# Patient Record
Sex: FEMALE | Race: WHITE | Employment: OTHER | URBAN - METROPOLITAN AREA
[De-identification: names, ages, dates, MRNs, and addresses within clinical notes are randomized per-mention and may not be internally consistent; named-entity substitution may affect disease eponyms.]

---

## 2017-01-06 ENCOUNTER — TELEPHONE (OUTPATIENT)
Dept: INTERNAL MEDICINE CLINIC | Age: 77
End: 2017-01-06

## 2017-01-06 NOTE — TELEPHONE ENCOUNTER
Patient called wanting to know if records have been sent to [facility]. She has not heard from them to schedule her appointment. please call patient back at [phone]

## 2017-01-09 NOTE — TELEPHONE ENCOUNTER
Spoke to pt - she states \"they would NOT give me an appointment, they said that they needed records to see if I was qualified to get an appointment with them. I called them the day after I was in your office and have not heard back from them. \" Pt was advised that I would be in contact with that office in attempts to get her scheduled. Pt states that she has an open schedule and is available whenever to schedule with Dr. Jackelyn Knapp.

## 2017-01-09 NOTE — TELEPHONE ENCOUNTER
LM on the general VM at [facility] requesting a staff member to contact me back at the office. Office number was provided.

## 2017-01-09 NOTE — TELEPHONE ENCOUNTER
Oral Hose Dr. Alta Easley was accepting new patients as of a few months ago. Please see if we can get her in to see him if possible. (he is [employer]). Other options for mental health (I'm not certain who has room for new patients at this time):    [facility]  Dr. Simon Ibarra, [address] [facility], [address]  64[address] [facility]   [phone]  [address] [facility], [address] [place] [address]  [phone]  201 [facility], 1 [facility]  [phone] 900 [facility] 101  [facility] 22.    [phone]   [address]   [address]  [address] [facility], [address]  Phone: [phone]      [facility]  Dr. Wilbur Juárez   [phone]  [address] [facility] [address]   [place] [id number]

## 2017-01-10 NOTE — TELEPHONE ENCOUNTER
Spoke to pt - she was advised of Dr. Esther Griffiths MD's recommendations and provided with all contact information. Ms. Hiral Winetr acknowledged understanding and all questions were answered to patients satisfaction. No further questions or concerns at this time. Requested pt to contact the office back with further information if she can get an appointment with a provider. Also, LVM on Dr. Wyatt Comes office VM requesting they contact the office back in attempts to get pt scheduled.

## 2017-01-10 NOTE — TELEPHONE ENCOUNTER
Left VM requesting pt to contact the office back to advise of further information and contacts provided by Dr. Alla Bui. Also, would like to advise pt that documentation has been faxed over to [facility]. Fax confirmation was received.

## 2017-03-23 RX ORDER — LOVASTATIN 40 MG/1
TABLET ORAL
Qty: 90 TAB | Refills: 0 | Status: SHIPPED | OUTPATIENT
Start: 2017-03-23 | End: 2017-06-20 | Stop reason: SDUPTHER

## 2017-04-25 RX ORDER — OMEPRAZOLE 20 MG/1
CAPSULE, DELAYED RELEASE ORAL
Qty: 180 CAP | Refills: 1 | Status: SHIPPED | OUTPATIENT
Start: 2017-04-25 | End: 2018-03-13 | Stop reason: SDUPTHER

## 2017-04-26 PROBLEM — Z79.891 CHRONICALLY ON OPIATE THERAPY: Status: ACTIVE | Noted: 2017-04-26

## 2017-04-26 RX ORDER — VALSARTAN 320 MG/1
TABLET ORAL
Qty: 90 TAB | Refills: 1 | Status: SHIPPED | OUTPATIENT
Start: 2017-04-26 | End: 2018-01-15 | Stop reason: SDUPTHER

## 2017-06-13 ENCOUNTER — HOSPITAL ENCOUNTER (OUTPATIENT)
Dept: LAB | Age: 77
Discharge: HOME | End: 2017-06-13
Payer: MEDICARE

## 2017-06-13 ENCOUNTER — OFFICE VISIT (OUTPATIENT)
Dept: INTERNAL MEDICINE CLINIC | Age: 77
End: 2017-06-13

## 2017-06-13 VITALS
HEIGHT: 64 IN | SYSTOLIC BLOOD PRESSURE: 110 MMHG | HEART RATE: 58 BPM | TEMPERATURE: 99.1 F | WEIGHT: 179 LBS | OXYGEN SATURATION: 96 % | RESPIRATION RATE: 16 BRPM | DIASTOLIC BLOOD PRESSURE: 60 MMHG | BODY MASS INDEX: 30.56 KG/M2

## 2017-06-13 DIAGNOSIS — E55.9 VITAMIN D DEFICIENCY: ICD-10-CM

## 2017-06-13 DIAGNOSIS — I10 BENIGN ESSENTIAL HYPERTENSION: Primary | ICD-10-CM

## 2017-06-13 DIAGNOSIS — E78.00 HYPERCHOLESTEREMIA: ICD-10-CM

## 2017-06-13 DIAGNOSIS — F32.A ANXIETY AND DEPRESSION: ICD-10-CM

## 2017-06-13 DIAGNOSIS — F41.9 ANXIETY AND DEPRESSION: ICD-10-CM

## 2017-06-13 DIAGNOSIS — I25.10 CORONARY ARTERY DISEASE INVOLVING NATIVE CORONARY ARTERY OF NATIVE HEART WITHOUT ANGINA PECTORIS: ICD-10-CM

## 2017-06-13 PROCEDURE — 81001 URINALYSIS AUTO W/SCOPE: CPT

## 2017-06-13 PROCEDURE — 82043 UR ALBUMIN QUANTITATIVE: CPT

## 2017-06-13 PROCEDURE — 82306 VITAMIN D 25 HYDROXY: CPT

## 2017-06-13 PROCEDURE — 80061 LIPID PANEL: CPT

## 2017-06-13 PROCEDURE — 85025 COMPLETE CBC W/AUTO DIFF WBC: CPT

## 2017-06-13 PROCEDURE — 36415 COLL VENOUS BLD VENIPUNCTURE: CPT

## 2017-06-13 PROCEDURE — 80053 COMPREHEN METABOLIC PANEL: CPT

## 2017-06-13 PROCEDURE — 84443 ASSAY THYROID STIM HORMONE: CPT

## 2017-06-13 RX ORDER — DOCUSATE SODIUM 100 MG/1
100 CAPSULE, LIQUID FILLED ORAL
Status: CANCELLED | OUTPATIENT
Start: 2017-06-13

## 2017-06-13 RX ORDER — POLYETHYLENE GLYCOL 3350 17 G/17G
POWDER, FOR SOLUTION ORAL
Qty: 24 PACKET | Refills: 0 | Status: SHIPPED | OUTPATIENT
Start: 2017-06-13 | End: 2018-03-19 | Stop reason: SDUPTHER

## 2017-06-13 NOTE — MR AVS SNAPSHOT
Visit Information Date & Time Provider Department Dept. Phone Encounter #  
 6/13/2017  1:15 PM Effie Mejia MD [place] [address] [facility] [phone] [id number] Follow-up Instructions Return in about 6 months (around 12/13/2017) for MWE, 30 min slot. Upcoming Health Maintenance Date Due DTaP/Tdap/Td series (1 - Tdap) 5/15/1961 ZOSTER VACCINE AGE 60> 5/15/2002 GLAUCOMA SCREENING Q2Y 5/15/2007 MEDICARE YEARLY EXAM 5/15/2007 Pneumococcal 65+ Low/Medium Risk (2 of 2 - PPSV23) 3/3/2016 INFLUENZA AGE 9 TO ADULT 8/1/2017 COLONOSCOPY 1/12/2026 Allergies as of 6/13/2017  Review Complete On: 6/13/2017 By: Effie Mejia MD  
 No Known Allergies Current Immunizations  Reviewed on 12/13/2016 Name Date Influenza High Dose Vaccine PF 12/1/2016 Influenza Vaccine PF 10/17/2014, 11/12/2013 12:18 PM  
 Influenza Vaccine Split 12/5/2012 Influenza Vaccine Whole 1/1/2010 Pneumococcal Conjugate (PCV-13) 3/3/2015 Not reviewed this visit You Were Diagnosed With   
  
 Codes Comments Vitamin D deficiency    -  Primary ICD-10-CM: E55.9 ICD-9-CM: 268.9 Benign essential hypertension     ICD-10-CM: I10 
ICD-9-CM: 401.1 Hypercholesteremia     ICD-10-CM: E78.00 ICD-9-CM: 272.0 Anxiety and depression     ICD-10-CM: F41.9, F32.9 ICD-9-CM: 300.00, 311 Coronary artery disease involving native coronary artery of native heart without angina pectoris     ICD-10-CM: I25.10 ICD-9-CM: 414.01 Vitals BP Pulse Temp Resp Height(growth percentile) Weight(growth percentile) 110/60 (BP 1 Location: Left arm, BP Patient Position: Sitting) (!) 58 99.1 °F (37.3 °C) (Oral) 16 5' 4\" (1.626 m) 179 lb (81.2 kg) SpO2 BMI OB Status Smoking Status 96% 30.73 kg/m2 Postmenopausal Former Smoker Vitals History BMI and BSA Data Body Mass Index Body Surface Area 30.73 kg/m 2 1.91 m 2 Preferred Pharmacy Pharmacy Name Phone [facility] [address] [facility] [address] [facility] [phone] Your Updated Medication List  
  
   
This list is accurate as of: 6/13/17  2:11 PM.  Always use your most recent med list. amLODIPine 10 mg tablet Commonly known as:  Frye Asher TAKE ONE TABLET BY MOUTH DAILY  
  
 aspirin 81 mg tablet Take 81 mg by mouth. B.infantis-B.ani-B.long-B.bifi 10-15 mg Tbec Take  by mouth. Calcium Carbonate-Vit D3-Min 600 mg calcium- 400 unit Tab Take 1 Tab by mouth daily. celecoxib 200 mg capsule Commonly known as:  CELEBREX Take 200 mg by mouth daily. cloNIDine HCl 0.2 mg tablet Commonly known as:  CATAPRES Take 0.2 mg by mouth two (2) times a day. COLACE 100 mg capsule Generic drug:  docusate sodium Take 100 mg by mouth daily as needed. DOMPERIDONE (BULK) Take 10 mg by mouth four (4) times daily. DULoxetine 60 mg capsule Commonly known as:  CYMBALTA Take 60 mg by mouth daily. furosemide 80 mg tablet Commonly known as:  LASIX TAKE ONE TABLET BY MOUTH DAILY HYDROcodone-acetaminophen  mg tablet Commonly known as:  NORCO  
four (4) times daily. lovastatin 40 mg tablet Commonly known as:  MEVACOR  
TAKE ONE TABLET BY MOUTH EVERY NIGHT  
  
 LYRICA PO Take 150 mg by mouth two (2) times a day. Pt unsure of dosage  
  
 melatonin 5 mg Cap capsule Take 5 mg by mouth nightly. metoprolol tartrate 100 mg IR tablet Commonly known as:  LOPRESSOR Take 1 Tab by mouth two (2) times a day. multivitamin with iron tablet Take 1 Tab by mouth daily. omeprazole 20 mg capsule Commonly known as:  PRILOSEC  
TAKE ONE CAPSULE BY MOUTH TWICE A DAY  
  
 polyethylene glycol 17 gram packet Commonly known as:  Nick Hoops 1 packet as needed daily. No more than three times a week  
  
 valsartan 320 mg tablet Commonly known as:  DIOVAN  
TAKE ONE TABLET BY MOUTH DAILY ZyrTEC 10 mg tablet Generic drug:  cetirizine Take 10 mg by mouth daily. Prescriptions Sent to Pharmacy Refills  
 polyethylene glycol (MIRALAX) 17 gram packet 0 Si packet as needed daily. No more than three times a week Class: Normal  
 Pharmacy: [facility] [address] [facility] [address] Lower[address] [facility] Ph #: [phone] We Performed the Following CBC WITH AUTOMATED DIFF [55482 CPT(R)] LIPID PANEL [24394 CPT(R)] METABOLIC PANEL, COMPREHENSIVE [54119 CPT(R)] MICROALBUMIN, UR, RAND W/ MICROALBUMIN/CREA RATIO S067005 CPT(R)] TSH REFLEX TO T4 [[id number] Custom] UA/M W/RFLX CULTURE, ROUTINE [[id number] Custom] VITAMIN D, 25 HYDROXY [id number] CPT(R)] Follow-up Instructions Return in about 6 months (around 2017) for MWE, 30 min slot. Please provide this summary of care documentation to your next provider. Your primary care clinician is listed as Yuan Karma. If you have any questions after today's visit, please call [phone].

## 2017-06-13 NOTE — PROGRESS NOTES
Establish Care       HPI:  Judy Bailey is a 78y.o. year old female who is here to establish care. She  had her medical care:    [facility]    She reports the following history and medical concerns:      HTN- s/p MI in . Dr. Judyann Favre  Valsartan  Clonidine  Amlodipine    Chol- mevacor 40 mg. Also on lasix 80 mg for fluid retention- Dr. Judyann Favre  (episode in 2016 when she was on a lower dose of lasix)  Developed pressure sore on ankle and had to go to wound care. Had a nuclear stress test.    Metabolic encephalopathy- opiod use as per discharge summary 2016    Dr. Tori Murphy- pain management. On oxycodone, lyrica, and cymbalta. Took her off paxil. Significant other  recently. Life has been hell since then regarding the trust.    Aware of risk of kidney issues and ulcers with celebrex        Assessment and Plan        1. Vitamin D deficiency  Patient compliant with Vit D. Emphasized importance of taking with food and not too much because it can be toxic to our body. Therefore, it should be checked regularly. Levels ordered. - VITAMIN D, 25 HYDROXY    2. Benign essential hypertension  Tolerating medication. Denies dizziness that is positional, SOB, or chest pain. Understands the importance of compliance to reduce risk of future heart failure. Agreed to call if any of above symptoms develop and  stay on current regimen of  clonodine and valsartan and amlodipine. Concerned about her use of lasix 80 mg but she states she needs it. Will check creatinine.  - CBC WITH AUTOMATED DIFF  - METABOLIC PANEL, COMPREHENSIVE  - MICROALBUMIN, UR, RAND W/ MICROALBUMIN/CREA RATIO  - UA/M W/RFLX CULTURE, ROUTINE    3. Hypercholesteremia  The nature of cardiac risk has been fully discussed with this patient. I have made her aware of her LDL target goal given her cardiovascular risk analysis. I have discussed the appropriate diet. The need for lifelong compliance in order to reduce risk is stressed.  A regular exercise program is recommended to help achieve and maintain normal body weight, fitness and improve lipid balance. The risks and benefits of medications were discussed. Advised to have Omega 3 Fish Oil 1000 mg as a supplement. Last cholesterol labs reviewed with patient. Patient made aware to get liver checked every 6 months. Continue with  mevacor for now although old drug.  - LIPID PANEL  - TSH REFLEX TO T4    4. Anxiety and depression  Doing well on cymbalta. 5. Coronary artery disease involving native coronary artery of native heart without angina pectoris  Stable. Stopped smoking cigars. 6. Pain management- concerned about her use of chronic celebrex. Risk for heart disease and kidney issues. Will check Cr.          Visit Vitals    /60 (BP 1 Location: Left arm, BP Patient Position: Sitting)    Pulse (!) 58    Temp 99.1 °F (37.3 °C) (Oral)    Resp 16    Ht 5' 4\" (1.626 m)    Wt 179 lb (81.2 kg)    SpO2 96%    BMI 30.73 kg/m2       Historical Data    Past Medical History:   Diagnosis Date    Arthritis     Beta-blocker therapy     CAD (coronary artery disease) 1999    MI >> stent x3, cath 2010: OK    Chronic pain     back pain    Delayed gastric emptying     Depression     Depression with anxiety     Dyspepsia and other specified disorders of function of stomach     GERD (gastroesophageal reflux disease)     Hypercholesterolemia     Hypertension        Past Surgical History:   Procedure Laterality Date    HX CATARACT REMOVAL      HX CORONARY STENT PLACEMENT      HX DILATION AND CURETTAGE      HX HEENT      ORAL SX    HX LUMBAR LAMINECTOMY  12/2012    Dr. Pool Bass    R Maury Sanchez 8 HX OTHER SURGICAL      RECTAL SX TWICE; ABCESS; FISTULA    HX SMALL BOWEL RESECTION  10/31/14    Obstruction, Dr. Bhupendra Caceres, 25 in removed    HX TONSILLECTOMY         Outpatient Encounter Prescriptions as of 6/13/2017   Medication Sig Dispense Refill    polyethylene glycol (MIRALAX) 17 gram packet 1 packet as needed daily. No more than three times a week 24 Packet 0    valsartan (DIOVAN) 320 mg tablet TAKE ONE TABLET BY MOUTH DAILY 90 Tab 1    omeprazole (PRILOSEC) 20 mg capsule TAKE ONE CAPSULE BY MOUTH TWICE A  Cap 1    lovastatin (MEVACOR) 40 mg tablet TAKE ONE TABLET BY MOUTH EVERY NIGHT 90 Tab 0    furosemide (LASIX) 80 mg tablet TAKE ONE TABLET BY MOUTH DAILY 90 Tab 1    melatonin (MELATONIN) 5 mg cap capsule Take 5 mg by mouth nightly.  cloNIDine HCl (CATAPRES) 0.2 mg tablet Take 0.2 mg by mouth two (2) times a day.  DULoxetine (CYMBALTA) 60 mg capsule Take 60 mg by mouth daily.  HYDROcodone-acetaminophen (NORCO)  mg tablet four (4) times daily.  multivitamin with iron tablet Take 1 Tab by mouth daily.  amLODIPine (NORVASC) 10 mg tablet TAKE ONE TABLET BY MOUTH DAILY 90 Tab 3    PREGABALIN (LYRICA PO) Take 150 mg by mouth two (2) times a day. Pt unsure of dosage      celecoxib (CELEBREX) 200 mg capsule Take 200 mg by mouth daily.  B.infantis-B.ani-B.long-B.bifi 10-15 mg TbEC Take  by mouth.  cetirizine (ZYRTEC) 10 mg tablet Take 10 mg by mouth daily.  metoprolol (LOPRESSOR) 100 mg tablet Take 1 Tab by mouth two (2) times a day. 180 Tab 1    Calcium Carbonate-Vit D3-Min 600 mg calcium- 400 unit Tab Take 1 Tab by mouth daily.  docusate sodium (COLACE) 100 mg capsule Take 100 mg by mouth daily as needed.  DOMPERIDONE, BULK, Take 10 mg by mouth four (4) times daily.  aspirin 81 mg tablet Take 81 mg by mouth. No facility-administered encounter medications on file as of 6/13/2017. No Known Allergies     Social History     Social History    Marital status:      Spouse name: N/A    Number of children: N/A    Years of education: N/A     Occupational History    Not on file.      Social History Main Topics    Smoking status: Former Smoker     Packs/day: 1.00     Types: Cigarettes     Quit date: 1/1/2006    Smokeless tobacco: Never Used    Alcohol use 1.8 oz/week     3 Standard drinks or equivalent per week    Drug use: No    Sexual activity: Not Currently     Other Topics Concern    Not on file     Social History Narrative    Julia lives independently. One daughter is nurse at [employer], one daughter  at [employer].         family history includes Depression in her maternal aunt, maternal grandfather, maternal grandmother, and maternal uncle; Heart Disease in her brother, father, and mother; Hypertension in her brother and mother; Stroke in her father. Review of Systems   Constitutional: Negative for weight loss. Eyes: Negative for blurred vision. Respiratory: Negative for shortness of breath. Cardiovascular: Negative for chest pain. Gastrointestinal: Negative for abdominal pain. Genitourinary: Negative for dysuria and frequency. Skin: Negative for rash. Neurological: Negative for dizziness, focal weakness, weakness and headaches. Endo/Heme/Allergies: Negative for environmental allergies. Does not bruise/bleed easily. Physical Exam   Constitutional: She appears well-developed and well-nourished. She is active. Non-toxic appearance. She does not have a sickly appearance. She does not appear ill. No distress. Eyes: Conjunctivae are normal. Right eye exhibits no discharge. Cardiovascular: Normal rate, regular rhythm, S1 normal, S2 normal, normal heart sounds and normal pulses. Exam reveals no gallop and no friction rub. Pulmonary/Chest: Effort normal and breath sounds normal. No respiratory distress. Abdominal: Soft. Bowel sounds are normal.   Musculoskeletal: She exhibits no edema or deformity. Neurological: She is alert. Skin: Skin is warm and dry. No rash noted. No pallor. Psychiatric: She has a normal mood and affect. Her behavior is normal.   Vitals reviewed.      Ortho Exam       Orders Placed This Encounter  CBC WITH AUTOMATED DIFF    LIPID PANEL    TSH REFLEX TO T4    METABOLIC PANEL, COMPREHENSIVE    VITAMIN D, 25 HYDROXY    MICROALBUMIN, UR, RAND W/ MICROALBUMIN/CREA RATIO    UA/M W/RFLX CULTURE, ROUTINE    polyethylene glycol (MIRALAX) 17 gram packet     Si packet as needed daily. No more than three times a week     Dispense:  24 Packet     Refill:  0        I have reviewed the patient's medical history in detail and updated the computerized patient record. We had a prolonged discussion about these complex clinical issues and went over the various important aspects to consider. All questions were answered. Advised her to call back or return to office if symptoms do not improve, change in nature, or persist.    She was given an after visit summary or informed of [employer] [facility] which includes patient instructions, diagnoses, current medications, & vitals. She expressed understanding with the diagnosis and plan.

## 2017-06-13 NOTE — PROGRESS NOTES
1. Have you been to the ER, urgent care clinic since your last visit? Hospitalized since your last visit? No    2. Have you seen or consulted any other health care providers outside of the [address] [place] [address] since your last visit? Include any pap smears or colon screening.  No  Chief Complaint   Patient presents with   [address]

## 2017-06-14 LAB
25(OH)D3+25(OH)D2 SERPL-MCNC: 31.8 NG/ML (ref 30–100)
ALBUMIN SERPL-MCNC: 4.2 G/DL (ref 3.5–4.8)
ALBUMIN/CREAT UR: <19.1 MG/G CREAT (ref 0–30)
ALBUMIN/GLOB SERPL: 1.8 {RATIO} (ref 1.2–2.2)
ALP SERPL-CCNC: 77 IU/L (ref 39–117)
ALT SERPL-CCNC: 16 IU/L (ref 0–32)
APPEARANCE UR: CLEAR
AST SERPL-CCNC: 23 IU/L (ref 0–40)
BACTERIA #/AREA URNS HPF: NORMAL /[HPF]
BASOPHILS # BLD AUTO: 0 X10E3/UL (ref 0–0.2)
BASOPHILS NFR BLD AUTO: 0 %
BILIRUB SERPL-MCNC: 0.5 MG/DL (ref 0–1.2)
BILIRUB UR QL STRIP: NEGATIVE
BUN SERPL-MCNC: 15 MG/DL (ref 8–27)
BUN/CREAT SERPL: 21 (ref 12–28)
CALCIUM SERPL-MCNC: 9.2 MG/DL (ref 8.7–10.3)
CASTS URNS QL MICRO: NORMAL /LPF
CHLORIDE SERPL-SCNC: 99 MMOL/L (ref 96–106)
CHOLEST SERPL-MCNC: 152 MG/DL (ref 100–199)
CO2 SERPL-SCNC: 25 MMOL/L (ref 18–29)
COLOR UR: YELLOW
CREAT SERPL-MCNC: 0.71 MG/DL (ref 0.57–1)
CREAT UR-MCNC: 15.7 MG/DL
EOSINOPHIL # BLD AUTO: 0.1 X10E3/UL (ref 0–0.4)
EOSINOPHIL NFR BLD AUTO: 1 %
EPI CELLS #/AREA URNS HPF: NORMAL /HPF
ERYTHROCYTE [DISTWIDTH] IN BLOOD BY AUTOMATED COUNT: 13.5 % (ref 12.3–15.4)
GLOBULIN SER CALC-MCNC: 2.4 G/DL (ref 1.5–4.5)
GLUCOSE SERPL-MCNC: 85 MG/DL (ref 65–99)
GLUCOSE UR QL: NEGATIVE
HCT VFR BLD AUTO: 41 % (ref 34–46.6)
HDLC SERPL-MCNC: 39 MG/DL
HGB BLD-MCNC: 13.8 G/DL (ref 11.1–15.9)
HGB UR QL STRIP: NEGATIVE
IMM GRANULOCYTES # BLD: 0 X10E3/UL (ref 0–0.1)
IMM GRANULOCYTES NFR BLD: 0 %
INTERPRETATION, 910389: NORMAL
KETONES UR QL STRIP: NEGATIVE
LDLC SERPL CALC-MCNC: 60 MG/DL (ref 0–99)
LEUKOCYTE ESTERASE UR QL STRIP: NEGATIVE
LYMPHOCYTES # BLD AUTO: 2.4 X10E3/UL (ref 0.7–3.1)
LYMPHOCYTES NFR BLD AUTO: 24 %
MCH RBC QN AUTO: 32.2 PG (ref 26.6–33)
MCHC RBC AUTO-ENTMCNC: 33.7 G/DL (ref 31.5–35.7)
MCV RBC AUTO: 96 FL (ref 79–97)
MICRO URNS: NORMAL
MICRO URNS: NORMAL
MICROALBUMIN UR-MCNC: <3 UG/ML
MONOCYTES # BLD AUTO: 0.6 X10E3/UL (ref 0.1–0.9)
MONOCYTES NFR BLD AUTO: 6 %
NEUTROPHILS # BLD AUTO: 6.9 X10E3/UL (ref 1.4–7)
NEUTROPHILS NFR BLD AUTO: 69 %
NITRITE UR QL STRIP: NEGATIVE
PH UR STRIP: 6 [PH] (ref 5–7.5)
PLATELET # BLD AUTO: 208 X10E3/UL (ref 150–379)
POTASSIUM SERPL-SCNC: 4.3 MMOL/L (ref 3.5–5.2)
PROT SERPL-MCNC: 6.6 G/DL (ref 6–8.5)
PROT UR QL STRIP: NEGATIVE
RBC # BLD AUTO: 4.29 X10E6/UL (ref 3.77–5.28)
RBC #/AREA URNS HPF: NORMAL /HPF
SODIUM SERPL-SCNC: 141 MMOL/L (ref 134–144)
SP GR UR: 1.01 (ref 1–1.03)
TRIGL SERPL-MCNC: 264 MG/DL (ref 0–149)
TSH SERPL DL<=0.005 MIU/L-ACNC: 3.88 UIU/ML (ref 0.45–4.5)
URINALYSIS REFLEX, 377202: NORMAL
UROBILINOGEN UR STRIP-MCNC: 0.2 MG/DL (ref 0.2–1)
VLDLC SERPL CALC-MCNC: 53 MG/DL (ref 5–40)
WBC # BLD AUTO: 10.1 X10E3/UL (ref 3.4–10.8)
WBC #/AREA URNS HPF: NORMAL /HPF

## 2017-06-17 NOTE — PROGRESS NOTES
Let her know her kidneys and liver are ok but her TG are very high indicating too high carb intake like sweets, bread, potatoes, pasta. When too high it can cause pancreatitis. Ok to mail copy if she wants of her labs. Her urine culture was negative.   Ask if she is having urine symptoms  Signed electronically by: Simone Ray MD at 8:11 AM on June 17, 2017

## 2017-06-20 NOTE — PROGRESS NOTES
Patient notified if lab results. Patient states she does not eat a lot of starches. She has gastroparesis. She states she is taking lovastatin 40 mg for cholesterol. She is wondering if she need to increase her dosage.

## 2017-09-05 ENCOUNTER — HOSPITAL ENCOUNTER (OUTPATIENT)
Dept: PHYSICAL THERAPY | Age: 77
Discharge: HOME | End: 2017-09-05
Payer: MEDICARE

## 2017-09-05 PROCEDURE — 97110 THERAPEUTIC EXERCISES: CPT

## 2017-09-05 PROCEDURE — G8979 MOBILITY GOAL STATUS: HCPCS

## 2017-09-05 PROCEDURE — G8978 MOBILITY CURRENT STATUS: HCPCS

## 2017-09-05 PROCEDURE — 97162 PT EVAL MOD COMPLEX 30 MIN: CPT

## 2017-09-05 NOTE — PROGRESS NOTES
[facility]  [address]  Phone: [phone]  Fax: [phone]      Plan of Care/Statement of Necessity for Physical Therapy Services  2-15    Patient name: Alicja Rodas  : 1940  Provider#: [id number]  Referral source: Brenda Osorio MD      Medical/Treatment Diagnosis: Low back pain [M54.5]     Prior Hospitalization: see medical history     Comorbidities: GERD, CAD, chronic back pain, anxiety and depression, spinal stenosis, lumbar region with neurogenic claudication, gastroparesis, s/p colon resection, dextroscoliosis, bilateral leg edema, HTN, mild obstructive sleep apnea on CPAP, osteopenia, persistent disorder of initiating or maintaining sleep, hypercholesteremia, chronic neck and back pain, iron deficiency anemia, chronically on opiate therapy  Prior Level of Function: chronic back pain; retired  Medications: Verified on Patient Summary List  Start of Care: 2017     Onset Date: chronic   The Plan of Care and following information is based on the information from the initial evaluation. Assessment/ key information: Pt is a 78year old female who is referred to Physical Therapy by Savanna Marti, with a diagnosis of post-laminectomy syndrome and lumbar radiculopathy. Pt presents poor postural awareness and is a poor pelvic girdle stabilizer. Pt demonstrates weakness in bilateral LEs. Pt wears a lumbar brace for support and has a pain stimulator implanted for management of chronic back pain.   Patient will benefit from skilled PT services to modify and progress therapeutic interventions, address functional mobility deficits, address ROM deficits, address strength deficits, analyze and address soft tissue restrictions, analyze and cue movement patterns, analyze and modify body mechanics/ergonomics, assess and modify postural abnormalities, address imbalance/dizziness and instruct in home and community integration to attain pt/PT goals. Evaluation Complexity History HIGH Complexity :3+ comorbidities / personal factors will impact the outcome/ POC ; Examination HIGH Complexity : 4+ Standardized tests and measures addressing body structure, function, activity limitation and / or participation in recreation  ;Presentation MEDIUM Complexity : Evolving with changing characteristics  ; Clinical Decision Making MEDIUM Complexity : FOTO score of 26-74  Overall Complexity Rating: MEDIUM    Problem List: pain affecting function, decrease ROM, decrease strength, edema affecting function, impaired gait/ balance, decrease ADL/ functional abilitiies, decrease activity tolerance, decrease flexibility/ joint mobility and decrease transfer abilities   Treatment Plan may include any combination of the following: Therapeutic exercise, Therapeutic activities, Neuromuscular re-education, Physical agent/modality, Gait/balance training, Manual therapy, Aquatic therapy, Patient education, Functional mobility training and Stair training  Patient / Family readiness to learn indicated by: asking questions, trying to perform skills and interest  Persons(s) to be included in education: patient (P)  Barriers to Learning/Limitations: None  Patient Goal (s): Improve walking  Patient Self Reported Health Status: good  Rehabilitation Potential: fair    Short Term Goals: To be accomplished in 2 weeks:  1) Pt will be Independent with HEP. 2) Pt will be able to sit greater than 30 minutes without pain. 3) Pt will be able to stand greater than 10 minutes without increase of pain to perform meal preparation. 4) Pt will be able to ambulate greater than 0.5 block without increase of pain with LRAD. Long Term Goals: To be accomplished in 4 weeks:  1) Pt will be able to stand greater than 15 minutes without increase of pain to perform household chores. 2) Pt will be able to ambulate greater than1 block without increase of pain with LRAD.   3) Pt will be able to retrieve item from ground without pain. 4) Pt will report improvement in overall functional mobility, as measured by FOTO, with an increased score of at least 8 points, from 38 to 46. Frequency / Duration: Patient to be seen 2 times per week for 4 weeks. Patient/ Caregiver education and instruction: self care, activity modification and exercises    [x]  Plan of care has been reviewed with PTA    G-Codes (GP)  Mobility   Current  CL= 60-79%   Goal  CK= 40-59%  The severity rating is based on clinical judgment and the FOTO Score. Certification Period: 09/05/2017-12/05/2107  Carmelo Souza PT, DPT   9/5/2017 1:43 PM    ________________________________________________________________________    I certify that the above Therapy Services are being furnished while the patient is under my care. I agree with the treatment plan and certify that this therapy is necessary.     [de-identified] Signature:____________________  Date:____________Time: _________
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
## 2017-09-11 ENCOUNTER — HOSPITAL ENCOUNTER (OUTPATIENT)
Dept: PHYSICAL THERAPY | Age: 77
Discharge: HOME | End: 2017-09-11
Payer: MEDICARE

## 2017-09-11 PROCEDURE — 97110 THERAPEUTIC EXERCISES: CPT

## 2017-09-11 NOTE — PROGRESS NOTES
PT DAILY TREATMENT NOTE - [facility] 2-15    Patient Name: Grant Tillman  Date:2017  : 1940  [x]  Patient  Verified  Payor: Edgar Jimenez / Plan: [place] MEDICARE PART A & B / Product Type: Medicare /    In time:1:45pm  Out time: 2:46/105pm  Total Treatment Time (min): 60  Total Timed Codes (min): 60  1:1 Treatment Time ( [address] only): 30   Visit #: 2     Treatment Area: Low back pain [M54.5]    SUBJECTIVE  Pain Level (0-10 scale): 6/10  Any medication changes, allergies to medications, adverse drug reactions, diagnosis change, or new procedure performed?: [x] No    [] Yes (see summary sheet for update)  Subjective functional status/changes:   [] No changes reported  Pt reports compliance with HEP. Pt brings in rollator and is interested if we can help move back wheels from the inside to the outside because when she walks (with the wheels on the inside), she trips on them. OBJECTIVE  50 min Therapeutic Exercise:  [x] See flow sheet :   Rationale: increase ROM, increase strength and improve coordination to improve the patients ability to activate core stabilizing muscles to support LB and improve pt's ability to perform functional activities          10  With   [] TE   [] TA   [] neuro   [] other: Patient Education: [x] Review HEP    [] Progressed/Changed HEP based on:   [] positioning   [] body mechanics   [] transfers   [] heat/ice application    [x] other: able to move back 2 wheels on rollator from the inside to the outside. Other Objective/Functional Measures: --     Pain Level (0-10 scale) post treatment: 5/10    ASSESSMENT/Changes in Function:   Pt tolerated progression of therapeutic exercises without increase in pain. Used foam roll to facilitate activation of lower abdominals during posterior pelvic tilt with abdominal brace exercise.    Patient will continue to benefit from skilled PT services to modify and progress therapeutic interventions, address functional mobility deficits, address ROM deficits, address strength deficits, analyze and address soft tissue restrictions, analyze and cue movement patterns, analyze and modify body mechanics/ergonomics, assess and modify postural abnormalities and address imbalance to attain remaining goals. []  See Plan of Care  []  See progress note/recertification  []  See Discharge Summary         Progress towards goals / Updated goals:  Short Term Goals: To be accomplished in 2 weeks:  1) Pt will be Independent with HEP. 2) Pt will be able to sit greater than 30 minutes without pain. 3) Pt will be able to stand greater than 10 minutes without increase of pain to perform meal preparation. 4) Pt will be able to ambulate greater than 0.5 block without increase of pain with LRAD.      Long Term Goals: To be accomplished in 4 weeks:  1) Pt will be able to stand greater than 15 minutes without increase of pain to perform household chores. 2) Pt will be able to ambulate greater than1 block without increase of pain with LRAD. 3) Pt will be able to retrieve item from ground without pain. 4) Pt will report improvement in overall functional mobility, as measured by FOTO, with an increased score of at least 8 points, from 38 to 46.         PLAN  [x]  Upgrade activities as tolerated     [x]  Continue plan of care  []  Update interventions per flow sheet       []  Discharge due to:_  []  Other:_      Oswald Orellana PT, DPT   9/11/2017  3:18 PM

## 2017-09-14 ENCOUNTER — HOSPITAL ENCOUNTER (OUTPATIENT)
Dept: PHYSICAL THERAPY | Age: 77
Discharge: HOME | End: 2017-09-14
Payer: MEDICARE

## 2017-09-14 PROCEDURE — 97110 THERAPEUTIC EXERCISES: CPT

## 2017-09-14 NOTE — PROGRESS NOTES
PT DAILY TREATMENT NOTE - [facility] 2-15    Patient Name: Benito Zimmerman  Date:2017  : 1940  [x]  Patient  Verified  Payor: Skye Falk / Plan: [place] MEDICARE PART A & B / Product Type: Medicare /    In time:1:15pm  Out time: 2:15pm  Total Treatment Time (min): 60  Total Timed Codes (min): 50  1:1 Treatment Time ( [address] only): 50  Visit #: 3    Treatment Area: Low back pain [M54.5]    SUBJECTIVE  Pain Level (0-10 scale): 8/10  Any medication changes, allergies to medications, adverse drug reactions, diagnosis change, or new procedure performed?: [x] No    [] Yes (see summary sheet for update)  Subjective functional status/changes:   [] No changes reported  Pt complains of increased pain in hips and legs from doing a lot of driving since last PT session and from additional exercises last visit.     OBJECTIVE  Modality rationale: decrease pain and increase tissue extensibility to improve the patients ability to ambulate and perform functional activities safely   Min Type Additional Details    [] Estim: []Att   []Unatt        []TENS instruct                  []IFC  []Premod   []NMES                     []Other:  []w/US   []w/ice   []w/heat  Position:  Location:    []  Traction: [] Cervical       []Lumbar                       [] Prone          []Supine                       []Intermittent   []Continuous Lbs:  [] before manual  [] after manual  []w/heat    []  Ultrasound: []Continuous   [] Pulsed at:                           []1MHz   []3MHz Location:  W/cm2:    [] Paraffin         Location:   []w/heat   10 []  Ice     [x]  Heat  []  Ice massage Position: right side-lying with pillow between knees  Location: low back at end of session    []  Laser  []  Other: Position:  Location:      []  Vasopneumatic Device Pressure:       [] lo [] med [] hi   Temperature:    [x] Skin assessment post-treatment:  [x]intact []redness- no adverse reaction    []redness  adverse reaction:     50 min Therapeutic Exercise:  [x] See flow sheet :   Rationale: increase ROM, increase strength and improve coordination to improve the patients ability to activate core stabilizing muscles to support LB and improve pt's ability to perform functional activities            With   [] TE   [] TA   [] neuro   [] other: Patient Education: [x] Review HEP    [] Progressed/Changed HEP based on:   [] positioning   [] body mechanics   [] transfers   [] heat/ice application    [] other:     Other Objective/Functional Measures: --     Pain Level (0-10 scale) post treatment: 4/10    ASSESSMENT/Changes in Function:   Pt reported significant benefit from moist hot pack at end of session. Progressed strengthening and core stabilization exercises without increase in pain. Pt educated about postural awareness, especially when ambulating with rollator. Patient will continue to benefit from skilled PT services to modify and progress therapeutic interventions, address functional mobility deficits, address ROM deficits, address strength deficits, analyze and address soft tissue restrictions, analyze and cue movement patterns, analyze and modify body mechanics/ergonomics, assess and modify postural abnormalities and address imbalance to attain remaining goals. []  See Plan of Care  []  See progress note/recertification  []  See Discharge Summary         Progress towards goals / Updated goals:  Short Term Goals: To be accomplished in 2 weeks:  1) Pt will be Independent with HEP. 2) Pt will be able to sit greater than 30 minutes without pain. 3) Pt will be able to stand greater than 10 minutes without increase of pain to perform meal preparation. 4) Pt will be able to ambulate greater than 0.5 block without increase of pain with LRAD.      Long Term Goals: To be accomplished in 4 weeks:  1) Pt will be able to stand greater than 15 minutes without increase of pain to perform household chores.   2) Pt will be able to ambulate greater than1 block without increase of pain with LRAD. 3) Pt will be able to retrieve item from ground without pain. 4) Pt will report improvement in overall functional mobility, as measured by FOTO, with an increased score of at least 8 points, from 38 to 46.         PLAN  [x]  Upgrade activities as tolerated     [x]  Continue plan of care  []  Update interventions per flow sheet       []  Discharge due to:_  []  Other:_      Alfred Causey, PT, DPT   9/14/2017  3:18 PM

## 2017-09-18 ENCOUNTER — APPOINTMENT (OUTPATIENT)
Dept: PHYSICAL THERAPY | Age: 77
End: 2017-09-18
Payer: MEDICARE

## 2017-09-19 ENCOUNTER — HOSPITAL ENCOUNTER (OUTPATIENT)
Dept: PHYSICAL THERAPY | Age: 77
Discharge: HOME | End: 2017-09-19
Payer: MEDICARE

## 2017-09-19 PROCEDURE — 97110 THERAPEUTIC EXERCISES: CPT

## 2017-09-19 NOTE — PROGRESS NOTES
PT DAILY TREATMENT NOTE - [facility] 2-15    Patient Name: Angel Dinero  Date:2017  : 1940  [x]  Patient  Verified  Payor: [place] MEDICARE / Plan: [place] MEDICARE PART A & B / Product Type: Medicare /    In time: 1:30pm  Out time: 2:30pm  Total Treatment Time (min): 60  Total Timed Codes (min): 50  1:1 Treatment Time ( [address] only): 50  Visit #: 4    Treatment Area: Low back pain [M54.5]    SUBJECTIVE  Pain Level (0-10 scale): 8/10  Any medication changes, allergies to medications, adverse drug reactions, diagnosis change, or new procedure performed?: [x] No    [] Yes (see summary sheet for update)  Subjective functional status/changes:   [] No changes reported  Pt reports compliance with HEP and has been practicing sit to/from stand with controlled descent. Pt feels that exercises are helping manage pain.     OBJECTIVE  Modality rationale: decrease pain and increase tissue extensibility to improve the patients ability to ambulate and perform functional activities safely   Min Type Additional Details    [] Estim: []Att   []Unatt        []TENS instruct                  []IFC  []Premod   []NMES                     []Other:  []w/US   []w/ice   []w/heat  Position:  Location:    []  Traction: [] Cervical       []Lumbar                       [] Prone          []Supine                       []Intermittent   []Continuous Lbs:  [] before manual  [] after manual  []w/heat    []  Ultrasound: []Continuous   [] Pulsed at:                           []1MHz   []3MHz Location:  W/cm2:    [] Paraffin         Location:   []w/heat   10 []  Ice     [x]  Heat  []  Ice massage Position: right side-lying with pillow between knees  Location: low back at end of session    []  Laser  []  Other: Position:  Location:      []  Vasopneumatic Device Pressure:       [] lo [] med [] hi   Temperature:    [x] Skin assessment post-treatment:  [x]intact []redness- no adverse reaction    []redness  adverse reaction:     50 min Therapeutic Exercise: [x] See flow sheet :   Rationale: increase ROM, increase strength and improve coordination to improve the patients ability to activate core stabilizing muscles to support LB and improve pt's ability to perform functional activities            With   [] TE   [] TA   [] neuro   [] other: Patient Education: [x] Review HEP    [] Progressed/Changed HEP based on:   [] positioning   [] body mechanics   [] transfers   [] heat/ice application    [] other:     Other Objective/Functional Measures: --     Pain Level (0-10 scale) post treatment: 4/10    ASSESSMENT/Changes in Function:   Pt tolerated increase resistance with LE strengthening (mat) exercises with increase in pain. Patient will continue to benefit from skilled PT services to modify and progress therapeutic interventions, address functional mobility deficits, address ROM deficits, address strength deficits, analyze and address soft tissue restrictions, analyze and cue movement patterns, analyze and modify body mechanics/ergonomics, assess and modify postural abnormalities and address imbalance to attain remaining goals. []  See Plan of Care  []  See progress note/recertification  []  See Discharge Summary         Progress towards goals / Updated goals:  Short Term Goals: To be accomplished in 2 weeks:  1) Pt will be Independent with HEP. MET  2) Pt will be able to sit greater than 30 minutes without pain. 3) Pt will be able to stand greater than 10 minutes without increase of pain to perform meal preparation. 4) Pt will be able to ambulate greater than 0.5 block without increase of pain with LRAD.      Long Term Goals: To be accomplished in 4 weeks:  1) Pt will be able to stand greater than 15 minutes without increase of pain to perform household chores. 2) Pt will be able to ambulate greater than1 block without increase of pain with LRAD. 3) Pt will be able to retrieve item from ground without pain.   4) Pt will report improvement in overall functional mobility, as measured by FOTO, with an increased score of at least 8 points, from 38 to 46.         PLAN  [x]  Upgrade activities as tolerated     [x]  Continue plan of care  []  Update interventions per flow sheet       []  Discharge due to:_  []  Other:_      Shankar Delacruz, PT, DPT   9/19/2017  3:18 PM

## 2017-09-21 ENCOUNTER — HOSPITAL ENCOUNTER (OUTPATIENT)
Dept: PHYSICAL THERAPY | Age: 77
Discharge: HOME | End: 2017-09-21
Payer: MEDICARE

## 2017-09-21 PROCEDURE — 97110 THERAPEUTIC EXERCISES: CPT

## 2017-09-21 PROCEDURE — 97140 MANUAL THERAPY 1/> REGIONS: CPT

## 2017-09-21 NOTE — PROGRESS NOTES
PT DAILY TREATMENT NOTE - [facility] 2-15    Patient Name: Lian Maldonado  Date:2017  : 1940  [x]  Patient  Verified  Payor: [place] MEDICARE / Plan: VA MEDICARE PART A & B / Product Type: Medicare /    In time: 2:10pm  Out time: 3:05pm  Total Treatment Time (min): 55  Total Timed Codes (min): 45  1:1 Treatment Time ([address] only): 30  Visit #: 5    Treatment Area: Low back pain [M54.5]    SUBJECTIVE  Pain Level (0-10 scale): 8/10  Any medication changes, allergies to medications, adverse drug reactions, diagnosis change, or new procedure performed?: [x] No    [] Yes (see summary sheet for update)  Subjective functional status/changes:   [] No changes reported  Pt states that pain is about the same but she is able to do a little more activity during the day. Pt complains of feeling \"compressed\".      OBJECTIVE  Modality rationale: decrease pain and increase tissue extensibility to improve the patients ability to ambulate and perform functional activities safely   Min Type Additional Details    [] Estim: []Att   []Unatt        []TENS instruct                  []IFC  []Premod   []NMES                     []Other:  []w/US   []w/ice   []w/heat  Position:  Location:    []  Traction: [] Cervical       []Lumbar                       [] Prone          []Supine                       []Intermittent   []Continuous Lbs:  [] before manual  [] after manual  []w/heat    []  Ultrasound: []Continuous   [] Pulsed at:                           []1MHz   []3MHz Location:  W/cm2:    [] Paraffin         Location:   []w/heat   10 []  Ice     [x]  Heat  []  Ice massage Position: right side-lying with pillow between knees  Location: low back at end of session    []  Laser  []  Other: Position:  Location:      []  Vasopneumatic Device Pressure:       [] lo [] med [] hi   Temperature:    [x] Skin assessment post-treatment:  [x]intact []redness- no adverse reaction    []redness  adverse reaction:     35 min Therapeutic Exercise:  [x] See flow sheet :   Rationale: increase ROM, increase strength and improve coordination to improve the patients ability to activate core stabilizing muscles to support LB and improve pt's ability to perform functional activities    10 min Manual Therapy: lumbar traction with belt- pt in hook-lying position     Rationale: decrease pain, increase ROM, increase tissue extensibility and increase postural awareness to improve the patients ability to ambulate and perform functional activities with increased ease            With   [] TE   [] TA   [] neuro   [] other: Patient Education: [x] Review HEP    [] Progressed/Changed HEP based on:   [] positioning   [] body mechanics   [] transfers   [] heat/ice application    [] other:     Other Objective/Functional Measures: --     Pain Level (0-10 scale) post treatment: 7/10    ASSESSMENT/Changes in Function:   Pt reported significant relief with manual lumbar traction. Patient will continue to benefit from skilled PT services to modify and progress therapeutic interventions, address functional mobility deficits, address ROM deficits, address strength deficits, analyze and address soft tissue restrictions, analyze and cue movement patterns, analyze and modify body mechanics/ergonomics, assess and modify postural abnormalities and address imbalance to attain remaining goals. []  See Plan of Care  []  See progress note/recertification  []  See Discharge Summary         Progress towards goals / Updated goals:  Short Term Goals: To be accomplished in 2 weeks:  1) Pt will be Independent with HEP. MET  2) Pt will be able to sit greater than 30 minutes without pain. 3) Pt will be able to stand greater than 10 minutes without increase of pain to perform meal preparation. 4) Pt will be able to ambulate greater than 0.5 block without increase of pain with LRAD.      Long Term Goals:  To be accomplished in 4 weeks:  1) Pt will be able to stand greater than 15 minutes without increase of pain to perform household chores. 2) Pt will be able to ambulate greater than1 block without increase of pain with LRAD. 3) Pt will be able to retrieve item from ground without pain. 4) Pt will report improvement in overall functional mobility, as measured by FOTO, with an increased score of at least 8 points, from 38 to 46.         PLAN  [x]  Upgrade activities as tolerated     [x]  Continue plan of care  []  Update interventions per flow sheet       []  Discharge due to:_  []  Other:_      Lizz Mcmillan, PT, DPT   9/21/2017  3:18 PM

## 2017-09-25 ENCOUNTER — APPOINTMENT (OUTPATIENT)
Dept: PHYSICAL THERAPY | Age: 77
End: 2017-09-25
Payer: MEDICARE

## 2017-09-26 ENCOUNTER — HOSPITAL ENCOUNTER (OUTPATIENT)
Dept: PHYSICAL THERAPY | Age: 77
Discharge: HOME | End: 2017-09-26
Payer: MEDICARE

## 2017-09-26 PROCEDURE — 97110 THERAPEUTIC EXERCISES: CPT

## 2017-09-26 NOTE — PROGRESS NOTES
PT DAILY TREATMENT NOTE - [facility] 2-15    Patient Name: Katy Gale  Date:2017  : 1940  [x]  Patient  Verified  Payor: Jonelle Samson / Plan: [place] MED[place] / Product Type: Medicare /    In time: 1:35pm  Out time: 3:05pm  Total Treatment Time (min): 90  Total Timed Codes (min):60  1:1 Treatment Time ( [address] only): 30  Visit #: 6    Treatment Area: Low back pain [M54.5]    SUBJECTIVE  Pain Level (0-10 scale): 8/10  Any medication changes, allergies to medications, adverse drug reactions, diagnosis change, or new procedure performed?: [x] No    [] Yes (see summary sheet for update)  Subjective functional status/changes:   [] No changes reported  Pt reports feeling about the same.     OBJECTIVE  Modality rationale: decrease pain and increase tissue extensibility to improve the patients ability to ambulate and perform functional activities safely   Min Type Additional Details    [] Estim: []Att   []Unatt        []TENS instruct                  []IFC  []Premod   []NMES                     []Other:  []w/US   []w/ice   []w/heat  Position:  Location:    []  Traction: [] Cervical       []Lumbar                       [] Prone          []Supine                       []Intermittent   []Continuous Lbs:  [] before manual  [] after manual  []w/heat    []  Ultrasound: []Continuous   [] Pulsed at:                           []1MHz   []3MHz Location:  W/cm2:    [] Paraffin         Location:   []w/heat   10 []  Ice     [x]  Heat  []  Ice massage Position: right side-lying with pillow between knees  Location: low back at end of session    []  Laser  []  Other: Position:  Location:      []  Vasopneumatic Device Pressure:       [] lo [] med [] hi   Temperature:    [x] Skin assessment post-treatment:  [x]intact []redness- no adverse reaction    []redness  adverse reaction:     60 min Therapeutic Exercise:  [x] See flow sheet :   Rationale: increase ROM, increase strength and improve coordination to improve the patients ability to activate core stabilizing muscles to support LB and improve pt's ability to perform functional activities            With   [] TE   [] TA   [] neuro   [] other: Patient Education: [x] Review HEP    [] Progressed/Changed HEP based on:   [] positioning   [] body mechanics   [] transfers   [] heat/ice application    [] other:     Other Objective/Functional Measures: --     Pain Level (0-10 scale) post treatment: 7/10    ASSESSMENT/Changes in Function:   Pt tolerated progression of core stabilization (supine bird-dog) exercise without difficulty or complaint of increased pain. Patient will continue to benefit from skilled PT services to modify and progress therapeutic interventions, address functional mobility deficits, address ROM deficits, address strength deficits, analyze and address soft tissue restrictions, analyze and cue movement patterns, analyze and modify body mechanics/ergonomics, assess and modify postural abnormalities and address imbalance to attain remaining goals. []  See Plan of Care  []  See progress note/recertification  []  See Discharge Summary         Progress towards goals / Updated goals:  Short Term Goals: To be accomplished in 2 weeks:  1) Pt will be Independent with HEP. MET  2) Pt will be able to sit greater than 30 minutes without pain. 3) Pt will be able to stand greater than 10 minutes without increase of pain to perform meal preparation. 4) Pt will be able to ambulate greater than 0.5 block without increase of pain with LRAD.      Long Term Goals: To be accomplished in 4 weeks:  1) Pt will be able to stand greater than 15 minutes without increase of pain to perform household chores. 2) Pt will be able to ambulate greater than1 block without increase of pain with LRAD. 3) Pt will be able to retrieve item from ground without pain.   4) Pt will report improvement in overall functional mobility, as measured by FOTO, with an increased score of at least 8 points, from 38 to 46.        PLAN  [x]  Upgrade activities as tolerated     [x]  Continue plan of care  []  Update interventions per flow sheet       []  Discharge due to:_  []  Other:_      Mayra Oliveros, PT, DPT   9/26/2017  3:18 PM

## 2017-09-28 ENCOUNTER — HOSPITAL ENCOUNTER (OUTPATIENT)
Dept: PHYSICAL THERAPY | Age: 77
Discharge: HOME | End: 2017-09-28
Payer: MEDICARE

## 2017-09-28 PROCEDURE — 97110 THERAPEUTIC EXERCISES: CPT

## 2017-09-28 PROCEDURE — 97140 MANUAL THERAPY 1/> REGIONS: CPT

## 2017-09-28 NOTE — PROGRESS NOTES
PT DAILY TREATMENT NOTE - [facility] 2-15    Patient Name: Oscar Johnson  Date:2017  : 1940  [x]  Patient  Verified  Payor: [place] MEDICARE / Plan: [place] MEDICARE PART A & B / Product Type: Medicare /    In time: 1:00pm  Out time: 2:00pm  Total Treatment Time (min): 60  Total Timed Codes (min): 50  1:1 Treatment Time ( only): 30  Visit #: 7    Treatment Area: Low back pain [M54.5]    SUBJECTIVE  Pain Level (0-10 scale): 8/10  Any medication changes, allergies to medications, adverse drug reactions, diagnosis change, or new procedure performed?: [x] No    [] Yes (see summary sheet for update)  Subjective functional status/changes:   [] No changes reported  Pt states that she has been sleeping better but complains that she feels increased pain and stiffness first thing in the morning.     OBJECTIVE  Modality rationale: decrease pain and increase tissue extensibility to improve the patients ability to ambulate and perform functional activities safely   Min Type Additional Details    [] Estim: []Att   []Unatt        []TENS instruct                  []IFC  []Premod   []NMES                     []Other:  []w/US   []w/ice   []w/heat  Position:  Location:    []  Traction: [] Cervical       []Lumbar                       [] Prone          []Supine                       []Intermittent   []Continuous Lbs:  [] before manual  [] after manual  []w/heat    []  Ultrasound: []Continuous   [] Pulsed at:                           []1MHz   []3MHz Location:  W/cm2:    [] Paraffin         Location:   []w/heat   10 []  Ice     [x]  Heat  []  Ice massage Position: right side-lying with pillow between knees  Location: low back at end of session    []  Laser  []  Other: Position:  Location:      []  Vasopneumatic Device Pressure:       [] lo [] med [] hi   Temperature:    [x] Skin assessment post-treatment:  [x]intact []redness- no adverse reaction    []redness  adverse reaction:     40 min Therapeutic Exercise:  [x] See flow sheet : Rationale: increase ROM, increase strength and improve coordination to improve the patients ability to activate core stabilizing muscles to support LB and improve pt's ability to perform functional activities    10 min Manual Therapy: lumbar traction with belt- pt in hook-lying position     Rationale: decrease pain, increase ROM, increase tissue extensibility and increase postural awareness to improve the patients ability to ambulate and perform functional activities with increased ease            With   [] TE   [] TA   [] neuro   [] other: Patient Education: [x] Review HEP    [] Progressed/Changed HEP based on:   [] positioning   [] body mechanics   [] transfers   [] heat/ice application    [] other:     Other Objective/Functional Measures: --     Pain Level (0-10 scale) post treatment: 5/10    ASSESSMENT/Changes in Function:   Pt tolerated progression of strengthening exercises (total gym squat) without increase in pain; pt fatigued quickly. Patient will continue to benefit from skilled PT services to modify and progress therapeutic interventions, address functional mobility deficits, address ROM deficits, address strength deficits, analyze and address soft tissue restrictions, analyze and cue movement patterns, analyze and modify body mechanics/ergonomics, assess and modify postural abnormalities and address imbalance to attain remaining goals. []  See Plan of Care  []  See progress note/recertification  []  See Discharge Summary         Progress towards goals / Updated goals:  Short Term Goals: To be accomplished in 2 weeks:  1) Pt will be Independent with HEP. MET  2) Pt will be able to sit greater than 30 minutes without pain. 3) Pt will be able to stand greater than 10 minutes without increase of pain to perform meal preparation. 4) Pt will be able to ambulate greater than 0.5 block without increase of pain with LRAD.      Long Term Goals:  To be accomplished in 4 weeks:  1) Pt will be able to stand greater than 15 minutes without increase of pain to perform household chores. 2) Pt will be able to ambulate greater than1 block without increase of pain with LRAD. 3) Pt will be able to retrieve item from ground without pain. 4) Pt will report improvement in overall functional mobility, as measured by FOTO, with an increased score of at least 8 points, from 38 to 46.         PLAN  [x]  Upgrade activities as tolerated     [x]  Continue plan of care  []  Update interventions per flow sheet       []  Discharge due to:_  []  Other:_      Jake Moses PT, DPT   9/28/2017  3:18 PM

## 2017-10-03 ENCOUNTER — HOSPITAL ENCOUNTER (OUTPATIENT)
Dept: PHYSICAL THERAPY | Age: 77
Discharge: HOME | End: 2017-10-03
Payer: MEDICARE

## 2017-10-03 PROCEDURE — 97110 THERAPEUTIC EXERCISES: CPT

## 2017-10-03 NOTE — PROGRESS NOTES
PT DAILY TREATMENT NOTE - [facility] 2-15    Patient Name: Carmen Mary  Date:10/3/2017  : 1940  [x]  Patient  Verified  Payor: [place] MEDICARE / Plan: [place] MEDICARE PART A & B / Product Type: Medicare /    In time: 1:30pm  Out time: 2:30pm  Total Treatment Time (min): 60  Total Timed Codes (min): 50  1:1 Treatment Time ( [address] only): 30  Visit #: 8    Treatment Area: Low back pain [M54.5]    SUBJECTIVE  Pain Level (0-10 scale): 10/10  Any medication changes, allergies to medications, adverse drug reactions, diagnosis change, or new procedure performed?: [x] No    [] Yes (see summary sheet for update)  Subjective functional status/changes:   [] No changes reported  Pt complains of increased LBP today. Pt does not know why today is a \"bad day\".     OBJECTIVE  Modality rationale: decrease pain and increase tissue extensibility to improve the patients ability to ambulate and perform functional activities safely   Min Type Additional Details    [] Estim: []Att   []Unatt        []TENS instruct                  []IFC  []Premod   []NMES                     []Other:  []w/US   []w/ice   []w/heat  Position:  Location:    []  Traction: [] Cervical       []Lumbar                       [] Prone          []Supine                       []Intermittent   []Continuous Lbs:  [] before manual  [] after manual  []w/heat    []  Ultrasound: []Continuous   [] Pulsed at:                           []1MHz   []3MHz Location:  W/cm2:    [] Paraffin         Location:   []w/heat   10 []  Ice     [x]  Heat  []  Ice massage Position: right side-lying with pillow between knees  Location: low back at end of session    []  Laser  []  Other: Position:  Location:      []  Vasopneumatic Device Pressure:       [] lo [] med [] hi   Temperature:    [x] Skin assessment post-treatment:  [x]intact []redness- no adverse reaction    []redness  adverse reaction:     50 min Therapeutic Exercise:  [x] See flow sheet :   Rationale: increase ROM, increase strength and improve coordination to improve the patients ability to activate core stabilizing muscles to support LB and improve pt's ability to perform functional activities            With   [] TE   [] TA   [] neuro   [] other: Patient Education: [x] Review HEP    [] Progressed/Changed HEP based on:   [] positioning   [] body mechanics   [] transfers   [] heat/ice application    [] other:     Other Objective/Functional Measures: --     Pain Level (0-10 scale) post treatment: 8/10    ASSESSMENT/Changes in Function:    []  See Plan of Care  [x]  See progress note/recertification  []  See Discharge Summary         Progress towards goals / Updated goals:  Short Term Goals: To be accomplished in 2 weeks:  1) Pt will be Independent with HEP. MET  2) Pt will be able to sit greater than 30 minutes without pain. MET  3) Pt will be able to stand greater than 10 minutes without increase of pain to perform meal preparation. MET  4) Pt will be able to ambulate greater than 0.5 block without increase of pain with LRAD. MET     Long Term Goals: To be accomplished in 4 weeks:  1) Pt will be able to stand greater than 15 minutes without increase of pain to perform household chores. progressing  2) Pt will be able to ambulate greater than1 block without increase of pain with LRAD. progressing  3) Pt will be able to retrieve item from ground without pain. MET  4) Pt will report improvement in overall functional mobility, as measured by FOTO, with an increased score of at least 8 points, from 38 to 46.     progressing    PLAN  [x]  Upgrade activities as tolerated     [x]  Continue plan of care  []  Update interventions per flow sheet       []  Discharge due to:_  []  Other:_      Maikol Almendarez, PT, DPT   10/3/2017  3:18 PM

## 2017-10-05 ENCOUNTER — HOSPITAL ENCOUNTER (OUTPATIENT)
Dept: PHYSICAL THERAPY | Age: 77
Discharge: HOME | End: 2017-10-05
Payer: MEDICARE

## 2017-10-05 PROCEDURE — 97110 THERAPEUTIC EXERCISES: CPT | Performed by: PHYSICAL THERAPIST

## 2017-10-05 PROCEDURE — 97140 MANUAL THERAPY 1/> REGIONS: CPT | Performed by: PHYSICAL THERAPIST

## 2017-10-05 NOTE — PROGRESS NOTES
PT DAILY TREATMENT NOTE - [facility] 2-15    Patient Name: Miya Lu  Date:10/5/2017  : 1940  [x]  Patient  Verified  Payor: [place] MEDICARE / Plan: [place] MEDICARE PART A & B / Product Type: Medicare /    In time: 2:00p Out time:3:55p  Total Treatment Time (min): 55  Total Timed Codes (min): 40  1:1 Treatment Time ( only): 40   Visit #: 8    Treatment Area: Low back pain [M54.5]    SUBJECTIVE  Pain Level (0-10 scale): 8/10  Any medication changes, allergies to medications, adverse drug reactions, diagnosis change, or new procedure performed?: [x] No    [] Yes (see summary sheet for update)  Subjective functional status/changes:   [] No changes reported  \"I was so tired yesterday following lunch with my girlfriend, but doing more errands preparing for my cruise\"    OBJECTIVE        Modality rationale: decrease pain and increase tissue extensibility to improve the patients ability to ambulate and perform functional activities safely   Min Type Additional Details     [] Estim: []Att   []Unatt        []TENS instruct                  []IFC  []Premod   []NMES                     []Other:  []w/US   []w/ice   []w/heat  Position:  Location:     []  Traction: [] Cervical       []Lumbar                       [] Prone          []Supine                       []Intermittent   []Continuous Lbs:  [] before manual  [] after manual  []w/heat     []  Ultrasound: []Continuous   [] Pulsed at:                           []1MHz   []3MHz Location:  W/cm2:     [] Paraffin      Location:   []w/heat   15 []  Ice     [x]  Heat  []  Ice massage Position: right side-lying with pillow between knees  Location: low back at end of session     []  Laser  []  Other: Position:  Location:     []  Vasopneumatic Device Pressure:       [] lo [] med [] hi   Temperature:    [x] Skin assessment post-treatment:  [x]intact []redness- no adverse reaction    []redness  adverse reaction:      30 min Therapeutic Exercise:  [x] See flow sheet :   Rationale: increase ROM, increase strength and improve coordination to improve the patients ability to activate core stabilizing muscles to support LB and improve pt's ability to perform functional activities     10 min Manual Therapy: lumbar traction with belt- pt in hook-lying position     Rationale: decrease pain, increase ROM, increase tissue extensibility and increase postural awareness to improve the patients ability to ambulate and perform functional activities with increased ease         With   [] TE   [] TA   [] neuro   [] other: Patient Education: [x] Review HEP    [] Progressed/Changed HEP based on:   [] positioning   [] body mechanics   [] transfers   [] heat/ice application    [] other:      Other Objective/Functional Measures: --                  Pain Level (0-10 scale) post treatment: 5/10     ASSESSMENT/Changes in Function:   Pt did well today and felt good relief with manual traction with belt. She has been able to do more up and about with preparing for her trip so feeling more fatigue, but overall feeling stronger. Patient will continue to benefit from skilled PT services to modify and progress therapeutic interventions, address functional mobility deficits, address ROM deficits, address strength deficits, analyze and address soft tissue restrictions, analyze and cue movement patterns, analyze and modify body mechanics/ergonomics, assess and modify postural abnormalities and address imbalance to attain remaining goals.      []  See Plan of Care  []  See progress note/recertification  []  See Discharge Summary      Progress towards goals / Updated goals:  Short Term Goals: To be accomplished in 2 weeks:  1) Pt will be Independent with HEP. MET  2) Pt will be able to sit greater than 30 minutes without pain. 3) Pt will be able to stand greater than 10 minutes without increase of pain to perform meal preparation. 4) Pt will be able to ambulate greater than 0.5 block without increase of pain with LRAD.     Long Term Goals: To be accomplished in 4 weeks:  1) Pt will be able to stand greater than 15 minutes without increase of pain to perform household chores. 2) Pt will be able to ambulate greater than1 block without increase of pain with LRAD. 3) Pt will be able to retrieve item from ground without pain.   4) Pt will report improvement in overall functional mobility, as measured by FOTO, with an increased score of at least 8 points, from 38 to 46.         PLAN  [x]  Upgrade activities as tolerated     [x]  Continue plan of care  []  Update interventions per flow sheet       []  Discharge due to:_  []  Other:_      Esther Blair 10/5/2017  4:04 PM

## 2017-10-09 NOTE — PROGRESS NOTES
[facility]   [address] [address], [address]  Phone: [phone] Fax: [phone]    Progress Note    Name: Oscar Johnson   : 1940   MD: Tangela Escobar MD       Treatment Diagnosis: Low back pain [M54.5]  Start of Care: 2017    Visits from Start of Care: 8  Missed Visits: 0    Summary of Care: Pt has participated in 8 outpatient PT sessions, 2017-10/03/2017, for chronic LBP. Treatment has included therapeutic exercise, manual therapy, pt education, moist hot pack and home exercise program.      Assessment / Recommendations: Pt has been agreeable and compliant with PT. Pt continues to use rollator for ambulation; not able to achieve erect posture (forward flexed trunk). Pt reports pain relief with moist hot pack and manual traction. Plan to continue outpatient PT 2x/week x 4 weeks to maximize rehab potential and safety. Progress towards goals / Updated goals:  Short Term Goals: To be accomplished in 2 weeks:  1) Pt will be Independent with HEP. MET  2) Pt will be able to sit greater than 30 minutes without pain. MET  3) Pt will be able to stand greater than 10 minutes without increase of pain to perform meal preparation. MET  4) Pt will be able to ambulate greater than 0.5 block without increase of pain with LRAD. MET     Long Term Goals: To be accomplished in 4 weeks:  1) Pt will be able to stand greater than 15 minutes without increase of pain to perform household chores. progressing  2) Pt will be able to ambulate greater than1 block without increase of pain with LRAD. progressing  3) Pt will be able to retrieve item from ground without pain. MET  4) Pt will report improvement in overall functional mobility, as measured by FOTO, with an increased score of at least 8 points, from 38 to 46.     progressing    G-Codes (GP)  Mobility  [id number] Current  CL= 60-79%  [id number] Goal  CK= 40-59%    The severity rating is based on the Other clinical judgement. [email or web address]. Willie Mcfarland, PT, DPT   10/9/2017 12:43 PM    ________________________________________________________________________  NOTE TO PHYSICIAN:  Please complete the following and fax to: [facility]: Fax: [phone]. Retain this original for your records. If you are unable to process this request in 24 hours, please contact our office.        ____ I have read the above report and request that my patient continue therapy with the following changes/special instructions:  ____ I have read the above report and request that my patient be discharged from therapy    Physician's Signature:_________________ Date:___________Time:__________

## 2017-10-10 ENCOUNTER — HOSPITAL ENCOUNTER (OUTPATIENT)
Dept: PHYSICAL THERAPY | Age: 77
Discharge: HOME | End: 2017-10-10
Payer: MEDICARE

## 2017-10-10 PROCEDURE — 97110 THERAPEUTIC EXERCISES: CPT

## 2017-10-10 PROCEDURE — 97140 MANUAL THERAPY 1/> REGIONS: CPT

## 2017-10-10 NOTE — PROGRESS NOTES
PT DAILY TREATMENT NOTE - [facility] 2-15    Patient Name: Amanda Childers  Date:10/10/2017  : 1940  [x]  Patient  Verified  Payor: [place] MEDICARE / Plan: [place] MEDICARE PART A & B / Product Type: Medicare /    In time: 1:30pm Out time: 3:00pm  Total Treatment Time (min): 90  Total Timed Codes (min): 75  1:1 Treatment Time ( [address] only): 30  Visit #: 10    Treatment Area: Low back pain [M54.5]    SUBJECTIVE  Pain Level (0-10 scale): 9/10  Any medication changes, allergies to medications, adverse drug reactions, diagnosis change, or new procedure performed?: [x] No    [] Yes (see summary sheet for update)  Subjective functional status/changes:   [] No changes reported  Pt reports having increased pain today; pt believes it is due to the rainy weather today. Pt complains of increased left LE pain and tingling. Pt ambulating with SPC today because she could not get her rollator out of the car.     OBJECTIVE        Modality rationale: decrease pain and increase tissue extensibility to improve the patients ability to ambulate and perform functional activities safely   Min Type Additional Details     [] Estim: []Att   []Unatt        []TENS instruct                  []IFC  []Premod   []NMES                     []Other:  []w/US   []w/ice   []w/heat  Position:  Location:     []  Traction: [] Cervical       []Lumbar                       [] Prone          []Supine                       []Intermittent   []Continuous Lbs:  [] before manual  [] after manual  []w/heat     []  Ultrasound: []Continuous   [] Pulsed at:                           []1MHz   []3MHz Location:  W/cm2:     [] Paraffin      Location:   []w/heat   15 []  Ice     [x]  Heat  []  Ice massage Position: right side-lying with pillow between knees  Location: low back at end of session     []  Laser  []  Other: Position:  Location:     []  Vasopneumatic Device Pressure:       [] lo [] med [] hi   Temperature:    [x] Skin assessment post-treatment:  [x]intact []redness- no adverse reaction    []redness  adverse reaction:      50 min Therapeutic Exercise:  [x] See flow sheet :   Rationale: increase ROM, increase strength and improve coordination to improve the patients ability to activate core stabilizing muscles to support LB and improve pt's ability to perform functional activities     15 min Manual Therapy: right piriformis release prone (too painful to lie on left side); lumbar traction with belt- pt in hook-lying position     Rationale: decrease pain, increase ROM, increase tissue extensibility and increase postural awareness to improve the patients ability to ambulate and perform functional activities with increased ease         With   [] TE   [] TA   [] neuro   [] other: Patient Education: [x] Review HEP    [] Progressed/Changed HEP based on:   [] positioning   [] body mechanics   [] transfers   [] heat/ice application    [] other:      Other Objective/Functional Measures: --                  Pain Level (0-10 scale) post treatment: 7/10     ASSESSMENT/Changes in Function:   Pt responded favorably to piriformis release technique. Patient will continue to benefit from skilled PT services to modify and progress therapeutic interventions, address functional mobility deficits, address ROM deficits, address strength deficits, analyze and address soft tissue restrictions, analyze and cue movement patterns, analyze and modify body mechanics/ergonomics, assess and modify postural abnormalities and address imbalance to attain remaining goals.      []  See Plan of Care  []  See progress note/recertification  []  See Discharge Summary      Progress towards goals / Updated goals:  Short Term Goals: To be accomplished in 2 weeks:  1) Pt will be Independent with HEP. MET  2) Pt will be able to sit greater than 30 minutes without pain. 3) Pt will be able to stand greater than 10 minutes without increase of pain to perform meal preparation.   4) Pt will be able to ambulate greater than 0.5 block without increase of pain with LRAD.       Long Term Goals: To be accomplished in 4 weeks:  1) Pt will be able to stand greater than 15 minutes without increase of pain to perform household chores. 2) Pt will be able to ambulate greater than1 block without increase of pain with LRAD. 3) Pt will be able to retrieve item from ground without pain.   4) Pt will report improvement in overall functional mobility, as measured by FOTO, with an increased score of at least 8 points, from 38 to 46.         PLAN  [x]  Upgrade activities as tolerated     [x]  Continue plan of care  []  Update interventions per flow sheet       []  Discharge due to:_  []  Other:_      Quique Hauser PT, DPT   10/10/2017  4:04 PM

## 2017-10-12 ENCOUNTER — HOSPITAL ENCOUNTER (OUTPATIENT)
Dept: PHYSICAL THERAPY | Age: 77
Discharge: HOME | End: 2017-10-12
Payer: MEDICARE

## 2017-10-12 PROCEDURE — 97110 THERAPEUTIC EXERCISES: CPT

## 2017-10-12 PROCEDURE — 97140 MANUAL THERAPY 1/> REGIONS: CPT

## 2017-10-12 NOTE — PROGRESS NOTES
PT DAILY TREATMENT NOTE - [facility] 2-15    Patient Name: Oscar Johnson  Date:10/12/2017  : 1940  [x]  Patient  Verified  Payor: [place] MEDICARE / Plan: [place] MEDICARE PART A & B / Product Type: Medicare /    In time: 1:35P Out time: 2:55P  Total Treatment Time (min): 80  Total Timed Codes (min): 65  1:1 Treatment Time ([address] only): 45  Visit #: 11    Treatment Area: Low back pain [M54.5]    SUBJECTIVE  Pain Level (0-10 scale): 8/10  Any medication changes, allergies to medications, adverse drug reactions, diagnosis change, or new procedure performed?: [x] No    [] Yes (see summary sheet for update)  Subjective functional status/changes:   [] No changes reported  Pt reported that the weather makes her pain worse. She stated that she took a pain pill about an hour ago.     OBJECTIVE        Modality rationale: decrease pain and increase tissue extensibility to improve the patients ability to ambulate and perform functional activities safely   Min Type Additional Details     [] Estim: []Att   []Unatt        []TENS instruct                  []IFC  []Premod   []NMES                     []Other:  []w/US   []w/ice   []w/heat  Position:  Location:     []  Traction: [] Cervical       []Lumbar                       [] Prone          []Supine                       []Intermittent   []Continuous Lbs:  [] before manual  [] after manual  []w/heat     []  Ultrasound: []Continuous   [] Pulsed at:                           []1MHz   []3MHz Location:  W/cm2:     [] Paraffin      Location:   []w/heat   15 []  Ice     [x]  Heat  []  Ice massage Position: right side-lying with pillow between knees  Location: low back at end of session     []  Laser  []  Other: Position:  Location:     []  Vasopneumatic Device Pressure:       [] lo [] med [] hi   Temperature:    [x] Skin assessment post-treatment:  [x]intact []redness- no adverse reaction    []redness  adverse reaction:      45 min Therapeutic Exercise:  [x] See flow sheet :   Rationale: increase ROM, increase strength and improve coordination to improve the patients ability to activate core stabilizing muscles to support LB and improve pt's ability to perform functional activities     20 min Manual Therapy: right piriformis release prone (too painful to lie on left side); lumbar traction with belt- pt in hook-lying position     Rationale: decrease pain, increase ROM, increase tissue extensibility and increase postural awareness to improve the patients ability to ambulate and perform functional activities with increased ease         With   [] TE   [] TA   [] neuro   [] other: Patient Education: [x] Review HEP    [] Progressed/Changed HEP based on:   [] positioning   [] body mechanics   [] transfers   [] heat/ice application    [] other:      Other Objective/Functional Measures: --                  Pain Level (0-10 scale) post treatment: better/10     ASSESSMENT/Changes in Function:   Pt tolerated more aggressive STM on R glute today. Pt reported she felt better following manual.Will capture FOTO next visit. Patient will continue to benefit from skilled PT services to modify and progress therapeutic interventions, address functional mobility deficits, address ROM deficits, address strength deficits, analyze and address soft tissue restrictions, analyze and cue movement patterns, analyze and modify body mechanics/ergonomics, assess and modify postural abnormalities and address imbalance to attain remaining goals.      [x]  See Plan of Care  []  See progress note/recertification  []  See Discharge Summary      Progress towards goals / Updated goals:  Short Term Goals: To be accomplished in 2 weeks:  1) Pt will be Independent with HEP. MET  2) Pt will be able to sit greater than 30 minutes without pain. 3) Pt will be able to stand greater than 10 minutes without increase of pain to perform meal preparation. 4) Pt will be able to ambulate greater than 0.5 block without increase of pain with LRAD.     Long Term Goals: To be accomplished in 4 weeks:  1) Pt will be able to stand greater than 15 minutes without increase of pain to perform household chores. 2) Pt will be able to ambulate greater than1 block without increase of pain with LRAD. 3) Pt will be able to retrieve item from ground without pain.   4) Pt will report improvement in overall functional mobility, as measured by FOTO, with an increased score of at least 8 points, from 38 to 46.         PLAN  [x]  Upgrade activities as tolerated     [x]  Continue plan of care  []  Update interventions per flow sheet       []  Discharge due to:_  []  Other:_      Donis Noland PTA   10/12/2017  4:04 PM

## 2017-10-24 ENCOUNTER — HOSPITAL ENCOUNTER (OUTPATIENT)
Dept: PHYSICAL THERAPY | Age: 77
Discharge: HOME | End: 2017-10-24
Payer: MEDICARE

## 2017-10-24 PROCEDURE — 97140 MANUAL THERAPY 1/> REGIONS: CPT

## 2017-10-24 PROCEDURE — G8978 MOBILITY CURRENT STATUS: HCPCS

## 2017-10-24 PROCEDURE — G8979 MOBILITY GOAL STATUS: HCPCS

## 2017-10-24 PROCEDURE — 97110 THERAPEUTIC EXERCISES: CPT

## 2017-10-24 NOTE — PROGRESS NOTES
PT DAILY TREATMENT NOTE - [facility] 2-15    Patient Name: Rikki Jones  Date:10/24/2017  : 1940  [x]  Patient  Verified  Payor: [place] MEDICARE / Plan: [place] MEDICARE PART A & B / Product Type: Medicare /    In time: 1:40pm Out time: 2:35pm  Total Treatment Time (min): 60  Total Timed Codes (min): 60  1:1 Treatment Time ([facility] only): 60  Visit #: 12    Treatment Area: Low back pain [M54.5]    SUBJECTIVE  Pain Level (0-10 scale): 9/10  Any medication changes, allergies to medications, adverse drug reactions, diagnosis change, or new procedure performed?: [x] No    [] Yes (see summary sheet for update)  Subjective functional status/changes:   [] No changes reported  Pt reports traveling on cruise to [place] since last PT session and managed fairly well- used rollator and hand a handicapped room. OBJECTIVE   45 min Therapeutic Exercise:  [x] See flow sheet :   Rationale: increase ROM, increase strength and improve coordination to improve the patients ability to activate core stabilizing muscles to support LB and improve pt's ability to perform functional activities     10 min Manual Therapy: lumbar traction with belt- pt in hook-lying position     Rationale: decrease pain, increase ROM, increase tissue extensibility and increase postural awareness to improve the patients ability to ambulate and perform functional activities with increased ease         With   [] TE   [] TA   [] neuro   [] other: Patient Education: [x] Review HEP    [] Progressed/Changed HEP based on:   [] positioning   [] body mechanics   [] transfers   [] heat/ice application    [] other:      Other Objective/Functional Measures: --                  Pain Level (0-10 scale) post treatment: 8/10     ASSESSMENT/Changes in Function:   Pt denied modalities at end of session. Pt performed all exercises without increase in pain; reports greatest benefit from manual traction. Pt has met all established short-term PT goals.   Patient will continue to benefit from skilled PT services to modify and progress therapeutic interventions, address functional mobility deficits, address ROM deficits, address strength deficits, analyze and address soft tissue restrictions, analyze and cue movement patterns, analyze and modify body mechanics/ergonomics, assess and modify postural abnormalities and address imbalance to attain remaining goals.      [x]  See Plan of Care  []  See progress note/recertification  []  See Discharge Summary      Progress towards goals / Updated goals:  Short Term Goals: To be accomplished in 2 weeks:  1) Pt will be Independent with HEP. MET  2) Pt will be able to sit greater than 30 minutes without pain. MET  3) Pt will be able to stand greater than 10 minutes without increase of pain to perform meal preparation. MET  4) Pt will be able to ambulate greater than 0.5 block without increase of pain with LRAD. MET      Long Term Goals: To be accomplished in 4 weeks:  1) Pt will be able to stand greater than 15 minutes without increase of pain to perform household chores. 2) Pt will be able to ambulate greater than1 block without increase of pain with LRAD. 3) Pt will be able to retrieve item from ground without pain.   4) Pt will report improvement in overall functional mobility, as measured by FOTO, with an increased score of at least 8 points, from 38 to 46.         PLAN  [x]  Upgrade activities as tolerated     [x]  Continue plan of care  []  Update interventions per flow sheet       []  Discharge due to:_  []  Other:_      Quincy Joaquin, PT, PTA   10/24/2017  4:04 PM

## 2017-10-26 ENCOUNTER — HOSPITAL ENCOUNTER (OUTPATIENT)
Dept: PHYSICAL THERAPY | Age: 77
Discharge: HOME | End: 2017-10-26
Payer: MEDICARE

## 2017-10-26 PROCEDURE — 97140 MANUAL THERAPY 1/> REGIONS: CPT

## 2017-10-26 PROCEDURE — 97110 THERAPEUTIC EXERCISES: CPT

## 2017-10-26 NOTE — PROGRESS NOTES
PT DAILY TREATMENT NOTE - [facility] 2-15    Patient Name: Amanda Childers  Date:10/26/2017  : 1940  [x]  Patient  Verified  Payor: [place] MEDICARE / Plan: [place] MEDICARE PART A & B / Product Type: Medicare /    In time: 1:30pm Out time: 2:40pm  Total Treatment Time (min): 70  Total Timed Codes (min): 55  1:1 Treatment Time ( [address] only): 55  Visit #: 13    Treatment Area: Low back pain [M54.5]    SUBJECTIVE  Pain Level (0-10 scale): 8/10  Any medication changes, allergies to medications, adverse drug reactions, diagnosis change, or new procedure performed?: [x] No    [] Yes (see summary sheet for update)  Subjective functional status/changes:   [] No changes reported  Pt reports feeling tired and stiff recovering from her cruise.     OBJECTIVE   Modality rationale: decrease pain and increase tissue extensibility to improve the patients ability to ambulate and perform functional activities with increased ease   Min Type Additional Details    [] Estim: []Att   []Unatt        []TENS instruct                  []IFC  []Premod   []NMES                     []Other:  []w/US   []w/ice   []w/heat  Position:  Location:    []  Traction: [] Cervical       []Lumbar                       [] Prone          []Supine                       []Intermittent   []Continuous Lbs:  [] before manual  [] after manual  []w/heat    []  Ultrasound: []Continuous   [] Pulsed at:                           []1MHz   []3MHz Location:  W/cm2:    [] Paraffin         Location:   []w/heat   15 []  Ice     [x]  Heat  []  Ice massage Position: right side-lying  Location: LB at end of session    []  Laser  []  Other: Position:  Location:      []  Vasopneumatic Device Pressure:       [] lo [] med [] hi   Temperature:    [x] Skin assessment post-treatment:  [x]intact []redness- no adverse reaction    []redness  adverse reaction:     45 min Therapeutic Exercise:  [x] See flow sheet :   Rationale: increase ROM, increase strength and improve coordination to improve the patients ability to activate core stabilizing muscles to support LB and improve pt's ability to perform functional activities     10 min Manual Therapy: lumbar traction with belt- pt in hook-lying position     Rationale: decrease pain, increase ROM, increase tissue extensibility and increase postural awareness to improve the patients ability to ambulate and perform functional activities with increased ease         With   [] TE   [] TA   [] neuro   [] other: Patient Education: [x] Review HEP    [] Progressed/Changed HEP based on:   [] positioning   [] body mechanics   [] transfers   [] heat/ice application    [] other:      Other Objective/Functional Measures: --                  Pain Level (0-10 scale) post treatment: 7/10     ASSESSMENT/Changes in Function:   Pt complained of \"cramping\" in left foot when performing LE therapeutic exercises. Patient will continue to benefit from skilled PT services to modify and progress therapeutic interventions, address functional mobility deficits, address ROM deficits, address strength deficits, analyze and address soft tissue restrictions, analyze and cue movement patterns, analyze and modify body mechanics/ergonomics, assess and modify postural abnormalities and address imbalance to attain remaining goals.      [x]  See Plan of Care  []  See progress note/recertification  []  See Discharge Summary      Progress towards goals / Updated goals:  Short Term Goals: To be accomplished in 2 weeks:  1) Pt will be Independent with HEP. MET  2) Pt will be able to sit greater than 30 minutes without pain. MET  3) Pt will be able to stand greater than 10 minutes without increase of pain to perform meal preparation. MET  4) Pt will be able to ambulate greater than 0.5 block without increase of pain with LRAD. MET      Long Term Goals: To be accomplished in 4 weeks:  1) Pt will be able to stand greater than 15 minutes without increase of pain to perform household chores.   2) Pt will be able to ambulate greater than1 block without increase of pain with LRAD. 3) Pt will be able to retrieve item from ground without pain.   4) Pt will report improvement in overall functional mobility, as measured by FOTO, with an increased score of at least 8 points, from 38 to 46.         PLAN  [x]  Upgrade activities as tolerated     [x]  Continue plan of care  []  Update interventions per flow sheet       []  Discharge due to:_  []  Other:_      Jake Moses PT, DPT  10/26/2017  4:04 PM

## 2017-10-31 ENCOUNTER — HOSPITAL ENCOUNTER (OUTPATIENT)
Dept: PHYSICAL THERAPY | Age: 77
Discharge: HOME | End: 2017-10-31
Payer: MEDICARE

## 2017-10-31 PROCEDURE — 97110 THERAPEUTIC EXERCISES: CPT

## 2017-10-31 NOTE — PROGRESS NOTES
PT DAILY TREATMENT NOTE - [facility] 2-15    Patient Name: Phillip Hernandes  Date:10/31/2017  : 1940  [x]  Patient  Verified  Payor: [place] MEDICARE / Plan: VA MEDICARE PART A & B / Product Type: Medicare /    In time: 1:30pm Out time: 2:30pm  Total Treatment Time (min): 60  Total Timed Codes (min): 45  1:1 Treatment Time ( [address] only): 30  Visit #: 14    Treatment Area: Low back pain [M54.5]    SUBJECTIVE  Pain Level (0-10 scale): 8/10  Any medication changes, allergies to medications, adverse drug reactions, diagnosis change, or new procedure performed?: [x] No    [] Yes (see summary sheet for update)  Subjective functional status/changes:   [] No changes reported  Pt states that she ran out of her Lyrica medication and went 4 days without taking it- which she believes related her increased pain.     OBJECTIVE   Modality rationale: decrease pain and increase tissue extensibility to improve the patients ability to ambulate and perform functional activities with increased ease   Min Type Additional Details    [] Estim: []Att   []Unatt        []TENS instruct                  []IFC  []Premod   []NMES                     []Other:  []w/US   []w/ice   []w/heat  Position:  Location:    []  Traction: [] Cervical       []Lumbar                       [] Prone          []Supine                       []Intermittent   []Continuous Lbs:  [] before manual  [] after manual  []w/heat    []  Ultrasound: []Continuous   [] Pulsed at:                           []1MHz   []3MHz Location:  W/cm2:    [] Paraffin         Location:   []w/heat   15 []  Ice     [x]  Heat  []  Ice massage Position: right side-lying  Location: LB at end of session    []  Laser  []  Other: Position:  Location:      []  Vasopneumatic Device Pressure:       [] lo [] med [] hi   Temperature:    [x] Skin assessment post-treatment:  [x]intact []redness- no adverse reaction    []redness  adverse reaction:     45 min Therapeutic Exercise:  [x] See flow sheet :   Rationale: increase ROM, increase strength and improve coordination to improve the patients ability to activate core stabilizing muscles to support LB and improve pt's ability to perform functional activities         With   [] TE   [] TA   [] neuro   [] other: Patient Education: [x] Review HEP    [] Progressed/Changed HEP based on:   [] positioning   [] body mechanics   [] transfers   [] heat/ice application    [] other:      Other Objective/Functional Measures: --                  Pain Level (0-10 scale) post treatment: 7/10     ASSESSMENT/Changes in Function:   Pt experience toe/foot cramping again today but not as bad as last visit. Patient will continue to benefit from skilled PT services to modify and progress therapeutic interventions, address functional mobility deficits, address ROM deficits, address strength deficits, analyze and address soft tissue restrictions, analyze and cue movement patterns, analyze and modify body mechanics/ergonomics, assess and modify postural abnormalities and address imbalance to attain remaining goals.      [x]  See Plan of Care  []  See progress note/recertification  []  See Discharge Summary      Progress towards goals / Updated goals:  Short Term Goals: To be accomplished in 2 weeks:  1) Pt will be Independent with HEP. MET  2) Pt will be able to sit greater than 30 minutes without pain. MET  3) Pt will be able to stand greater than 10 minutes without increase of pain to perform meal preparation. MET  4) Pt will be able to ambulate greater than 0.5 block without increase of pain with LRAD. MET      Long Term Goals: To be accomplished in 4 weeks:  1) Pt will be able to stand greater than 15 minutes without increase of pain to perform household chores. 2) Pt will be able to ambulate greater than1 block without increase of pain with LRAD. 3) Pt will be able to retrieve item from ground without pain.   4) Pt will report improvement in overall functional mobility, as measured by FOTO, with an increased score of at least 8 points, from 38 to 46.         PLAN  [x]  Upgrade activities as tolerated     [x]  Continue plan of care  []  Update interventions per flow sheet       []  Discharge due to:_  []  Other:_      Gil Rapp PT, DPT  10/31/2017  4:04 PM

## 2017-11-02 ENCOUNTER — HOSPITAL ENCOUNTER (OUTPATIENT)
Dept: PHYSICAL THERAPY | Age: 77
Discharge: HOME | End: 2017-11-02
Payer: MEDICARE

## 2017-11-02 PROCEDURE — 97110 THERAPEUTIC EXERCISES: CPT

## 2017-11-02 PROCEDURE — 97140 MANUAL THERAPY 1/> REGIONS: CPT

## 2017-11-02 NOTE — PROGRESS NOTES
PT DAILY TREATMENT NOTE - [facility] 2-15    Patient Name: Renee Macias  Date:2017  : 1940  [x]  Patient  Verified  [place]or: [place] MED[place]ARE / Plan: [place] MEDICARE PART A & B / Product Type: Medicare /    In time: 1:25pm Out time: 2:30pm  Total Treatment Time (min): 65  Total Timed Codes (min): 50  1:1 Treatment Time ( only): 30  Visit #: 15    Treatment Area: Low back pain [M54.5]    SUBJECTIVE  Pain Level (0-10 scale): 6/10  Any medication changes, allergies to medications, adverse drug reactions, diagnosis change, or new procedure performed?: [x] No    [] Yes (see summary sheet for update)  Subjective functional status/changes:   [] No changes reported  Pt was able to go to the movies last night and sit through movie for the first time in a long time.     OBJECTIVE   Modality rationale: decrease pain and increase tissue extensibility to improve the patients ability to ambulate and perform functional activities with increased ease   Min Type Additional Details    [] Estim: []Att   []Unatt        []TENS instruct                  []IFC  []Premod   []NMES                     []Other:  []w/US   []w/ice   []w/heat  Position:  Location:    []  Traction: [] Cervical       []Lumbar                       [] Prone          []Supine                       []Intermittent   []Continuous Lbs:  [] before manual  [] after manual  []w/heat    []  Ultrasound: []Continuous   [] Pulsed at:                           []1MHz   []3MHz Location:  W/cm2:    [] Paraffin         Location:   []w/heat   15 []  Ice     [x]  Heat  []  Ice massage Position: right side-lying  Location: LB at end of session    []  Laser  []  Other: Position:  Location:      []  Vasopneumatic Device Pressure:       [] lo [] med [] hi   Temperature:    [x] Skin assessment post-treatment:  [x]intact []redness- no adverse reaction    []redness  adverse reaction:     40 min Therapeutic Exercise:  [x] See flow sheet :   Rationale: increase ROM, increase strength and improve coordination to improve the patients ability to activate core stabilizing muscles to support LB and improve pt's ability to perform functional activities    10 min Manual Therapy: lumbar traction with strap    Rationale: decrease pain, increase ROM, increase tissue extensibility and increase postural awareness to improve the patients ability to perform functional activities with increased ease         With   [] TE   [] TA   [] neuro   [] other: Patient Education: [x] Review HEP    [] Progressed/Changed HEP based on:   [] positioning   [] body mechanics   [] transfers   [] heat/ice application    [] other:      Other Objective/Functional Measures: --                  Pain Level (0-10 scale) post treatment: 5/10     ASSESSMENT/Changes in Function:   Pt able to perform all exercises without increase in pain. Pt complained that standing hamstring curl exercise was difficult; require frequent verbal cues for postural correction and to avoid compensation. Patient will continue to benefit from skilled PT services to modify and progress therapeutic interventions, address functional mobility deficits, address ROM deficits, address strength deficits, analyze and address soft tissue restrictions, analyze and cue movement patterns, analyze and modify body mechanics/ergonomics, assess and modify postural abnormalities and address imbalance to attain remaining goals.      [x]  See Plan of Care  []  See progress note/recertification  []  See Discharge Summary      Progress towards goals / Updated goals:  Short Term Goals: To be accomplished in 2 weeks:  1) Pt will be Independent with HEP. MET  2) Pt will be able to sit greater than 30 minutes without pain. MET  3) Pt will be able to stand greater than 10 minutes without increase of pain to perform meal preparation. MET  4) Pt will be able to ambulate greater than 0.5 block without increase of pain with LRAD.   MET      Long Term Goals: To be accomplished in 4 weeks:  1) Pt will be able to stand greater than 15 minutes without increase of pain to perform household chores. 2) Pt will be able to ambulate greater than1 block without increase of pain with LRAD. 3) Pt will be able to retrieve item from ground without pain.   4) Pt will report improvement in overall functional mobility, as measured by FOTO, with an increased score of at least 8 points, from 38 to 46.         PLAN  [x]  Upgrade activities as tolerated     [x]  Continue plan of care  []  Update interventions per flow sheet       []  Discharge due to:_  []  Other:_      Mikaela Jara, PT, DPT  11/2/2017  4:04 PM

## 2017-11-07 ENCOUNTER — HOSPITAL ENCOUNTER (OUTPATIENT)
Dept: PHYSICAL THERAPY | Age: 77
Discharge: HOME | End: 2017-11-07
Payer: MEDICARE

## 2017-11-07 PROCEDURE — G8979 MOBILITY GOAL STATUS: HCPCS

## 2017-11-07 PROCEDURE — 97110 THERAPEUTIC EXERCISES: CPT

## 2017-11-07 PROCEDURE — G8978 MOBILITY CURRENT STATUS: HCPCS

## 2017-11-07 PROCEDURE — 97140 MANUAL THERAPY 1/> REGIONS: CPT

## 2017-11-07 NOTE — PROGRESS NOTES
[facility]   [address] [address], [address] [place] [address]  Phone: [phone] Fax: [phone]    Progress Note    Name: Antonette Aden   : 1940   MD: Elke Morse MD     Treatment Diagnosis: Low back pain [M54.5]  Start of Care: 2017    Visits from Start of Care: 16  Missed Visits: 0    Summary of Care: Pt has participated in 16 outpatient PT sessions, 2017-2017, for chronic LBP. Treatment has included therapeutic exercise, manual therapy, pt education, moist hot pack and home exercise program.      Assessment / Recommendations:  Pt continues to complain of severe pain, 8-10; pt was able to travel on a cruise and reports improvement in overall functional mobility and therefore, quality of life. Pt primarily uses rollator for ambulation; occasionally uses SPC. Pt's trunk remains forward-flexed and knees stay in slight flexion during ambulation/standing. Right LE externally rotates during ambulation when she fatigues. Discussed management of chronic pain with exercises, with which pt responds favorably. Recommend pt consider therapeutic massage as a compliment to exercises for long-term management of chronic pain. Plan to continues outpatient PT 2x/week x 2 weeks. Progress towards goals / Updated goals:  Short Term Goals: To be accomplished in 2 weeks:  1) Pt will be Independent with HEP. MET  2) Pt will be able to sit greater than 30 minutes without pain. MET  3) Pt will be able to stand greater than 10 minutes without increase of pain to perform meal preparation. MET  4) Pt will be able to ambulate greater than 0.5 block without increase of pain with LRAD. MET      Long Term Goals: To be accomplished in 4 weeks:  1) Pt will be able to stand greater than 15 minutes without increase of pain to perform household chores. progressing  2) Pt will be able to ambulate greater than1 block without increase of pain with LRAD.  MET  3) Pt will be able to retrieve item from ground without pain. progressing  4) Pt will report improvement in overall functional mobility, as measured by FOTO, with an increased score of at least 8 points, from 38 to 46.     NOT ASSESSED    G-Codes (GP)  Mobility  [id number] Current  CK= 40-59%   Goal  CK= 40-59%  The severity rating is based on the Other clinical judgement score. Jose Markham, PT, DPT   11/7/2017 2:25 PM    ________________________________________________________________________  NOTE TO PHYSICIAN:  Please complete the following and fax to: [facility]: Fax: [phone]. Rey Kwan Retain this original for your records. If you are unable to process this request in 24 hours, please contact our office.        ____ I have read the above report and request that my patient continue therapy with the following changes/special instructions:  ____ I have read the above report and request that my patient be discharged from therapy    Physician's Signature:_________________ Date:___________Time:__________

## 2017-11-07 NOTE — PROGRESS NOTES
PT DAILY TREATMENT NOTE - [facility] 2-15    Patient Name: Lino Almaguer  Date:2017  : 1940  [x]  Patient  Verified  Payor: [place] MEDICARE / Plan: [place] MEDICARE PART A & B / Product Type: Medicare /    In time: 1:30pm Out time: 2:40pm  Total Treatment Time (min): 70  Total Timed Codes (min): 60  1:1 Treatment Time ( [address] only): 60  Visit #: 16    Treatment Area: Low back pain [M54.5]    SUBJECTIVE  Pain Level (0-10 scale): 610  Any medication changes, allergies to medications, adverse drug reactions, diagnosis change, or new procedure performed?: [x] No    [] Yes (see summary sheet for update)  Subjective functional status/changes:   [] No changes reported  Pt complains of increased pain in low back and in neck, above hardware.     OBJECTIVE   Modality rationale: decrease pain and increase tissue extensibility to improve the patients ability to ambulate and perform functional activities with increased ease   Min Type Additional Details    [] Estim: []Att   []Unatt        []TENS instruct                  []IFC  []Premod   []NMES                     []Other:  []w/US   []w/ice   []w/heat  Position:  Location:    []  Traction: [] Cervical       []Lumbar                       [] Prone          []Supine                       []Intermittent   []Continuous Lbs:  [] before manual  [] after manual  []w/heat    []  Ultrasound: []Continuous   [] Pulsed at:                           []1MHz   []3MHz Location:  W/cm2:    [] Paraffin         Location:   []w/heat   15 []  Ice     [x]  Heat  []  Ice massage Position: right side-lying  Location: LB at end of session    []  Laser  []  Other: Position:  Location:      []  Vasopneumatic Device Pressure:       [] lo [] med [] hi   Temperature:    [x] Skin assessment post-treatment:  [x]intact []redness- no adverse reaction    []redness  adverse reaction:     45 min Therapeutic Exercise:  [x] See flow sheet :   Rationale: increase ROM, increase strength and improve coordination to improve the patients ability to activate core stabilizing muscles to support LB and improve pt's ability to perform functional activities    15 min Manual Therapy: lumbar traction with strap    Rationale: decrease pain, increase ROM, increase tissue extensibility and increase postural awareness to improve the patients ability to perform functional activities with increased ease         With   [] TE   [] TA   [] neuro   [] other: Patient Education: [x] Review HEP    [] Progressed/Changed HEP based on:   [] positioning   [] body mechanics   [] transfers   [] heat/ice application    [] other:      Other Objective/Functional Measures: --                  Pain Level (0-10 scale) post treatment: 6/10     ASSESSMENT/Changes in Function:   [x]  See Plan of Care  [x]  See progress note/recertification  []  See Discharge Summary      Progress towards goals / Updated goals:  Short Term Goals: To be accomplished in 2 weeks:  1) Pt will be Independent with HEP. MET  2) Pt will be able to sit greater than 30 minutes without pain. MET  3) Pt will be able to stand greater than 10 minutes without increase of pain to perform meal preparation. MET  4) Pt will be able to ambulate greater than 0.5 block without increase of pain with LRAD. MET      Long Term Goals: To be accomplished in 4 weeks:  1) Pt will be able to stand greater than 15 minutes without increase of pain to perform household chores. progressing  2) Pt will be able to ambulate greater than1 block without increase of pain with LRAD. MET  3) Pt will be able to retrieve item from ground without pain.  progressing  4) Pt will report improvement in overall functional mobility, as measured by FOTO, with an increased score of at least 8 points, from 38 to 46.     NOT ASSESSED     PLAN  [x]  Upgrade activities as tolerated     [x]  Continue plan of care  []  Update interventions per flow sheet       []  Discharge due to:_  []  Other:_      Michoacano Azar, PT, FELISHA  11/7/2017  4:04 PM

## 2017-11-09 ENCOUNTER — HOSPITAL ENCOUNTER (OUTPATIENT)
Dept: PHYSICAL THERAPY | Age: 77
Discharge: HOME | End: 2017-11-09
Payer: MEDICARE

## 2017-11-09 PROCEDURE — 97110 THERAPEUTIC EXERCISES: CPT

## 2017-11-09 PROCEDURE — 97140 MANUAL THERAPY 1/> REGIONS: CPT

## 2017-11-09 NOTE — PROGRESS NOTES
PT DAILY TREATMENT NOTE - [facility] 2-15    Patient Name: Beth Garner  Date:2017  : 1940  [x]  Patient  Verified  Payor: [place] MEDICARE / Plan: VA MEDICARE PART A & B / Product Type: Medicare /    In time: 1:30pm Out time: 2:40pm  Total Treatment Time (min): 70  Total Timed Codes (min): 55  1:1 Treatment Time ( [address] only): 30  Visit #: 17    Treatment Area: Low back pain [M54.5]    SUBJECTIVE  Pain Level (0-10 scale): 610  Any medication changes, allergies to medications, adverse drug reactions, diagnosis change, or new procedure performed?: [x] No    [] Yes (see summary sheet for update)  Subjective functional status/changes:   [] No changes reported  Pt states that she was not able to sleep last night due to pain. Pt believes pain is worse and interfering with her ability to sleep due to the weather (cold and rainy).     OBJECTIVE   Modality rationale: decrease pain and increase tissue extensibility to improve the patients ability to ambulate and perform functional activities with increased ease   Min Type Additional Details    [] Estim: []Att   []Unatt        []TENS instruct                  []IFC  []Premod   []NMES                     []Other:  []w/US   []w/ice   []w/heat  Position:  Location:    []  Traction: [] Cervical       []Lumbar                       [] Prone          []Supine                       []Intermittent   []Continuous Lbs:  [] before manual  [] after manual  []w/heat    []  Ultrasound: []Continuous   [] Pulsed at:                           []1MHz   []3MHz Location:  W/cm2:    [] Paraffin         Location:   []w/heat   15 []  Ice     [x]  Heat  []  Ice massage Position: right side-lying  Location: LB at end of session    []  Laser  []  Other: Position:  Location:      []  Vasopneumatic Device Pressure:       [] lo [] med [] hi   Temperature:    [x] Skin assessment post-treatment:  [x]intact []redness- no adverse reaction    []redness  adverse reaction:     40 min Therapeutic Exercise: [x] See flow sheet :   Rationale: increase ROM, increase strength and improve coordination to improve the patients ability to activate core stabilizing muscles to support LB and improve pt's ability to perform functional activities    15 min Manual Therapy: right piriformis release; foam roll to right ITB; TPR left rhomboids; lumbar traction with strap    Rationale: decrease pain, increase ROM, increase tissue extensibility and increase postural awareness to improve the patients ability to perform functional activities with increased ease         With   [] TE   [] TA   [] neuro   [] other: Patient Education: [x] Review HEP    [] Progressed/Changed HEP based on:   [] positioning   [] body mechanics   [] transfers   [] heat/ice application    [] other:      Other Objective/Functional Measures: --                  Pain Level (0-10 scale) post treatment: 5/10     ASSESSMENT/Changes in Function:   Held some exercises due to increased pain and complaint of spasm in right foot with exercises. Patient will continue to benefit from skilled PT services to modify and progress therapeutic interventions, address functional mobility deficits, address ROM deficits, address strength deficits, analyze and address soft tissue restrictions, analyze and cue movement patterns, analyze and modify body mechanics/ergonomics, assess and modify postural abnormalities and address imbalance to attain remaining goals.      [x]  See Plan of Care  []  See progress note/recertification  []  See Discharge Summary      Progress towards goals / Updated goals:  Short Term Goals: To be accomplished in 2 weeks:  1) Pt will be Independent with HEP. MET  2) Pt will be able to sit greater than 30 minutes without pain. MET  3) Pt will be able to stand greater than 10 minutes without increase of pain to perform meal preparation. MET  4) Pt will be able to ambulate greater than 0.5 block without increase of pain with LRAD.   MET      Long Term Goals: To be accomplished in 4 weeks:  1) Pt will be able to stand greater than 15 minutes without increase of pain to perform household chores. progressing  2) Pt will be able to ambulate greater than1 block without increase of pain with LRAD. MET  3) Pt will be able to retrieve item from ground without pain.  progressing  4) Pt will report improvement in overall functional mobility, as measured by FOTO, with an increased score of at least 8 points, from 38 to 46.     NOT ASSESSED    PLAN  [x]  Upgrade activities as tolerated     [x]  Continue plan of care  []  Update interventions per flow sheet       []  Discharge due to:_  []  Other:_      Maikol Almendarez, PT, DPT  11/9/2017  4:04 PM

## 2017-11-14 ENCOUNTER — HOSPITAL ENCOUNTER (OUTPATIENT)
Dept: PHYSICAL THERAPY | Age: 77
Discharge: HOME | End: 2017-11-14
Payer: MEDICARE

## 2017-11-14 PROCEDURE — 97140 MANUAL THERAPY 1/> REGIONS: CPT

## 2017-11-14 PROCEDURE — 97110 THERAPEUTIC EXERCISES: CPT

## 2017-11-14 NOTE — PROGRESS NOTES
PT DAILY TREATMENT NOTE - [facility] 2-15    Patient Name: Sim Coffey  Date:2017  : 1940  [x]  Patient  Verified  Payor: [place] MEDICARE / Plan: [place] MEDICARE PART A & B / Product Type: Medicare /    In time: 1:30pm Out time: 2:40pm  Total Treatment Time (min): 70  Total Timed Codes (min): 55  1:1 Treatment Time ([address] only): 54  Visit #: 18    Treatment Area: Low back pain [M54.5]    SUBJECTIVE  Pain Level (0-10 scale): 8/10  Any medication changes, allergies to medications, adverse drug reactions, diagnosis change, or new procedure performed?: [x] No    [] Yes (see summary sheet for update)  Subjective functional status/changes:   [] No changes reported  Pt received full-body massage last week and felt \"wonderful\". Pt realized that she was a little dehydrated afterwards but felt better after drinking water.      OBJECTIVE   Modality rationale: decrease pain and increase tissue extensibility to improve the patients ability to ambulate and perform functional activities with increased ease   Min Type Additional Details    [] Estim: []Att   []Unatt        []TENS instruct                  []IFC  []Premod   []NMES                     []Other:  []w/US   []w/ice   []w/heat  Position:  Location:    []  Traction: [] Cervical       []Lumbar                       [] Prone          []Supine                       []Intermittent   []Continuous Lbs:  [] before manual  [] after manual  []w/heat    []  Ultrasound: []Continuous   [] Pulsed at:                           []1MHz   []3MHz Location:  W/cm2:    [] Paraffin         Location:   []w/heat   15 []  Ice     [x]  Heat  []  Ice massage Position: right side-lying  Location: LB at end of session    []  Laser  []  Other: Position:  Location:      []  Vasopneumatic Device Pressure:       [] lo [] med [] hi   Temperature:    [x] Skin assessment post-treatment:  [x]intact []redness- no adverse reaction    []redness  adverse reaction:     45 min Therapeutic Exercise:  [x] See flow sheet :   Rationale: increase ROM, increase strength and improve coordination to improve the patients ability to activate core stabilizing muscles to support LB and improve pt's ability to perform functional activities    10 min Manual Therapy: lumbar traction with strap    Rationale: decrease pain, increase ROM, increase tissue extensibility and increase postural awareness to improve the patients ability to perform functional activities with increased ease         With   [] TE   [] TA   [] neuro   [] other: Patient Education: [x] Review HEP    [] Progressed/Changed HEP based on:   [] positioning   [] body mechanics   [] transfers   [] heat/ice application    [] other:      Other Objective/Functional Measures: --                  Pain Level (0-10 scale) post treatment: 5/10     ASSESSMENT/Changes in Function:   Pt tolerated progression of strengthening and stabilization exercises in standing position without increase in pain; pt fatigued quickly. Patient will continue to benefit from skilled PT services to modify and progress therapeutic interventions, address functional mobility deficits, address ROM deficits, address strength deficits, analyze and address soft tissue restrictions, analyze and cue movement patterns, analyze and modify body mechanics/ergonomics, assess and modify postural abnormalities and address imbalance to attain remaining goals.      [x]  See Plan of Care  []  See progress note/recertification  []  See Discharge Summary      Progress towards goals / Updated goals:  Short Term Goals: To be accomplished in 2 weeks:  1) Pt will be Independent with HEP. MET  2) Pt will be able to sit greater than 30 minutes without pain. MET  3) Pt will be able to stand greater than 10 minutes without increase of pain to perform meal preparation. MET  4) Pt will be able to ambulate greater than 0.5 block without increase of pain with LRAD.   MET      Long Term Goals: To be accomplished in 4 weeks:  1) Pt will be able to stand greater than 15 minutes without increase of pain to perform household chores. progressing  2) Pt will be able to ambulate greater than1 block without increase of pain with LRAD. MET  3) Pt will be able to retrieve item from ground without pain.  progressing  4) Pt will report improvement in overall functional mobility, as measured by FOTO, with an increased score of at least 8 points, from 38 to 46.     NOT ASSESSED    PLAN  [x]  Upgrade activities as tolerated     [x]  Continue plan of care  []  Update interventions per flow sheet       []  Discharge due to:_  []  Other:_      Soraida Preciado, PT, DPT  11/14/2017  4:04 PM

## 2017-11-16 ENCOUNTER — HOSPITAL ENCOUNTER (OUTPATIENT)
Dept: PHYSICAL THERAPY | Age: 77
Discharge: HOME | End: 2017-11-16
Payer: MEDICARE

## 2017-11-16 PROCEDURE — 97110 THERAPEUTIC EXERCISES: CPT

## 2017-11-16 PROCEDURE — 97140 MANUAL THERAPY 1/> REGIONS: CPT

## 2017-11-16 NOTE — PROGRESS NOTES
PT DAILY TREATMENT NOTE - [facility] 2-15    Patient Name: Sandor Muller  Date:2017  : 1940  [x]  Patient  Verified  Payor: [place] MEDICARE / Plan: [place] MEDICARE PART A & B / Product Type: Medicare /    In time: 1:35pm Out time: 2:45pm  Total Treatment Time (min): 70  Total Timed Codes (min): 55  1:1 Treatment Time ( [address] only): 30  Visit #: 19    Treatment Area: Low back pain [M54.5]    SUBJECTIVE  Pain Level (0-10 scale): 810  Any medication changes, allergies to medications, adverse drug reactions, diagnosis change, or new procedure performed?: [x] No    [] Yes (see summary sheet for update)  Subjective functional status/changes:   [] No changes reported  Pt states that pain is the same. Pt continues to wear lumbar corset brace for support and pain relief when out of the house. Pt ambulates with rollator.     OBJECTIVE   Modality rationale: decrease pain and increase tissue extensibility to improve the patients ability to ambulate and perform functional activities with increased ease   Min Type Additional Details    [] Estim: []Att   []Unatt        []TENS instruct                  []IFC  []Premod   []NMES                     []Other:  []w/US   []w/ice   []w/heat  Position:  Location:    []  Traction: [] Cervical       []Lumbar                       [] Prone          []Supine                       []Intermittent   []Continuous Lbs:  [] before manual  [] after manual  []w/heat    []  Ultrasound: []Continuous   [] Pulsed at:                           []1MHz   []3MHz Location:  W/cm2:    [] Paraffin         Location:   []w/heat   15 []  Ice     [x]  Heat  []  Ice massage Position: right side-lying  Location: LB at end of session    []  Laser  []  Other: Position:  Location:      []  Vasopneumatic Device Pressure:       [] lo [] med [] hi   Temperature:    [x] Skin assessment post-treatment:  [x]intact []redness- no adverse reaction    []redness  adverse reaction:     45 min Therapeutic Exercise:  [x] See flow sheet :   Rationale: increase ROM, increase strength and improve coordination to improve the patients ability to activate core stabilizing muscles to support LB and improve pt's ability to perform functional activities    10 min Manual Therapy: lumbar traction with strap    Rationale: decrease pain, increase ROM, increase tissue extensibility and increase postural awareness to improve the patients ability to perform functional activities with increased ease         With   [] TE   [] TA   [] neuro   [] other: Patient Education: [x] Review HEP    [] Progressed/Changed HEP based on:   [] positioning   [] body mechanics   [] transfers   [] heat/ice application    [] other:      Other Objective/Functional Measures: --                  Pain Level (0-10 scale) post treatment: 5/10     ASSESSMENT/Changes in Function:   Plan to continue x 2 visits to complete POC, then discharge to [facility] for maintenance.       [x]  See Plan of Care  []  See progress note/recertification  []  See Discharge Summary      Progress towards goals / Updated goals:  Short Term Goals: To be accomplished in 2 weeks:  1) Pt will be Independent with HEP. MET  2) Pt will be able to sit greater than 30 minutes without pain. MET  3) Pt will be able to stand greater than 10 minutes without increase of pain to perform meal preparation. MET  4) Pt will be able to ambulate greater than 0.5 block without increase of pain with LRAD. MET      Long Term Goals: To be accomplished in 4 weeks:  1) Pt will be able to stand greater than 15 minutes without increase of pain to perform household chores. progressing  2) Pt will be able to ambulate greater than1 block without increase of pain with LRAD. MET  3) Pt will be able to retrieve item from ground without pain.  progressing  4) Pt will report improvement in overall functional mobility, as measured by FOTO, with an increased score of at least 8 points, from 38 to 46.     NOT ASSESSED    PLAN  [x]  Upgrade activities as tolerated     [x]  Continue plan of care  []  Update interventions per flow sheet       []  Discharge due to:_  []  Other:_      Soraida Preciado, PT, DPT  11/16/2017  4:04 PM

## 2017-11-21 ENCOUNTER — HOSPITAL ENCOUNTER (OUTPATIENT)
Dept: PHYSICAL THERAPY | Age: 77
Discharge: HOME | End: 2017-11-21
Payer: MEDICARE

## 2017-11-21 PROCEDURE — 97140 MANUAL THERAPY 1/> REGIONS: CPT

## 2017-11-21 PROCEDURE — 97110 THERAPEUTIC EXERCISES: CPT

## 2017-11-21 NOTE — PROGRESS NOTES
PT DAILY TREATMENT NOTE - [facility] 2-15    Patient Name: Govind Quezada  Date:2017  : 1940  [x]  Patient  Verified  Payor: [place] MEDICARE / Plan: [place] MEDICARE PART A & B / Product Type: Medicare /    In time: 1:35pm Out time: 2:40pm  Total Treatment Time (min): 65  Total Timed Codes (min): 50  1:1 Treatment Time ( only): 30  Visit #: 19    Treatment Area: Low back pain [M54.5]    SUBJECTIVE  Pain Level (0-10 scale): 8/10  Any medication changes, allergies to medications, adverse drug reactions, diagnosis change, or new procedure performed?: [x] No    [] Yes (see summary sheet for update)  Subjective functional status/changes:   [] No changes reported  Pt states that although she still has pain, pt has been able to do a lot more. Pt went shopping yesterday by herself.     OBJECTIVE   Modality rationale: decrease pain and increase tissue extensibility to improve the patients ability to ambulate and perform functional activities with increased ease   Min Type Additional Details    [] Estim: []Att   []Unatt        []TENS instruct                  []IFC  []Premod   []NMES                     []Other:  []w/US   []w/ice   []w/heat  Position:  Location:    []  Traction: [] Cervical       []Lumbar                       [] Prone          []Supine                       []Intermittent   []Continuous Lbs:  [] before manual  [] after manual  []w/heat    []  Ultrasound: []Continuous   [] Pulsed at:                           []1MHz   []3MHz Location:  W/cm2:    [] Paraffin         Location:   []w/heat   15 []  Ice     [x]  Heat  []  Ice massage Position: right side-lying  Location: LB at end of session    []  Laser  []  Other: Position:  Location:      []  Vasopneumatic Device Pressure:       [] lo [] med [] hi   Temperature:    [x] Skin assessment post-treatment:  [x]intact []redness- no adverse reaction    []redness  adverse reaction:     40 min Therapeutic Exercise:  [x] See flow sheet :   Rationale: increase ROM, increase strength and improve coordination to improve the patients ability to activate core stabilizing muscles to support LB and improve pt's ability to perform functional activities    10 min Manual Therapy: lumbar traction with strap    Rationale: decrease pain, increase ROM, increase tissue extensibility and increase postural awareness to improve the patients ability to perform functional activities with increased ease         With   [] TE   [] TA   [] neuro   [] other: Patient Education: [x] Review HEP    [] Progressed/Changed HEP based on:   [] positioning   [] body mechanics   [] transfers   [] heat/ice application    [] other:      Other Objective/Functional Measures: --                  Pain Level (0-10 scale) post treatment: 5/10     ASSESSMENT/Changes in Function:   Pt complained to cramping in toes (both feet) when lumbar flexion exercises (knee to chest with ball). Patient will continue to benefit from skilled PT services to modify and progress therapeutic interventions, address functional mobility deficits, address ROM deficits, address strength deficits, analyze and address soft tissue restrictions, analyze and cue movement patterns, analyze and modify body mechanics/ergonomics, assess and modify postural abnormalities and address imbalance to attain remaining goals.      [x]  See Plan of Care  []  See progress note/recertification  []  See Discharge Summary      Progress towards goals / Updated goals:  Short Term Goals: To be accomplished in 2 weeks:  1) Pt will be Independent with HEP. MET  2) Pt will be able to sit greater than 30 minutes without pain. MET  3) Pt will be able to stand greater than 10 minutes without increase of pain to perform meal preparation. MET  4) Pt will be able to ambulate greater than 0.5 block without increase of pain with LRAD.   MET      Long Term Goals: To be accomplished in 4 weeks:  1) Pt will be able to stand greater than 15 minutes without increase of pain to perform household chores. progressing  2) Pt will be able to ambulate greater than1 block without increase of pain with LRAD. MET  3) Pt will be able to retrieve item from ground without pain.  progressing  4) Pt will report improvement in overall functional mobility, as measured by FOTO, with an increased score of at least 8 points, from 38 to 46.     NOT ASSESSED    PLAN  [x]  Upgrade activities as tolerated     [x]  Continue plan of care  []  Update interventions per flow sheet       []  Discharge due to:_  []  Other:_      Nicole Willis, PT, DPT  11/21/2017  4:04 PM

## 2017-11-29 ENCOUNTER — HOSPITAL ENCOUNTER (OUTPATIENT)
Dept: PHYSICAL THERAPY | Age: 77
Discharge: HOME | End: 2017-11-29
Payer: MEDICARE

## 2017-11-29 PROCEDURE — 97140 MANUAL THERAPY 1/> REGIONS: CPT

## 2017-11-29 PROCEDURE — 97110 THERAPEUTIC EXERCISES: CPT

## 2017-11-29 NOTE — PROGRESS NOTES
PT DAILY TREATMENT NOTE - [facility] 2-15    Patient Name: Govind Quezada  Date:2017  : 1940  [x]  Patient  Verified  Payor: [place] MEDICARE / Plan: VA MEDICARE PART A & B / Product Type: Medicare /    In time: 2:00pm Out time: 3:10pm  Total Treatment Time (min): 70  Total Timed Codes (min): 55  1:1 Treatment Time ( [address] only): 30  Visit #: 21    Treatment Area: Low back pain [M54.5]    SUBJECTIVE  Pain Level (0-10 scale): 8/10  Any medication changes, allergies to medications, adverse drug reactions, diagnosis change, or new procedure performed?: [x] No    [] Yes (see summary sheet for update)  Subjective functional status/changes:   [] No changes reported  Pt states that she thinks her pain stimulator is not holding a charge as well as it used to and she may need to get it checked; therefore, pain has been worse.      OBJECTIVE   Modality rationale: decrease pain and increase tissue extensibility to improve the patients ability to ambulate and perform functional activities with increased ease   Min Type Additional Details    [] Estim: []Att   []Unatt        []TENS instruct                  []IFC  []Premod   []NMES                     []Other:  []w/US   []w/ice   []w/heat  Position:  Location:    []  Traction: [] Cervical       []Lumbar                       [] Prone          []Supine                       []Intermittent   []Continuous Lbs:  [] before manual  [] after manual  []w/heat    []  Ultrasound: []Continuous   [] Pulsed at:                           []1MHz   []3MHz Location:  W/cm2:    [] Paraffin         Location:   []w/heat   15 []  Ice     [x]  Heat  []  Ice massage Position: right side-lying  Location: LB at end of session    []  Laser  []  Other: Position:  Location:      []  Vasopneumatic Device Pressure:       [] lo [] med [] hi   Temperature:    [x] Skin assessment post-treatment:  [x]intact []redness- no adverse reaction    []redness  adverse reaction:     45 min Therapeutic Exercise:  [x] See flow sheet :   Rationale: increase ROM, increase strength and improve coordination to improve the patients ability to activate core stabilizing muscles to support LB and improve pt's ability to perform functional activities    10 min Manual Therapy: lumbar traction with strap    Rationale: decrease pain, increase ROM, increase tissue extensibility and increase postural awareness to improve the patients ability to perform functional activities with increased ease         With   [] TE   [] TA   [] neuro   [] other: Patient Education: [x] Review HEP    [] Progressed/Changed HEP based on:   [] positioning   [] body mechanics   [] transfers   [] heat/ice application    [] other:      Other Objective/Functional Measures: --                  Pain Level (0-10 scale) post treatment: 7.5/10     ASSESSMENT/Changes in Function:   Plan to continue x 1 more visit, to complete PT POC, then discharge to [facility].     [x]  See Plan of Care  []  See progress note/recertification  []  See Discharge Summary      Progress towards goals / Updated goals:  Short Term Goals: To be accomplished in 2 weeks:  1) Pt will be Independent with HEP. MET  2) Pt will be able to sit greater than 30 minutes without pain. MET  3) Pt will be able to stand greater than 10 minutes without increase of pain to perform meal preparation. MET  4) Pt will be able to ambulate greater than 0.5 block without increase of pain with LRAD. MET      Long Term Goals: To be accomplished in 4 weeks:  1) Pt will be able to stand greater than 15 minutes without increase of pain to perform household chores. progressing  2) Pt will be able to ambulate greater than1 block without increase of pain with LRAD. MET  3) Pt will be able to retrieve item from ground without pain.  progressing  4) Pt will report improvement in overall functional mobility, as measured by FOTO, with an increased score of at least 8 points, from 38 to 46.     NOT ASSESSED    PLAN  [x]  Upgrade activities as tolerated     [x]  Continue plan of care  []  Update interventions per flow sheet       []  Discharge due to:_  []  Other:_      Mikaela Jara PT, DPT  11/29/2017  4:04 PM

## 2017-12-04 ENCOUNTER — HOSPITAL ENCOUNTER (OUTPATIENT)
Dept: PHYSICAL THERAPY | Age: 77
Discharge: HOME | End: 2017-12-04
Payer: MEDICARE

## 2017-12-04 PROCEDURE — 97140 MANUAL THERAPY 1/> REGIONS: CPT

## 2017-12-04 PROCEDURE — 97110 THERAPEUTIC EXERCISES: CPT

## 2017-12-04 NOTE — PROGRESS NOTES
PT DAILY TREATMENT NOTE - [facility] 2-15    Patient Name: Patience Wilkes  Date:2017  : 1940  [x]  Patient  Verified  Payor: [place] MEDICARE / Plan: VA MEDICARE PART A & B / Product Type: Medicare /    In time: 2:20pm Out time: 3:30pm  Total Treatment Time (min): 70  Total Timed Codes (min): 55  1:1 Treatment Time ( [address] only): 54  Visit #: 22    Treatment Area: Low back pain [M54.5]    SUBJECTIVE  Pain Level (0-10 scale): 8/10  Any medication changes, allergies to medications, adverse drug reactions, diagnosis change, or new procedure performed?: [x] No    [] Yes (see summary sheet for update)  Subjective functional status/changes:   [] No changes reported  Pt complains of persistent pain in LB but overall, feels that PT has been helpful.  Pt plans to continue her Karly Puckett" class at least 2x/week    OBJECTIVE   Modality rationale: decrease pain and increase tissue extensibility to improve the patients ability to ambulate and perform functional activities with increased ease   Min Type Additional Details    [] Estim: []Att   []Unatt        []TENS instruct                  []IFC  []Premod   []NMES                     []Other:  []w/US   []w/ice   []w/heat  Position:  Location:    []  Traction: [] Cervical       []Lumbar                       [] Prone          []Supine                       []Intermittent   []Continuous Lbs:  [] before manual  [] after manual  []w/heat    []  Ultrasound: []Continuous   [] Pulsed at:                           []1MHz   []3MHz Location:  W/cm2:    [] Paraffin         Location:   []w/heat   15 []  Ice     [x]  Heat  []  Ice massage Position: right side-lying  Location: LB at end of session    []  Laser  []  Other: Position:  Location:      []  Vasopneumatic Device Pressure:       [] lo [] med [] hi   Temperature:    [x] Skin assessment post-treatment:  [x]intact []redness- no adverse reaction    []redness  adverse reaction:     45 min Therapeutic Exercise:  [x] See flow sheet : Rationale: increase ROM, increase strength and improve coordination to improve the patients ability to activate core stabilizing muscles to support LB and improve pt's ability to perform functional activities    10 min Manual Therapy: lumbar traction with strap    Rationale: decrease pain, increase ROM, increase tissue extensibility and increase postural awareness to improve the patients ability to perform functional activities with increased ease         With   [] TE   [] TA   [] neuro   [] other: Patient Education: [x] Review HEP    [] Progressed/Changed HEP based on:   [] positioning   [] body mechanics   [] transfers   [] heat/ice application    [] other:      Other Objective/Functional Measures: FOTO: 36                  Pain Level (0-10 scale) post treatment: 7/10     ASSESSMENT/Changes in Function:     []  See Plan of Care  []  See progress note/recertification  [x]  See Discharge Summary      Progress towards goals / Updated goals:  Short Term Goals: To be accomplished in 2 weeks:  1) Pt will be Independent with HEP. MET  2) Pt will be able to sit greater than 30 minutes without pain. MET  3) Pt will be able to stand greater than 10 minutes without increase of pain to perform meal preparation. MET  4) Pt will be able to ambulate greater than 0.5 block without increase of pain with LRAD. MET      Long Term Goals: To be accomplished in 4 weeks:  1) Pt will be able to stand greater than 15 minutes without increase of pain to perform household chores. MET  2) Pt will be able to ambulate greater than1 block without increase of pain with LRAD. MET  3) Pt will be able to retrieve item from ground without pain.  MET  4) Pt will report improvement in overall functional mobility, as measured by FOTO, with an increased score of at least 8 points, from 38 to 46.     NOT MET    PLAN  [x]  Upgrade activities as tolerated     []  Continue plan of care  []  Update interventions per flow sheet       [x]  Discharge due to:_completed POC  []  Other:_      Nicole Willis PT, DPT  12/4/2017  4:04 PM

## 2017-12-06 ENCOUNTER — APPOINTMENT (OUTPATIENT)
Dept: PHYSICAL THERAPY | Age: 77
End: 2017-12-06
Payer: MEDICARE

## 2017-12-06 NOTE — ANCILLARY DISCHARGE INSTRUCTIONS
[facility]   [address], [address] [place] [address]  Phone: [phone] Fax: [phone]      Discharge Summary 2-15      Patient name: Miya Lu  : 1940  Provider#: [id number]  Referral source: Malini Osorio MD      Medical/Treatment Diagnosis: Low back pain [M54.5]     Prior Hospitalization: see medical history     Comorbidities: GERD, CAD, chronic back pain, anxiety and depression, spinal stenosis, lumbar region with neurogenic claudication, gastroparesis, s/p colon resection, dextroscoliosis, bilateral leg edema, HTN, mild obstructive sleep apnea on CPAP, osteopenia, persistent disorder of initiating or maintaining sleep, hypercholesteremia, chronic neck and back pain, iron deficiency anemia, chronically on opiate therapy  Prior Level of Function:chronic back pain; retired  Medications: Verified on Patient Summary List    Start of Care: 2017     Onset Date:chronic   Visits from Start of Care: 22     Missed Visits: 0  Reporting Period : 2017 to 2017    Progress towards goals / Updated goals:  Short Term Goals: To be accomplished in 2 weeks:  1) Pt will be Independent with HEP. MET  2) Pt will be able to sit greater than 30 minutes without pain. MET  3) Pt will be able to stand greater than 10 minutes without increase of pain to perform meal preparation. MET  4) Pt will be able to ambulate greater than 0.5 block without increase of pain with LRAD.  MET      Long Term Goals: To be accomplished in 4 weeks:  1) Pt will be able to stand greater than 15 minutes without increase of pain to perform household chores. MET  2) Pt will be able to ambulate greater than1 block without increase of pain with LRAD. MET  3) Pt will be able to retrieve item from ground without pain.  MET  4) Pt will report improvement in overall functional mobility, as measured by FOTO, with an increased score of at least 8 points, from 38 to 46.     NOT MET     Assessment/Summary of care: Pt has participated in 22 outpatient PT sessions, 09/05/2017-12/04/2017, for chronic LBP. Treatment has included therapeutic exercise, manual therapy, pt education, moist hot pack and home exercise program.     Pt continues to complain of severe pain, 8-9/10 but reports improvement in overall functional mobility and therefore, quality of life. Pt primarily uses rollator for ambulation; occasionally uses SPC. Pt's trunk remains forward-flexed and knees stay in slight flexion during ambulation/standing. Right LE externally rotates during ambulation when she fatigues. Discussed management of chronic pain with exercises, with which pt responds favorably. Pt plans to continue her Spero Lower" class at least 2x/week. Recommend pt consider therapeutic massage as a compliment to exercises for long-term management of chronic pain. Pt has completed PT Plan of Care, therefore, pt discharged at this time. G-Codes (GP)  Mobility   [id number] Goal  CK= 40-59%  D/C  CL= 60-79%    The severity rating is based on clinical judgment and the FOTO Score score.     RECOMMENDATIONS:  [x]Discontinue therapy: [x]Patient has reached or is progressing toward set goals     []Patient is non-compliant or has abdicated     []Due to lack of appreciable progress towards set goals    Ronnie Emery, PT, DPT   12/6/2017 2:03 PM

## 2017-12-13 ENCOUNTER — HOSPITAL ENCOUNTER (OUTPATIENT)
Dept: LAB | Age: 77
Discharge: HOME | End: 2017-12-13
Payer: MEDICARE

## 2017-12-13 ENCOUNTER — OFFICE VISIT (OUTPATIENT)
Dept: INTERNAL MEDICINE CLINIC | Age: 77
End: 2017-12-13

## 2017-12-13 VITALS
HEART RATE: 67 BPM | BODY MASS INDEX: 29.79 KG/M2 | SYSTOLIC BLOOD PRESSURE: 120 MMHG | OXYGEN SATURATION: 97 % | HEIGHT: 64 IN | DIASTOLIC BLOOD PRESSURE: 64 MMHG | WEIGHT: 174.5 LBS | RESPIRATION RATE: 14 BRPM | TEMPERATURE: 97.9 F

## 2017-12-13 DIAGNOSIS — E55.9 VITAMIN D DEFICIENCY: ICD-10-CM

## 2017-12-13 DIAGNOSIS — I25.10 CORONARY ARTERY DISEASE INVOLVING NATIVE CORONARY ARTERY OF NATIVE HEART WITHOUT ANGINA PECTORIS: ICD-10-CM

## 2017-12-13 DIAGNOSIS — Z79.891 CHRONICALLY ON OPIATE THERAPY: ICD-10-CM

## 2017-12-13 DIAGNOSIS — I10 BENIGN ESSENTIAL HYPERTENSION: Primary | ICD-10-CM

## 2017-12-13 DIAGNOSIS — E78.00 HYPERCHOLESTEREMIA: ICD-10-CM

## 2017-12-13 DIAGNOSIS — Z13.31 SCREENING FOR DEPRESSION: ICD-10-CM

## 2017-12-13 DIAGNOSIS — M48.062 SPINAL STENOSIS, LUMBAR REGION, WITH NEUROGENIC CLAUDICATION: ICD-10-CM

## 2017-12-13 DIAGNOSIS — Z00.00 MEDICARE ANNUAL WELLNESS VISIT, SUBSEQUENT: ICD-10-CM

## 2017-12-13 PROCEDURE — 84436 ASSAY OF TOTAL THYROXINE: CPT

## 2017-12-13 PROCEDURE — 85025 COMPLETE CBC W/AUTO DIFF WBC: CPT

## 2017-12-13 PROCEDURE — 82306 VITAMIN D 25 HYDROXY: CPT

## 2017-12-13 PROCEDURE — 36415 COLL VENOUS BLD VENIPUNCTURE: CPT

## 2017-12-13 PROCEDURE — 80061 LIPID PANEL: CPT

## 2017-12-13 PROCEDURE — 84443 ASSAY THYROID STIM HORMONE: CPT

## 2017-12-13 PROCEDURE — 80053 COMPREHEN METABOLIC PANEL: CPT

## 2017-12-13 RX ORDER — PRAVASTATIN SODIUM 40 MG/1
40 TABLET ORAL
Qty: 90 TAB | Refills: 3 | Status: SHIPPED | OUTPATIENT
Start: 2017-12-13 | End: 2018-12-10 | Stop reason: SDUPTHER

## 2017-12-13 RX ORDER — PREGABALIN 225 MG/1
225 CAPSULE ORAL 2 TIMES DAILY
COMMUNITY
Start: 2017-11-16

## 2017-12-13 RX ORDER — CYCLOBENZAPRINE HCL 10 MG
10 TABLET ORAL 2 TIMES DAILY
COMMUNITY
Start: 2017-11-25

## 2017-12-13 RX ORDER — BUMETANIDE 2 MG/1
2 TABLET ORAL
COMMUNITY
Start: 2017-11-20

## 2017-12-13 NOTE — PROGRESS NOTES
Annual Wellness Visit and Hypertension (6 MONTH FOLLOW UP)       HPI:  Shavon Lomeli is a 78y.o. year old female who is here for a follow up visit. She was last seen by me on Visit date not found. She reports the following:    Joined exercise class and doing Gianluca Qi. \"I love it\"      Hypertension    Patient has a history of HTN. The patient is taking hypertensive medications compliantly without side effects. Denies chest pain, dyspnea, edema, or symptoms of TIA's. BP Readings from Last 3 Encounters:   12/13/17 120/64   06/13/17 110/60   12/13/16 134/80         Hyperlipidemia    Patient has history of hyperlipidemia that is being managed with medications. She has been taking her statin cholesterol medication regularly without side effects such as myalgias or upper abdominal pain, nausea or jaundice. Lab Results   Component Value Date/Time    Cholesterol, total 152 06/13/2017 02:14 PM    HDL Cholesterol 39 06/13/2017 02:14 PM    LDL, calculated 60 06/13/2017 02:14 PM    VLDL, calculated 53 06/13/2017 02:14 PM    Triglyceride 264 06/13/2017 02:14 PM    CHOL/HDL Ratio 3.0 02/12/2015 11:09 AM         Hx of \"metabolic encephalopathy\" for opiod use as per discharge summary April 2016. Back pain better with PT but \"it will never go away. \"  Pain management Dr. Galvez Dose. Neuropathy- on lyrica 225 mg          Assessment and Plan        1. Benign essential hypertension  Tolerating medication. Denies dizziness that is positional, SOB, or chest pain. Understands the importance of compliance to reduce risk of future heart failure. Agreed to call if any of above symptoms develop and  stay on current regimen of    Key CAD CHF Meds             bumetanide (BUMEX) 2 mg tablet  (Taking)     valsartan (DIOVAN) 320 mg tablet  (Taking) TAKE ONE TABLET BY MOUTH DAILY    cloNIDine HCl (CATAPRES) 0.2 mg tablet  (Taking) Take 0.2 mg by mouth two (2) times a day.     amLODIPine (NORVASC) 10 mg tablet  (Taking) TAKE ONE TABLET BY MOUTH DAILY    metoprolol (LOPRESSOR) 100 mg tablet  (Taking) Take 1 Tab by mouth two (2) times a day. aspirin 81 mg tablet  (Taking) Take 81 mg by mouth.         - CBC WITH AUTOMATED DIFF  - LIPID PANEL  - TSH REFLEX TO T4  - METABOLIC PANEL, COMPREHENSIVE  - VITAMIN D, 25 HYDROXY    2. Chronically on opiate therapy  As per pain management. She is aware of its long term effects on addiction and possible memory. 3. Hypercholesteremia  The nature of cardiac risk has been fully discussed with this patient. I have made her aware of her LDL target goal given her cardiovascular risk analysis. I have discussed the appropriate diet. The need for lifelong compliance in order to reduce risk is stressed. A regular exercise program is recommended to help achieve and maintain normal body weight, fitness and improve lipid balance. The risks and benefits of medications were discussed. Last cholesterol labs reviewed with patient. Patient made aware to get liver checked every 6 months. Because of so many meds she is on, change mevacor to pravachol (more renal excretion and metabolism). - LIPID PANEL  - TSH REFLEX TO T4    4. Coronary artery disease involving native coronary artery of native heart without angina pectoris  Stable no active issues. 5. Screening for depression  PHQ over the last two weeks 12/13/2017   Little interest or pleasure in doing things Not at all   Feeling down, depressed or hopeless Not at all   Total Score PHQ 2 0           6. Medicare annual wellness visit, subsequent  States both daughters are on her advanced directive. Has some memory impairment. Didn't want to get tested. Likely from opiod and lyrica not helping. 7. Spinal stenosis, lumbar region, with neurogenic claudication  Needs rolling walker. No falls. Started Gianluca Qi which is fantastic to prevent falls. 8. Vitamin D deficiency   Patient compliant with Vit D.   Emphasized importance of taking with food and not too much because it can be toxic to our body. Therefore, it should be checked regularly. Levels ordered. - VITAMIN D, 25 HYDROXY          Visit Vitals    /64 (BP 1 Location: Left arm, BP Patient Position: Sitting)    Pulse 67    Temp 97.9 °F (36.6 °C) (Oral)    Resp 14    Ht 5' 4\" (1.626 m)    Wt 174 lb 8 oz (79.2 kg)    SpO2 97%    BMI 29.95 kg/m2       Historical Data    Past Medical History:   Diagnosis Date    Arthritis     Beta-blocker therapy     CAD (coronary artery disease) 1999    MI >> stent x3, cath 2010: OK    Chronic pain     back pain    Delayed gastric emptying     Depression     Depression with anxiety     Dyspepsia and other specified disorders of function of stomach     GERD (gastroesophageal reflux disease)     Hypercholesterolemia     Hypertension        Past Surgical History:   Procedure Laterality Date    HX CATARACT REMOVAL      HX CORONARY STENT PLACEMENT      HX DILATION AND CURETTAGE      HX HEENT      ORAL SX    HX LUMBAR LAMINECTOMY  12/2012    Dr. Severino Rank    [facility]    HX OTHER SURGICAL      RECTAL SX TWICE; ABCESS; FISTULA    HX SMALL BOWEL RESECTION  10/31/14    Obstruction, Dr. Sharona Guzman, 25 in removed    HX TONSILLECTOMY         Outpatient Encounter Prescriptions as of 12/13/2017   Medication Sig Dispense Refill    bumetanide (BUMEX) 2 mg tablet       cyclobenzaprine (FLEXERIL) 10 mg tablet       LYRICA 225 mg capsule       Omega-3 Fatty Acids (FISH OIL) 300 mg cap Take  by mouth.  lovastatin (MEVACOR) 40 mg tablet TAKE ONE TABLET BY MOUTH EVERY NIGHT 90 Tab 3    polyethylene glycol (MIRALAX) 17 gram packet 1 packet as needed daily.   No more than three times a week 24 Packet 0    valsartan (DIOVAN) 320 mg tablet TAKE ONE TABLET BY MOUTH DAILY 90 Tab 1    omeprazole (PRILOSEC) 20 mg capsule TAKE ONE CAPSULE BY MOUTH TWICE A  Cap 1    melatonin (MELATONIN) 5 mg cap capsule Take 5 mg by mouth nightly.  cloNIDine HCl (CATAPRES) 0.2 mg tablet Take 0.2 mg by mouth two (2) times a day.  HYDROcodone-acetaminophen (NORCO)  mg tablet four (4) times daily.  multivitamin with iron tablet Take 1 Tab by mouth daily.  amLODIPine (NORVASC) 10 mg tablet TAKE ONE TABLET BY MOUTH DAILY 90 Tab 3    B.infantis-B.ani-B.long-B.bifi 10-15 mg TbEC Take  by mouth.  cetirizine (ZYRTEC) 10 mg tablet Take 10 mg by mouth daily.  metoprolol (LOPRESSOR) 100 mg tablet Take 1 Tab by mouth two (2) times a day. 180 Tab 1    Calcium Carbonate-Vit D3-Min 600 mg calcium- 400 unit Tab Take 1 Tab by mouth daily.  docusate sodium (COLACE) 100 mg capsule Take 100 mg by mouth daily as needed.  DOMPERIDONE, BULK, Take 10 mg by mouth four (4) times daily.  aspirin 81 mg tablet Take 81 mg by mouth.  furosemide (LASIX) 80 mg tablet TAKE ONE TABLET BY MOUTH DAILY 90 Tab 2    DULoxetine (CYMBALTA) 60 mg capsule Take 60 mg by mouth daily.  PREGABALIN (LYRICA PO) Take 150 mg by mouth two (2) times a day. Pt unsure of dosage      celecoxib (CELEBREX) 200 mg capsule Take 200 mg by mouth daily. No facility-administered encounter medications on file as of 12/13/2017. No Known Allergies     Social History     Social History    Marital status:      Spouse name: N/A    Number of children: N/A    Years of education: N/A     Occupational History    Not on file. Social History Main Topics    Smoking status: Former Smoker     Packs/day: 1.00     Types: Cigarettes     Quit date: 1/1/2006    Smokeless tobacco: Never Used    Alcohol use 1.8 oz/week     3 Standard drinks or equivalent per week    Drug use: No    Sexual activity: Not Currently     Other Topics Concern    Not on file     Social History Narrative    Julia lives independently.   One daughter is nurse at [employer], one daughter  at [employer].         family history includes Depression in her maternal aunt, maternal grandfather, maternal grandmother, and maternal uncle; Heart Disease in her brother, father, and mother; Hypertension in her brother and mother; Stroke in her father. Review of Systems   Constitutional: Negative for chills, fever and weight loss. Eyes: Negative for blurred vision. Respiratory: Negative for shortness of breath. Cardiovascular: Negative for chest pain. Gastrointestinal: Negative for abdominal pain. Genitourinary: Negative for dysuria and frequency. Skin: Negative for rash. Neurological: Negative for dizziness, focal weakness, weakness and headaches. Endo/Heme/Allergies: Negative for environmental allergies. Does not bruise/bleed easily. Psychiatric/Behavioral: Negative for depression. The patient is not nervous/anxious. Physical Exam   Constitutional: She appears well-developed and well-nourished. She is active. Non-toxic appearance. She does not have a sickly appearance. She does not appear ill. No distress. Eyes: Conjunctivae are normal. Right eye exhibits no discharge. Cardiovascular: Normal rate, regular rhythm, S1 normal, S2 normal, normal heart sounds and normal pulses. Exam reveals no gallop and no friction rub. Pulmonary/Chest: Effort normal and breath sounds normal. No respiratory distress. Abdominal: Soft. Bowel sounds are normal.   Musculoskeletal: She exhibits no edema or deformity. Neurological: She is alert. Skin: Skin is warm and dry. No rash noted. No pallor. Psychiatric: She has a normal mood and affect. Her behavior is normal.   Vitals reviewed. Ortho Exam      Orders Placed This Encounter    bumetanide (BUMEX) 2 mg tablet    cyclobenzaprine (FLEXERIL) 10 mg tablet    LYRICA 225 mg capsule    Omega-3 Fatty Acids (FISH OIL) 300 mg cap     Sig: Take  by mouth. I have reviewed the patient's medical history in detail and updated the computerized patient record.      We had a prolonged discussion about these complex clinical issues and went over the various important aspects to consider. All questions were answered. Advised her to call back or return to office if symptoms do not improve, change in nature, or persist.    She was given an after visit summary or informed of [employer] which includes patient instructions, diagnoses, current medications, & vitals. She expressed understanding with the diagnosis and plan. Julia Grant is a 78 y.o. female and presents for annual Medicare Wellness Visit. Assessment of cognitive impairment: Alert and oriented x 3. Patient reports some memory issues. \"not worse than my friends\"      Problem List: Reviewed with patient and discussed risk factors. 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 Reviewed DNR/DNI and patient is no.         Depression Screen: Reviewed PQH2 done by nurse. No flowsheet data found. Fall Risk:   Fall Risk Assessment, last 12 mths 6/13/2017   Able to walk? Yes   Fall in past 12 months? No   Fall with injury? -   Number of falls in past 12 months -   Fall Risk Score -       Home Safety - discussion completed. No issues found. Hearing Loss:  denies any hearing loss    Activities of Daily Living:  Self-care. Requires assistance with: no ADLs    Adult Nutrition Screen:  No risk factors noted. Advance Care Planning (ACP) Provider Conversation Snapshot    Date of ACP Conversation: 12/13/17  Persons included in Conversation:  patient  Length of ACP Conversation in minutes:  16 minutes    Authorized Decision Maker (if patient is incapable of making informed decisions): This person is:   Healthcare Agent/Medical Power of  under Advance Directive          For Patients with Decision Making Capacity:   Values/Goals: Exploration of values, goals, and preferences if recovery is not expected, even with continued medical treatment in the event of:  Imminent death    Conversation Outcomes / Follow-Up Plan:   Reviewed existing Advance Directive            Health Maintenance- Reviewed    AAA Screening:  Glaucoma Screening:       Health Maintenance Due   Topic Date Due    DTaP/Tdap/Td series (1 - Tdap) 05/15/1963    ZOSTER VACCINE AGE 60>  03/15/2002    GLAUCOMA SCREENING Q2Y  05/15/2007    MEDICARE YEARLY EXAM  05/15/2007        Health Maintenance reviewed -  Plan:      Orders Placed This Encounter    CBC WITH AUTOMATED DIFF    LIPID PANEL    TSH REFLEX TO T4    METABOLIC PANEL, COMPREHENSIVE    VITAMIN D, 25 HYDROXY    bumetanide (BUMEX) 2 mg tablet    cyclobenzaprine (FLEXERIL) 10 mg tablet    LYRICA 225 mg capsule    Omega-3 Fatty Acids (FISH OIL) 300 mg cap    pravastatin (PRAVACHOL) 40 mg tablet           Plan:    Diagnoses and all orders for this visit:    1.  Benign essential hypertension  -     CBC WITH AUTOMATED DIFF  -     LIPID PANEL  -     TSH REFLEX TO T4  -     METABOLIC PANEL, COMPREHENSIVE  -     VITAMIN D, 25 HYDROXY    2. Chronically on opiate therapy    3. Hypercholesteremia  -     LIPID PANEL  -     TSH REFLEX TO T4    4. Coronary artery disease involving native coronary artery of native heart without angina pectoris    5. Screening for depression    6. Medicare annual wellness visit, subsequent    7. Spinal stenosis, lumbar region, with neurogenic claudication    8. Vitamin D deficiency   -     VITAMIN D, 25 HYDROXY    Other orders  -     pravastatin (PRAVACHOL) 40 mg tablet; Take 1 Tab by mouth nightly. Orders Placed This Encounter    CBC WITH AUTOMATED DIFF    LIPID PANEL    TSH REFLEX TO T4    METABOLIC PANEL, COMPREHENSIVE    VITAMIN D, 25 HYDROXY    bumetanide (BUMEX) 2 mg tablet    cyclobenzaprine (FLEXERIL) 10 mg tablet    LYRICA 225 mg capsule    Omega-3 Fatty Acids (FISH OIL) 300 mg cap    pravastatin (PRAVACHOL) 40 mg tablet         Follow-up Disposition: Not on File  Reviewed with patient the treatment plan, goals of treatment plan, and limitations of treatment plan, to include the potential for side effects from medications and procedures. If side effects occur, it is the responsibility of the patient to inform the clinic so that a change in the treatment plan can be made in a safe manner. The patient is advised that stopping prescribed medication may cause an increase in symptoms and possible medication withdrawal symptoms. The patient is informed an emergency room evaluation may be necessary if this occurs. Patient verbalized understanding and is in agreement with treatment plan as outlined above. All questions answered.

## 2017-12-13 NOTE — MR AVS SNAPSHOT
Visit Information Date & Time Provider Department Dept. Phone Encounter #  
 12/13/2017 11:15 AM Yessy Guzman MD [place] [id number] [id number]  Follow-up Instructions Return in about 6 months (around 6/13/2018) for Follow up 30 min slot, recheck blood pressure, recheck medication evaluation. Upcoming Health Maintenance Date Due DTaP/Tdap/Td series (1 - Tdap) 5/15/1963 ZOSTER VACCINE AGE 60> 3/15/2002 GLAUCOMA SCREENING Q2Y 5/15/2007 MEDICARE YEARLY EXAM 5/15/2007 Pneumococcal 65+ Low/Medium Risk (2 of 2 - PPSV23) 9/20/2018 COLONOSCOPY 1/12/2026 Allergies as of 12/13/2017  Review Complete On: 12/13/2017 By: Yessy Guzman MD  
 No Known Allergies Current Immunizations  Reviewed on 12/13/2016 Name Date Influenza High Dose Vaccine PF 9/20/2017, 12/1/2016 Influenza Vaccine PF 10/17/2014, 11/12/2013 12:18 PM  
 Influenza Vaccine Split 12/5/2012 Influenza Vaccine Whole 1/1/2010 Pneumococcal Conjugate (PCV-13) 9/20/2017, 3/3/2015 Not reviewed this visit You Were Diagnosed With   
  
 Codes Comments Benign essential hypertension    -  Primary ICD-10-CM: I10 
ICD-9-CM: 610. 1 Chronically on opiate therapy     ICD-10-CM: Z79.891 ICD-9-CM: V58.69 Hypercholesteremia     ICD-10-CM: E78.00 ICD-9-CM: 272.0 Coronary artery disease involving native coronary artery of native heart without angina pectoris     ICD-10-CM: I25.10 ICD-9-CM: 414.01 Screening for depression     ICD-10-CM: Z13.89 ICD-9-CM: V79.0 Medicare annual wellness visit, subsequent     ICD-10-CM: Z00.00 ICD-9-CM: V70.0 Spinal stenosis, lumbar region, with neurogenic claudication     ICD-10-CM: M48.062 
ICD-9-CM: 724.03 Vitamin D deficiency     ICD-10-CM: E55.9 ICD-9-CM: 268.9 Vitals BP Pulse Temp Resp Height(growth percentile) Weight(growth percentile) 120/64 (BP 1 Location: Left arm, BP Patient Position: Sitting) 67 97.9 °F (36.6 °C) (Oral) 14 5' 4\" (1.626 m) 174 lb 8 oz (79.2 kg) SpO2 BMI OB Status Smoking Status 97% 29.95 kg/m2 Postmenopausal Former Smoker Vitals History BMI and BSA Data Body Mass Index Body Surface Area  
 29.95 kg/m 2 1.89 m 2 Preferred Pharmacy Pharmacy Name Phone [facility] [address], [facility] [address] [facility] [phone] Your Updated Medication List  
  
   
This list is accurate as of: 12/13/17 11:42 AM.  Always use your most recent med list. amLODIPine 10 mg tablet Commonly known as:  Rosas Del TAKE ONE TABLET BY MOUTH DAILY  
  
 aspirin 81 mg tablet Take 81 mg by mouth. B.infantis-B.ani-B.long-B.bifi 10-15 mg Tbec Take  by mouth. bumetanide 2 mg tablet Commonly known as:  Kathie Achilles Calcium Carbonate-Vit D3-Min 600 mg calcium- 400 unit Tab Take 1 Tab by mouth daily. cloNIDine HCl 0.2 mg tablet Commonly known as:  CATAPRES Take 0.2 mg by mouth two (2) times a day. COLACE 100 mg capsule Generic drug:  docusate sodium Take 100 mg by mouth daily as needed. cyclobenzaprine 10 mg tablet Commonly known as:  FLEXERIL  
  
 DOMPERIDONE (BULK) Take 10 mg by mouth four (4) times daily. FISH  mg Cap Generic drug:  Omega-3 Fatty Acids Take  by mouth. HYDROcodone-acetaminophen  mg tablet Commonly known as:  NORCO  
four (4) times daily. LYRICA 225 mg capsule Generic drug:  pregabalin  
  
 melatonin 5 mg Cap capsule Take 5 mg by mouth nightly. metoprolol tartrate 100 mg IR tablet Commonly known as:  LOPRESSOR Take 1 Tab by mouth two (2) times a day. multivitamin with iron tablet Take 1 Tab by mouth daily. omeprazole 20 mg capsule Commonly known as:  PRILOSEC  
TAKE ONE CAPSULE BY MOUTH TWICE A DAY  
  
 polyethylene glycol 17 gram packet Commonly known as:  Nicole Diazos 1 packet as needed daily. No more than three times a week  
  
 pravastatin 40 mg tablet Commonly known as:  PRAVACHOL Take 1 Tab by mouth nightly. valsartan 320 mg tablet Commonly known as:  DIOVAN  
TAKE ONE TABLET BY MOUTH DAILY Lanice Nicki Commonly known as:  ULTRA-LIGHT ROLLATOR [place] Drones with seat ZyrTEC 10 mg tablet Generic drug:  cetirizine Take 10 mg by mouth daily. Prescriptions Sent to Pharmacy Refills  
 pravastatin (PRAVACHOL) 40 mg tablet 3 Sig: Take 1 Tab by mouth nightly. Class: Normal  
 Pharmacy: [place] [address] Ph #: [phone] Route: Oral  
 Walker (ULTRA-LIGHT ROLLATOR) misc 0 Sig: [place] Drones with seat Class: Normal  
 Pharmacy: [place] [address] Ph #: [phone] We Performed the Following CBC WITH AUTOMATED DIFF [27336 CPT(R)] LIPID PANEL [39683 CPT(R)] METABOLIC PANEL, COMPREHENSIVE [79773 CPT(R)] TSH REFLEX TO T4 [[id number] Custom] VITAMIN D, 25 HYDROXY [id number] CPT(R)] Follow-up Instructions Return in about 6 months (around 6/13/2018) for Follow up 30 min slot, recheck blood pressure, recheck medication evaluation. To-Do List   
 12/14/2017 2:00 PM  
  Appointment with Alcon Ibarra at [facility] [address] ([phone])  
  
 01/02/2018 3:15 PM  
  Appointment with Alcon Ibarra at [facility] [address] ([phone]) [facility] SERVICES! Dear Carlos Saunders: Thank you for requesting a [employer] account. Our records indicate that you have previously registered for a [employer] account but its currently inactive. Please call our [employer] support line at [phone]. Additional Information If you have questions, please visit the Frequently Asked Questions section of the [employer] website at [email or web address] Remember, [employer] is NOT to be used for urgent needs. For medical emergencies, dial 911. Now available from your iPhone and Android! Please provide this summary of care documentation to your next provider. Your primary care clinician is listed as Socorro Ayala. If you have any questions after today's visit, please call [phone].

## 2017-12-15 LAB
25(OH)D3+25(OH)D2 SERPL-MCNC: 54 NG/ML (ref 30–100)
ALBUMIN SERPL-MCNC: 4 G/DL (ref 3.5–4.8)
ALBUMIN/GLOB SERPL: 1.7 {RATIO} (ref 1.2–2.2)
ALP SERPL-CCNC: 80 IU/L (ref 39–117)
ALT SERPL-CCNC: 21 IU/L (ref 0–32)
AST SERPL-CCNC: 25 IU/L (ref 0–40)
BASOPHILS # BLD AUTO: 0 X10E3/UL (ref 0–0.2)
BASOPHILS NFR BLD AUTO: 0 %
BILIRUB SERPL-MCNC: 0.5 MG/DL (ref 0–1.2)
BUN SERPL-MCNC: 14 MG/DL (ref 8–27)
BUN/CREAT SERPL: 17 (ref 12–28)
CALCIUM SERPL-MCNC: 9.1 MG/DL (ref 8.7–10.3)
CHLORIDE SERPL-SCNC: 104 MMOL/L (ref 96–106)
CHOLEST SERPL-MCNC: 150 MG/DL (ref 100–199)
CO2 SERPL-SCNC: 24 MMOL/L (ref 18–29)
CREAT SERPL-MCNC: 0.82 MG/DL (ref 0.57–1)
EOSINOPHIL # BLD AUTO: 0.2 X10E3/UL (ref 0–0.4)
EOSINOPHIL NFR BLD AUTO: 2 %
ERYTHROCYTE [DISTWIDTH] IN BLOOD BY AUTOMATED COUNT: 13.3 % (ref 12.3–15.4)
GFR SERPLBLD CREATININE-BSD FMLA CKD-EPI: 70 ML/MIN/1.73
GFR SERPLBLD CREATININE-BSD FMLA CKD-EPI: 81 ML/MIN/1.73
GLOBULIN SER CALC-MCNC: 2.4 G/DL (ref 1.5–4.5)
GLUCOSE SERPL-MCNC: 86 MG/DL (ref 65–99)
HCT VFR BLD AUTO: 39.9 % (ref 34–46.6)
HDLC SERPL-MCNC: 33 MG/DL
HGB BLD-MCNC: 13.9 G/DL (ref 11.1–15.9)
IMM GRANULOCYTES # BLD: 0 X10E3/UL (ref 0–0.1)
IMM GRANULOCYTES NFR BLD: 0 %
INTERPRETATION, 910389: NORMAL
LDLC SERPL CALC-MCNC: 63 MG/DL (ref 0–99)
LYMPHOCYTES # BLD AUTO: 2.6 X10E3/UL (ref 0.7–3.1)
LYMPHOCYTES NFR BLD AUTO: 28 %
MCH RBC QN AUTO: 32.8 PG (ref 26.6–33)
MCHC RBC AUTO-ENTMCNC: 34.8 G/DL (ref 31.5–35.7)
MCV RBC AUTO: 94 FL (ref 79–97)
MONOCYTES # BLD AUTO: 0.6 X10E3/UL (ref 0.1–0.9)
MONOCYTES NFR BLD AUTO: 7 %
NEUTROPHILS # BLD AUTO: 5.9 X10E3/UL (ref 1.4–7)
NEUTROPHILS NFR BLD AUTO: 63 %
PLATELET # BLD AUTO: 215 X10E3/UL (ref 150–379)
POTASSIUM SERPL-SCNC: 4.6 MMOL/L (ref 3.5–5.2)
PROT SERPL-MCNC: 6.4 G/DL (ref 6–8.5)
RBC # BLD AUTO: 4.24 X10E6/UL (ref 3.77–5.28)
SODIUM SERPL-SCNC: 143 MMOL/L (ref 134–144)
T4 SERPL-MCNC: 6.3 UG/DL (ref 4.5–12)
TRIGL SERPL-MCNC: 268 MG/DL (ref 0–149)
TSH SERPL DL<=0.005 MIU/L-ACNC: 4.85 UIU/ML (ref 0.45–4.5)
VLDLC SERPL CALC-MCNC: 54 MG/DL (ref 5–40)
WBC # BLD AUTO: 9.4 X10E3/UL (ref 3.4–10.8)

## 2017-12-22 ENCOUNTER — TELEPHONE (OUTPATIENT)
Dept: INTERNAL MEDICINE CLINIC | Age: 77
End: 2017-12-22

## 2017-12-22 DIAGNOSIS — E78.1 HIGH TRIGLYCERIDES: ICD-10-CM

## 2017-12-22 DIAGNOSIS — R79.89 ABNORMAL TSH: Primary | ICD-10-CM

## 2017-12-22 NOTE — TELEPHONE ENCOUNTER
----- Message from Dav Louie MD sent at 12/22/2017  7:11 AM EST -----  I think it would be better to repeat the TSH in January since she was feeling so bad when she did the blood tests. Tell her it should be fasting to repeat the TG. Labs ordered.

## 2017-12-22 NOTE — PROGRESS NOTES
I think it would be better to repeat the TSH in January since she was feeling so bad when she did the blood tests. Tell her it should be fasting to repeat the TG. Labs ordered.

## 2018-01-15 RX ORDER — VALSARTAN 320 MG/1
TABLET ORAL
Qty: 90 TAB | Refills: 3 | Status: SHIPPED | OUTPATIENT
Start: 2018-01-15 | End: 2018-04-30 | Stop reason: SDUPTHER

## 2018-01-15 NOTE — TELEPHONE ENCOUNTER
Last office visit- 12/13/17  Next office visit- 6/14/18  Last refill-    Requested Prescriptions     Pending Prescriptions Disp Refills    valsartan (DIOVAN) 320 mg tablet 90 Tab 1     Sig: TAKE ONE TABLET BY MOUTH DAILY

## 2018-03-13 RX ORDER — OMEPRAZOLE 20 MG/1
CAPSULE, DELAYED RELEASE ORAL
Qty: 180 CAP | Refills: 1 | Status: SHIPPED | OUTPATIENT
Start: 2018-03-13 | End: 2019-03-20 | Stop reason: SDUPTHER

## 2018-03-13 NOTE — TELEPHONE ENCOUNTER
Last office visit- 12/13/17  Next office visit- 6/14/18  Last refill- 9/30/17    Requested Prescriptions     Pending Prescriptions Disp Refills    omeprazole (PRILOSEC) 20 mg capsule 180 Cap 1

## 2018-03-17 RX ORDER — POLYETHYLENE GLYCOL 3350 17 G/17G
POWDER, FOR SOLUTION ORAL
Refills: 0 | OUTPATIENT
Start: 2018-03-17

## 2018-03-20 RX ORDER — POLYETHYLENE GLYCOL 3350 17 G/17G
POWDER, FOR SOLUTION ORAL
Qty: 24 PACKET | Refills: 3 | Status: SHIPPED | OUTPATIENT
Start: 2018-03-20

## 2018-03-20 NOTE — TELEPHONE ENCOUNTER
This should not be taken regularly. Ask how often she takes it and who originally prescribed for her. I may have refilled it but uncomfortable in telling her to take it too often as it can damage her colon.

## 2018-03-20 NOTE — TELEPHONE ENCOUNTER
Pt states she is not taken it regularly, only when her stomach is given her problems. She does not remember who prescribed it to her.

## 2018-03-21 NOTE — TELEPHONE ENCOUNTER
Pharmacy called requesting that the script be changed to the bottle of miralax instead of the packets. the bottle is about half the price.

## 2018-04-30 RX ORDER — VALSARTAN 320 MG/1
TABLET ORAL
Qty: 90 TAB | Refills: 3 | Status: SHIPPED | OUTPATIENT
Start: 2018-04-30 | End: 2018-09-13

## 2018-04-30 NOTE — TELEPHONE ENCOUNTER
Last office visit- 12/13/17  Next office visit- 6/14/18  Last refill- 1/15/18    Requested Prescriptions     Pending Prescriptions Disp Refills    valsartan (DIOVAN) 320 mg tablet 90 Tab 3     Sig: TAKE ONE TABLET BY MOUTH DAILY

## 2018-06-14 ENCOUNTER — TELEPHONE (OUTPATIENT)
Dept: INTERNAL MEDICINE CLINIC | Age: 78
End: 2018-06-14

## 2018-06-14 ENCOUNTER — OFFICE VISIT (OUTPATIENT)
Dept: INTERNAL MEDICINE CLINIC | Age: 78
End: 2018-06-14

## 2018-06-14 ENCOUNTER — HOSPITAL ENCOUNTER (OUTPATIENT)
Dept: LAB | Age: 78
Discharge: HOME | End: 2018-06-14
Payer: MEDICARE

## 2018-06-14 VITALS
HEART RATE: 74 BPM | DIASTOLIC BLOOD PRESSURE: 80 MMHG | BODY MASS INDEX: 29.88 KG/M2 | HEIGHT: 64 IN | WEIGHT: 175 LBS | SYSTOLIC BLOOD PRESSURE: 160 MMHG | RESPIRATION RATE: 15 BRPM | TEMPERATURE: 99.2 F | OXYGEN SATURATION: 96 %

## 2018-06-14 DIAGNOSIS — E78.00 HYPERCHOLESTEREMIA: ICD-10-CM

## 2018-06-14 DIAGNOSIS — E03.9 HYPOTHYROIDISM, UNSPECIFIED TYPE: ICD-10-CM

## 2018-06-14 DIAGNOSIS — F17.200 SMOKING: ICD-10-CM

## 2018-06-14 DIAGNOSIS — Z79.891 CHRONICALLY ON OPIATE THERAPY: ICD-10-CM

## 2018-06-14 DIAGNOSIS — I10 BENIGN ESSENTIAL HYPERTENSION: Primary | ICD-10-CM

## 2018-06-14 PROCEDURE — 80061 LIPID PANEL: CPT

## 2018-06-14 PROCEDURE — 80053 COMPREHEN METABOLIC PANEL: CPT

## 2018-06-14 PROCEDURE — 85025 COMPLETE CBC W/AUTO DIFF WBC: CPT

## 2018-06-14 PROCEDURE — 84443 ASSAY THYROID STIM HORMONE: CPT

## 2018-06-14 PROCEDURE — 36415 COLL VENOUS BLD VENIPUNCTURE: CPT

## 2018-06-14 RX ORDER — TRAMADOL HYDROCHLORIDE 50 MG/1
TABLET ORAL
COMMUNITY
Start: 2018-05-16 | End: 2018-09-11 | Stop reason: ALTCHOICE

## 2018-06-14 NOTE — PROGRESS NOTES
Reviewed record in preparation for visit and have obtained necessary documentation. Identified pt with two pt identifiers(name and ). Health Maintenance Due   Topic    DTaP/Tdap/Td series (1 - Tdap)    GLAUCOMA SCREENING Q2Y          Chief Complaint   Patient presents with    Hypertension     6 month follow up        Wt Readings from Last 3 Encounters:   18 175 lb (79.4 kg)   17 174 lb 8 oz (79.2 kg)   17 179 lb (81.2 kg)     Temp Readings from Last 3 Encounters:   18 99.2 °F (37.3 °C) (Oral)   17 97.9 °F (36.6 °C) (Oral)   17 99.1 °F (37.3 °C) (Oral)     BP Readings from Last 3 Encounters:   18 194/90   17 120/64   17 110/60     Pulse Readings from Last 3 Encounters:   18 74   17 67   17 (!) 58           Learning Assessment:  :     Learning Assessment 2018   PRIMARY LEARNER Patient Patient Patient Patient   HIGHEST LEVEL OF EDUCATION - PRIMARY LEARNER  SOME COLLEGE SOME COLLEGE SOME COLLEGE SOME COLLEGE   BARRIERS PRIMARY LEARNER NONE NONE NONE NONE   CO-LEARNER CAREGIVER No No No No   PRIMARY LANGUAGE ENGLISH ENGLISH ENGLISH ENGLISH    NEED - - - No   LEARNER PREFERENCE PRIMARY PICTURES LISTENING LISTENING LISTENING   LEARNING SPECIAL TOPICS - - - no   ANSWERED BY patient patient patient patient   RELATIONSHIP SELF SELF SELF SELF   ASSESSMENT COMMENT - - - none       Depression Screening:  :     PHQ over the last two weeks 2018   Little interest or pleasure in doing things Not at all   Feeling down, depressed or hopeless Not at all   Total Score PHQ 2 0       Fall Risk Assessment:  :     Fall Risk Assessment, last 12 mths 2018   Able to walk? Yes   Fall in past 12 months?  No   Fall with injury? -   Number of falls in past 12 months -   Fall Risk Score -       Abuse Screening:  :     Abuse Screening Questionnaire 2018   Do you ever feel afraid of your partner? N N N   Are you in a relationship with someone who physically or mentally threatens you? N N N   Is it safe for you to go home? Wilfrido Thomas       Coordination of Care Questionnaire:  :     1) Have you been to an emergency room, urgent care clinic since your last visit? [facility] 3/2018  Hospitalized since your last visit? No    2) Have you seen or consulted any other health care providers outside of [address] [place] [address] since your last visit? No (Include any pap smears or colon screenings in this section.)    3) Do you have an Advance Directive on file? yes    4) Are you interested in receiving information on Advance Directives?  No

## 2018-06-14 NOTE — PROGRESS NOTES
Hypertension (6 month follow up)       HPI:  Braeden John is a 70y.o. year old female who is here for a follow up visit. She was last seen by me on Visit date not found. She reports the following:    Was aware TSH was a little off  Wt Readings from Last 3 Encounters:   06/14/18 175 lb (79.4 kg)   12/13/17 174 lb 8 oz (79.2 kg)   06/13/17 179 lb (81.2 kg)      Consistent with taking BP medications    Gastroparesis- sees Dr. Jose Sotelo. Pt refuses colonoscopy. Taking 160 mg of valsartan instead of 320 mg.as per Dr. Tata Calixto last summer because of hypotension. She says she doesn't recall low BP    Lives alone. One daughter is involved in her life but they are not close. She lives at [facility]. Assessment and Plan        1. Benign essential hypertension  Go back to 320 mg of valsartan (don't cut in half)  She has plenty. Recheck BP in 4 weeks. - CBC WITH AUTOMATED DIFF  - LIPID PANEL  - TSH REFLEX TO T4  - METABOLIC PANEL, COMPREHENSIVE    2. Hypercholesteremia  Check cholesterol today fasting.     - CBC WITH AUTOMATED DIFF  - LIPID PANEL  - TSH REFLEX TO T4  - METABOLIC PANEL, COMPREHENSIVE    3. Hypothyroidism, unspecified type  Tolerating thyroid medication and takes on empty stomach. Aware to watch for symptoms of overactive thyroid like heat intolerance, loose stools, increase appetite, and weight loss along with palpitations. Pt informed if low thyroid symptoms, like cold intolerance, weight gain, hair loss, edema, and mental slowness, please call to get TSH rechecked. Noncompliance of medications can lead to coma and severe mental status changes. Stay on current regimen of  Synthroid. She knew about the abnormal TSH but didn't come in for recheck of labs. - CBC WITH AUTOMATED DIFF  - LIPID PANEL  - TSH REFLEX TO T4  - METABOLIC PANEL, COMPREHENSIVE    4. Smoking  Needs to stop smoking even if she does not smoke regularly. Will put an increased risk for stroke and heart disease. Pt is aware    5. Chronically on opiate therapy  As per pain management. Metabolic encephalopathy- opiod use as per discharge summary 4/2016        Visit Vitals    /80    Pulse 74    Temp 99.2 °F (37.3 °C) (Oral)    Resp 15    Ht 5' 4\" (1.626 m)    Wt 175 lb (79.4 kg)    SpO2 96%    BMI 30.04 kg/m2         Historical Data    Past Medical History:   Diagnosis Date    Arthritis     Beta-blocker therapy     CAD (coronary artery disease) 1999    MI >> stent x3, cath 2010: [place]    Chronic pain     back pain    Delayed gastric emptying     Depression     Depression with anxiety     Dyspepsia and other specified disorders of function of stomach     GERD (gastroesophageal reflux disease)     Hypercholesterolemia     Hypertension        Past Surgical History:   Procedure Laterality Date    HX CATARACT REMOVAL      HX CORONARY STENT PLACEMENT      HX DILATION AND CURETTAGE      HX HEENT      ORAL SX    HX LUMBAR LAMINECTOMY  12/2012    Dr. Patrick Tompkins    [facility]    HX OTHER SURGICAL      RECTAL SX TWICE; ABCESS; FISTULA    HX SMALL BOWEL RESECTION  10/31/14    Obstruction, Dr. Byron Smith, 25 in removed    HX TONSILLECTOMY         Outpatient Encounter Prescriptions as of 6/14/2018   Medication Sig Dispense Refill    traMADol (ULTRAM) 50 mg tablet       valsartan (DIOVAN) 320 mg tablet TAKE ONE TABLET BY MOUTH DAILY 90 Tab 3    polyethylene glycol (MIRALAX) 17 gram packet 1 packet as needed daily. No more than three times a week 24 Packet 3    omeprazole (PRILOSEC) 20 mg capsule TAKE ONE CAPSULE BY MOUTH TWICE A  Cap 1    bumetanide (BUMEX) 2 mg tablet       cyclobenzaprine (FLEXERIL) 10 mg tablet       LYRICA 225 mg capsule       Omega-3 Fatty Acids (FISH OIL) 300 mg cap Take  by mouth.  pravastatin (PRAVACHOL) 40 mg tablet Take 1 Tab by mouth nightly.  90 Tab 3    Walker (ULTRA-LIGHT ROLLATOR) misc Walker with seat 1 Each 0    melatonin (MELATONIN) 5 mg cap capsule Take 5 mg by mouth nightly.  cloNIDine HCl (CATAPRES) 0.2 mg tablet Take 0.2 mg by mouth two (2) times a day.  HYDROcodone-acetaminophen (NORCO)  mg tablet four (4) times daily.  multivitamin with iron tablet Take 1 Tab by mouth daily.  amLODIPine (NORVASC) 10 mg tablet TAKE ONE TABLET BY MOUTH DAILY 90 Tab 3    B.infantis-B.ani-B.long-B.bifi 10-15 mg TbEC Take  by mouth.  cetirizine (ZYRTEC) 10 mg tablet Take 10 mg by mouth daily.  metoprolol (LOPRESSOR) 100 mg tablet Take 1 Tab by mouth two (2) times a day. 180 Tab 1    Calcium Carbonate-Vit D3-Min 600 mg calcium- 400 unit Tab Take 1 Tab by mouth daily.  docusate sodium (COLACE) 100 mg capsule Take 100 mg by mouth daily as needed.  DOMPERIDONE, BULK, Take 10 mg by mouth four (4) times daily.  aspirin 81 mg tablet Take 81 mg by mouth. No facility-administered encounter medications on file as of 6/14/2018. No Known Allergies     Social History     Social History    Marital status:      Spouse name: N/A    Number of children: N/A    Years of education: N/A     Occupational History    Not on file. Social History Main Topics    Smoking status: Former Smoker     Packs/day: 1.00     Types: Cigarettes     Quit date: 1/1/2006    Smokeless tobacco: Never Used    Alcohol use 1.8 oz/week     3 Standard drinks or equivalent per week    Drug use: No    Sexual activity: Not Currently     Other Topics Concern    Not on file     Social History Narrative    Julia lives independently. One daughter is nurse at [employer] [employer], one daughter  at [employer]         family history includes Depression in her maternal aunt, maternal grandfather, maternal grandmother, and maternal uncle; Heart Disease in her brother, father, and mother; Hypertension in her brother and mother; Stroke in her father. Review of Systems   Constitutional: Negative for weight loss. Eyes: Negative for blurred vision. Respiratory: Negative for shortness of breath. Cardiovascular: Negative for chest pain. Gastrointestinal: Negative for abdominal pain. Genitourinary: Negative for dysuria and frequency. Skin: Negative for rash. Neurological: Negative for dizziness, focal weakness, weakness and headaches. Endo/Heme/Allergies: Negative for environmental allergies. Does not bruise/bleed easily. Physical Exam   Constitutional: She appears well-developed and well-nourished. She is active. Non-toxic appearance. She does not have a sickly appearance. She does not appear ill. No distress. Eyes: Conjunctivae are normal. Right eye exhibits no discharge. Cardiovascular: Normal rate, regular rhythm, S1 normal, S2 normal, normal heart sounds and normal pulses. Exam reveals no gallop and no friction rub. Pulmonary/Chest: Effort normal and breath sounds normal. No respiratory distress. Abdominal: Soft. Bowel sounds are normal.   Musculoskeletal: She exhibits no edema or deformity. Neurological: She is alert. Skin: Skin is warm and dry. No rash noted. No pallor. Psychiatric: She has a normal mood and affect. Her behavior is normal.   Vitals reviewed. Ortho Exam      Orders Placed This Encounter    CBC WITH AUTOMATED DIFF    LIPID PANEL    TSH REFLEX TO T4    METABOLIC PANEL, COMPREHENSIVE    traMADol (ULTRAM) 50 mg tablet        I have reviewed the patient's medical history in detail and updated the computerized patient record. We had a prolonged discussion about these complex clinical issues and went over the various important aspects to consider. All questions were answered. Advised her to call back or return to office if symptoms do not improve, change in nature, or persist.    She was given an after visit summary or informed of [employer] Access which includes patient instructions, diagnoses, current medications, & vitals.   She expressed understanding with the diagnosis and plan. Advance Care Planning (ACP) Provider Conversation Snapshot    Date of ACP Conversation: 06/14/18  Persons included in Conversation:  patient  Length of ACP Conversation in minutes:  <16 minutes (Non-Billable)    Authorized Decision Maker (if patient is incapable of making informed decisions):    This person is:   Healthcare Agent/Medical Power of  under Advance Directive  Olegario Sanders (lives in [facility])          For Patients with Decision Making Capacity:   Values/Goals: Exploration of values, goals, and preferences if recovery is not expected, even with continued medical treatment in the event of:  Imminent death    Conversation Outcomes / Follow-Up Plan:   Completed new Advance Directive

## 2018-06-14 NOTE — TELEPHONE ENCOUNTER
Pt saw dr Susanne Bundy in the office today, Pt has a question when she checked out about her blood pressure being high she was wondering if a loose tooth could cause her BP to be high.

## 2018-06-15 LAB
ALBUMIN SERPL-MCNC: 4.7 G/DL (ref 3.5–4.8)
ALBUMIN/GLOB SERPL: 2 {RATIO} (ref 1.2–2.2)
ALP SERPL-CCNC: 74 IU/L (ref 39–117)
ALT SERPL-CCNC: 18 IU/L (ref 0–32)
AST SERPL-CCNC: 22 IU/L (ref 0–40)
BASOPHILS # BLD AUTO: 0 X10E3/UL (ref 0–0.2)
BASOPHILS NFR BLD AUTO: 0 %
BILIRUB SERPL-MCNC: 0.4 MG/DL (ref 0–1.2)
BUN SERPL-MCNC: 15 MG/DL (ref 8–27)
BUN/CREAT SERPL: 19 (ref 12–28)
CALCIUM SERPL-MCNC: 9.1 MG/DL (ref 8.7–10.3)
CHLORIDE SERPL-SCNC: 99 MMOL/L (ref 96–106)
CHOLEST SERPL-MCNC: 155 MG/DL (ref 100–199)
CO2 SERPL-SCNC: 26 MMOL/L (ref 20–29)
CREAT SERPL-MCNC: 0.78 MG/DL (ref 0.57–1)
EOSINOPHIL # BLD AUTO: 0.1 X10E3/UL (ref 0–0.4)
EOSINOPHIL NFR BLD AUTO: 1 %
ERYTHROCYTE [DISTWIDTH] IN BLOOD BY AUTOMATED COUNT: 13.8 % (ref 12.3–15.4)
GFR SERPLBLD CREATININE-BSD FMLA CKD-EPI: 74 ML/MIN/1.73
GFR SERPLBLD CREATININE-BSD FMLA CKD-EPI: 85 ML/MIN/1.73
GLOBULIN SER CALC-MCNC: 2.3 G/DL (ref 1.5–4.5)
GLUCOSE SERPL-MCNC: 89 MG/DL (ref 65–99)
HCT VFR BLD AUTO: 43.6 % (ref 34–46.6)
HDLC SERPL-MCNC: 47 MG/DL
HGB BLD-MCNC: 14.8 G/DL (ref 11.1–15.9)
IMM GRANULOCYTES # BLD: 0 X10E3/UL (ref 0–0.1)
IMM GRANULOCYTES NFR BLD: 0 %
INTERPRETATION, 910389: NORMAL
LDLC SERPL CALC-MCNC: 68 MG/DL (ref 0–99)
LYMPHOCYTES # BLD AUTO: 2.9 X10E3/UL (ref 0.7–3.1)
LYMPHOCYTES NFR BLD AUTO: 26 %
MCH RBC QN AUTO: 31.9 PG (ref 26.6–33)
MCHC RBC AUTO-ENTMCNC: 33.9 G/DL (ref 31.5–35.7)
MCV RBC AUTO: 94 FL (ref 79–97)
MONOCYTES # BLD AUTO: 0.7 X10E3/UL (ref 0.1–0.9)
MONOCYTES NFR BLD AUTO: 6 %
NEUTROPHILS # BLD AUTO: 7.7 X10E3/UL (ref 1.4–7)
NEUTROPHILS NFR BLD AUTO: 67 %
PLATELET # BLD AUTO: 225 X10E3/UL (ref 150–379)
POTASSIUM SERPL-SCNC: 4.1 MMOL/L (ref 3.5–5.2)
PROT SERPL-MCNC: 7 G/DL (ref 6–8.5)
RBC # BLD AUTO: 4.64 X10E6/UL (ref 3.77–5.28)
SODIUM SERPL-SCNC: 143 MMOL/L (ref 134–144)
TRIGL SERPL-MCNC: 198 MG/DL (ref 0–149)
TSH SERPL DL<=0.005 MIU/L-ACNC: 1.67 UIU/ML (ref 0.45–4.5)
VLDLC SERPL CALC-MCNC: 40 MG/DL (ref 5–40)
WBC # BLD AUTO: 11.4 X10E3/UL (ref 3.4–10.8)

## 2018-06-15 NOTE — TELEPHONE ENCOUNTER
No it will not cause it to go high.   She should just take the full dose of the valsartan as we discussed

## 2018-06-15 NOTE — PROGRESS NOTES
Let her know her numbers looked very good. Her thyroid is back to normal.  Cholesterol is very good.

## 2018-07-09 ENCOUNTER — TELEPHONE (OUTPATIENT)
Dept: INTERNAL MEDICINE CLINIC | Age: 78
End: 2018-07-09

## 2018-07-09 NOTE — TELEPHONE ENCOUNTER
Spoke with patient, two identifiers verified. Advised per Dr. Lizz Stevens recommendation, patient agrees. States her BP hasn't \"come down a whole lot\", but has been checking it at [place]. Advised to see Dr. Eliecer Ortiz, and us after if any further problems/concerns. Patient verbalizes understanding. Appt canceled w/ Dr. Casrto Chavarria by me- patient consents. The following appointments have been cancelled.  Please check the appointments you would like to reschedule:  Date/Time Tuesday, July 10, 2018 03:45PM

## 2018-07-09 NOTE — TELEPHONE ENCOUNTER
Patient has an appointment with her cardiologist on Friday, she is due to come in and see Dr. Lianne Mathur tomorrow for a fu on her blood pressure, and would like to know if she still needs to keep this appt with Dr. Lianne Mathur since she is seeing her cardiologist on Friday.

## 2018-09-11 ENCOUNTER — HOSPITAL ENCOUNTER (EMERGENCY)
Age: 78
Discharge: HOME | End: 2018-09-12
Attending: EMERGENCY MEDICINE | Admitting: EMERGENCY MEDICINE
Payer: MEDICARE

## 2018-09-11 ENCOUNTER — APPOINTMENT (OUTPATIENT)
Dept: GENERAL RADIOLOGY | Age: 78
End: 2018-09-11
Attending: EMERGENCY MEDICINE
Payer: MEDICARE

## 2018-09-11 DIAGNOSIS — S42.402A CLOSED FRACTURE OF DISTAL END OF LEFT HUMERUS, UNSPECIFIED FRACTURE MORPHOLOGY, INITIAL ENCOUNTER: Primary | ICD-10-CM

## 2018-09-11 PROCEDURE — 73562 X-RAY EXAM OF KNEE 3: CPT

## 2018-09-11 PROCEDURE — 73060 X-RAY EXAM OF HUMERUS: CPT

## 2018-09-11 PROCEDURE — 74011250637 HC RX REV CODE- 250/637: Performed by: EMERGENCY MEDICINE

## 2018-09-11 PROCEDURE — 99283 EMERGENCY DEPT VISIT LOW MDM: CPT

## 2018-09-11 PROCEDURE — 96374 THER/PROPH/DIAG INJ IV PUSH: CPT

## 2018-09-11 PROCEDURE — 74011250636 HC RX REV CODE- 250/636: Performed by: EMERGENCY MEDICINE

## 2018-09-11 RX ORDER — FENTANYL CITRATE 50 UG/ML
50 INJECTION, SOLUTION INTRAMUSCULAR; INTRAVENOUS ONCE
Status: COMPLETED | OUTPATIENT
Start: 2018-09-11 | End: 2018-09-11

## 2018-09-11 RX ORDER — HYDROCODONE BITARTRATE AND ACETAMINOPHEN 10; 325 MG/1; MG/1
1 TABLET ORAL
Qty: 10 TAB | Refills: 0 | Status: SHIPPED | OUTPATIENT
Start: 2018-09-11 | End: 2022-07-07 | Stop reason: ALTCHOICE

## 2018-09-11 RX ORDER — OXYCODONE HYDROCHLORIDE 5 MG/1
10 TABLET ORAL
Status: COMPLETED | OUTPATIENT
Start: 2018-09-11 | End: 2018-09-11

## 2018-09-11 RX ADMIN — FENTANYL CITRATE 50 MCG: 50 INJECTION, SOLUTION INTRAMUSCULAR; INTRAVENOUS at 22:52

## 2018-09-11 RX ADMIN — OXYCODONE HYDROCHLORIDE 10 MG: 5 TABLET ORAL at 22:22

## 2018-09-12 ENCOUNTER — HOSPITAL ENCOUNTER (OUTPATIENT)
Dept: CT IMAGING | Age: 78
Discharge: HOME | End: 2018-09-12
Attending: ORTHOPAEDIC SURGERY
Payer: MEDICARE

## 2018-09-12 VITALS
RESPIRATION RATE: 16 BRPM | BODY MASS INDEX: 30.48 KG/M2 | WEIGHT: 172 LBS | HEIGHT: 63 IN | SYSTOLIC BLOOD PRESSURE: 187 MMHG | OXYGEN SATURATION: 91 % | DIASTOLIC BLOOD PRESSURE: 79 MMHG | TEMPERATURE: 98.1 F

## 2018-09-12 DIAGNOSIS — S42.412A CLOSED SUPRACONDYLAR FRACTURE OF LEFT ELBOW: ICD-10-CM

## 2018-09-12 PROCEDURE — 73200 CT UPPER EXTREMITY W/O DYE: CPT

## 2018-09-12 NOTE — ED PROVIDER NOTES
HPI Comments: 70 y.o. female with past medical history significant for GERD, hypertension, and CAD who presents from home via EMS with chief complaint of left arm pain. Pt reports she stubbed her left toe on furniture and tripped causing her to strike her left upper arm on a shopping cart which she has at home. Pt reports this occurred this evening. Pt states she fell on her knees, but did not hit her head or lose consciousness. As a result, the pt also complains of some left knee pain and swelling. In the ED, pt rates her pain a 7/10 at rest, but is exacerbated greatly with movement of her left arm. Pt reports she has been able to ambulate since her fall. Pt denies any nausea. There are no other acute medical concerns at this time. Social hx - Tobacco use: former smoker, Alcohol Use: yes PCP: Anuja Chávez MD 
 
Note written by Rolando Becerra, as dictated by Lydia Pinon DO 8:45 PM. The history is provided by the patient. No  was used. Past Medical History:  
Diagnosis Date  Arthritis  Beta-blocker therapy  CAD (coronary artery disease) 1999 MI >> stent x3, cath 2010: [place]  Chronic pain   
 back pain  Delayed gastric emptying  Depression  Depression with anxiety  Dyspepsia and other specified disorders of function of stomach  GERD (gastroesophageal reflux disease)  Hypercholesterolemia  Hypertension Past Surgical History:  
Procedure Laterality Date  HX CATARACT REMOVAL    
 HX CORONARY STENT PLACEMENT    
 HX DILATION AND CURETTAGE    
 HX HEENT    
 ORAL SX  
 HX LUMBAR LAMINECTOMY  12/2012 Dr. Rafa Quesada  HX OTHER SURGICAL BIRTH DONOVAN EXCISED  HX OTHER SURGICAL    
 RECTAL SX TWICE; ABCESS; FISTULA  HX SMALL BOWEL RESECTION  10/31/14 Obstruction, Dr. Patricio Caballero, 25 in removed  HX TONSILLECTOMY Family History:  
Problem Relation Age of Onset  Hypertension Mother  Heart Disease Mother  Heart Disease Father  Stroke Father  Heart Disease Brother  Hypertension Brother  Depression Maternal Aunt  Depression Maternal Uncle  Depression Maternal Grandmother  Depression Maternal Grandfather Social History Social History  Marital status:  Spouse name: N/A  
 Number of children: N/A  
 Years of education: N/A Occupational History  Not on file. Social History Main Topics  Smoking status: Former Smoker Packs/day: 1.00 Types: Cigarettes Quit date: 1/1/2006  Smokeless tobacco: Never Used  Alcohol use 1.8 oz/week 3 Standard drinks or equivalent per week  Drug use: No  
 Sexual activity: Not Currently Other Topics Concern  Not on file Social History Narrative Julia lives independently. One daughter is nurse at [employer], one daughter  at [employer]. ALLERGIES: Review of patient's allergies indicates no known allergies. Review of Systems Constitutional: Negative for activity change, appetite change, chills and fever. HENT: Negative for congestion, rhinorrhea, sinus pressure, sneezing and sore throat. Eyes: Negative for photophobia and visual disturbance. Respiratory: Negative for cough and shortness of breath. Cardiovascular: Negative for chest pain. Gastrointestinal: Negative for abdominal pain, blood in stool, constipation, diarrhea, nausea and vomiting. Genitourinary: Negative for difficulty urinating, dysuria, flank pain, frequency, hematuria, menstrual problem, urgency, vaginal bleeding and vaginal discharge. Musculoskeletal: Positive for joint swelling and myalgias. Negative for back pain and neck pain. Skin: Negative for rash and wound. Neurological: Negative for syncope, weakness, numbness and headaches. Psychiatric/Behavioral: Negative for self-injury and suicidal ideas. All other systems reviewed and are negative. Vitals:  
 09/11/18 2039 BP: 184/79 Resp: 16 Temp: 98.1 °F (36.7 °C) SpO2: 98% Weight: 78 kg (172 lb) Height: 5' 3\" (1.6 m) Physical Exam  
Constitutional: She is oriented to person, place, and time. She appears well-developed and well-nourished. No distress. HENT:  
Head: Normocephalic and atraumatic. Eyes: Conjunctivae and EOM are normal. Pupils are equal, round, and reactive to light. Neck: Neck supple. Cardiovascular: Normal rate, regular rhythm and normal heart sounds. Pulmonary/Chest: Effort normal and breath sounds normal.  
Abdominal: Soft. She exhibits no distension. There is no tenderness. Musculoskeletal:  
Significant swelling about the left distal humerus with bony crepitus, decreased ROM of the elbow, and NVID. Distal pulses intact. FROM of the wrist. No tenderness or swelling to the shoulder. No midline C/T/or L spine tenderness. Left knee has some soft tissue swelling over the patella, but not bony crepitus and FROM. No deformity. NVID. Neurological: She is alert and oriented to person, place, and time. Skin: Skin is warm and dry. She is not diaphoretic. Nursing note and vitals reviewed. Note written by Rolando Shannon, as dictated by Jeanne Mcfarlane DO 9:38 PM. 
 
MDM 
xrays showed distal humerus fracture. Knee xray negative. ED Course Procedures CONSULT NOTE: 
9:46 PM Jeanne Mcfarlane DO spoke with SYLWIA Grissom for Orthopedics. Discussed available diagnostic tests and clinical findings. SYLWIA Leon will come evaluate the pt and she will a coaptation splint. SYLWIA Johnson recommends the pt needs to follow up with Camila Morales MD on 9/13/18 so she can potentially have surgery on 9/14/18. Given the patient has chronic pain and has a pain contract with [facility] physician do not want to disrupt her relationship with them.  However, this is a significant injury that will cause significant pain and warrants medication. She states that she only has a few of her normally rx'd norco pain medications left currently. I will rx 10 additional. She was advised to contact her pain  in the AM to make sure that she is allowed to fill this rx without disrupting her pain contract. 11:10 PM 
Patient's results have been reviewed with them. Patient and/or family have verbally conveyed their understanding and agreement of the patient's signs, symptoms, diagnosis, treatment and prognosis and additionally agree to follow up as recommended or return to the Emergency Room should their condition change prior to follow-up. Discharge instructions have also been provided to the patient with some educational information regarding their diagnosis as well a list of reasons why they would want to return to the ER prior to their follow-up appointment should their condition change.

## 2018-09-12 NOTE — CONSULTS
ORTHOPAEDIC CONSULT NOTE    Subjective:     Date of Consultation:  September 11, 2018  Referring Physician:  Elina Easton is a 70 y.o. female with PMH significant for chronic pain and long term opiate use presents with complaints of left arm and left knee pain following a GLF at home today. States she was rushing to the door when she tripped on something and thinks she landed striking a shopping cart she has in her condo. She complains of left upper arm pain to the elbow but denies wrist or shoulder pain. She denies tingling or numbness. She states she has some medial left knee pain but denies radiation into the hip or ankle. She was brought to the ED and found to have a distal humerus fracture for which we have been asked to see the patient.     Patient Active Problem List    Diagnosis Date Noted    Chronically on opiate therapy 04/26/2017    Iron deficiency anemia 08/10/2016    Chronic neck and back pain 03/22/2016    Persistent disorder of initiating or maintaining sleep 01/12/2016    Hypercholesteremia 01/12/2016    Osteopenia 09/08/2015    Mild obstructive sleep apnea 04/29/2015    Benign essential hypertension 09/08/2014    Bilateral leg edema 05/22/2014    S/P colon resection 02/27/2014    Dextroscoliosis 02/27/2014    Gastroparesis 11/08/2013    Spinal stenosis, lumbar region, with neurogenic claudication 12/26/2012    Chronic back pain 04/09/2012    Anxiety and depression 04/09/2012    GERD (gastroesophageal reflux disease) 09/19/2011    CAD (coronary artery disease) 09/19/2011     Family History   Problem Relation Age of Onset    Hypertension Mother     Heart Disease Mother     Heart Disease Father     Stroke Father     Heart Disease Brother     Hypertension Brother     Depression Maternal Aunt     Depression Maternal Uncle     Depression Maternal Grandmother     Depression Maternal Grandfather       Social History   Substance Use Topics    Smoking status: Former Smoker     Packs/day: 1.00     Types: Cigarettes     Quit date: 1/1/2006    Smokeless tobacco: Never Used    Alcohol use 1.8 oz/week     3 Standard drinks or equivalent per week     Past Medical History:   Diagnosis Date    Arthritis     Beta-blocker therapy     CAD (coronary artery disease) 1999    MI >> stent x3, cath 2010: [place]    Chronic pain     back pain    Delayed gastric emptying     Depression     Depression with anxiety     Dyspepsia and other specified disorders of function of stomach     GERD (gastroesophageal reflux disease)     Hypercholesterolemia     Hypertension       Past Surgical History:   Procedure Laterality Date    HX CATARACT REMOVAL      HX CORONARY STENT PLACEMENT      HX DILATION AND CURETTAGE      HX HEENT      ORAL SX    HX LUMBAR LAMINECTOMY  12/2012    Dr. Allegra Lopez    [facility]    HX OTHER SURGICAL      RECTAL SX TWICE; ABCESS; FISTULA    HX SMALL BOWEL RESECTION  10/31/14    Obstruction, Dr. Dariela Zavala, 25 in removed    HX TONSILLECTOMY        Prior to Admission medications    Medication Sig Start Date End Date Taking? Authorizing Provider   traMADol (ULTRAM) 50 mg tablet  5/16/18   Historical Provider   valsartan (DIOVAN) 320 mg tablet TAKE ONE TABLET BY MOUTH DAILY 4/30/18   Yessy Guzman MD   polyethylene glycol [facility]) 17 gram packet 1 packet as needed daily. No more than three times a week 3/20/18   Yessy Guzman MD   omeprazole (PRILOSEC) 20 mg capsule TAKE ONE CAPSULE BY MOUTH TWICE A DAY 3/13/18   Yessy Guzman MD   [facility]) 2 mg tablet  11/20/17   Historical Provider   cyclobenzaprine (FLEXERIL) 10 mg tablet  11/25/17   Historical Provider   LYRICA 225 mg capsule  11/16/17   Historical Provider   Omega-3 Fatty Acids (FISH OIL) 300 mg cap Take  by mouth. Historical Provider   pravastatin (PRAVACHOL) 40 mg tablet Take 1 Tab by mouth nightly.  12/13/17   MD Kyler Horta (ULTRA-LIGHT ROLLATOR) [facility] Walker with seat 12/13/17   Mary Awad MD   melatonin (MELATONIN) 5 mg cap capsule Take 5 mg by mouth nightly. Historical Provider   cloNIDine HCl (CATAPRES) 0.2 mg tablet Take 0.2 mg by mouth two (2) times a day. 7/25/16   Historical Provider   HYDROcodone-acetaminophen (NORCO)  mg tablet four (4) times daily. 7/13/16   Historical Provider   multivitamin with iron tablet Take 1 Tab by mouth daily. Historical Provider   amLODIPine (NORVASC) 10 mg tablet TAKE ONE TABLET BY MOUTH DAILY 7/8/16   MD Sara Dyson-B.ani-B.long-JASONbifi 10-15 mg TbEC Take  by mouth. Historical Provider   cetirizine (ZYRTEC) 10 mg tablet Take 10 mg by mouth daily. Historical Provider   metoprolol (LOPRESSOR) 100 mg tablet Take 1 Tab by mouth two (2) times a day. 3/25/15   Morris White MD   Calcium Carbonate-Vit D3-Min 600 mg calcium- 400 unit Tab Take 1 Tab by mouth daily. Historical Provider   docusate sodium (COLACE) 100 mg capsule Take 100 mg by mouth daily as needed. Historical Provider   DOMPERIDONE, BULK, Take 10 mg by mouth four (4) times daily. Historical Provider   aspirin 81 mg tablet Take 81 mg by mouth. Historical Provider     No current facility-administered medications for this encounter. Current Outpatient Prescriptions   Medication Sig    traMADol (ULTRAM) 50 mg tablet     valsartan (DIOVAN) 320 mg tablet TAKE ONE TABLET BY MOUTH DAILY    polyethylene glycol (MIRALAX) 17 gram packet 1 packet as needed daily. No more than three times a week    omeprazole (PRILOSEC) 20 mg capsule TAKE ONE CAPSULE BY MOUTH TWICE A DAY    bumetanide (BUMEX) 2 mg tablet     cyclobenzaprine (FLEXERIL) 10 mg tablet     LYRICA 225 mg capsule     Omega-3 Fatty Acids (FISH OIL) 300 mg cap Take  by mouth.  pravastatin (PRAVACHOL) 40 mg tablet Take 1 Tab by mouth nightly.     Walker (ULTRA-LIGHT ROLLATOR) misc Walker with seat    melatonin (MELATONIN) 5 mg cap capsule Take 5 mg by mouth nightly.  cloNIDine HCl (CATAPRES) 0.2 mg tablet Take 0.2 mg by mouth two (2) times a day.  HYDROcodone-acetaminophen (NORCO)  mg tablet four (4) times daily.  multivitamin with iron tablet Take 1 Tab by mouth daily.  amLODIPine (NORVASC) 10 mg tablet TAKE ONE TABLET BY MOUTH DAILY    B.infantis-B.ani-B.long-B.bifi 10-15 mg TbEC Take  by mouth.  cetirizine (ZYRTEC) 10 mg tablet Take 10 mg by mouth daily.  metoprolol (LOPRESSOR) 100 mg tablet Take 1 Tab by mouth two (2) times a day.  Calcium Carbonate-Vit D3-Min 600 mg calcium- 400 unit Tab Take 1 Tab by mouth daily.  docusate sodium (COLACE) 100 mg capsule Take 100 mg by mouth daily as needed.  DOMPERIDONE, BULK, Take 10 mg by mouth four (4) times daily.  aspirin 81 mg tablet Take 81 mg by mouth. No Known Allergies     Review of Systems:  Pertinent items are noted in HPI. Mental Status: no dementia    Objective:     Patient Vitals for the past 8 hrs:   BP Temp Resp SpO2 Height Weight   189 184/79 98.1 °F (36.7 °C) 16 98 % 5' 3\" (1.6 m) 78 kg (172 lb)     Temp (24hrs), Av.1 °F (36.7 °C), Min:98.1 °F (36.7 °C), Max:98.1 °F (36.7 °C)      EXAM: Awake and alert; obviously uncomfortable; reluctant to do much in the way of exam  Left arm held in position of relative comfort in adduction and internal rotation  Mild distal arm deformity palpable  Shoulder and elbow NTTP  Moves fingers to command  5/5 strength for wrist extension  Distal pulses palpable  Left knee with mild medial swelling palpable  Mild TTP about the medial and lateral joint lines  Joint ligamentously stable      Imaging Review:     EXAM:  XR HUMERUS LT     INDICATION:   fall.     COMPARISON: None.     FINDINGS: Two views of the left humerus demonstrate oblique fracture of the  distal shaft of the humerus with small comminuted fragments.  The fracture site  is angulated laterally and demonstrates half shaft diameter displacement. .     IMPRESSION  IMPRESSION:  Oblique minimally comminuted angulated fracture of the distal shaft  of the humerus. Mickie Hanna EXAM:  XR KNEE LT 3 V     INDICATION:   fall. Trauma     COMPARISON: None.     FINDINGS: Three views of the left knee demonstrate no fracture or dislocation. There is no evidence of joint effusion. .  There is joint space narrowing in the  medial compartment with hypertrophic change consistent with degenerative joint  disease. Vascular calcification is noted. There is also a popcorn type  calcification projected posterior to the joint space which may be with in the  joint capsule. This may be a small area of synovial chondromatosis. .     IMPRESSION  IMPRESSION:  No acute abnormality. Labs: No results found for this or any previous visit (from the past 24 hour(s)). Impression:     Active Problems:    * No active hospital problems.  *      Plan:     Acute left distal humerus fracture - will require immobilization and close follow-up for surgical management  Coaptation splint to be applied  Pain control likely to be difficult with this patient due to her history  Sling to hold forearm flexed  Follow-up with Dr Jones Bland Thursday - he requests her show up at 13:45 and tell office staff that he is expecting her and she will be worked in to his clinic    Dr Jones Bland aware of patient and agrees with current plan of care    Rollene Olszewski, PA-C  Orthopedic Trauma Service  [address]

## 2018-09-12 NOTE — ED NOTES
Discharge instructions given to pt. All questions answered and pt verbalized understanding. V/S stable @ time of discharge. Pt off unit via wheelchair.

## 2018-09-12 NOTE — PROCEDURES
PROCEDURE NOTE - SPLINT PLACEMENT:  10:46 PM    Pt was found to have a left distal humerus fracture which requires immobilization. Neurovascularly intact prior to splint placement. An Orthoglass coaptation splint was placed on pt's left humerus. Joint was placed in flexion. Neurovascularly intact post splint placement. The procedure took 1-15 minutes, and pt tolerated well.  Sling to maintain loose forearm flexion    [address]

## 2018-09-12 NOTE — ED TRIAGE NOTES
Patient arrives via EMS after tripping and falling at home. Reports LEFT shoulder pain and bilateral knee pain. Ambulatory to stretcher with steady gait. Patient denies any new back or neck pain, hx of chronic back pain. Able to move all extremities. Denies LOC.

## 2018-09-12 NOTE — ED NOTES
Patient's bracelets and thumb rings removed from left hand, and given to patient's son which placed patient's belongings in her purse.

## 2018-09-13 ENCOUNTER — HOSPITAL ENCOUNTER (OUTPATIENT)
Dept: PREADMISSION TESTING | Age: 78
Discharge: HOME | End: 2018-09-13
Payer: MEDICARE

## 2018-09-13 ENCOUNTER — TELEPHONE (OUTPATIENT)
Dept: INTERNAL MEDICINE CLINIC | Age: 78
End: 2018-09-13

## 2018-09-13 VITALS
HEIGHT: 63 IN | SYSTOLIC BLOOD PRESSURE: 113 MMHG | HEART RATE: 64 BPM | TEMPERATURE: 98.4 F | RESPIRATION RATE: 16 BRPM | BODY MASS INDEX: 32.27 KG/M2 | WEIGHT: 182.13 LBS | DIASTOLIC BLOOD PRESSURE: 67 MMHG

## 2018-09-13 LAB
ANION GAP SERPL CALC-SCNC: 8 MMOL/L (ref 5–15)
BASOPHILS # BLD: 0 K/UL (ref 0–0.1)
BASOPHILS NFR BLD: 1 % (ref 0–1)
BUN SERPL-MCNC: 17 MG/DL (ref 6–20)
BUN/CREAT SERPL: 23 (ref 12–20)
CALCIUM SERPL-MCNC: 8.7 MG/DL (ref 8.5–10.1)
CHLORIDE SERPL-SCNC: 104 MMOL/L (ref 97–108)
CO2 SERPL-SCNC: 28 MMOL/L (ref 21–32)
CREAT SERPL-MCNC: 0.75 MG/DL (ref 0.55–1.02)
DIFFERENTIAL METHOD BLD: ABNORMAL
EOSINOPHIL # BLD: 0.2 K/UL (ref 0–0.4)
EOSINOPHIL NFR BLD: 2 % (ref 0–7)
ERYTHROCYTE [DISTWIDTH] IN BLOOD BY AUTOMATED COUNT: 13.2 % (ref 11.5–14.5)
GLUCOSE SERPL-MCNC: 98 MG/DL (ref 65–100)
HCT VFR BLD AUTO: 35.8 % (ref 35–47)
HGB BLD-MCNC: 12 G/DL (ref 11.5–16)
IMM GRANULOCYTES # BLD: 0 K/UL (ref 0–0.04)
IMM GRANULOCYTES NFR BLD AUTO: 0 % (ref 0–0.5)
LYMPHOCYTES # BLD: 2.1 K/UL (ref 0.8–3.5)
LYMPHOCYTES NFR BLD: 25 % (ref 12–49)
MCH RBC QN AUTO: 32.8 PG (ref 26–34)
MCHC RBC AUTO-ENTMCNC: 33.5 G/DL (ref 30–36.5)
MCV RBC AUTO: 97.8 FL (ref 80–99)
MONOCYTES # BLD: 0.7 K/UL (ref 0–1)
MONOCYTES NFR BLD: 8 % (ref 5–13)
NEUTS SEG # BLD: 5.5 K/UL (ref 1.8–8)
NEUTS SEG NFR BLD: 65 % (ref 32–75)
NRBC # BLD: 0 K/UL (ref 0–0.01)
NRBC BLD-RTO: 0 PER 100 WBC
PLATELET # BLD AUTO: 185 K/UL (ref 150–400)
PMV BLD AUTO: 11.4 FL (ref 8.9–12.9)
POTASSIUM SERPL-SCNC: 4.2 MMOL/L (ref 3.5–5.1)
RBC # BLD AUTO: 3.66 M/UL (ref 3.8–5.2)
SODIUM SERPL-SCNC: 140 MMOL/L (ref 136–145)
WBC # BLD AUTO: 8.5 K/UL (ref 3.6–11)

## 2018-09-13 PROCEDURE — 85025 COMPLETE CBC W/AUTO DIFF WBC: CPT | Performed by: ORTHOPAEDIC SURGERY

## 2018-09-13 PROCEDURE — 80048 BASIC METABOLIC PNL TOTAL CA: CPT | Performed by: ORTHOPAEDIC SURGERY

## 2018-09-13 RX ORDER — TRAMADOL HYDROCHLORIDE 50 MG/1
50 TABLET ORAL
COMMUNITY
End: 2018-10-29

## 2018-09-13 NOTE — TELEPHONE ENCOUNTER
Pt friend has an appt with Dr. Jillian Iqbal today and she wanted to relay this message to you today. Said that pt fell in her condo Tuesday evening and broke her left humerous.  Pt is having surgery on Monday at [facility].

## 2018-09-13 NOTE — PERIOP NOTES
REVIEWED PRE-OP INSTRUCTIONS AND MEDICATIONS . REVIEWED SURGICAL SITE INFECTIONS SHEET AND COPY GIVEN. CHG WIPES GIVEN 2 PACKS AND INSTRUCTIONS ON HOW TO USE CHG. VERBALIZED UNDERSTANDING OF INFORMATION DISCUSSED DURING INTERVIEW.  Karla Feng RN

## 2018-09-17 ENCOUNTER — APPOINTMENT (OUTPATIENT)
Dept: GENERAL RADIOLOGY | Age: 78
End: 2018-09-17
Attending: ORTHOPAEDIC SURGERY
Payer: MEDICARE

## 2018-09-17 ENCOUNTER — ANESTHESIA (OUTPATIENT)
Dept: MEDSURG UNIT | Age: 78
End: 2018-09-17
Payer: MEDICARE

## 2018-09-17 ENCOUNTER — HOSPITAL ENCOUNTER (OUTPATIENT)
Age: 78
Setting detail: OBSERVATION
Discharge: HOME HEALTH | End: 2018-09-20
Attending: ORTHOPAEDIC SURGERY | Admitting: ORTHOPAEDIC SURGERY
Payer: MEDICARE

## 2018-09-17 ENCOUNTER — ANESTHESIA EVENT (OUTPATIENT)
Dept: MEDSURG UNIT | Age: 78
End: 2018-09-17
Payer: MEDICARE

## 2018-09-17 DIAGNOSIS — S42.412A LEFT SUPRACONDYLAR HUMERUS FRACTURE, CLOSED, INITIAL ENCOUNTER: Primary | ICD-10-CM

## 2018-09-17 PROCEDURE — C1713 ANCHOR/SCREW BN/BN,TIS/BN: HCPCS | Performed by: ORTHOPAEDIC SURGERY

## 2018-09-17 PROCEDURE — 74011250636 HC RX REV CODE- 250/636: Performed by: ORTHOPAEDIC SURGERY

## 2018-09-17 PROCEDURE — 77030011640 HC PAD GRND REM COVD -A: Performed by: ORTHOPAEDIC SURGERY

## 2018-09-17 PROCEDURE — 77030020782 HC GWN BAIR PAWS FLX 3M -B

## 2018-09-17 PROCEDURE — 74011250636 HC RX REV CODE- 250/636

## 2018-09-17 PROCEDURE — 77030013567 HC DRN WND RESERV BARD -A: Performed by: ORTHOPAEDIC SURGERY

## 2018-09-17 PROCEDURE — 74011000250 HC RX REV CODE- 250: Performed by: ORTHOPAEDIC SURGERY

## 2018-09-17 PROCEDURE — 94760 N-INVAS EAR/PLS OXIMETRY 1: CPT

## 2018-09-17 PROCEDURE — 77030003601 HC NDL NRV BLK BBMI -A

## 2018-09-17 PROCEDURE — 77030022994 HC BIT DRL QC SYNT -B: Performed by: ORTHOPAEDIC SURGERY

## 2018-09-17 PROCEDURE — 77030003862 HC BIT DRL SYNT -B: Performed by: ORTHOPAEDIC SURGERY

## 2018-09-17 PROCEDURE — L3650 SO 8 ABD RESTRAINT PRE OTS: HCPCS | Performed by: ORTHOPAEDIC SURGERY

## 2018-09-17 PROCEDURE — 77030032490 HC SLV COMPR SCD KNE COVD -B: Performed by: ORTHOPAEDIC SURGERY

## 2018-09-17 PROCEDURE — 74011250637 HC RX REV CODE- 250/637: Performed by: ORTHOPAEDIC SURGERY

## 2018-09-17 PROCEDURE — 77030031139 HC SUT VCRL2 J&J -A: Performed by: ORTHOPAEDIC SURGERY

## 2018-09-17 PROCEDURE — 74011000272 HC RX REV CODE- 272: Performed by: ORTHOPAEDIC SURGERY

## 2018-09-17 PROCEDURE — 76001 XR FLUOROSCOPY OVER 60 MINUTES: CPT

## 2018-09-17 PROCEDURE — 77030008684 HC TU ET CUF COVD -B: Performed by: ANESTHESIOLOGY

## 2018-09-17 PROCEDURE — 73060 X-RAY EXAM OF HUMERUS: CPT

## 2018-09-17 PROCEDURE — 99218 HC RM OBSERVATION: CPT

## 2018-09-17 PROCEDURE — 77030012407 HC DRN WND BARD -B: Performed by: ORTHOPAEDIC SURGERY

## 2018-09-17 PROCEDURE — 76210000036 HC AMBSU PH I REC 1.5 TO 2 HR: Performed by: ORTHOPAEDIC SURGERY

## 2018-09-17 PROCEDURE — 74011000250 HC RX REV CODE- 250

## 2018-09-17 PROCEDURE — 76060000064 HC AMB SURG ANES 2 TO 2.5 HR: Performed by: ORTHOPAEDIC SURGERY

## 2018-09-17 PROCEDURE — 77030000032 HC CUF TRNQT ZIMM -B: Performed by: ORTHOPAEDIC SURGERY

## 2018-09-17 PROCEDURE — 74011250636 HC RX REV CODE- 250/636: Performed by: ANESTHESIOLOGY

## 2018-09-17 PROCEDURE — 76030000004 HC AMB SURG OR TIME 2 TO 2.5: Performed by: ORTHOPAEDIC SURGERY

## 2018-09-17 PROCEDURE — 77030008467 HC STPLR SKN COVD -B: Performed by: ORTHOPAEDIC SURGERY

## 2018-09-17 PROCEDURE — 77030016472 HC BIT DRL QC2 SYNT -C: Performed by: ORTHOPAEDIC SURGERY

## 2018-09-17 PROCEDURE — 77030013079 HC BLNKT BAIR HGGR 3M -A: Performed by: ANESTHESIOLOGY

## 2018-09-17 DEVICE — IMPLANTABLE DEVICE: Type: IMPLANTABLE DEVICE | Site: ARM | Status: FUNCTIONAL

## 2018-09-17 DEVICE — SCREW BNE L20MM DIA2.7MM CORT S STL ST FULL THRD FOR SM: Type: IMPLANTABLE DEVICE | Site: ARM | Status: FUNCTIONAL

## 2018-09-17 DEVICE — 3.5MM CORTEX SCREW SELF-TAPPING 24MM: Type: IMPLANTABLE DEVICE | Site: ARM | Status: FUNCTIONAL

## 2018-09-17 DEVICE — SCREW BNE L26MM DIA3.5MM CORT S STL ST LOK FULL THRD: Type: IMPLANTABLE DEVICE | Site: ARM | Status: FUNCTIONAL

## 2018-09-17 DEVICE — 3.5MM LOCKING SCREW SLF-TPNG W/STARDRIVE(TM) RECESS 16MM: Type: IMPLANTABLE DEVICE | Site: ARM | Status: FUNCTIONAL

## 2018-09-17 DEVICE — SCREW BNE L22MM DIA3.5MM CORT S STL ST NONCANNULATED LOK: Type: IMPLANTABLE DEVICE | Site: ARM | Status: FUNCTIONAL

## 2018-09-17 DEVICE — SCREW BNE L20MM DIA3.5MM CORT S STL ST NONCANNULATED LOK: Type: IMPLANTABLE DEVICE | Site: ARM | Status: FUNCTIONAL

## 2018-09-17 RX ORDER — SODIUM CHLORIDE 0.9 % (FLUSH) 0.9 %
5-10 SYRINGE (ML) INJECTION EVERY 8 HOURS
Status: DISCONTINUED | OUTPATIENT
Start: 2018-09-17 | End: 2018-09-20 | Stop reason: HOSPADM

## 2018-09-17 RX ORDER — OMEPRAZOLE 20 MG/1
20 CAPSULE, DELAYED RELEASE ORAL DAILY
Status: DISCONTINUED | OUTPATIENT
Start: 2018-09-18 | End: 2018-09-17 | Stop reason: CLARIF

## 2018-09-17 RX ORDER — EPHEDRINE SULFATE 50 MG/ML
5 INJECTION, SOLUTION INTRAVENOUS AS NEEDED
Status: DISCONTINUED | OUTPATIENT
Start: 2018-09-17 | End: 2018-09-17

## 2018-09-17 RX ORDER — SUCCINYLCHOLINE CHLORIDE 20 MG/ML
INJECTION INTRAMUSCULAR; INTRAVENOUS AS NEEDED
Status: DISCONTINUED | OUTPATIENT
Start: 2018-09-17 | End: 2018-09-17 | Stop reason: HOSPADM

## 2018-09-17 RX ORDER — MIDAZOLAM HYDROCHLORIDE 1 MG/ML
0.5 INJECTION, SOLUTION INTRAMUSCULAR; INTRAVENOUS
Status: DISCONTINUED | OUTPATIENT
Start: 2018-09-17 | End: 2018-09-17

## 2018-09-17 RX ORDER — ALBUTEROL SULFATE 0.83 MG/ML
2.5 SOLUTION RESPIRATORY (INHALATION) AS NEEDED
Status: DISCONTINUED | OUTPATIENT
Start: 2018-09-17 | End: 2018-09-17

## 2018-09-17 RX ORDER — CEFAZOLIN SODIUM IN 0.9 % NACL 2 G/100 ML
PLASTIC BAG, INJECTION (ML) INTRAVENOUS AS NEEDED
Status: DISCONTINUED | OUTPATIENT
Start: 2018-09-17 | End: 2018-09-17 | Stop reason: HOSPADM

## 2018-09-17 RX ORDER — CLONIDINE HYDROCHLORIDE 0.2 MG/1
0.2 TABLET ORAL 2 TIMES DAILY
Status: DISCONTINUED | OUTPATIENT
Start: 2018-09-17 | End: 2018-09-20 | Stop reason: HOSPADM

## 2018-09-17 RX ORDER — ACETAMINOPHEN 10 MG/ML
INJECTION, SOLUTION INTRAVENOUS AS NEEDED
Status: DISCONTINUED | OUTPATIENT
Start: 2018-09-17 | End: 2018-09-17 | Stop reason: HOSPADM

## 2018-09-17 RX ORDER — AMLODIPINE BESYLATE 5 MG/1
2.5 TABLET ORAL DAILY
Status: DISCONTINUED | OUTPATIENT
Start: 2018-09-18 | End: 2018-09-20 | Stop reason: HOSPADM

## 2018-09-17 RX ORDER — SODIUM CHLORIDE, SODIUM LACTATE, POTASSIUM CHLORIDE, CALCIUM CHLORIDE 600; 310; 30; 20 MG/100ML; MG/100ML; MG/100ML; MG/100ML
1000 INJECTION, SOLUTION INTRAVENOUS CONTINUOUS
Status: DISCONTINUED | OUTPATIENT
Start: 2018-09-17 | End: 2018-09-17

## 2018-09-17 RX ORDER — FENTANYL CITRATE 50 UG/ML
INJECTION, SOLUTION INTRAMUSCULAR; INTRAVENOUS AS NEEDED
Status: DISCONTINUED | OUTPATIENT
Start: 2018-09-17 | End: 2018-09-17 | Stop reason: HOSPADM

## 2018-09-17 RX ORDER — LANOLIN ALCOHOL/MO/W.PET/CERES
9 CREAM (GRAM) TOPICAL
Status: DISCONTINUED | OUTPATIENT
Start: 2018-09-17 | End: 2018-09-20 | Stop reason: HOSPADM

## 2018-09-17 RX ORDER — ASPIRIN 81 MG/1
81 TABLET ORAL DAILY
Status: DISCONTINUED | OUTPATIENT
Start: 2018-09-18 | End: 2018-09-20 | Stop reason: HOSPADM

## 2018-09-17 RX ORDER — SODIUM CHLORIDE 0.9 % (FLUSH) 0.9 %
5-10 SYRINGE (ML) INJECTION AS NEEDED
Status: DISCONTINUED | OUTPATIENT
Start: 2018-09-17 | End: 2018-09-20 | Stop reason: HOSPADM

## 2018-09-17 RX ORDER — MORPHINE SULFATE 10 MG/ML
2 INJECTION, SOLUTION INTRAMUSCULAR; INTRAVENOUS
Status: DISCONTINUED | OUTPATIENT
Start: 2018-09-17 | End: 2018-09-17

## 2018-09-17 RX ORDER — DIPHENHYDRAMINE HYDROCHLORIDE 50 MG/ML
12.5 INJECTION, SOLUTION INTRAMUSCULAR; INTRAVENOUS AS NEEDED
Status: DISCONTINUED | OUTPATIENT
Start: 2018-09-17 | End: 2018-09-17

## 2018-09-17 RX ORDER — IBUPROFEN 200 MG
400 TABLET ORAL
Status: DISCONTINUED | OUTPATIENT
Start: 2018-09-17 | End: 2018-09-17 | Stop reason: HOSPADM

## 2018-09-17 RX ORDER — LIDOCAINE HYDROCHLORIDE 20 MG/ML
INJECTION, SOLUTION EPIDURAL; INFILTRATION; INTRACAUDAL; PERINEURAL AS NEEDED
Status: DISCONTINUED | OUTPATIENT
Start: 2018-09-17 | End: 2018-09-17 | Stop reason: HOSPADM

## 2018-09-17 RX ORDER — IBUPROFEN 400 MG/1
400 TABLET ORAL
Status: DISCONTINUED | OUTPATIENT
Start: 2018-09-17 | End: 2018-09-20 | Stop reason: HOSPADM

## 2018-09-17 RX ORDER — HYDROCODONE BITARTRATE AND ACETAMINOPHEN 5; 325 MG/1; MG/1
1 TABLET ORAL
Status: DISCONTINUED | OUTPATIENT
Start: 2018-09-17 | End: 2018-09-20 | Stop reason: HOSPADM

## 2018-09-17 RX ORDER — MIDAZOLAM HYDROCHLORIDE 1 MG/ML
1 INJECTION, SOLUTION INTRAMUSCULAR; INTRAVENOUS AS NEEDED
Status: DISCONTINUED | OUTPATIENT
Start: 2018-09-17 | End: 2018-09-17

## 2018-09-17 RX ORDER — ROPIVACAINE HYDROCHLORIDE 5 MG/ML
30 INJECTION, SOLUTION EPIDURAL; INFILTRATION; PERINEURAL AS NEEDED
Status: DISCONTINUED | OUTPATIENT
Start: 2018-09-17 | End: 2018-09-17

## 2018-09-17 RX ORDER — ROCURONIUM BROMIDE 10 MG/ML
INJECTION, SOLUTION INTRAVENOUS AS NEEDED
Status: DISCONTINUED | OUTPATIENT
Start: 2018-09-17 | End: 2018-09-17 | Stop reason: HOSPADM

## 2018-09-17 RX ORDER — ONDANSETRON 2 MG/ML
4 INJECTION INTRAMUSCULAR; INTRAVENOUS AS NEEDED
Status: DISCONTINUED | OUTPATIENT
Start: 2018-09-17 | End: 2018-09-17

## 2018-09-17 RX ORDER — CETIRIZINE HCL 10 MG
10 TABLET ORAL DAILY
Status: DISCONTINUED | OUTPATIENT
Start: 2018-09-18 | End: 2018-09-20 | Stop reason: HOSPADM

## 2018-09-17 RX ORDER — MORPHINE SULFATE 10 MG/ML
2 INJECTION, SOLUTION INTRAMUSCULAR; INTRAVENOUS
Status: DISCONTINUED | OUTPATIENT
Start: 2018-09-17 | End: 2018-09-17 | Stop reason: HOSPADM

## 2018-09-17 RX ORDER — DEXMEDETOMIDINE HYDROCHLORIDE 4 UG/ML
INJECTION, SOLUTION INTRAVENOUS AS NEEDED
Status: DISCONTINUED | OUTPATIENT
Start: 2018-09-17 | End: 2018-09-17 | Stop reason: HOSPADM

## 2018-09-17 RX ORDER — DEXAMETHASONE SODIUM PHOSPHATE 4 MG/ML
INJECTION, SOLUTION INTRA-ARTICULAR; INTRALESIONAL; INTRAMUSCULAR; INTRAVENOUS; SOFT TISSUE AS NEEDED
Status: DISCONTINUED | OUTPATIENT
Start: 2018-09-17 | End: 2018-09-17 | Stop reason: HOSPADM

## 2018-09-17 RX ORDER — SODIUM CHLORIDE 9 MG/ML
25 INJECTION, SOLUTION INTRAVENOUS CONTINUOUS
Status: DISCONTINUED | OUTPATIENT
Start: 2018-09-17 | End: 2018-09-17

## 2018-09-17 RX ORDER — PRAVASTATIN SODIUM 20 MG/1
40 TABLET ORAL
Status: DISCONTINUED | OUTPATIENT
Start: 2018-09-17 | End: 2018-09-20 | Stop reason: HOSPADM

## 2018-09-17 RX ORDER — PREGABALIN 75 MG/1
225 CAPSULE ORAL 2 TIMES DAILY
Status: DISCONTINUED | OUTPATIENT
Start: 2018-09-17 | End: 2018-09-20 | Stop reason: HOSPADM

## 2018-09-17 RX ORDER — PROPOFOL 10 MG/ML
INJECTION, EMULSION INTRAVENOUS AS NEEDED
Status: DISCONTINUED | OUTPATIENT
Start: 2018-09-17 | End: 2018-09-17 | Stop reason: HOSPADM

## 2018-09-17 RX ORDER — HYDROCODONE BITARTRATE AND ACETAMINOPHEN 5; 325 MG/1; MG/1
1 TABLET ORAL
Status: DISCONTINUED | OUTPATIENT
Start: 2018-09-17 | End: 2018-09-17 | Stop reason: HOSPADM

## 2018-09-17 RX ORDER — HYDROCODONE BITARTRATE AND ACETAMINOPHEN 5; 325 MG/1; MG/1
1 TABLET ORAL AS NEEDED
Status: DISCONTINUED | OUTPATIENT
Start: 2018-09-17 | End: 2018-09-17

## 2018-09-17 RX ORDER — DOCUSATE SODIUM 100 MG/1
100 CAPSULE, LIQUID FILLED ORAL DAILY
Status: DISCONTINUED | OUTPATIENT
Start: 2018-09-18 | End: 2018-09-20 | Stop reason: HOSPADM

## 2018-09-17 RX ORDER — ONDANSETRON 2 MG/ML
INJECTION INTRAMUSCULAR; INTRAVENOUS AS NEEDED
Status: DISCONTINUED | OUTPATIENT
Start: 2018-09-17 | End: 2018-09-17 | Stop reason: HOSPADM

## 2018-09-17 RX ORDER — EPHEDRINE SULFATE 50 MG/ML
INJECTION, SOLUTION INTRAVENOUS AS NEEDED
Status: DISCONTINUED | OUTPATIENT
Start: 2018-09-17 | End: 2018-09-17 | Stop reason: HOSPADM

## 2018-09-17 RX ORDER — GLYCOPYRROLATE 0.2 MG/ML
0.2 INJECTION INTRAMUSCULAR; INTRAVENOUS
Status: DISCONTINUED | OUTPATIENT
Start: 2018-09-17 | End: 2018-09-17

## 2018-09-17 RX ORDER — FENTANYL CITRATE 50 UG/ML
50 INJECTION, SOLUTION INTRAMUSCULAR; INTRAVENOUS AS NEEDED
Status: DISCONTINUED | OUTPATIENT
Start: 2018-09-17 | End: 2018-09-17

## 2018-09-17 RX ORDER — FERROUS SULFATE, DRIED 160(50) MG
1 TABLET, EXTENDED RELEASE ORAL
Status: DISCONTINUED | OUTPATIENT
Start: 2018-09-18 | End: 2018-09-20 | Stop reason: HOSPADM

## 2018-09-17 RX ORDER — MORPHINE SULFATE 10 MG/ML
5 INJECTION, SOLUTION INTRAMUSCULAR; INTRAVENOUS
Status: DISCONTINUED | OUTPATIENT
Start: 2018-09-17 | End: 2018-09-17 | Stop reason: HOSPADM

## 2018-09-17 RX ORDER — MORPHINE SULFATE 10 MG/ML
5 INJECTION, SOLUTION INTRAMUSCULAR; INTRAVENOUS
Status: DISCONTINUED | OUTPATIENT
Start: 2018-09-17 | End: 2018-09-20 | Stop reason: HOSPADM

## 2018-09-17 RX ORDER — METOPROLOL TARTRATE 50 MG/1
100 TABLET ORAL 2 TIMES DAILY
Status: DISCONTINUED | OUTPATIENT
Start: 2018-09-17 | End: 2018-09-20 | Stop reason: HOSPADM

## 2018-09-17 RX ORDER — BUMETANIDE 1 MG/1
2 TABLET ORAL DAILY
Status: DISCONTINUED | OUTPATIENT
Start: 2018-09-18 | End: 2018-09-20 | Stop reason: HOSPADM

## 2018-09-17 RX ORDER — FENTANYL CITRATE 50 UG/ML
25 INJECTION, SOLUTION INTRAMUSCULAR; INTRAVENOUS
Status: DISCONTINUED | OUTPATIENT
Start: 2018-09-17 | End: 2018-09-17

## 2018-09-17 RX ORDER — LIDOCAINE HYDROCHLORIDE 10 MG/ML
0.1 INJECTION, SOLUTION EPIDURAL; INFILTRATION; INTRACAUDAL; PERINEURAL AS NEEDED
Status: DISCONTINUED | OUTPATIENT
Start: 2018-09-17 | End: 2018-09-17

## 2018-09-17 RX ORDER — PANTOPRAZOLE SODIUM 40 MG/1
40 TABLET, DELAYED RELEASE ORAL
Status: DISCONTINUED | OUTPATIENT
Start: 2018-09-17 | End: 2018-09-20 | Stop reason: HOSPADM

## 2018-09-17 RX ADMIN — EPHEDRINE SULFATE 5 MG: 50 INJECTION, SOLUTION INTRAVENOUS at 15:27

## 2018-09-17 RX ADMIN — MORPHINE SULFATE 2 MG: 10 INJECTION, SOLUTION INTRAMUSCULAR; INTRAVENOUS at 18:13

## 2018-09-17 RX ADMIN — PROPOFOL 150 MG: 10 INJECTION, EMULSION INTRAVENOUS at 14:27

## 2018-09-17 RX ADMIN — ROCURONIUM BROMIDE 5 MG: 10 INJECTION, SOLUTION INTRAVENOUS at 14:27

## 2018-09-17 RX ADMIN — SUCCINYLCHOLINE CHLORIDE 120 MG: 20 INJECTION INTRAMUSCULAR; INTRAVENOUS at 14:27

## 2018-09-17 RX ADMIN — METOPROLOL TARTRATE 100 MG: 50 TABLET ORAL at 19:26

## 2018-09-17 RX ADMIN — DEXAMETHASONE SODIUM PHOSPHATE 4 MG: 4 INJECTION, SOLUTION INTRA-ARTICULAR; INTRALESIONAL; INTRAMUSCULAR; INTRAVENOUS; SOFT TISSUE at 14:45

## 2018-09-17 RX ADMIN — FENTANYL CITRATE 25 MCG: 50 INJECTION, SOLUTION INTRAMUSCULAR; INTRAVENOUS at 17:12

## 2018-09-17 RX ADMIN — ONDANSETRON 4 MG: 2 INJECTION INTRAMUSCULAR; INTRAVENOUS at 14:45

## 2018-09-17 RX ADMIN — SODIUM CHLORIDE, SODIUM LACTATE, POTASSIUM CHLORIDE, AND CALCIUM CHLORIDE 1000 ML: 600; 310; 30; 20 INJECTION, SOLUTION INTRAVENOUS at 14:01

## 2018-09-17 RX ADMIN — EPHEDRINE SULFATE 5 MG: 50 INJECTION, SOLUTION INTRAVENOUS at 15:38

## 2018-09-17 RX ADMIN — PREGABALIN 225 MG: 75 CAPSULE ORAL at 19:26

## 2018-09-17 RX ADMIN — MIDAZOLAM HYDROCHLORIDE 1 MG: 1 INJECTION, SOLUTION INTRAMUSCULAR; INTRAVENOUS at 13:31

## 2018-09-17 RX ADMIN — Medication 9 MG: at 22:36

## 2018-09-17 RX ADMIN — FENTANYL CITRATE 50 MCG: 50 INJECTION, SOLUTION INTRAMUSCULAR; INTRAVENOUS at 14:06

## 2018-09-17 RX ADMIN — MORPHINE SULFATE 2 MG: 10 INJECTION, SOLUTION INTRAMUSCULAR; INTRAVENOUS at 17:54

## 2018-09-17 RX ADMIN — DEXMEDETOMIDINE HYDROCHLORIDE 8 MCG: 4 INJECTION, SOLUTION INTRAVENOUS at 15:48

## 2018-09-17 RX ADMIN — FENTANYL CITRATE 25 MCG: 50 INJECTION, SOLUTION INTRAMUSCULAR; INTRAVENOUS at 17:14

## 2018-09-17 RX ADMIN — MIDAZOLAM HYDROCHLORIDE 2 MG: 1 INJECTION, SOLUTION INTRAMUSCULAR; INTRAVENOUS at 13:28

## 2018-09-17 RX ADMIN — MORPHINE SULFATE 2 MG: 10 INJECTION, SOLUTION INTRAMUSCULAR; INTRAVENOUS at 17:22

## 2018-09-17 RX ADMIN — HYDROCODONE BITARTRATE AND ACETAMINOPHEN 1 TABLET: 5; 325 TABLET ORAL at 22:35

## 2018-09-17 RX ADMIN — FENTANYL CITRATE 25 MCG: 50 INJECTION, SOLUTION INTRAMUSCULAR; INTRAVENOUS at 16:33

## 2018-09-17 RX ADMIN — PRAVASTATIN SODIUM 40 MG: 20 TABLET ORAL at 22:35

## 2018-09-17 RX ADMIN — ACETAMINOPHEN 1000 MG: 10 INJECTION, SOLUTION INTRAVENOUS at 14:45

## 2018-09-17 RX ADMIN — DEXMEDETOMIDINE HYDROCHLORIDE 8 MCG: 4 INJECTION, SOLUTION INTRAVENOUS at 16:26

## 2018-09-17 RX ADMIN — PANTOPRAZOLE SODIUM 40 MG: 40 TABLET, DELAYED RELEASE ORAL at 19:26

## 2018-09-17 RX ADMIN — FENTANYL CITRATE 25 MCG: 50 INJECTION, SOLUTION INTRAMUSCULAR; INTRAVENOUS at 15:45

## 2018-09-17 RX ADMIN — LIDOCAINE HYDROCHLORIDE 60 MG: 20 INJECTION, SOLUTION EPIDURAL; INFILTRATION; INTRACAUDAL; PERINEURAL at 14:27

## 2018-09-17 RX ADMIN — FENTANYL CITRATE 25 MCG: 50 INJECTION, SOLUTION INTRAMUSCULAR; INTRAVENOUS at 15:48

## 2018-09-17 RX ADMIN — Medication 2 G: at 14:41

## 2018-09-17 RX ADMIN — PROPOFOL 30 MG: 10 INJECTION, EMULSION INTRAVENOUS at 15:45

## 2018-09-17 NOTE — PROGRESS NOTES
Primary Nurse Karla Pascual RN and Emilia Murrieta RN performed a dual skin assessment on this patient No impairment noted David score is 21

## 2018-09-17 NOTE — IP AVS SNAPSHOT
[address] [facility] 13 
[phone] Patient: Julia Grant MRN: [id number] Q:9/10/[id number] About your hospitalization You were admitted on:  September 17, 2018 [facility] You were discharged on:  September 20, 2018 Why you were hospitalized Your primary diagnosis was:  Not on File Your diagnoses also included:  Left Supracondylar Humerus Fracture, Closed, Initial Encounter Follow-up Information Follow up With Details Comments Contact Info Philomena Ceballos MD   [address] [place] [address] [address] [place] [address] 
[phone] AT [place] [id number] 
[phone] Discharge Orders None A check briana indicates which time of day the medication should be taken. My Medications START taking these medications Instructions Each Dose to Equal  
 Morning Noon Evening Bedtime  
 oxyCODONE IR 5 mg immediate release tablet Commonly known as:  Danne Copper Your last dose was: Your next dose is: Take 1 Tab by mouth every four (4) hours as needed for Pain. Max Daily Amount: 30 mg.  
 5 mg CONTINUE taking these medications Instructions Each Dose to Equal  
 Morning Noon Evening Bedtime  
 amLODIPine 10 mg tablet Commonly known as:  Perry Haven Your last dose was: Your next dose is: TAKE ONE TABLET BY MOUTH DAILY  
     
   
   
   
  
 aspirin 81 mg tablet Your last dose was: Your next dose is: Take 81 mg by mouth. 81 mg  
    
   
   
   
  
 B.infantis-B.ani-B.long-B.bifi 10-15 mg Tbec Your last dose was: Your next dose is: Take 1 Tab by mouth daily as needed. 1 Tab  
    
   
   
   
  
 bumetanide 2 mg tablet Commonly known as:  Ginna Mace Your last dose was: Your next dose is:    
   
   
 2 mg.  
 2 mg Calcium Carbonate-Vit D3-Min 600 mg calcium- 400 unit Tab Your last dose was: Your next dose is: Take 1 Tab by mouth daily. 1 Tab  
    
   
   
   
  
 cloNIDine HCl 0.2 mg tablet Commonly known as:  CATAPRES Your last dose was: Your next dose is: Take 0.2 mg by mouth two (2) times a day. 0.2 mg  
    
   
   
   
  
 COLACE 100 mg capsule Generic drug:  docusate sodium Your last dose was: Your next dose is: Take 100 mg by mouth daily as needed. 100 mg  
    
   
   
   
  
 cyclobenzaprine 10 mg tablet Commonly known as:  FLEXERIL Your last dose was: Your next dose is:    
   
   
 10 mg two (2) times a day. 10 mg  
    
   
   
   
  
 DOMPERIDONE (BULK) Your last dose was: Your next dose is: Take 10 mg by mouth two (2) times a day. 10 mg FISH  mg Cap Generic drug:  Omega-3 Fatty Acids Your last dose was: Your next dose is: Take 1 Tab by mouth. 1 Tab HYDROcodone-acetaminophen  mg tablet Commonly known as:  Birda [place] Your last dose was: Your next dose is: Take 1 Tab by mouth every six (6) hours as needed for Pain. Max Daily Amount: 4 Tabs. 1 Tab LYRICA 225 mg capsule Generic drug:  pregabalin Your last dose was: Your next dose is:    
   
   
 225 mg two (2) times a day. 225 mg  
    
   
   
   
  
 melatonin 5 mg Cap capsule Your last dose was: Your next dose is: Take 10 mg by mouth nightly. 10 mg  
    
   
   
   
  
 metoprolol tartrate 100 mg IR tablet Commonly known as:  LOPRESSOR Your last dose was: Your next dose is: Take 1 Tab by mouth two (2) times a day.   
 100 mg  
    
   
   
 multivitamin with iron tablet Your last dose was: Your next dose is: Take 1 Tab by mouth daily. 1 Tab  
    
   
   
   
  
 omeprazole 20 mg capsule Commonly known as:  PRILOSEC Your last dose was: Your next dose is: TAKE ONE CAPSULE BY MOUTH TWICE A DAY  
     
   
   
   
  
 polyethylene glycol 17 gram packet Commonly known as:  Alonza Schimke Your last dose was: Your next dose is:    
   
   
 1 packet as needed daily. No more than three times a week  
     
   
   
   
  
 pravastatin 40 mg tablet Commonly known as:  PRAVACHOL Your last dose was: Your next dose is: Take 1 Tab by mouth nightly. 40 mg  
    
   
   
   
  
 traMADol 50 mg tablet Commonly known as:  ULTRAM  
   
Your last dose was: Your next dose is: Take 50 mg by mouth every six (6) hours as needed for Pain (FOR BREAKTHROUGH PAIN). 50 mg Pinkie Skiff Commonly known as:  ULTRA-LIGHT ROLLATOR Your last dose was: Your next dose is:    
   
   
 Walker with seat ZyrTEC 10 mg tablet Generic drug:  cetirizine Your last dose was: Your next dose is: Take 10 mg by mouth daily. 10 mg Where to Get Your Medications Information on where to get these meds will be given to you by the nurse or doctor. ! Ask your nurse or doctor about these medications  
  oxyCODONE IR 5 mg immediate release tablet Opioid Education Prescription Opioids: What You Need to Know: 
 
Prescription opioids can be used to help relieve moderate-to-severe pain and are often prescribed following a surgery or injury, or for certain health conditions. These medications can be an important part of treatment but also come with serious risks.   Opioids are strong pain medicines. Examples include hydrocodone, oxycodone, fentanyl, and morphine. Heroin is an example of an illegal opioid. It is important to work with your health care provider to make sure you are getting the safest, most effective care. WHAT ARE THE RISKS AND SIDE EFFECTS OF OPIOID USE? Prescription opioids carry serious risks of addiction and overdose, especially with prolonged use. An opioid overdose, often marked by slow breathing, can cause sudden death. The use of prescription opioids can have a number of side effects as well, even when taken as directed. · Tolerance-meaning you might need to take more of a medication for the same pain relief · Physical dependence-meaning you have symptoms of withdrawal when the medication is stopped. Withdrawal symptoms can include nausea, sweating, chills, diarrhea, stomach cramps, and muscle aches. Withdrawal can last up to several weeks, depending on which drug you took and how long you took it. · Increased sensitivity to pain · Constipation · Nausea, vomiting, and dry mouth · Sleepiness and dizziness · Confusion · Depression · Low levels of testosterone that can result in lower sex drive, energy, and strength · Itching and sweating RISKS ARE GREATER WITH:      
· History of drug misuse, substance use disorder, or overdose · Mental health conditions (such as depression or anxiety) · Sleep apnea · Older age (72 years or older) · Pregnancy Avoid alcohol while taking prescription opioids. Also, unless specifically advised by your health care provider, medications to avoid include: · Benzodiazepines (such as Xanax or Valium) · Muscle relaxants (such as Soma or Flexeril) · Hypnotics (such as Ambien or Lunesta) · Other prescription opioids KNOW YOUR OPTIONS Talk to your health care provider about ways to manage your pain that don't involve prescription opioids.   Some of these options may actually work better and have fewer risks and side effects. Consult your physician before adding or stopping any medications, treatments, or physical activity. Options may include: 
· Pain relievers such as acetaminophen, ibuprofen, and naproxen · Some medications that are also used for depression or seizures · Physical therapy and exercise · Counseling to help patients learn how to cope better with triggers of pain and stress. · Application of heat or cold compress · Massage therapy · Relaxation techniques Be Informed Make sure you know the name of your medication, how much and how often to take it, and its potential risks & side effects. IF YOU ARE PRESCRIBED OPIOIDS FOR PAIN: 
· Never take opioids in greater amounts or more often than prescribed. Remember the goal is not to be pain-free but to manage your pain at a tolerable level. · Follow up with your primary care provider to: · Work together to create a plan on how to manage your pain. · Talk about ways to help manage your pain that don't involve prescription opioids. · Talk about any and all concerns and side effects. · Help prevent misuse and abuse. · Never sell or share prescription opioids · Help prevent misuse and abuse. · Store prescription opioids in a secure place and out of reach of others (this may include visitors, children, friends, and family). · Safely dispose of unused/unwanted prescription opioids: Find your community drug take-back program or your pharmacy mail-back program, or flush them down the toilet, following guidance from the Food and Drug Administration (www.fda.gov/Drugs/ResourcesForYou). · Visit www.cdc.gov/drugoverdose to learn about the risks of opioid abuse and overdose. · If you believe you may be struggling with addiction, tell your health care provider and ask for guidance or call [address] [place], [place] [address]OX MEDIA at 5-392-896-JTIK. Discharge Instructions F/u with me 1 week from Monday (2 weeks post-op) ACO Transitions of Care Introducing [facility] offers a voluntary care coordination program to provide high quality service and care to [facility] fee-for-service beneficiaries. Flower Anaya was designed to help you enhance your health and well-being through the following services: ? Transitions of Care  support for individuals who are transitioning from one care setting to another (example: Hospital to home). ? Chronic and Complex Care Coordination  support for individuals and caregivers of those with serious or chronic illnesses or with more than one chronic (ongoing) condition and those who take a number of different medications. If you meet specific medical criteria, a [address] [place] [address] Rd may call you directly to coordinate your care with your primary care physician and your other care providers. For questions about the [facility] programs, please, contact your physicians office. For general questions or additional information about Accountable Care Organizations: 
Please visit www.medicare.gov/acos. html or call 1-800-MEDICARE (9-780.512.7919) TTY users should call 7-808.770.8571. Introducing [facility]! Gay Davenport introduces The Local patient portal. Now you can access parts of your medical record, email your doctor's office, and request medication refills online. 1. In your internet browser, go to [email or web address] 2. Click on the First Time User? Click Here link in the Sign In box. You will see the New Member Sign Up page. 3. Enter your The Local Access Code exactly as it appears below. You will not need to use this code after youve completed the sign-up process. If you do not sign up before the expiration date, you must request a new code. · Flipora Access Code: [email or web address] Expires: 12/10/2018  8:36 PM 
 
4. Enter the last four digits of your Social Security Number (xxxx) and Date of Birth (mm/dd/yyyy) as indicated and click Submit. You will be taken to the next sign-up page. 5. Create a Flipora ID. This will be your Flipora login ID and cannot be changed, so think of one that is secure and easy to remember. 6. Create a Flipora password. You can change your password at any time. 7. Enter your Password Reset Question and Answer. This can be used at a later time if you forget your password. 8. Enter your e-mail address. You will receive e-mail notification when new information is available in [address] 9. Click Sign Up. You can now view and download portions of your medical record. 10. Click the Download Summary menu link to download a portable copy of your medical information. If you have questions, please visit the Frequently Asked Questions section of the Flipora website. Remember, Flipora is NOT to be used for urgent needs. For medical emergencies, dial 911. Now available from your iPhone and Android! Introducing Don Rod As a [facility] patient, I wanted to make you aware of our electronic visit tool called Don Ilancristel. [facility] 24/7 allows you to connect within minutes with a medical provider 24 hours a day, seven days a week via a mobile device or tablet or logging into a secure website from your computer. You can access Don Ilantoshafin from anywhere in the [place]. A virtual visit might be right for you when you have a simple condition and feel like you just dont want to get out of bed, or cant get away from work for an appointment, when your regular [facility] provider is not available (evenings, weekends or holidays), or when youre out of town and need minor care.   Electronic visits cost only $49 and if the [facility] [place] 24/7 provider determines a prescription is needed to treat your condition, one can be electronically transmitted to a nearby pharmacy*. Please take a moment to enroll today if you have not already done so. The enrollment process is free and takes just a few minutes. To enroll, please download the Prizeo 24/7 lurdes to your tablet or phone, or visit [email or web address] to enroll on your computer. And, as an [address] [place] [address] patient with a Mobspire account, the results of your visits will be scanned into your electronic medical record and your primary care provider will be able to view the scanned results. We urge you to continue to see your regular [facility] provider for your ongoing medical care. And while your primary care provider may not be the one available when you seek a HeadMix virtual visit, the peace of mind you get from getting a real diagnosis real time can be priceless. For more information on HeadMix, view our Frequently Asked Questions (FAQs) at [email or web address]. Sincerely, 
 
Ladi Canchola MD 
Chief Medical Officer [facility] Angelina Larry *:  certain medications cannot be prescribed via HeadMix Providers Seen During Your Hospitalization Provider Specialty Primary office phone Makenzie Singh MD Orthopedic Surgery [phone] Immunizations Administered for This Admission Name Date Influenza Vaccine (Quad) PF 9/20/2018 Your Primary Care Physician (PCP) Primary Care Physician Office Phone Office Fax Brittnee Dalton [phone] You are allergic to the following No active allergies Recent Documentation Height Weight Breastfeeding? BMI OB Status Smoking Status 1.6 m 82.6 kg No 32.24 kg/m2 Postmenopausal Current Every Day Smoker Emergency Contacts Name Discharge Info Relation Home Work Mobile Cierra Huggins DISCHARGE CAREGIVER [3] Daughter [21] [phone] Zeinab Cisse DISCHARGE CAREGIVER [3] Daughter [21] [phone] Patient Belongings The following personal items are in your possession at time of discharge: 
  Dental Appliances: None  Visual Aid: None   Hearing Aids/Status: Does not own  Home Medications: None   Jewelry: Ring  Clothing: Footwear, Undergarments, Pants, Shirt, At bedside    Other Valuables: Amber Albert Please provide this summary of care documentation to your next provider. Signatures-by signing, you are acknowledging that this After Visit Summary has been reviewed with you and you have received a copy. Patient Signature:  ____________________________________________________________ Date:  ____________________________________________________________  
  
Parvin Francis Provider Signature:  ____________________________________________________________ Date:  ____________________________________________________________

## 2018-09-17 NOTE — ANESTHESIA PROCEDURE NOTES
Peripheral Block Start time: 9/17/2018 1:28 PM 
End time: 9/17/2018 1:39 PM 
Performed by: Kennedi Garcia Authorized by: Kennedi Garcia Pre-procedure: Indications: at surgeon's request, post-op pain management and procedure for pain Preanesthetic Checklist: patient identified, risks and benefits discussed, site marked, timeout performed, anesthesia consent given and patient being monitored Timeout Time: 13:28 Block Type:  
Block Type:  Supraclavicular Laterality:  Left Monitoring:  Standard ASA monitoring, continuous pulse ox, frequent vital sign checks, heart rate, oxygen and responsive to questions Injection Technique:  Single shot Procedures: nerve stimulator Patient Position: supine Prep: chlorhexidine Location:  Supraclavicular Needle Type:  Stimuplex Needle Gauge:  22 G Needle Localization:  Nerve stimulator Medication Injected:  2.0% 
mepivacaine Volume (mL):  20 Add'l Medication Injected:  0.5% 
ropivacaine Volume (mL):  10 Assessment: 
Number of attempts:  1 Injection Assessment:  Incremental injection every 5 mL, negative aspiration for CSF, no paresthesia, negative aspiration for blood and no intravascular symptoms Patient tolerance:  Patient tolerated the procedure well with no immediate complications

## 2018-09-17 NOTE — ANESTHESIA PREPROCEDURE EVALUATION
Anesthetic History No history of anesthetic complications Review of Systems / Medical History Patient summary reviewed, nursing notes reviewed and pertinent labs reviewed Pulmonary Sleep apnea: No treatment Smoker Neuro/Psych Psychiatric history Comments: Chronic pain Cardiovascular Hypertension: well controlled CAD Comments: Negative chemical stress test 2016 GI/Hepatic/Renal 
  
GERD: well controlled Endo/Other Within defined limits Other Findings Comments: fibromyalgia Physical Exam 
 
Airway Mallampati: II 
TM Distance: > 6 cm Neck ROM: normal range of motion Mouth opening: Normal 
 
 Cardiovascular Regular rate and rhythm,  S1 and S2 normal,  no murmur, click, rub, or gallop Dental 
No notable dental hx Pulmonary Breath sounds clear to auscultation Abdominal 
GI exam deferred Other Findings Anesthetic Plan ASA: 3 Anesthesia type: general 
 
 
Post-op pain plan if not by surgeon: peripheral nerve block single Induction: Intravenous Anesthetic plan and risks discussed with: Patient

## 2018-09-18 PROCEDURE — 77010033678 HC OXYGEN DAILY

## 2018-09-18 PROCEDURE — 97161 PT EVAL LOW COMPLEX 20 MIN: CPT

## 2018-09-18 PROCEDURE — 74011250637 HC RX REV CODE- 250/637: Performed by: ORTHOPAEDIC SURGERY

## 2018-09-18 PROCEDURE — G8978 MOBILITY CURRENT STATUS: HCPCS

## 2018-09-18 PROCEDURE — 74011250636 HC RX REV CODE- 250/636: Performed by: ORTHOPAEDIC SURGERY

## 2018-09-18 PROCEDURE — G8979 MOBILITY GOAL STATUS: HCPCS

## 2018-09-18 PROCEDURE — 99218 HC RM OBSERVATION: CPT

## 2018-09-18 PROCEDURE — 94760 N-INVAS EAR/PLS OXIMETRY 1: CPT

## 2018-09-18 PROCEDURE — 97116 GAIT TRAINING THERAPY: CPT

## 2018-09-18 RX ORDER — ONDANSETRON 4 MG/1
4 TABLET, ORALLY DISINTEGRATING ORAL
Status: DISCONTINUED | OUTPATIENT
Start: 2018-09-18 | End: 2018-09-20 | Stop reason: HOSPADM

## 2018-09-18 RX ORDER — KETOROLAC TROMETHAMINE 30 MG/ML
30 INJECTION, SOLUTION INTRAMUSCULAR; INTRAVENOUS ONCE
Status: DISCONTINUED | OUTPATIENT
Start: 2018-09-18 | End: 2018-09-18

## 2018-09-18 RX ORDER — KETOROLAC TROMETHAMINE 30 MG/ML
15 INJECTION, SOLUTION INTRAMUSCULAR; INTRAVENOUS ONCE
Status: COMPLETED | OUTPATIENT
Start: 2018-09-18 | End: 2018-09-18

## 2018-09-18 RX ORDER — OXYCODONE HYDROCHLORIDE 5 MG/1
5 TABLET ORAL ONCE
Status: COMPLETED | OUTPATIENT
Start: 2018-09-18 | End: 2018-09-18

## 2018-09-18 RX ORDER — KETOROLAC TROMETHAMINE 30 MG/ML
15 INJECTION, SOLUTION INTRAMUSCULAR; INTRAVENOUS
Status: COMPLETED | OUTPATIENT
Start: 2018-09-18 | End: 2018-09-18

## 2018-09-18 RX ORDER — OXYCODONE HYDROCHLORIDE 5 MG/1
5-10 TABLET ORAL
Status: DISCONTINUED | OUTPATIENT
Start: 2018-09-18 | End: 2018-09-20 | Stop reason: HOSPADM

## 2018-09-18 RX ORDER — GUAIFENESIN/DEXTROMETHORPHAN 100-10MG/5
10 SYRUP ORAL
Status: DISCONTINUED | OUTPATIENT
Start: 2018-09-18 | End: 2018-09-20 | Stop reason: HOSPADM

## 2018-09-18 RX ADMIN — GUAIFENESIN AND DEXTROMETHORPHAN 10 ML: 100; 10 SYRUP ORAL at 17:32

## 2018-09-18 RX ADMIN — PREGABALIN 225 MG: 75 CAPSULE ORAL at 18:13

## 2018-09-18 RX ADMIN — DOCUSATE SODIUM 100 MG: 100 CAPSULE, LIQUID FILLED ORAL at 09:49

## 2018-09-18 RX ADMIN — GUAIFENESIN AND DEXTROMETHORPHAN 10 ML: 100; 10 SYRUP ORAL at 23:49

## 2018-09-18 RX ADMIN — Medication 9 MG: at 21:42

## 2018-09-18 RX ADMIN — AMLODIPINE BESYLATE 2.5 MG: 5 TABLET ORAL at 08:17

## 2018-09-18 RX ADMIN — IBUPROFEN 400 MG: 400 TABLET, FILM COATED ORAL at 11:16

## 2018-09-18 RX ADMIN — CETIRIZINE HYDROCHLORIDE 10 MG: 10 TABLET, FILM COATED ORAL at 09:48

## 2018-09-18 RX ADMIN — GUAIFENESIN AND DEXTROMETHORPHAN 10 ML: 100; 10 SYRUP ORAL at 05:42

## 2018-09-18 RX ADMIN — HYDROCODONE BITARTRATE AND ACETAMINOPHEN 1 TABLET: 5; 325 TABLET ORAL at 02:43

## 2018-09-18 RX ADMIN — KETOROLAC TROMETHAMINE 15 MG: 30 INJECTION, SOLUTION INTRAMUSCULAR at 04:07

## 2018-09-18 RX ADMIN — METOPROLOL TARTRATE 100 MG: 50 TABLET ORAL at 18:14

## 2018-09-18 RX ADMIN — PANTOPRAZOLE SODIUM 40 MG: 40 TABLET, DELAYED RELEASE ORAL at 07:14

## 2018-09-18 RX ADMIN — CLONIDINE HYDROCHLORIDE 0.2 MG: 0.2 TABLET ORAL at 08:16

## 2018-09-18 RX ADMIN — MORPHINE SULFATE 2.5 MG: 10 INJECTION INTRAVENOUS at 00:22

## 2018-09-18 RX ADMIN — OXYCODONE HYDROCHLORIDE 10 MG: 5 TABLET ORAL at 18:46

## 2018-09-18 RX ADMIN — Medication 1 CAPSULE: at 09:48

## 2018-09-18 RX ADMIN — OXYCODONE HYDROCHLORIDE 10 MG: 5 TABLET ORAL at 14:54

## 2018-09-18 RX ADMIN — Medication 10 ML: at 14:56

## 2018-09-18 RX ADMIN — OXYCODONE HYDROCHLORIDE 5 MG: 5 TABLET ORAL at 04:08

## 2018-09-18 RX ADMIN — CLONIDINE HYDROCHLORIDE 0.2 MG: 0.2 TABLET ORAL at 18:14

## 2018-09-18 RX ADMIN — OXYCODONE HYDROCHLORIDE 10 MG: 5 TABLET ORAL at 22:36

## 2018-09-18 RX ADMIN — MORPHINE SULFATE 2.5 MG: 10 INJECTION INTRAVENOUS at 01:18

## 2018-09-18 RX ADMIN — KETOROLAC TROMETHAMINE 15 MG: 30 INJECTION, SOLUTION INTRAMUSCULAR at 12:59

## 2018-09-18 RX ADMIN — HYDROCODONE BITARTRATE AND ACETAMINOPHEN 1 TABLET: 5; 325 TABLET ORAL at 12:13

## 2018-09-18 RX ADMIN — HYDROCODONE BITARTRATE AND ACETAMINOPHEN 1 TABLET: 5; 325 TABLET ORAL at 07:18

## 2018-09-18 RX ADMIN — ASPIRIN 81 MG: 81 TABLET, COATED ORAL at 09:48

## 2018-09-18 RX ADMIN — ONDANSETRON 4 MG: 4 TABLET, ORALLY DISINTEGRATING ORAL at 22:35

## 2018-09-18 RX ADMIN — PANTOPRAZOLE SODIUM 40 MG: 40 TABLET, DELAYED RELEASE ORAL at 16:43

## 2018-09-18 RX ADMIN — MULTIPLE VITAMINS W/ MINERALS TAB 1 TABLET: TAB at 09:48

## 2018-09-18 RX ADMIN — MORPHINE SULFATE 5 MG: 10 INJECTION INTRAVENOUS at 11:07

## 2018-09-18 RX ADMIN — CALCIUM CARBONATE-VITAMIN D TAB 500 MG-200 UNIT 1 TABLET: 500-200 TAB at 09:48

## 2018-09-18 RX ADMIN — METOPROLOL TARTRATE 100 MG: 50 TABLET ORAL at 08:16

## 2018-09-18 RX ADMIN — PRAVASTATIN SODIUM 40 MG: 20 TABLET ORAL at 21:42

## 2018-09-18 RX ADMIN — PREGABALIN 225 MG: 75 CAPSULE ORAL at 09:47

## 2018-09-18 NOTE — PROGRESS NOTES
Dr. Paresh Bernal notified that they will need to fill out paperwork regarding the medication Domperidone before this medication can be approved by pharmacy. Dr. Paresh Bernal will notified Dr. Afsaneh Arango since he is the ordering physician.

## 2018-09-18 NOTE — PROGRESS NOTES
Bedside and Verbal shift change report given to [facility] ([facility]coming nurse) by Francisca Huizar (offgoing nurse). Report included the following information SBAR.

## 2018-09-18 NOTE — PROGRESS NOTES
5:39 PM 
Bedside and Verbal shift change report given to Vivi Leon RN (oncoming nurse) by Nova Gonzalez RN (offgoing nurse). Report included the following information SBAR, Kardex, OR Summary, Procedure Summary, Intake/Output, MAR and Recent Results.

## 2018-09-18 NOTE — ANESTHESIA POSTPROCEDURE EVALUATION
Post-Anesthesia Evaluation and Assessment Patient: Oscar Johnson MRN: [id number]  SSN: [id number] YOB: 1940  Age: 70 y.o. Sex: female Cardiovascular Function/Vital Signs Visit Vitals  BP (!) 205/110 (BP 1 Location: Right arm, BP Patient Position: At rest)  Pulse (!) 101  Temp 37.2 °C (98.9 °F)  Resp 20  
 Ht 5' 3\" (1.6 m)  Wt 82.6 kg (182 lb)  SpO2 94%  BMI 32.24 kg/m2 Patient is status post general anesthesia for Procedure(s): OPEN REDUCTION INTERNAL FIXATION LEFT DISTAL HUMERUS (CHOICE). Nausea/Vomiting: None Postoperative hydration reviewed and adequate. Pain: 
Pain Scale 1: Numeric (0 - 10) (09/18/18 0962) Pain Intensity 1: 8 (09/18/18 0718) Managed Neurological Status:  
Neuro (WDL): Exceptions to [facility] (09/17/18 1830) Neuro Neurologic State: Alert (09/17/18 2021) Orientation Level: Oriented X4 (09/17/18 2021) Cognition: Appropriate decision making; Appropriate for age attention/concentration;Decreased attention/concentration; Appropriate safety awareness;Decreased command following (09/17/18 2021) Speech: Clear (09/17/18 2021) LUE Motor Response: Weak;Numbness; Purposeful (09/17/18 2021) LLE Motor Response: Purposeful (09/17/18 2021) RUE Motor Response: Purposeful (09/17/18 2021) RLE Motor Response: Purposeful (09/17/18 2021) At baseline Mental Status and Level of Consciousness: Arousable Pulmonary Status:  
O2 Device: Nasal cannula (09/17/18 1855) Adequate oxygenation and airway patent Complications related to anesthesia: None Post-anesthesia assessment completed. No concerns Signed By: Heriberto No MD   
 September 18, 2018

## 2018-09-18 NOTE — ROUTINE PROCESS
0400: Pt in tears complaining of increased pain in left upper arm and coughing. On call ortho paged. 0405: Dr. Michael Alexis notified pt has chronic pain and takes narcotics at home. Also on morphine 5 mg iv every 4 hours prn and hydrocodone 5/325 po every 4 hours prn. Verbal order to give toradol 15 mg iv one time dose, oxycodone 5 mg one time dose and robitussin prn for cough.

## 2018-09-18 NOTE — OP NOTES
1700 [facility] REPORT    Pratik Jackson  MR#: [id number]  : 1940  ACCOUNT #: [de-identified]   DATE OF SERVICE: 2018    PREOPERATIVE DIAGNOSIS:  Left supracondylar humerus fracture. POSTOPERATIVE DIAGNOSIS:  Left supracondylar humerus fracture. PROCEDURE PERFORMED:  Open reduction and internal fixation of left supracondylar humerus. SURGEON:  Yossi Fernandez MD    ANESTHESIA:  Supraclavicular block and general endotracheal.    DRAINS:  A 10-flat [place]. ESTIMATED BLOOD LOSS:  100. SPECIMENS REMOVED:  none    IMPLANTS:  Two 2.7 mm lag screws and a 5-hole Synthes extraarticular distal humerus plate. ASSISTANT:  None. INDICATION FOR SURGERY:  This is a 62-year-old female patient with the above condition. A thorough discussion of risks, benefits, and alternatives including, but not limited to radial nerve damage, PIN palsy, ulnar nerve damage, and ulnar nerve palsy, the possibility that these never recover, nonunion, requirement for total elbow replacement in the future, requirement for revision fixation, loss of motion, pain, nonunion, malunion, bleeding, PE, CVA, MI, death, and infection. She understood the risks and consented to the operation. DESCRIPTION OF PROCEDURE:  The patient was brought to the operating room and placed in a lateral position on a beanbag on the operative table. Bony prominences were padded and an axillary roll was placed. The left arm was placed over a stack of towels on a radiolucent arm board. Anesthesia was induced. The left upper extremity was then sterilely prepped and draped. A sterile tourniquet was applied. An Esmarch bandage was used to exsanguinate the limb and the tourniquet was inflated to 250 mmHg. An incision was made over the dorsal aspect of the triceps curving radially around the olecranon down to the ulna. Full-thickness flaps were raised. Medially, the ulnar nerve was identified.   This was carefully dissected out and protected. Radially, the posterior antebrachial cutaneous nerve was seen. This led to the radial nerve, which was protected throughout the case. The fracture site was exposed. Fracture hematoma was cleaned off. The fracture was open reduced. Lag screws were placed to temporarily hold the reduction in an anatomic position after confirmation of appropriate reduction by fluoroscopy. Two 2.7 mm lag screws were placed. The extraarticular posterolateral 5-hole plate was selected as the neutralizing plate. It was put into position. Locking holes were filled proximally and screw holes were filled with cortical screws distally. X-rays were taken to confirm hardware was in appropriate position without intraarticular penetration with appropriate fracture reduction. Because of fracture comminution and missing pieces, the decision was made to span the fracture site in the area of the lag screws and bridge this with the plate. The elbow and forearm were moved through a full range of motion to ensure that there were no bony block to motion or crepitus. The wound was irrigated with normal saline. Subcutaneous tissue was closed using 2-0 Vicryl and skin was closed using staples. A volar slab splint in extension was applied. The patient had a drain placed. It was pulled out between staple holes. She had an appropriate bandage applied and awakened in stable condition. COMPLICATIONS:  None. CONDITION:  Stable. MD MARSHA Miller / LAST  D: 09/17/2018 16:53     T: 09/17/2018 18:23  JOB #: [id number]

## 2018-09-18 NOTE — PROGRESS NOTES
Problem: Falls - Risk of 
Goal: *Absence of Falls Document Jose Greene Fall Risk and appropriate interventions in the flowsheet. Outcome: Progressing Towards Goal 
Fall Risk Interventions: 
Mobility Interventions: Communicate number of staff needed for ambulation/transfer Medication Interventions: Assess postural VS orthostatic hypotension Elimination Interventions: Call light in reach History of Falls Interventions: Utilize gait belt for transfer/ambulation

## 2018-09-18 NOTE — PROGRESS NOTES
Problem: Mobility Impaired (Adult and Pediatric) Goal: *Acute Goals and Plan of Care (Insert Text) Physical Therapy Goals Initiated 9/18/2018 1. Patient will move from supine to sit and sit to supine , scoot up and down and roll side to side in bed with independence within 7 day(s). 2.  Patient will transfer from bed to chair and chair to bed with modified independence using the least restrictive device within 7 day(s). 3.  Patient will perform sit to stand with modified independence within 7 day(s). 4.  Patient will ambulate with modified independence for 200 feet with the least restrictive device within 7 day(s). physical Therapy EVALUATION Patient: Kadie Caballero (25 y.o. female) Date: 9/18/2018 Primary Diagnosis: LEFT HUMERUS FRACTURE CLOSED Left supracondylar humerus fracture, closed, initial encounter Procedure(s) (LRB): OPEN REDUCTION INTERNAL FIXATION LEFT DISTAL HUMERUS (CHOICE) (Left) 1 Day Post-Op Precautions: fall, ASSESSMENT : 
Based on the objective data described below, the patient presents with pain and swelling of her LUE ( especially swelling of the hand) both at rest and with activity, impaired standing balance, and decline in functional mobility from her baseline of independent/mod I. Patient is s/p a fall and fracture of her left distal humerus and ORIF yesterday. Was asked to see pt to possibly clear for discharge to home. Pt required assist X 2 for supine to sit and min assist for transfers and amb using her cane. Vital signs stable on room air, see chart below. Post session pt was able to remain up to a chair. Unable to clear pt for discharge at this time. She lives alone and must be mod I. PT will follow and progress mobility. Also I asked her nurse for an OT consult. Pt's home set up is a small bathroom and is not user friendly for someone unable to use her left arm for toileting. Vitals:  
 09/18/18 1528 09/18/18 1534 09/18/18 1541 09/18/18 1548 BP: 136/75 139/78 121/86 130/83 BP 1 Location: Right arm Right arm Right arm Right arm BP Patient Position: Supine;Pre-activity Sitting Standing Sitting;Post activity Pulse: 73 71 78 72 Resp:      
Temp:      
SpO2: on room air 92%   92% room air Patient will benefit from skilled intervention to address the above impairments. Patients rehabilitation potential is considered to be Good Factors which may influence rehabilitation potential include:  
[]         None noted 
[]         Mental ability/status [x]         Medical condition 
[x]         Home/family situation and support systems 
[]         Safety awareness [x]         Pain tolerance/management 
[]         Other: PLAN : 
Recommendations and Planned Interventions: 
[x]           Bed Mobility Training             []    Neuromuscular Re-Education 
[x]           Transfer Training                   []    Orthotic/Prosthetic Training 
[x]           Gait Training                         []    Modalities [x]           Therapeutic Exercises           []    Edema Management/Control 
[x]           Therapeutic Activities            [x]    Patient and Family Training/Education 
[]           Other (comment): Frequency/Duration: Patient will be followed by physical therapy  5 times a week to address goals. Discharge Recommendations: Home Health Further Equipment Recommendations for Discharge: none from PT  
 
SUBJECTIVE:  
Patient stated It's a 10. OBJECTIVE DATA SUMMARY:  
Consult received, chart reviewed pt cleared by nursing HISTORY:   
Past Medical History:  
Diagnosis Date  Autoimmune disease ([facility] [address] FIBROMYALGIA  Beta-blocker therapy  CAD (coronary artery disease) 1999 MI >> stent x3, cath 2010: [place]  Chronic pain   
 back pain  Delayed gastric emptying  Depression  Depression with anxiety  Dyspepsia and other specified disorders of function of stomach  GERD (gastroesophageal reflux disease)  Hypercholesterolemia  Hypertension  JORDAN on CPAP   
 DOES NOT USE CPAP Past Surgical History:  
Procedure Laterality Date [address]  
 HX CATARACT REMOVAL    
 HX CORONARY STENT PLACEMENT    
 HX DILATION AND CURETTAGE    
 HX HEENT    
 ORAL SX  
 HX LUMBAR LAMINECTOMY  12/2012 Dr. Julian Unger  HX ORTHOPAEDIC  2012  
 spinal fusion  HX OTHER SURGICAL BIRTH DONOVAN EXCISED  HX OTHER SURGICAL    
 RECTAL SX TWICE; ABCESS; FISTULA  HX SMALL BOWEL RESECTION  10/31/14 Obstruction, Dr. Татьяна Singh, 25 in removed  HX TONSILLECTOMY Prior Level of Function/Home Situation: independent in her condo, mod I outside of her condo, using a single point cane Personal factors and/or comorbidities impacting plan of care: anxiety, chronic pain, spinal fusion Home Situation Home Environment: Apartment # Steps to Enter: 0 One/Two Story Residence: One story Interior Rails: None Lift Chair Available: No 
Living Alone: Yes Support Systems: Child(elian), Family member(s) Patient Expects to be Discharged to[de-identified] [email or web address] Current DME Used/Available at Home: Clerance Spray, straight, Walker, rollator, Walker, Wheelchair, Raised toilet seat, Transfer bench EXAMINATION/PRESENTATION/DECISION MAKING:  
Critical Behavior: 
Neurologic State: Alert Orientation Level: Oriented X4 Cognition: Appropriate safety awareness, Appropriate decision making, Appropriate for age attention/concentration Hearing: Auditory Auditory Impairment: None Hearing Aids/Status: Does not own Skin:  Refer to MD and nursing notes Edema: yes, edema left hand, see MD and nursing notes Range Of Motion: 
AROM: Generally decreased, functional, LEs Strength:   
Strength: Generally decreased, functional, LEs Tone & Sensation: Sensation: Impaired (at baseline LEs, but intact to light touch) Coordination: 
  
Vision:  
  
Functional Mobility: 
Bed Mobility: 
  
Supine to Sit: Assist x2; Moderate assistance Transfers: 
Sit to Stand: Minimum assistance Stand to Sit: Minimum assistance Balance:  
Sitting: Impaired Sitting - Static: Good (unsupported) Sitting - Dynamic: Fair (occasional) Standing: Impaired; With support Standing - Static: Fair Standing - Dynamic : Fair Ambulation/Gait Training: 
Distance (ft): 40 Feet (ft) (withone seated rest break) Assistive Device: Gait belt;Cane, straight Ambulation - Level of Assistance: Minimal assistance Gait Abnormalities: Decreased step clearance Base of Support: Widened Speed/Patricia: Slow;Pace decreased (<100 feet/min) Step Length: Left shortened;Right shortened Stairs: Therapeutic Exercises: Ankle pumps Functional Measure: 
Timed up and go: 
 
Timed Get Up And Go Test: 0 (pt unable) Timed Up and Go and G-code impairment scale: 
Percentage of Impairment CH 
 
0% 
 CI 
 
1-19% CJ 
 
20-39% CK 
 
40-59% CL 
 
60-79% CM 
 
80-99% CN  
 
100% Timed Score 0-56 10 11-12 13-14 15-16 17-18 19 20  
 
 
< than 10 seconds=Normal  Greater then 13.5 seconds (in elderly)=Increased fall risk Taniya Murray. Predicting the probability for falls in community dwelling older adults using the Timed Up and Go Test. Phys Ther. 2000;80:896-903. G codes: In compliance with CMSs Claims Based Outcome Reporting, the following G-code set was chosen for this patient based on their primary functional limitation being treated: The outcome measure chosen to determine the severity of the functional limitation was the TUG with a score of 0 which was correlated with the impairment scale. ? Mobility - Walking and Moving Around:  - CURRENT STATUS: CN - 100% impaired, limited or restricted  - GOAL STATUS: CI - 1%-19% impaired, limited or restricted  - D/C STATUS:  ---------------To be determined--------------- Physical Therapy Evaluation Charge Determination History Examination Presentation Decision-Making HIGH Complexity :3+ comorbidities / personal factors will impact the outcome/ POC  LOW Complexity : 1-2 Standardized tests and measures addressing body structure, function, activity limitation and / or participation in recreation  LOW Complexity : Stable, uncomplicated  LOW Complexity : FOTO score of  Based on the above components, the patient evaluation is determined to be of the following complexity level: LOW Pain: 
Pain Scale 1: Numeric (0 - 10) Pain Intensity 1: 8 at rest and 10 post amb, discussed with her nurse Pain Location 1: Arm Pain Orientation 1: Left Pain Description 1: Aching; Throbbing Pain Intervention(s) 1: Medication (see MAR); Family Support; Food Activity Tolerance: VSS Please refer to the flowsheet for vital signs taken during this treatment. After treatment:  
[x]         Patient left in no apparent distress sitting up in chair, LUE elevated on pillows 
[]         Patient left in no apparent distress in bed 
[x]         Call bell left within reach [x]         Nursing notified 
[x]         Caregiver present 
[]         Bed alarm activated COMMUNICATION/EDUCATION:  
The patients plan of care was discussed with: Registered Nurse. [x]         Fall prevention education was provided and the patient/caregiver indicated understanding. [x]         Patient/family have participated as able in goal setting and plan of care. [x]         Patient/family agree to work toward stated goals and plan of care. []         Patient understands intent and goals of therapy, but is neutral about his/her participation. []         Patient is unable to participate in goal setting and plan of care. Thank you for this referral. 
Nickolas Lose Time Calculation: 33 mins

## 2018-09-18 NOTE — PROGRESS NOTES
09/18/18 9934 Vital Signs Temp 98.9 °F (37.2 °C) Temp Source Oral  
Pulse (Heart Rate) (!) 101 Heart Rate Source Monitor Resp Rate 20  
O2 Sat (%) 94 % Level of Consciousness Alert  
BP (!) 205/110 MAP (Calculated) 142 BP 1 Method Automatic  
BP 1 Location Right arm BP Patient Position At rest  
MEWS Score 4 Pt has a mew score of 4 d/t tachycardia and increased BP. Will give morning bp meds.

## 2018-09-19 PROCEDURE — G8987 SELF CARE CURRENT STATUS: HCPCS

## 2018-09-19 PROCEDURE — 97530 THERAPEUTIC ACTIVITIES: CPT

## 2018-09-19 PROCEDURE — 97535 SELF CARE MNGMENT TRAINING: CPT

## 2018-09-19 PROCEDURE — 94760 N-INVAS EAR/PLS OXIMETRY 1: CPT

## 2018-09-19 PROCEDURE — 74011250637 HC RX REV CODE- 250/637: Performed by: ORTHOPAEDIC SURGERY

## 2018-09-19 PROCEDURE — 97116 GAIT TRAINING THERAPY: CPT

## 2018-09-19 PROCEDURE — 97165 OT EVAL LOW COMPLEX 30 MIN: CPT

## 2018-09-19 PROCEDURE — 99218 HC RM OBSERVATION: CPT

## 2018-09-19 PROCEDURE — G8988 SELF CARE GOAL STATUS: HCPCS

## 2018-09-19 RX ADMIN — PREGABALIN 225 MG: 75 CAPSULE ORAL at 17:22

## 2018-09-19 RX ADMIN — Medication 10 ML: at 21:19

## 2018-09-19 RX ADMIN — ASPIRIN 81 MG: 81 TABLET, COATED ORAL at 08:50

## 2018-09-19 RX ADMIN — PREGABALIN 225 MG: 75 CAPSULE ORAL at 08:50

## 2018-09-19 RX ADMIN — PANTOPRAZOLE SODIUM 40 MG: 40 TABLET, DELAYED RELEASE ORAL at 07:24

## 2018-09-19 RX ADMIN — CALCIUM CARBONATE-VITAMIN D TAB 500 MG-200 UNIT 1 TABLET: 500-200 TAB at 08:50

## 2018-09-19 RX ADMIN — GUAIFENESIN AND DEXTROMETHORPHAN 10 ML: 100; 10 SYRUP ORAL at 13:22

## 2018-09-19 RX ADMIN — OXYCODONE HYDROCHLORIDE 10 MG: 5 TABLET ORAL at 17:22

## 2018-09-19 RX ADMIN — Medication 9 MG: at 21:19

## 2018-09-19 RX ADMIN — GUAIFENESIN AND DEXTROMETHORPHAN 10 ML: 100; 10 SYRUP ORAL at 19:12

## 2018-09-19 RX ADMIN — DOCUSATE SODIUM 100 MG: 100 CAPSULE, LIQUID FILLED ORAL at 08:50

## 2018-09-19 RX ADMIN — PANTOPRAZOLE SODIUM 40 MG: 40 TABLET, DELAYED RELEASE ORAL at 16:20

## 2018-09-19 RX ADMIN — OXYCODONE HYDROCHLORIDE 10 MG: 5 TABLET ORAL at 21:19

## 2018-09-19 RX ADMIN — Medication 10 ML: at 06:00

## 2018-09-19 RX ADMIN — ONDANSETRON 4 MG: 4 TABLET, ORALLY DISINTEGRATING ORAL at 08:57

## 2018-09-19 RX ADMIN — AMLODIPINE BESYLATE 2.5 MG: 5 TABLET ORAL at 08:50

## 2018-09-19 RX ADMIN — PRAVASTATIN SODIUM 40 MG: 20 TABLET ORAL at 21:19

## 2018-09-19 RX ADMIN — Medication 1 CAPSULE: at 08:49

## 2018-09-19 RX ADMIN — METOPROLOL TARTRATE 100 MG: 50 TABLET ORAL at 17:23

## 2018-09-19 RX ADMIN — OXYCODONE HYDROCHLORIDE 10 MG: 5 TABLET ORAL at 13:22

## 2018-09-19 RX ADMIN — CETIRIZINE HYDROCHLORIDE 10 MG: 10 TABLET, FILM COATED ORAL at 08:49

## 2018-09-19 RX ADMIN — Medication 10 ML: at 13:22

## 2018-09-19 RX ADMIN — CLONIDINE HYDROCHLORIDE 0.2 MG: 0.2 TABLET ORAL at 17:22

## 2018-09-19 RX ADMIN — BUMETANIDE 2 MG: 1 TABLET ORAL at 08:50

## 2018-09-19 RX ADMIN — MULTIPLE VITAMINS W/ MINERALS TAB 1 TABLET: TAB at 08:51

## 2018-09-19 RX ADMIN — CLONIDINE HYDROCHLORIDE 0.2 MG: 0.2 TABLET ORAL at 08:49

## 2018-09-19 RX ADMIN — GUAIFENESIN AND DEXTROMETHORPHAN 10 ML: 100; 10 SYRUP ORAL at 07:25

## 2018-09-19 RX ADMIN — OXYCODONE HYDROCHLORIDE 10 MG: 5 TABLET ORAL at 04:08

## 2018-09-19 RX ADMIN — OXYCODONE HYDROCHLORIDE 10 MG: 5 TABLET ORAL at 09:35

## 2018-09-19 RX ADMIN — METOPROLOL TARTRATE 100 MG: 50 TABLET ORAL at 08:51

## 2018-09-19 NOTE — PROGRESS NOTES
Problem: Mobility Impaired (Adult and Pediatric) Goal: *Acute Goals and Plan of Care (Insert Text) Physical Therapy Goals Initiated 9/18/2018 1. Patient will move from supine to sit and sit to supine , scoot up and down and roll side to side in bed with independence within 7 day(s). 2.  Patient will transfer from bed to chair and chair to bed with modified independence using the least restrictive device within 7 day(s). 3.  Patient will perform sit to stand with modified independence within 7 day(s). 4.  Patient will ambulate with modified independence for 200 feet with the least restrictive device within 7 day(s). physical Therapy TREATMENT Patient: Kadie Caballero (3 y.o. female) Date: 9/19/2018 Diagnosis: LEFT HUMERUS FRACTURE CLOSED Left supracondylar humerus fracture, closed, initial encounter <principal problem not specified> Procedure(s) (LRB): OPEN REDUCTION INTERNAL FIXATION LEFT DISTAL HUMERUS (CHOICE) (Left) 2 Days Post-Op Precautions:   
Chart, physical therapy assessment, plan of care and goals were reviewed. ASSESSMENT: 
Patient is up in the chair with L UE elevated upon arrival. Patient is drowsy and recently had Zofran for nausea. Patient is interested in returning to bed but is agreeable to PT prior to transfer back to bed. Patient requires minimal assistance and support for the L UE with sit to stand transfers. Initially attempted gait with a rolling walker with an elevated platform to support the L UE in 90 degrees of shoulder flexion but patient unable to manage the walker safely. Patient ambulated 30 feet with a single point cane in the R UE and support for the L UE due to increased pain (patient currently in 10/10 pain despite pain medicine) when L UE is in a dependent position. Patient unable to ambulate more than 30 feet and is at risk for falls due to nausea, pain, and drowsiness - keeping eyes closed with activity.  Patient reports that she will have a friend to assist her at home but her friend will not be able to provide any hands on assistance with transfers and ambulation. Patient will continue to benefit from assistance for safe mobility. Progression toward goals: 
[]    Improving appropriately and progressing toward goals [x]    Improving slowly and progressing toward goals 
[]    Not making progress toward goals and plan of care will be adjusted PLAN: 
Patient continues to benefit from skilled intervention to address the above impairments. Continue treatment per established plan of care. Discharge Recommendations:  Home Health vs Inpatient Rehab Further Equipment Recommendations for Discharge:  None? SUBJECTIVE:  
Patient stated I am sick to my stomach. My left arm has to stay straight and elevated.  OBJECTIVE DATA SUMMARY:  
Critical Behavior: 
Neurologic State: Alert Orientation Level: Oriented X4 Cognition: Appropriate decision making, Appropriate for age attention/concentration, Appropriate safety awareness Functional Mobility Training: 
Bed Mobility: 
Supine to Sit:  (Patient is up in the chair with L UE elevated upon arrival. ) Sit to Supine: Moderate assistance (Patient able to elevate LE's onto bed without assistance but requires support for L UE with sit to supine. ) Scooting: Supervision (except assist for L UE support. ) Transfers: 
Sit to Stand: Minimum assistance; Additional time - support for L UE Stand to Sit: Contact guard assistance; Additional time  - support for L UE Balance: 
Sitting: Impaired Sitting - Static: Fair (occasional) Sitting - Dynamic: Not tested Standing: Impaired; With support Standing - Static: Fair Standing - Dynamic : Fair Ambulation/Gait Training: 
Distance (ft): 30 Feet (ft) (limited due to nausea, drowsiness, and 10/10 L UE pain. ) Assistive Device: Gait belt;Cane, straight Ambulation - Level of Assistance: Minimal assistance (X 1 for balance and moderate assistance X 1 for L UE support due to 10/10 pain.) Gait Abnormalities: Decreased step clearance;Shuffling gait; Altered arm swing Speed/Patricia: Slow;Shuffled Step Length: Right shortened;Left shortened Initially attempted ambulation with rolling walker with L UE elevated on L platform for support but without weight bearing. Patient unable to manage the rolling walker safely. Patient continued gait training with a single point cane in the right hand but patient requires support for the L UE as she is unable to tolerate the L UE in a dependent position due to 10/10 pain. Therapeutic Exercises:  
Finger pumping Pain: 
Pain Scale 1: Numeric (0 - 10) Pain Intensity 1: 10  
Pain Location 1: Arm Pain Orientation 1: Left Pain Description 1: Aching Pain Intervention(s) 1: Medication (see MAR); Exercise;Distraction Activity Tolerance:  
Please refer to the flowsheet for vital signs taken during this treatment. After treatment:  
[]    Patient left in no apparent distress sitting up in chair 
[x]    Patient left in no apparent distress in bed with L UE on pillows. [x]    Call bell left within reach [x]    OT present 
[]    Caregiver present 
[]    Bed alarm activated COMMUNICATION/COLLABORATION:  
The patients plan of care was discussed with: Occupational Therapist and Registered Nurse Sandro Kitchen, PT Time Calculation: 30 mins

## 2018-09-19 NOTE — PROGRESS NOTES
Problem: Self Care Deficits Care Plan (Adult) Goal: *Acute Goals and Plan of Care (Insert Text) Occupational Therapy Goals Initiated 9/19/2018 1. Patient will perform L shoulder exercises per physician order with supervision/set-up within 7 days. 2.  Patient will perform upper body ADLs with minimal assistance within 7 days. 3. Patient will perform bathing with minimal assistance/contact guard assist within 7 days. 4.  Patient will perform toilet transfers with modified independence using  SPC within 7 days. 5.  Patient will verbalize/demonstrate shoulder precautions during ADLs with 100% accuracy within 7 days. Occupational Therapy EVALUATION Patient: Ilene Randle (89 y.o. female) Date: 9/19/2018 Primary Diagnosis: LEFT HUMERUS FRACTURE CLOSED Left supracondylar humerus fracture, closed, initial encounter Procedure(s) (LRB): OPEN REDUCTION INTERNAL FIXATION LEFT DISTAL HUMERUS (CHOICE) (Left) 2 Days Post-Op Precautions: Fall; LUE NWB    
 
ASSESSMENT : 
Based on the objective data described below, the patient presents with overall CGA-Min A x1-2 with RW for functional mobility, up to Total A for lower body ADLs, and Mod-Max A for upper body ADLs s/p L distal humerus ORIF POD 2. Patient received seated in chair with LUE elevated, experiencing significant pain and edema in hand. Very drowsy throughout session, requiring constant cuing to keep eyes open. Unable to safely use platform RW, ambulating with SPC and unable to tolerate LUE hanging down at side for mobility so needing additional assist to keep elevated (friend would be unable to do this). Patient is currently impaired in >60% of all ADLs and requiring Min A x1-2 for mobility. Hopeful if pain improves and patient becomes more alert and steady; recommend patient stay one additional night.  States she spoke with surgeon and will be placed in sling with full elbow flexion by Friday which should significantly increase her safety, mobility, and independence with ADLs. Spoke with RN and PT. Will continue to follow. Patient will benefit from skilled intervention to address the above impairments. Patients rehabilitation potential is considered to be Good Factors which may influence rehabilitation potential include:  
[]             None noted []             Mental ability/status []             Medical condition []             Home/family situation and support systems []             Safety awareness []             Pain tolerance/management 
[]             Other: PLAN : 
Recommendations and Planned Interventions: 
[x]               Self Care Training                  [x]        Therapeutic Activities [x]               Functional Mobility Training    []        Cognitive Retraining 
[x]               Therapeutic Exercises           [x]        Endurance Activities [x]               Balance Training                   [x]        Neuromuscular Re-Education []               Visual/Perceptual Training     [x]   Home Safety Training 
[x]               Patient Education                 [x]        Family Training/Education []               Other (comment): Frequency/Duration: Patient will be followed by occupational therapy 5 times a week to address goals. Discharge Recommendations: Patient plans for d/c home with friend (who is unable to use L side) and HHOT/PT/RN; per current presentation recommend additional night in hospital prior to d/c home d/t drowsiness, poor pain management, and mobility limitations Further Equipment Recommendations for Discharge: TBD SUBJECTIVE:  
Patient stated I can't let it hang down, it swells so much.  OBJECTIVE DATA SUMMARY:  
HISTORY:  
Past Medical History:  
Diagnosis Date  Autoimmune disease ([facility] [address] FIBROMYALGIA  Beta-blocker therapy  CAD (coronary artery disease) 1999 MI >> stent x3, cath 2010: [place]  Chronic pain   
 back pain  Delayed gastric emptying  Depression  Depression with anxiety  Dyspepsia and other specified disorders of function of stomach  GERD (gastroesophageal reflux disease)  Hypercholesterolemia  Hypertension  JORDAN on CPAP   
 DOES NOT USE CPAP Past Surgical History:  
Procedure Laterality Date [place] STENTS  
 HX CATARACT REMOVAL    
 HX CORONARY STENT PLACEMENT    
 HX DILATION AND CURETTAGE    
 HX HEENT    
 ORAL SX  
 HX LUMBAR LAMINECTOMY  12/2012 Dr. Valenzuela Persons  HX ORTHOPAEDIC  2012  
 spinal fusion  HX OTHER SURGICAL BIRTH DONOVAN EXCISED  HX OTHER SURGICAL    
 RECTAL SX TWICE; ABCESS; FISTULA  HX SMALL BOWEL RESECTION  10/31/14 Obstruction, Dr. Britton Lundy, 25 in removed  HX TONSILLECTOMY Prior Level of Function/Environment/Context: Per patient report, lives at home alone. Independent at baseline. History of cervical>sacral spinal surgery resulting in 3 month admission to [facility]. Patient ambulates with SPC, does not work, can drive. Fairly sedentary lifestyle in small apartment per patient. Has a daughter in the area but her mother-in-law just suffered a CVA and lives with daughter, daughter is only able to leave when home health aides are present. Home Situation Home Environment: Apartment # Steps to Enter: 0 One/Two Story Residence: One story Interior Rails: None Lift Chair Available: No 
Living Alone: Yes Support Systems: Child(elian), Family member(s) Patient [facility] be [facility] Current [facility] Used/Available at Home: Alice Yuist, straight, Walker, rollator, Walker, Wheelchair, Raised toilet seat, Transfer  dominance: Right EXAMINATION OF PERFORMANCE DEFICITS: 
Cognitive/Behavioral Status: 
Neurologic State: Alert Orientation Level: Oriented X4 Cognition: Appropriate for age attention/concentration; Follows commands Perception: Appears intact Perseveration: No perseveration noted Safety/Judgement: Fall prevention Skin: Appears intact Edema: Significant swelling in L hand Hearing: Auditory Auditory Impairment: None Hearing Aids/Status: Does not own Vision/Perceptual:   
Tracking: Able to track stimulus in all quadrants w/o difficulty Range of Motion: In [address] AROM:  (RUE WFL; LUE nonfunctional) PROM: Generally decreased, functional 
 
Strength: In [address] Strength:  (RUE WFL; LUE nonfunctional) Coordination: 
Coordination:  (RUE WFL; LUE nonfunctional) Fine Motor Skills-Upper: Left Intact; Right Intact Gross Motor Skills-Upper: Left Impaired;Right Intact Tone & Sensation: In [address] Tone: Normal 
Sensation: Intact Balance: 
Sitting: Impaired Sitting - Static: Fair (occasional) Sitting - Dynamic: Not tested Standing: Impaired; With support Standing - Static: Fair Standing - Dynamic : Fair Functional Mobility and Transfers for ADLs: 
Bed Mobility: 
Supine to Sit:  (Patient is up in the chair with L UE elevated upon arrival. ) Sit to Supine: Moderate assistance (Patient able to elevate LE's onto bed without assistance but requires support for L UE with sit to supine. ) Scooting: Supervision (except assist for L UE support. ) Transfers: 
Sit to Stand: Minimum assistance; Additional time Stand to Sit: Contact guard assistance; Additional time Toilet Transfer : Minimum assistance; Additional time (Inferred per obs of functional mobility) ADL Assessment: 
Feeding: Moderate assistance (Container management and cutting food per obs of BUE ROM) Oral Facial Hygiene/Grooming: Moderate assistance (For bilateral overhead tasks and container mngt per BUE ROM) Bathing: Total assistance Upper Body Dressing: Total assistance Lower Body Dressing: Total assistance Toileting: Contact guard assistance (Inferred for standing components) ADL Intervention and task modifications: 
Feeding Drink to Mouth: Supervision/set-up Upper Body Bathing Bathing Assistance: Total assistance(dependent) Position Performed:  (Supine) Lower Body Bathing Bathing Assistance: Total assistance(dependent) Perineal  : Total assistance (dependent) Position Performed: Standing Lower Body : Total assistance (dependent) Position Performed: Supine Upper Body Dressing Assistance Hospital Gown: Total assistance (dependent) Lower Body Dressing Assistance Socks: Total assistance (dependent) Position Performed: Supine Cognitive Retraining Safety/Judgement: Fall prevention Therapeutic Exercise: 
- Patient ambulating short distance in room with SPC, Min A x1 for sit>stand, and additional person to hold shoulder at 90* flexion d/t reports of significant pain and edema when LUE in dependent position - Patient attempting platform walker, unable to safely negotiate and becoming overwhelmed Functional Measure: 
Barthel Index: 
 
Bathin Bladder: 10 Bowels: 10 
Groomin Dressin Feedin Mobility: 0 Stairs: 0 Toilet Use: 5 Transfer (Bed to Chair and Back): 10 Total: 40 Barthel and G-code impairment scale: 
Percentage of impairment CH 
0% CI 
1-19% CJ 
20-39% CK 
40-59% CL 
60-79% CM 
80-99% CN 
100% Barthel Score 0-100 100 99-80 79-60 59-40 20-39 1-19 
 0 Barthel Score 0-20 20 17-19 13-16 9-12 5-8 1-4 0 The Barthel ADL Index: Guidelines 1. The index should be used as a record of what a patient does, not as a record of what a patient could do. 2. The main aim is to establish degree of independence from any help, physical or verbal, however minor and for whatever reason. 3. The need for supervision renders the patient not independent. 4. A patient's performance should be established using the best available evidence. Asking the patient, friends/relatives and nurses are the usual sources, but direct observation and common sense are also important. However direct testing is not needed. 5. Usually the patient's performance over the preceding 24-48 hours is important, but occasionally longer periods will be relevant. 6. Middle categories imply that the patient supplies over 50 per cent of the effort. 7. Use of aids to be independent is allowed. Joan Majano., Barthel, D.W. (2379). Functional evaluation: the Barthel Index. [facility] (14)2. EVON Montoya, Carolyn Tenorio, Tuan Berkowitz., [place], [address] [place] [address] (1999). Measuring the change indisability after inpatient rehabilitation; comparison of the responsiveness of the Barthel Index and Functional [place] Measure. Journal of Neurology, Neurosurgery, and Psychiatry, 66(4), 061-402. ANDREA Esposito, BISHOP Medina, & Axel Santos MRUDOLPH. (2004.) Assessment of post-stroke quality of life in cost-effectiveness studies: The usefulness of the Barthel Index and the EuroQoL-5D. [facility], 26, 797-38 G codes: In compliance with CMSs Claims Based Outcome Reporting, the following G-code set was chosen for this patient based on their primary functional limitation being treated: The outcome measure chosen to determine the severity of the functional limitation was the Barthel Index with a score of 40/100 which was correlated with the impairment scale. ? Self Care:  
  - CURRENT STATUS: CK - 40%-59% impaired, limited or restricted  - GOAL STATUS: CJ - 20%-39% impaired, limited or restricted  - D/C STATUS:  ---------------To be determined--------------- Occupational Therapy Evaluation Charge Determination History Examination Decision-Making LOW Complexity : Brief history review  HIGH Complexity : 5 or more performance deficits relating to physical, cognitive , or psychosocial skils that result in activity limitations and / or participation restrictions MEDIUM Complexity : Patient may present with comorbidities that affect occupational performnce. Miniml to moderate modification of tasks or assistance (eg, physical or verbal ) with assesment(s) is necessary to enable patient to complete evaluation Based on the above components, the patient evaluation is determined to be of the following complexity level: LOW Pain: 
Pain Scale 1: Numeric (0 - 10) Pain Intensity 1: 8 Pain Location 1: Arm Pain Orientation 1: Left Pain Description 1: Aching Pain Intervention(s) 1: Medication (see MAR); Exercise;Distraction Activity Tolerance:  
Fair. Poor pain management and drowsiness. Please refer to the flowsheet for vital signs taken during this treatment. After treatment:  
[] Patient left in no apparent distress sitting up in chair 
[x] Patient left in no apparent distress in bed 
[x] Call bell left within reach [x] Nursing notified 
[] Caregiver present 
[] Bed alarm activated COMMUNICATION/EDUCATION:  
The patients plan of care was discussed with: Physical Therapist and Registered Nurse. [x] Home safety education was provided and the patient/caregiver indicated understanding. [x] Patient/family have participated as able in goal setting and plan of care. [] Patient/family agree to work toward stated goals and plan of care. [] Patient understands intent and goals of therapy, but is neutral about his/her participation. [] Patient is unable to participate in goal setting and plan of care. This patients plan of care is appropriate for delegation to [facility]. Thank you for this referral. 
Kev Watkins OT Time Calculation: 41 mins

## 2018-09-19 NOTE — PROGRESS NOTES
Bedside and Verbal shift change report given to Nick Maxwell RN (oncoming nurse) by Wil Carbajal RN (offgoing nurse). Report included the following information SBAR, Kardex, Intake/Output, MAR, Recent Results and Med Rec Status.

## 2018-09-19 NOTE — PROGRESS NOTES
Bedside and Verbal shift change report given to Lidia Alicia RN (oncoming nurse) by Marilyn Wing RN (offgoing nurse). Report included the following information SBAR.

## 2018-09-19 NOTE — PROGRESS NOTES
4:42 PM 
Bedside and Verbal shift change report given to [employer], RN (oncoming nurse) by Humaira Ross RN (offgoing nurse). Report included the following information SBAR, Kardex, OR Summary, Procedure Summary, Intake/Output, MAR and Recent Results.

## 2018-09-19 NOTE — PROGRESS NOTES
POD 2 Days Post-Op Procedure:  Procedure(s): OPEN REDUCTION INTERNAL FIXATION LEFT DISTAL HUMERUS (CHOICE) Subjective:  
 
Patient is seen today with the following complaints: Pain in elbow. Objective:  
 
Vitals:  Blood pressure 179/79, pulse (!) 103, temperature 98.8 °F (37.1 °C), resp. rate 16, height 5' 3\" (1.6 m), weight 82.6 kg (182 lb), SpO2 94 %, not currently breastfeeding. Tmax:  Temp (24hrs), Av.4 °F (36.9 °C), Min:98.2 °F (36.8 °C), Max:98.8 °F (37.1 °C) Physical Exam:  Examination of the LUE shows splint in place. Swelling and bruising at the hand. M/r/u intact to motor and sensory function with slight decrease in ulnar nerve sensation Labs: No results found for this or any previous visit (from the past 24 hour(s)). Assessment:  
 
Active Problems: 
  Left supracondylar humerus fracture, closed, initial encounter (2018) Plan/Recommendations/Medical Decision Making:  
 
OK to d/c splint tomorrow Plan for HHPT and d/c home tomorrow Jaylyn Mata MD

## 2018-09-20 VITALS
RESPIRATION RATE: 16 BRPM | BODY MASS INDEX: 32.25 KG/M2 | HEART RATE: 65 BPM | SYSTOLIC BLOOD PRESSURE: 139 MMHG | TEMPERATURE: 99.6 F | HEIGHT: 63 IN | WEIGHT: 182 LBS | OXYGEN SATURATION: 94 % | DIASTOLIC BLOOD PRESSURE: 79 MMHG

## 2018-09-20 PROCEDURE — 97530 THERAPEUTIC ACTIVITIES: CPT

## 2018-09-20 PROCEDURE — 97535 SELF CARE MNGMENT TRAINING: CPT

## 2018-09-20 PROCEDURE — 74011250636 HC RX REV CODE- 250/636: Performed by: ORTHOPAEDIC SURGERY

## 2018-09-20 PROCEDURE — 74011250637 HC RX REV CODE- 250/637: Performed by: ORTHOPAEDIC SURGERY

## 2018-09-20 PROCEDURE — 97116 GAIT TRAINING THERAPY: CPT

## 2018-09-20 PROCEDURE — 77010033678 HC OXYGEN DAILY

## 2018-09-20 PROCEDURE — 90471 IMMUNIZATION ADMIN: CPT

## 2018-09-20 PROCEDURE — 94760 N-INVAS EAR/PLS OXIMETRY 1: CPT

## 2018-09-20 PROCEDURE — 99218 HC RM OBSERVATION: CPT

## 2018-09-20 PROCEDURE — 90686 IIV4 VACC NO PRSV 0.5 ML IM: CPT | Performed by: ORTHOPAEDIC SURGERY

## 2018-09-20 RX ORDER — OXYCODONE AND ACETAMINOPHEN 5; 325 MG/1; MG/1
1 TABLET ORAL
Qty: 40 TAB | Refills: 0 | Status: SHIPPED | OUTPATIENT
Start: 2018-09-20 | End: 2018-09-20

## 2018-09-20 RX ORDER — OXYCODONE HYDROCHLORIDE 5 MG/1
5 TABLET ORAL
Qty: 40 TAB | Refills: 0 | Status: SHIPPED | OUTPATIENT
Start: 2018-09-20 | End: 2018-10-02

## 2018-09-20 RX ADMIN — ASPIRIN 81 MG: 81 TABLET, COATED ORAL at 09:12

## 2018-09-20 RX ADMIN — BUMETANIDE 1 MG: 1 TABLET ORAL at 09:11

## 2018-09-20 RX ADMIN — ONDANSETRON 4 MG: 4 TABLET, ORALLY DISINTEGRATING ORAL at 11:55

## 2018-09-20 RX ADMIN — DOCUSATE SODIUM 100 MG: 100 CAPSULE, LIQUID FILLED ORAL at 09:11

## 2018-09-20 RX ADMIN — OXYCODONE HYDROCHLORIDE 10 MG: 5 TABLET ORAL at 01:25

## 2018-09-20 RX ADMIN — GUAIFENESIN AND DEXTROMETHORPHAN 10 ML: 100; 10 SYRUP ORAL at 05:48

## 2018-09-20 RX ADMIN — PANTOPRAZOLE SODIUM 40 MG: 40 TABLET, DELAYED RELEASE ORAL at 05:43

## 2018-09-20 RX ADMIN — METOPROLOL TARTRATE 100 MG: 50 TABLET ORAL at 09:11

## 2018-09-20 RX ADMIN — Medication 1 CAPSULE: at 09:12

## 2018-09-20 RX ADMIN — OXYCODONE HYDROCHLORIDE 10 MG: 5 TABLET ORAL at 13:06

## 2018-09-20 RX ADMIN — MULTIPLE VITAMINS W/ MINERALS TAB 1 TABLET: TAB at 09:11

## 2018-09-20 RX ADMIN — Medication 10 ML: at 13:09

## 2018-09-20 RX ADMIN — PANTOPRAZOLE SODIUM 40 MG: 40 TABLET, DELAYED RELEASE ORAL at 16:20

## 2018-09-20 RX ADMIN — OXYCODONE HYDROCHLORIDE 10 MG: 5 TABLET ORAL at 16:20

## 2018-09-20 RX ADMIN — OXYCODONE HYDROCHLORIDE 10 MG: 5 TABLET ORAL at 05:43

## 2018-09-20 RX ADMIN — INFLUENZA VIRUS VACCINE 0.5 ML: 15; 15; 15; 15 SUSPENSION INTRAMUSCULAR at 14:34

## 2018-09-20 RX ADMIN — AMLODIPINE BESYLATE 2.5 MG: 5 TABLET ORAL at 09:12

## 2018-09-20 RX ADMIN — CETIRIZINE HYDROCHLORIDE 10 MG: 10 TABLET, FILM COATED ORAL at 09:12

## 2018-09-20 RX ADMIN — OXYCODONE HYDROCHLORIDE 10 MG: 5 TABLET ORAL at 09:11

## 2018-09-20 RX ADMIN — PREGABALIN 225 MG: 75 CAPSULE ORAL at 09:12

## 2018-09-20 RX ADMIN — CLONIDINE HYDROCHLORIDE 0.2 MG: 0.2 TABLET ORAL at 09:11

## 2018-09-20 RX ADMIN — Medication 10 ML: at 05:44

## 2018-09-20 RX ADMIN — CALCIUM CARBONATE-VITAMIN D TAB 500 MG-200 UNIT 1 TABLET: 500-200 TAB at 09:12

## 2018-09-20 NOTE — PROGRESS NOTES
Care Management Interventions PCP Verified by CM: Yes (Dr. Luz Maria Sood) Palliative Care Criteria Met (RRAT>21 & CHF Dx)?: No 
Mode of Transport at Discharge: Self Transition of Care Consult (CM Consult): Home Health [address]: No 
Reason Outside [place]: Physician referred to specific agency, Patient already serviced by other home care/hospice agency (AT [facility]) MyChart Signup: No 
Discharge Durable Medical Equipment: No 
Health Maintenance Reviewed: Yes Physical Therapy Consult: Yes Occupational Therapy Consult: Yes Speech Therapy Consult: No 
Current Support Network: Own Home Confirm Follow Up Transport: Family Plan discussed with Pt/Family/Caregiver: Yes Freedom of Choice Offered: Yes [employer] Information Provided?: No 
Discharge Location Discharge Placement: Home with home health Reason for Admission:   Left Humerus Fracture ORIF 
                
RRAT Score:    8 Plan for utilizing home health:     Yes, AT Home Care Likelihood of Readmission:  low Transition of Care Plan:      [address] accepted the case.  REMYT

## 2018-09-20 NOTE — PROGRESS NOTES
Problem: Mobility Impaired (Adult and Pediatric) Goal: *Acute Goals and Plan of Care (Insert Text) Physical Therapy Goals Initiated 9/18/2018 1. Patient will move from supine to sit and sit to supine , scoot up and down and roll side to side in bed with independence within 7 day(s). 2.  Patient will transfer from bed to chair and chair to bed with modified independence using the least restrictive device within 7 day(s). 3.  Patient will perform sit to stand with modified independence within 7 day(s). 4.  Patient will ambulate with modified independence for 200 feet with the least restrictive device within 7 day(s). physical Therapy TREATMENT Patient: Zan Soler (68 y.o. female) Date: 9/20/2018 Diagnosis: LEFT HUMERUS FRACTURE CLOSED Left supracondylar humerus fracture, closed, initial encounter <principal problem not specified> Procedure(s) (LRB): OPEN REDUCTION INTERNAL FIXATION LEFT DISTAL HUMERUS (CHOICE) (Left) 3 Days Post-Op Precautions:  NWB LUE Chart, physical therapy assessment, plan of care and goals were reviewed. ASSESSMENT: 
Pt received seated in chair with sling on LUE. Pt reported less pain since removal of splint and sling applied. Pt completed sit<>stand with SPC with standby assist; verbal cues provided to reach back for arm rest of chair when returning to sit for safety. Pt ambulated with SPC and CGA-standby assist demonstrating decreased connie, wide BARI, guarded posture with decreased trunk rotation. No evidence of LOB. Pt will continue to benefit from PT to progress mobility and reach highest level of independence. Recommend HHPT/OT upon discharge to continue therapy efforts. Progression toward goals: 
[x]    Improving appropriately and progressing toward goals 
[]    Improving slowly and progressing toward goals 
[]    Not making progress toward goals and plan of care will be adjusted PLAN: 
 Patient continues to benefit from skilled intervention to address the above impairments. Continue treatment per established plan of care. Discharge Recommendations:  Home Health PT/OT Further Equipment Recommendations for Discharge:  none SUBJECTIVE:  
Patient stated I won't be walking very far.  OBJECTIVE DATA SUMMARY:  
Critical Behavior: 
Neurologic State: Appropriate for age Orientation Level: Oriented X4 Cognition: Appropriate for age attention/concentration Safety/Judgement: Fall prevention Functional Mobility Training: 
Bed Mobility: 
  
Supine to Sit:  (NT--pt received and returned seated in chair) Transfers: 
Sit to Stand: Stand-by assistance Stand to Sit: Stand-by assistance Balance: 
Sitting: Intact Standing: Intact; With support Ambulation/Gait Training: 
Distance (ft): 40 Feet (ft) Assistive Device: Gait belt;Cane, straight (sling to L UE) Ambulation - Level of Assistance: Contact guard assistance;Stand-by assistance Gait Abnormalities: Decreased step clearance Base of Support: Widened Speed/Patricia: Slow Step Length: Right shortened;Left shortened Pain: 
Pain Scale 1: Numeric (0 - 10) Pain Intensity 1: 4 Pain Location 1: Arm Pain Orientation 1: Left Pain Description 1: Aching Pain Intervention(s) 1: Medication (see MAR); Elevation; Ice Activity Tolerance:  
Good. VSS Please refer to the flowsheet for vital signs taken during this treatment. After treatment:  
[x]    Patient left in no apparent distress sitting up in chair 
[]    Patient left in no apparent distress in bed 
[x]    Call bell left within reach [x]    Nursing notified 
[x]    Caregiver present 
[]    Bed alarm activated COMMUNICATION/COLLABORATION:  
The patients plan of care was discussed with: Occupational Therapist and Registered Nurse Shirin Ulrich PT, DPT Time Calculation: 10 mins

## 2018-09-20 NOTE — PROGRESS NOTES
Occupational Therapy Note 1218 Chart reviewed. Patient received supine in bed. Spoke with RN and Dr Robert Guerrero planning to take down extension cast and put patient in sling sometime today. Will f/u after cast change as patient will hopefully have increased ease with ADL participation in mobility. Thank you.  
 
Stefan Mir, OT

## 2018-09-20 NOTE — PROGRESS NOTES
3975 
I have reviewed discharge instructions with the patient. The patient verbalized understanding. Patient discharged home with family.

## 2018-09-20 NOTE — ROUTINE PROCESS
Bedside shift change report given to Mindi (oncoming nurse) by China Ramirez (offgoing nurse). Report included the following information SBAR.

## 2018-09-20 NOTE — PROGRESS NOTES
POD 3 Days Post-Op Procedure:  Procedure(s): OPEN REDUCTION INTERNAL FIXATION LEFT DISTAL HUMERUS (CHOICE) Subjective:  
 
Patient is seen today with the following complaints: Pain persists. Nausea. Unstable on her feet with walking. Objective:  
 
Vitals:  Blood pressure 160/75, pulse 93, temperature 98.8 °F (37.1 °C), resp. rate 16, height 5' 3\" (1.6 m), weight 82.6 kg (182 lb), SpO2 98 %, not currently breastfeeding. Tmax:  Temp (24hrs), Av.7 °F (37.1 °C), Min:98.1 °F (36.7 °C), Max:99.2 °F (37.3 °C) Physical Exam:  Examination of LUE shows swelling and bruising as expected. M/r/u nerves intact to motor and sensory function. Decreased ROM of the fingers, wrist, forearm and elbow. Splint removed. Incision site c/d/i Labs: No results found for this or any previous visit (from the past 24 hour(s)). Assessment:  
 
Active Problems: 
  Left supracondylar humerus fracture, closed, initial encounter (2018) Plan/Recommendations/Medical Decision Making:  
 
Daily dsg changes D/c home today if PT clears F/u with me 1 week from Monday (2 weeks post-op) HHPT Sling for comfort (pt may not be able to flex enough but needs sling to go home with) Eun Chatman MD

## 2018-09-20 NOTE — PROGRESS NOTES
Problem: Self Care Deficits Care Plan (Adult) Goal: *Acute Goals and Plan of Care (Insert Text) Occupational Therapy Goals Initiated 9/19/2018 1. Patient will perform L shoulder exercises per physician order with supervision/set-up within 7 days. 2.  Patient will perform upper body ADLs with minimal assistance within 7 days. 3. Patient will perform bathing with minimal assistance/contact guard assist within 7 days. 4.  Patient will perform toilet transfers with modified independence using  SPC within 7 days. 5.  Patient will verbalize/demonstrate shoulder precautions during ADLs with 100% accuracy within 7 days. Occupational Therapy TREATMENT Patient: Carmen Mary (68 y.o. female) Date: 9/20/2018 Diagnosis: LEFT HUMERUS FRACTURE CLOSED Left supracondylar humerus fracture, closed, initial encounter <principal problem not specified> Procedure(s) (LRB): OPEN REDUCTION INTERNAL FIXATION LEFT DISTAL HUMERUS (CHOICE) (Left) 3 Days Post-Op Precautions:   
Chart, occupational therapy assessment, plan of care, and goals were reviewed. ASSESSMENT: 
Patient received seated in chair with RN present, agreeable to ambulation and participation in ADLs/upper extremity exercises. Mobilizing better this date with [facility] and sling with elbow in flexion, SBA overall. Patient returning to chair, reporting feeling a bit \"tired\", after few minutes feelings not improving. BP 94/54 HR 65 O2 sats 94% on RA. After few minutes, BP remaining in 90s/50s. Stood and BP 80/49, returned to supine. /53 after few minutes in bed. Color improving, patient feeling better. Patient and daughter educated on all upper extremity exercises and sling management/dressing techniques, verbalizing and demonstrating good understanding. Patient will benefit from [facility] to maximize safety and independence with all ADLs, will have daughter's assist intermittently and friend staying with her for the weekend, then otherwise alone. Progression toward goals: 
[]       Improving appropriately and progressing toward goals [x]       Improving slowly and progressing toward goals 
[]       Not making progress toward goals and plan of care will be adjusted PLAN: 
Patient continues to benefit from skilled intervention to address the above impairments. Continue treatment per established plan of care. Discharge Recommendations:  Sue Shine Further Equipment Recommendations for Discharge:  None noted SUBJECTIVE:  
Patient stated I just feel tired.  OBJECTIVE DATA SUMMARY:  
Cognitive/Behavioral Status: 
Neurologic State: Alert Orientation Level: Oriented X4 Cognition: Appropriate decision making; Appropriate for age attention/concentration; Appropriate safety awareness; Follows commands Perception: Appears intact Perseveration: No perseveration noted Safety/Judgement: Awareness of environment; Insight into deficits Functional Mobility and Transfers for ADLs: 
Bed Mobility: 
Supine to Sit:  (NT--pt received and returned seated in chair) Transfers: 
Sit to Stand: Stand-by assistance Balance: 
Sitting: Intact Standing: Intact; With support ADL Intervention: 
Upper Body Dressing Assistance Pullover Shirt: Compensatory technique training; Moderate assistance (Still needs assist with threading RUE) Front Opened Shirt: Compensatory technique training Sling: Compensatory technique training; Min A Cognitive Retraining Safety/Judgement: Awareness of environment; Insight into deficits Therapeutic Exercises:  
 
EXERCISE Sets Reps Active Active Assist  
Passive Comments Elbow flexion/extension 1 10 []           []           []             
Wrist flexion/extension 1 10 []           []           [] Digit flexion/extension 1 10 []           []           [] Digit abduction/adduction 1 10 []           []           []             
 
Pain: 
Pain Scale 1: Numeric (0 - 10) Pain Intensity 1: 4 Pain Location 1: Arm Pain Orientation 1: Left Pain Description 1: Aching Pain Intervention(s) 1: Medication (see MAR); Elevation; Ice Activity Tolerance:  
Fair. Hypotensive. Please refer to the flowsheet for vital signs taken during this treatment. After treatment:  
[] Patient left in no apparent distress sitting up in chair 
[x] Patient left in no apparent distress in bed 
[x] Call bell left within reach [x] Nursing notified 
[x] Caregiver present 
[] Bed alarm activated COMMUNICATION/COLLABORATION:  
The patients plan of care was discussed with: Physical Therapist and Registered Nurse Palomo Graves OT Time Calculation: 42 mins

## 2018-09-20 NOTE — PROGRESS NOTES
Bedside shift change report given to Camron Guerra RN (oncoming nurse) by MANI Vickers RN (offgoing nurse). Report included the following information SBAR, Kardex and Recent Results.

## 2018-09-24 ENCOUNTER — TELEPHONE (OUTPATIENT)
Dept: INTERNAL MEDICINE CLINIC | Age: 78
End: 2018-09-24

## 2018-09-24 ENCOUNTER — PATIENT OUTREACH (OUTPATIENT)
Dept: INTERNAL MEDICINE CLINIC | Age: 78
End: 2018-09-24

## 2018-09-24 RX ORDER — IRBESARTAN 300 MG/1
300 TABLET ORAL DAILY
COMMUNITY
End: 2018-10-02 | Stop reason: SDUPTHER

## 2018-09-24 NOTE — PROGRESS NOTES
Hospital Discharge Follow-Up Date/Time:  2018 11:15 AM 
 
Patient was admitted (scheduled) to [facility] on 18 and discharged on 18 for left humerus fracture. The physician discharge summary was not available at the time of outreach. Patient was contacted within two business days of discharge. Top Challenges reviewed with the provider Low BP- on call physician received call of . Evening dose of clonidine and metoprolol was held and pt told to f/u with Dr. Lauren Figueroa. Patient will log BP readings. (take prior to medication) Denies lightheadedness or dizziness. Call office if SBP <110 for med guidance. Method of communication with provider :chart routing Inpatient RRAT score: 10 Was this a readmission? no  
Patient stated reason for the readmission: n/a Nurse Navigator (NN) contacted the patient by telephone to perform post hospital discharge assessment. Verified name and  with patient as identifiers. Provided introduction to self, and explanation of the Nurse Navigator role. Reviewed discharge instructions and red flags with patient who verbalized understanding. Patient given an opportunity to ask questions and does not have any further questions or concerns at this time. The patient agrees to contact the PCP office for questions related to their healthcare. NN provided contact information for future reference. Disease Specific:   N/A Summary of patient's top problems: 1. Chronic pain - contract with pain management - was told surgeon will manage her for 2 weeks and then back to pain management. Currently on oxycodone IR. 2. Labile BP - this morning 143/98 Home Health orders at discharge: PT, OT [address]: At [address] Date of initial visit: 18 Durable Medical Equipment ordered/company: n/a Durable Medical Equipment received: n/a Barriers to care? medication management Advance Care Planning: Does patient have an Advance Directive:  reviewed and current Medication(s):  
New Medications at Discharge: oxycodone Changed Medications at Discharge: n/a Discontinued Medications at Discharge: n/a Medication reconciliation was performed with patient, who verbalizes understanding of administration of home medications. There were no barriers to obtaining medications identified at this time. Referral to Pharm D needed: no  
 
Current Outpatient Prescriptions Medication Sig  
 irbesartan (AVAPRO) 300 mg tablet Take 300 mg by mouth daily.  oxyCODONE IR (ROXICODONE) 5 mg immediate release tablet Take 1 Tab by mouth every four (4) hours as needed for Pain. Max Daily Amount: 30 mg.  
 HYDROcodone-acetaminophen (NORCO)  mg tablet Take 1 Tab by mouth every six (6) hours as needed for Pain. Max Daily Amount: 4 Tabs.  polyethylene glycol (MIRALAX) 17 gram packet 1 packet as needed daily. No more than three times a week  omeprazole (PRILOSEC) 20 mg capsule TAKE ONE CAPSULE BY MOUTH TWICE A DAY  bumetanide (BUMEX) 2 mg tablet 2 mg.  LYRICA 225 mg capsule 225 mg two (2) times a day.  pravastatin (PRAVACHOL) 40 mg tablet Take 1 Tab by mouth nightly.  Walker (ULTRA-LIGHT ROLLATOR) misc Walker with seat  melatonin (MELATONIN) 5 mg cap capsule Take 10 mg by mouth nightly.  cloNIDine HCl (CATAPRES) 0.2 mg tablet Take 0.2 mg by mouth two (2) times a day.  B.infantis-B.ani-B.long-B.bifi 10-15 mg TbEC Take 1 Tab by mouth daily as needed.  cetirizine (ZYRTEC) 10 mg tablet Take 10 mg by mouth daily.  metoprolol (LOPRESSOR) 100 mg tablet Take 1 Tab by mouth two (2) times a day.  Calcium Carbonate-Vit D3-Min 600 mg calcium- 400 unit Tab Take 1 Tab by mouth daily.  docusate sodium (COLACE) 100 mg capsule Take 100 mg by mouth daily as needed.  DOMPERIDONE, BULK, Take 10 mg by mouth two (2) times a day.  aspirin 81 mg tablet Take 81 mg by mouth.  traMADol (ULTRAM) 50 mg tablet Take 50 mg by mouth every six (6) hours as needed for Pain (FOR BREAKTHROUGH PAIN).  cyclobenzaprine (FLEXERIL) 10 mg tablet 10 mg two (2) times a day.  Omega-3 Fatty Acids (FISH OIL) 300 mg cap Take 1 Tab by mouth.  multivitamin with iron tablet Take 1 Tab by mouth daily.  amLODIPine (NORVASC) 10 mg tablet TAKE ONE TABLET BY MOUTH DAILY No current facility-administered medications for this visit. There are no discontinued medications. [facility] follow up appointment(s):  
Future Appointments Date Time Provider Giuliana Ann 10/2/2018 1:00 PM Jose Samano MD [facility] [address] Non-[facility] follow up appointment(s): Dr. Sandy Cheng 10/1/18 [facility]:  n/a  
 
 
Goals None

## 2018-09-24 NOTE — TELEPHONE ENCOUNTER
On Call Note       Primary Care Doctor: Yanet Buchanan     Reason for call:  Low blood pressure      Patient was working with PT this morning and her blood pressure was noted to be low. Systolic in 978 with a diastolic pressure in th 26'O. Patient is asymptomatic. She is on quite a few blood pressure medications. She has taken her morning meds already. Management:   -hold her evening dose of metoprolol and clonidine.  -resume her regular dosing of all medications tomorrow  -follow up with Dr. Crystal Lira next week. Reviewed with patient that if symptoms are getting worse that she should call back or see a licensed provider at an urgent care or ER. Answered all questions to patient's satisfaction. Please call your PCP next business day for an office visit with any unresolved issues as face to face encounters provide better care with an exam than phone call conversations.     Signed By: Arturo Newell MD     September 24, 2018

## 2018-10-01 ENCOUNTER — TELEPHONE (OUTPATIENT)
Dept: INTERNAL MEDICINE CLINIC | Age: 78
End: 2018-10-01

## 2018-10-01 NOTE — TELEPHONE ENCOUNTER
----- Message from Sylvester Montes De Oca sent at 9/28/2018  3:30 PM EDT -----  Regarding: Dr. Letitia Arrieta / Chris Silveira with [employer] ([phone]), stated a fax for pain management medications was sent over on Sept. 7, 2018 with no response back.

## 2018-10-01 NOTE — TELEPHONE ENCOUNTER
Attempted to return call. Unable to get a live person. Unable to leave a voicemail message. Haven't seen any form.  Patient has not been getting pain medication from us acorinding to her chart

## 2018-10-02 ENCOUNTER — OFFICE VISIT (OUTPATIENT)
Dept: INTERNAL MEDICINE CLINIC | Age: 78
End: 2018-10-02

## 2018-10-02 VITALS
HEART RATE: 56 BPM | DIASTOLIC BLOOD PRESSURE: 62 MMHG | HEIGHT: 64 IN | WEIGHT: 178 LBS | OXYGEN SATURATION: 95 % | BODY MASS INDEX: 30.39 KG/M2 | RESPIRATION RATE: 18 BRPM | TEMPERATURE: 98 F | SYSTOLIC BLOOD PRESSURE: 106 MMHG

## 2018-10-02 DIAGNOSIS — F41.9 ANXIETY AND DEPRESSION: ICD-10-CM

## 2018-10-02 DIAGNOSIS — I10 BENIGN ESSENTIAL HYPERTENSION: Primary | ICD-10-CM

## 2018-10-02 DIAGNOSIS — F34.1 DYSTHYMIA: ICD-10-CM

## 2018-10-02 DIAGNOSIS — F32.A ANXIETY AND DEPRESSION: ICD-10-CM

## 2018-10-02 RX ORDER — DULOXETIN HYDROCHLORIDE 30 MG/1
30 CAPSULE, DELAYED RELEASE ORAL DAILY
Qty: 30 CAP | Refills: 2 | Status: SHIPPED | OUTPATIENT
Start: 2018-10-02 | End: 2018-10-29

## 2018-10-02 RX ORDER — IRBESARTAN 300 MG/1
150 TABLET ORAL DAILY
Qty: 30 TAB | Refills: 3
Start: 2018-10-02 | End: 2018-10-29

## 2018-10-02 NOTE — PROGRESS NOTES
Reviewed record in preparation for visit and have obtained necessary documentation. Identified pt with two pt identifiers(name and ). Health Maintenance Due Topic  DTaP/Tdap/Td series (1 - Tdap)  Shingrix Vaccine Age 50> (1 of 2)  GLAUCOMA SCREENING Q2Y  Pneumococcal 65+ Low/Medium Risk (2 of 2 - PPSV23) Chief Complaint Patient presents with  Depression  Medication Evaluation Wt Readings from Last 3 Encounters:  
10/02/18 178 lb (80.7 kg) 18 182 lb (82.6 kg) 18 182 lb 2 oz (82.6 kg) Temp Readings from Last 3 Encounters:  
18 99.6 °F (37.6 °C)  
18 98.4 °F (36.9 °C)  
18 98.1 °F (36.7 °C) BP Readings from Last 3 Encounters:  
18 139/79  
18 113/67  
18 187/79 Pulse Readings from Last 3 Encounters:  
18 65  
18 64  
18 74 Learning Assessment: 
:  
 
Learning Assessment 10/2/2018 2018 2017 2015 2014 PRIMARY LEARNER Patient Patient Patient Patient Patient HIGHEST LEVEL OF EDUCATION - PRIMARY LEARNER  SOME COLLEGE SOME COLLEGE SOME COLLEGE SOME COLLEGE SOME COLLEGE  
BARRIERS PRIMARY LEARNER NONE NONE NONE NONE NONE  
CO-LEARNER CAREGIVER - No No No No  
PRIMARY LANGUAGE ENGLISH ENGLISH ENGLISH ENGLISH ENGLISH  NEED - - - - No  
LEARNER PREFERENCE PRIMARY PICTURES PICTURES LISTENING LISTENING LISTENING  
LEARNING SPECIAL TOPICS - - - - no ANSWERED BY patient patient patient patient patient RELATIONSHIP SELF SELF SELF SELF SELF  
ASSESSMENT COMMENT - - - - none Depression Screening: 
:  
 
PHQ over the last two weeks 10/2/2018 Little interest or pleasure in doing things Not at all Feeling down, depressed, irritable, or hopeless Nearly every day Total Score PHQ 2 3 Fall Risk Assessment: 
:  
 
Fall Risk Assessment, last 12 mths 10/2/2018 Able to walk? Yes Fall in past 12 months? Yes Fall with injury?  Yes  
 Number of falls in past 12 months 1 Fall Risk Score 2 Abuse Screening: 
:  
 
Abuse Screening Questionnaire 10/2/2018 6/14/2018 5/20/2015 2/27/2014 Do you ever feel afraid of your partner? N N N N Are you in a relationship with someone who physically or mentally threatens you? N N N N Is it safe for you to go home? Cynthia Weeks Coordination of Care Questionnaire: 
:  
 
1) Have you been to an emergency room, urgent care clinic since your last visit? yes [facility] 9/13/2018 DX: Broken arm Hospitalized since your last visit? Yes [facility] 9/17/2018       
 
2) Have you seen or consulted any other health care providers outside of [address] [place] [address] since your last visit? yes   Dr. April Simpson Pain management 9/14/2018 (Include any pap smears or colon screenings in this section.) 3) Do you have an Advance Directive on file? yes 4) Are you interested in receiving information on Advance Directives? NO Patient is accompanied by self I have received verbal consent from Keli Tiwari to discuss any/all medical information while they are present in the room.

## 2018-10-02 NOTE — PATIENT INSTRUCTIONS
Duloxetine (By mouth) Duloxetine (doo-LOX-e-teen) Treats depression, anxiety, diabetic peripheral neuropathy, fibromyalgia, and chronic muscle or bone pain. This medicine is an SSNRI. Brand Name(s): Cymbalta, DermacinRx DPPRINCE Sifuentes There may be other brand names for this medicine. When This Medicine Should Not Be Used: This medicine is not right for everyone. Do not use it if you had an allergic reaction to duloxetine. How to Use This Medicine:  
Capsule, Delayed Release Capsule · Take your medicine as directed. Your dose may need to be changed several times to find what works best for you. · Delayed-release capsule: Swallow the capsule whole. Do not crush, chew, break, or open it. · This medicine should come with a Medication Guide. Ask your pharmacist for a copy if you do not have one. · Missed dose: Take a dose as soon as you remember. If it is almost time for your next dose, wait until then and take a regular dose. Do not take extra medicine to make up for a missed dose. · Store the medicine in a closed container at room temperature, away from heat, moisture, and direct light. Drugs and Foods to Avoid: Ask your doctor or pharmacist before using any other medicine, including over-the-counter medicines, vitamins, and herbal products. · Do not take duloxetine if you have used an MAO inhibitor (MAOI) within the past 14 days. Do not start taking an MAO inhibitor within 5 days of stopping duloxetine. · Some medicines can affect how duloxetine works. Tell your doctor if you are using any of the following: 
¨ Buspirone, cimetidine, ciprofloxacin, enoxacin, fentanyl, lithium, Elisa's wort, theophylline, tramadol, tryptophan, or warfarin ¨ Amphetamines ¨ Blood pressure medicine ¨ Diuretic (water pill) ¨ Medicine for heart rhythm problems (including flecainide, propafenone, quinidine) ¨ Medicine to treat migraine headaches (including triptans) ¨ NSAID pain or arthritis medicine (including aspirin, celecoxib, diclofenac, ibuprofen, naproxen) ¨ Other medicine to treat depression or mood disorders (including amitriptyline, desipramine, fluoxetine, imipramine, nortriptyline, paroxetine) ¨ Phenothiazine medicine (including thioridazine) · Tell your doctor if you use anything else that makes you sleepy. Some examples are allergy medicine, narcotic pain medicine, and alcohol. · Do not drink alcohol while you are using this medicine. Warnings While Using This Medicine: · Tell your doctor if you are pregnant or breastfeeding, or if you have kidney disease, liver disease, diabetes, digestion problems, glaucoma, heart disease, high or low blood pressure, or problems with urination. Tell your doctor if you smoke or you have a history of seizures, or drug or alcohol addiction. · This medicine may cause the following problems:  
¨ Serious liver problems ¨ Serotonin syndrome (more likely when used with certain other medicines) ¨ Increased risk of bleeding problems ¨ Serious skin reactions ¨ Low sodium levels in the blood · This medicine can increase thoughts of suicide. Tell your doctor right away if you start to feel depressed and have thoughts about hurting yourself. · This medicine can cause changes in your blood pressure. This may make you dizzy or drowsy. Do not drive or do anything that could be dangerous until you know how this medicine affects you. Stand up slowly to avoid falls. · Do not stop using this medicine suddenly. Your doctor will need to slowly decrease your dose before you stop it completely. · Your doctor will check your progress and the effects of this medicine at regular visits. Keep all appointments. · Keep all medicine out of the reach of children. Never share your medicine with anyone. Possible Side Effects While Using This Medicine:  
Call your doctor right away if you notice any of these side effects: · Allergic reaction: Itching or hives, swelling in your face or hands, swelling or tingling in your mouth or throat, chest tightness, trouble breathing · Anxiety, restlessness, fever, fast heartbeat, sweating, muscle spasms, diarrhea, seeing or hearing things that are not there · Blistering, peeling, red skin rash · Confusion, weakness, muscle twitching · Dark urine or pale stools, nausea, vomiting, loss of appetite, stomach pain, yellow skin or eyes · Decrease in how much or how often you urinate · Eye pain, vision changes, seeing halos around lights · Feeling more energetic than usual 
· Lightheadedness, dizziness, or fainting · Unusual moods or behaviors, worsening depression, thoughts about hurting yourself, trouble sleeping · Unusual bleeding or bruising If you notice these less serious side effects, talk with your doctor: · Decrease in appetite or weight · Dry mouth, constipation, mild nausea · Unusual drowsiness, sleepiness, or tiredness If you notice other side effects that you think are caused by this medicine, tell your doctor. Call your doctor for medical advice about side effects. You may report side effects to FDA at 8-231-FDA-1871 © 2017 [facility] Information is for End User's use only and may not be sold, redistributed or otherwise used for commercial purposes. The above information is an  only. It is not intended as medical advice for individual conditions or treatments. Talk to your doctor, nurse or pharmacist before following any medical regimen to see if it is safe and effective for you.

## 2018-10-02 NOTE — PROGRESS NOTES
Depression; Medication Evaluation; and Hospital Follow Up [facility] 9/17/2018 Deidre left arm ) HPI: 
Candelaria Wong is a 70y.o. year old female who is here for a follow up visit. She was last seen by me on 6/14/2018. She reports the following: Was with grandson. Got up and stubbed her big toe, felt down and grabbed grocery cart. Fractured bottom of humerus. No dizziness when getting up. Feels fatigued. Hydrocodone- chronic use. Doing PT \"it has been fun\" moodwise. Dependant on all this people Going on cruise. Assessment and Plan 1. Benign essential hypertension Reduce irbersartan to 150 mg. She may be fatigued by low BP as she is normally much higher. 2. Anxiety and depression Start cymbalta 30 mg. Chronic pain from arm and nek 3. Dysthymia The risks and benefits of the new medication were discussed as well as possible side effects. Patient is instructed to call if any new symptoms arise that are listed in the AVS printed or available on [employer] or discussed:  Dizziness, loose stools - DULoxetine (CYMBALTA) 30 mg capsule; Take 1 Cap by mouth daily. Dispense: 30 Cap; Refill: 2 BP Readings from Last 3 Encounters:  
10/02/18 106/62  
09/20/18 139/79  
09/13/18 113/67 Pulse Readings from Last 3 Encounters:  
10/02/18 (!) 56  
09/20/18 65  
09/13/18 64 Visit Vitals  /62 (BP 1 Location: Left arm, BP Patient Position: Sitting)  Pulse (!) 56  Temp 98 °F (36.7 °C) (Oral)  Resp 18  Ht 5' 3.5\" (1.613 m)  Wt 178 lb (80.7 kg)  SpO2 95%  BMI 31.04 kg/m2 Historical Data Past Medical History:  
Diagnosis Date  Autoimmune disease ([facility] [address] FIBROMYALGIA  Beta-blocker therapy  CAD (coronary artery disease) 1999 MI >> stent x3, cath 2010: OK  Chronic pain   
 back pain  Delayed gastric emptying  Depression  Depression with anxiety  Dyspepsia and other specified disorders of function of stomach  GERD (gastroesophageal reflux disease)  Hypercholesterolemia  Hypertension  JORDAN on CPAP   
 DOES NOT USE CPAP Past Surgical History:  
Procedure Laterality Date [place] STENTS  
 HX CATARACT REMOVAL    
 HX CORONARY STENT PLACEMENT    
 HX DILATION AND CURETTAGE    
 HX HEENT    
 ORAL SX  
 HX LUMBAR LAMINECTOMY  12/2012 Dr. Nichole Craig  HX ORTHOPAEDIC  2012  
 spinal fusion  HX OTHER SURGICAL BIRTH DONOVAN EXCISED  HX OTHER SURGICAL    
 RECTAL SX TWICE; ABCESS; FISTULA  HX SMALL BOWEL RESECTION  10/31/14 Obstruction, Dr. Aminata Fischer, 25 in removed  HX TONSILLECTOMY Outpatient Encounter Prescriptions as of 10/2/2018 Medication Sig Dispense Refill  irbesartan (AVAPRO) 300 mg tablet Take 300 mg by mouth daily.  traMADol (ULTRAM) 50 mg tablet Take 50 mg by mouth every six (6) hours as needed for Pain (FOR BREAKTHROUGH PAIN).  HYDROcodone-acetaminophen (NORCO)  mg tablet Take 1 Tab by mouth every six (6) hours as needed for Pain. Max Daily Amount: 4 Tabs. 10 Tab 0  
 polyethylene glycol (MIRALAX) 17 gram packet 1 packet as needed daily. No more than three times a week 24 Packet 3  
 omeprazole (PRILOSEC) 20 mg capsule TAKE ONE CAPSULE BY MOUTH TWICE A  Cap 1  
 bumetanide (BUMEX) 2 mg tablet 2 mg.  cyclobenzaprine (FLEXERIL) 10 mg tablet 10 mg two (2) times a day.  LYRICA 225 mg capsule 225 mg two (2) times a day.  Omega-3 Fatty Acids (FISH OIL) 300 mg cap Take 1 Tab by mouth.  pravastatin (PRAVACHOL) 40 mg tablet Take 1 Tab by mouth nightly. 90 Tab 3  
 Walker (ULTRA-LIGHT ROLLATOR) misc Walker with seat 1 Each 0  
 melatonin (MELATONIN) 5 mg cap capsule Take 10 mg by mouth nightly.  cloNIDine HCl (CATAPRES) 0.2 mg tablet Take 0.2 mg by mouth two (2) times a day.  multivitamin with iron tablet Take 1 Tab by mouth daily.  B.infantis-B.ani-B.long-B.bifi 10-15 mg TbEC Take 1 Tab by mouth daily as needed.  cetirizine (ZYRTEC) 10 mg tablet Take 10 mg by mouth daily.  metoprolol (LOPRESSOR) 100 mg tablet Take 1 Tab by mouth two (2) times a day. 180 Tab 1  
 Calcium Carbonate-Vit D3-Min 600 mg calcium- 400 unit Tab Take 1 Tab by mouth daily.  docusate sodium (COLACE) 100 mg capsule Take 100 mg by mouth daily as needed.  DOMPERIDONE, BULK, Take 10 mg by mouth two (2) times a day.  aspirin 81 mg tablet Take 81 mg by mouth.  oxyCODONE IR (ROXICODONE) 5 mg immediate release tablet Take 1 Tab by mouth every four (4) hours as needed for Pain. Max Daily Amount: 30 mg. 40 Tab 0  
 amLODIPine (NORVASC) 10 mg tablet TAKE ONE TABLET BY MOUTH DAILY 90 Tab 3 No facility-administered encounter medications on file as of 10/2/2018. No Known Allergies Social History Social History  Marital status:  Spouse name: N/A  
 Number of children: N/A  
 Years of education: N/A Occupational History  Not on file. Social History Main Topics  Smoking status: Current Every Day Smoker Packs/day: 1.00 Types: Cigarettes Last attempt to quit: 1/1/2006  Smokeless tobacco: Never Used  Alcohol use 1.8 oz/week 3 Standard drinks or equivalent per week Comment: OCCASIONALLY  Drug use: No  
 Sexual activity: Not Currently Other Topics Concern  Not on file Social History Narrative Julia lives independently. One daughter is nurse at [employer], one daughter  at [employer] [place].   
  
 
family history includes Depression in her maternal aunt, maternal grandfather, maternal grandmother, and maternal uncle; Heart Disease in her brother, father, and mother; Hypertension in her brother and mother; Stroke in her father. There is no history of Anesth Problems. Review of Systems Constitutional: Negative for weight loss. Eyes: Negative for blurred vision. Respiratory: Negative for shortness of breath. Cardiovascular: Negative for chest pain. Gastrointestinal: Negative for abdominal pain. Genitourinary: Negative for dysuria and frequency. Skin: Negative for rash. Neurological: Negative for dizziness, focal weakness, weakness and headaches. Endo/Heme/Allergies: Negative for environmental allergies. Does not bruise/bleed easily. Physical Exam  
Constitutional: She appears well-developed and well-nourished. She is active. Non-toxic appearance. She does not have a sickly appearance. She does not appear ill. No distress. Left arm cast.  Hands good pulses Eyes: Conjunctivae are normal. Right eye exhibits no discharge. Cardiovascular: Normal rate, regular rhythm, S1 normal, S2 normal, normal heart sounds and normal pulses. Exam reveals no gallop and no friction rub. Pulmonary/Chest: Effort normal and breath sounds normal. No respiratory distress. Abdominal: Soft. Bowel sounds are normal.  
Musculoskeletal: She exhibits no edema or deformity. Neurological: She is alert. Skin: Skin is warm and dry. No rash noted. No pallor. Psychiatric: She has a normal mood and affect. Her behavior is normal.  
Vitals reviewed. Ortho Exam 
 
 
No orders of the defined types were placed in this encounter. I have reviewed the patient's medical history in detail and updated the computerized patient record. We had a prolonged discussion about these complex clinical issues and went over the various important aspects to consider. All questions were answered. Advised her to call back or return to office if symptoms do not improve, change in nature, or persist. 
 
She was given an after visit summary or informed of [employer] Access which includes patient instructions, diagnoses, current medications, & vitals. She expressed understanding with the diagnosis and plan.

## 2018-10-02 NOTE — MR AVS SNAPSHOT
[address], [address] [place] [address] 
[phone] Patient: An Byrd MRN: [id number] FDI:[birth year] Visit Information Date & Time Provider Department Dept. Phone Encounter #  
 10/2/2018  1:00 PM MD Horace Larose [facility] [phone] Upcoming Health Maintenance Date Due DTaP/Tdap/Td series (1 - Tdap) 5/15/1961 Shingrix Vaccine Age 50> (1 of 2) 5/15/1992 GLAUCOMA SCREENING Q2Y 5/15/2007 Pneumococcal 65+ Low/Medium Risk (2 of 2 - PPSV23) 9/20/2018 MEDICARE YEARLY EXAM 12/14/2018 COLONOSCOPY 1/12/2026 Allergies as of 10/2/2018  Review Complete On: 10/2/2018 By: Ashley Covarrubias LPN No Known Allergies Current Immunizations  Reviewed on 12/13/2016 Name Date Influenza High Dose Vaccine PF 9/20/2017, 12/1/2016 Influenza Vaccine (Quad) PF 9/20/2018  2:34 PM  
 Influenza Vaccine PF 10/17/2014, 11/12/2013 12:18 PM  
 Influenza Vaccine Split 12/5/2012 Influenza Vaccine Whole 1/1/2010 Pneumococcal Conjugate (PCV-13) 9/20/2017, 3/3/2015 Not reviewed this visit You Were Diagnosed With   
  
 Codes Comments Benign essential hypertension    -  Primary ICD-10-CM: I10 
ICD-9-CM: 401.1 Anxiety and depression     ICD-10-CM: F41.9, F32.9 ICD-9-CM: 300.00, 311 Dysthymia     ICD-10-CM: F34.1 ICD-9-CM: 300.4 Vitals BP Pulse Temp Resp Height(growth percentile) Weight(growth percentile) 106/62 (BP 1 Location: Left arm, BP Patient Position: Sitting) (!) 56 98 °F (36.7 °C) (Oral) 18 5' 3.5\" (1.613 m) 178 lb (80.7 kg) SpO2 BMI OB Status Smoking Status 95% 31.04 kg/m2 Postmenopausal Current Every Day Smoker Vitals History BMI and BSA Data Body Mass Index Body Surface Area 31.04 kg/m 2 1.9 m 2 Preferred Pharmacy Pharmacy Name Phone [facility] [address] [place] [address] Dr. Umana [facility] [phone] Your Updated Medication List  
  
   
This list is accurate as of 10/2/18  1:32 PM.  Always use your most recent med list.  
  
  
  
  
 aspirin 81 mg tablet Take 81 mg by mouth. B.infantis-B.ani-B.long-B.bifi 10-15 mg Tbec Take 1 Tab by mouth daily as needed. bumetanide 2 mg tablet Commonly known as:  BUMEX  
2 mg. Calcium Carbonate-Vit D3-Min 600 mg calcium- 400 unit Tab Take 1 Tab by mouth daily. cloNIDine HCl 0.2 mg tablet Commonly known as:  CATAPRES Take 0.2 mg by mouth two (2) times a day. COLACE 100 mg capsule Generic drug:  docusate sodium Take 100 mg by mouth daily as needed. cyclobenzaprine 10 mg tablet Commonly known as:  FLEXERIL  
10 mg two (2) times a day. DOMPERIDONE (BULK) Take 10 mg by mouth two (2) times a day. DULoxetine 30 mg capsule Commonly known as:  CYMBALTA Take 1 Cap by mouth daily. FISH  mg Cap Generic drug:  Omega-3 Fatty Acids Take 1 Tab by mouth. HYDROcodone-acetaminophen  mg tablet Commonly known as:  Vince Eliz Take 1 Tab by mouth every six (6) hours as needed for Pain. Max Daily Amount: 4 Tabs. irbesartan 300 mg tablet Commonly known as:  AVAPRO Take 1 Tab by mouth daily. LYRICA 225 mg capsule Generic drug:  pregabalin 225 mg two (2) times a day. melatonin 5 mg Cap capsule Take 10 mg by mouth nightly. metoprolol tartrate 100 mg IR tablet Commonly known as:  LOPRESSOR Take 1 Tab by mouth two (2) times a day. multivitamin with iron tablet Take 1 Tab by mouth daily. omeprazole 20 mg capsule Commonly known as:  PRILOSEC  
TAKE ONE CAPSULE BY MOUTH TWICE A DAY  
  
 polyethylene glycol 17 gram packet Commonly known as:  Srikanth Kobus 1 packet as needed daily. No more than three times a week  
  
 pravastatin 40 mg tablet Commonly known as:  PRAVACHOL  
 Take 1 Tab by mouth nightly. traMADol 50 mg tablet Commonly known as:  ULTRAM  
Take 50 mg by mouth every six (6) hours as needed for Pain (FOR BREAKTHROUGH PAIN). Vianney Martin Commonly known as:  ULTRA-LIGHT ROLLATOR Rubén Boor with seat ZyrTEC 10 mg tablet Generic drug:  cetirizine Take 10 mg by mouth daily. Prescriptions Sent to Pharmacy Refills DULoxetine (CYMBALTA) 30 mg capsule 2 Sig: Take 1 Cap by mouth daily. Class: Normal  
 Pharmacy: [place] [address] [address] [facility] [address] Umana [facility] Ph #: [phone] Route: Oral  
  
Patient Instructions Duloxetine (By mouth) Duloxetine (doo-LOX-e-teen) Treats depression, anxiety, diabetic peripheral neuropathy, fibromyalgia, and chronic muscle or bone pain. This medicine is an SSNRI. Brand Name(s): Cymbalta, DermacinRx DPN Alcus Nyhan There may be other brand names for this medicine. When This Medicine Should Not Be Used: This medicine is not right for everyone. Do not use it if you had an allergic reaction to duloxetine. How to Use This Medicine:  
Capsule, Delayed Release Capsule · Take your medicine as directed. Your dose may need to be changed several times to find what works best for you. · Delayed-release capsule: Swallow the capsule whole. Do not crush, chew, break, or open it. · This medicine should come with a Medication Guide. Ask your pharmacist for a copy if you do not have one. · Missed dose: Take a dose as soon as you remember. If it is almost time for your next dose, wait until then and take a regular dose. Do not take extra medicine to make up for a missed dose. · Store the medicine in a closed container at room temperature, away from heat, moisture, and direct light. Drugs and Foods to Avoid: Ask your doctor or pharmacist before using any other medicine, including over-the-counter medicines, vitamins, and herbal products. · Do not take duloxetine if you have used an MAO inhibitor (MAOI) within the past 14 days. Do not start taking an MAO inhibitor within 5 days of stopping duloxetine. · Some medicines can affect how duloxetine works. Tell your doctor if you are using any of the following: 
¨ Buspirone, cimetidine, ciprofloxacin, enoxacin, fentanyl, lithium, Elisa's wort, theophylline, tramadol, tryptophan, or warfarin ¨ Amphetamines ¨ Blood pressure medicine ¨ Diuretic (water pill) ¨ Medicine for heart rhythm problems (including flecainide, propafenone, quinidine) ¨ Medicine to treat migraine headaches (including triptans) ¨ NSAID pain or arthritis medicine (including aspirin, celecoxib, diclofenac, ibuprofen, naproxen) ¨ Other medicine to treat depression or mood disorders (including amitriptyline, desipramine, fluoxetine, imipramine, nortriptyline, paroxetine) ¨ Phenothiazine medicine (including thioridazine) · Tell your doctor if you use anything else that makes you sleepy. Some examples are allergy medicine, narcotic pain medicine, and alcohol. · Do not drink alcohol while you are using this medicine. Warnings While Using This Medicine: · Tell your doctor if you are pregnant or breastfeeding, or if you have kidney disease, liver disease, diabetes, digestion problems, glaucoma, heart disease, high or low blood pressure, or problems with urination. Tell your doctor if you smoke or you have a history of seizures, or drug or alcohol addiction. · This medicine may cause the following problems:  
¨ Serious liver problems ¨ Serotonin syndrome (more likely when used with certain other medicines) ¨ Increased risk of bleeding problems ¨ Serious skin reactions ¨ Low sodium levels in the blood · This medicine can increase thoughts of suicide. Tell your doctor right away if you start to feel depressed and have thoughts about hurting yourself. · This medicine can cause changes in your blood pressure.  This may make you dizzy or drowsy. Do not drive or do anything that could be dangerous until you know how this medicine affects you. Stand up slowly to avoid falls. · Do not stop using this medicine suddenly. Your doctor will need to slowly decrease your dose before you stop it completely. · Your doctor will check your progress and the effects of this medicine at regular visits. Keep all appointments. · Keep all medicine out of the reach of children. Never share your medicine with anyone. Possible Side Effects While Using This Medicine:  
Call your doctor right away if you notice any of these side effects: · Allergic reaction: Itching or hives, swelling in your face or hands, swelling or tingling in your mouth or throat, chest tightness, trouble breathing · Anxiety, restlessness, fever, fast heartbeat, sweating, muscle spasms, diarrhea, seeing or hearing things that are not there · Blistering, peeling, red skin rash · Confusion, weakness, muscle twitching · Dark urine or pale stools, nausea, vomiting, loss of appetite, stomach pain, yellow skin or eyes · Decrease in how much or how often you urinate · Eye pain, vision changes, seeing halos around lights · Feeling more energetic than usual 
· Lightheadedness, dizziness, or fainting · Unusual moods or behaviors, worsening depression, thoughts about hurting yourself, trouble sleeping · Unusual bleeding or bruising If you notice these less serious side effects, talk with your doctor: · Decrease in appetite or weight · Dry mouth, constipation, mild nausea · Unusual drowsiness, sleepiness, or tiredness If you notice other side effects that you think are caused by this medicine, tell your doctor. Call your doctor for medical advice about side effects. You may report side effects to FDA at 4-725-FDA-0199 © 2017 [facility] Information is for End User's use only and may not be sold, redistributed or otherwise used for commercial purposes. The above information is an  only. It is not intended as medical advice for individual conditions or treatments. Talk to your doctor, nurse or pharmacist before following any medical regimen to see if it is safe and effective for you. Introducing [facility]! Michael Barcenas introduces [employer] patient portal. Now you can access parts of your medical record, email your doctor's office, and request medication refills online. 1. In your internet browser, go to [email or web address] 2. Click on the First Time User? Click Here link in the Sign In box. You will see the New Member Sign Up page. 3. Enter your [employer] Access Code exactly as it appears below. You will not need to use this code after youve completed the sign-up process. If you do not sign up before the expiration date, you must request a new code. · [employer] Access Code: [email or web address] Expires: 12/10/2018  8:36 PM 
 
4. Enter the last four digits of your Social Security Number (xxxx) and Date of Birth (mm/dd/yyyy) as indicated and click Submit. You will be taken to the next sign-up page. 5. Create a [employer] ID. This will be your [employer] login ID and cannot be changed, so think of one that is secure and easy to remember. 6. Create a [employer] password. You can change your password at any time. 7. Enter your Password Reset Question and Answer. This can be used at a later time if you forget your password. 8. Enter your e-mail address. You will receive e-mail notification when new information is available in [address] 9. Click Sign Up. You can now view and download portions of your medical record. 10. Click the Download Summary menu link to download a portable copy of your medical information. If you have questions, please visit the Frequently Asked Questions section of the [employer] website. Remember, [employer] is NOT to be used for urgent needs. For medical emergencies, dial 911. Now available from your iPhone and Android! Please provide this summary of care documentation to your next provider. Your primary care clinician is listed as Cristopher Smith. If you have any questions after today's visit, please call [phone].

## 2018-10-24 ENCOUNTER — PATIENT OUTREACH (OUTPATIENT)
Dept: INTERNAL MEDICINE CLINIC | Age: 78
End: 2018-10-24

## 2018-10-29 ENCOUNTER — OFFICE VISIT (OUTPATIENT)
Dept: INTERNAL MEDICINE CLINIC | Age: 78
End: 2018-10-29

## 2018-10-29 VITALS
SYSTOLIC BLOOD PRESSURE: 130 MMHG | DIASTOLIC BLOOD PRESSURE: 80 MMHG | TEMPERATURE: 98.2 F | HEIGHT: 64 IN | BODY MASS INDEX: 30.58 KG/M2 | HEART RATE: 60 BPM | RESPIRATION RATE: 18 BRPM | WEIGHT: 179.1 LBS | OXYGEN SATURATION: 96 %

## 2018-10-29 DIAGNOSIS — L03.90 CELLULITIS, UNSPECIFIED CELLULITIS SITE: ICD-10-CM

## 2018-10-29 DIAGNOSIS — T63.444A BEE STING, UNDETERMINED INTENT, INITIAL ENCOUNTER: ICD-10-CM

## 2018-10-29 DIAGNOSIS — M62.838 NECK MUSCLE SPASM: ICD-10-CM

## 2018-10-29 DIAGNOSIS — F34.1 DYSTHYMIA: Primary | ICD-10-CM

## 2018-10-29 RX ORDER — IRBESARTAN 150 MG/1
150 TABLET ORAL DAILY
Qty: 90 TAB | Refills: 3
Start: 2018-10-29 | End: 2018-11-19

## 2018-10-29 RX ORDER — CEPHALEXIN 500 MG/1
500 CAPSULE ORAL 3 TIMES DAILY
Qty: 15 CAP | Refills: 0 | Status: SHIPPED | OUTPATIENT
Start: 2018-10-29 | End: 2018-11-05

## 2018-10-29 RX ORDER — DULOXETIN HYDROCHLORIDE 60 MG/1
60 CAPSULE, DELAYED RELEASE ORAL DAILY
Qty: 30 CAP | Refills: 3 | Status: SHIPPED | OUTPATIENT
Start: 2018-10-29 | End: 2019-02-15 | Stop reason: SDUPTHER

## 2018-10-29 NOTE — PROGRESS NOTES
Reviewed record in preparation for visit and have obtained necessary documentation. Identified pt with two pt identifiers(name and ). Health Maintenance Due   Topic    DTaP/Tdap/Td series (1 - Tdap)    Shingrix Vaccine Age 49> (1 of 2)    GLAUCOMA SCREENING Q2Y     Pneumococcal 65+ Low/Medium Risk (2 of 2 - PPSV23)         Chief Complaint   Patient presents with    Hypertension     Recheck        Wt Readings from Last 3 Encounters:   10/29/18 179 lb 1.6 oz (81.2 kg)   10/02/18 178 lb (80.7 kg)   18 182 lb (82.6 kg)     Temp Readings from Last 3 Encounters:   10/02/18 98 °F (36.7 °C) (Oral)   18 99.6 °F (37.6 °C)   18 98.4 °F (36.9 °C)     BP Readings from Last 3 Encounters:   10/02/18 106/62   18 139/79   18 113/67     Pulse Readings from Last 3 Encounters:   10/02/18 (!) 56   18 65   18 64           Learning Assessment:  :     Learning Assessment 10/2/2018 2018 2017 2015 2014   PRIMARY LEARNER Patient Patient Patient Patient Patient   HIGHEST LEVEL OF EDUCATION - PRIMARY LEARNER  [address] SOME COLLEGE   BARRIERS PRIMARY LEARNER NONE NONE NONE NONE NONE   CO-LEARNER CAREGIVER - No No No No   PRIMARY LANGUAGE ENGLISH ENGLISH ENGLISH ENGLISH ENGLISH    NEED - - - - No   LEARNER PREFERENCE PRIMARY PICTURES PICTURES LISTENING LISTENING LISTENING   LEARNING SPECIAL TOPICS - - - - no   ANSWERED BY patient patient patient patient patient   RELATIONSHIP SELF SELF SELF SELF SELF   ASSESSMENT COMMENT - - - - none       Depression Screening:  :     PHQ over the last two weeks 10/2/2018   Little interest or pleasure in doing things Not at all   Feeling down, depressed, irritable, or hopeless Nearly every day   Total Score PHQ 2 3       Fall Risk Assessment:  :     Fall Risk Assessment, last 12 mths 10/2/2018   Able to walk? Yes   Fall in past 12 months? Yes   Fall with injury?  Yes   Number of falls in past 12 months 1   Fall Risk Score 2       Abuse Screening:  :     Abuse Screening Questionnaire 10/2/2018 6/14/2018 5/20/2015 2/27/2014   Do you ever feel afraid of your partner? N N N N   Are you in a relationship with someone who physically or mentally threatens you? N N N N   Is it safe for you to go home? Y Y Y Y       Coordination of Care Questionnaire:  :     1) Have you been to an emergency room, urgent care clinic since your last visit? no   Hospitalized since your last visit? no             2) Have you seen or consulted any other health care providers outside of [address] [place] [address] since your last visit? Dr. Simon Kumar  Surgeon to left arm 10/01/2018 (Include any pap smears or colon screenings in this section.)    3) Do you have an Advance Directive on file? Yes    4) Are you interested in receiving information on Advance Directives? NO      Patient is accompanied by daughter I have received verbal consent from Missy Torres to discuss any/all medical information while they are present in the room.

## 2018-10-30 ENCOUNTER — TELEPHONE (OUTPATIENT)
Dept: INTERNAL MEDICINE CLINIC | Age: 78
End: 2018-10-30

## 2018-10-30 NOTE — TELEPHONE ENCOUNTER
Patient was seen yesterday and Dr. Aime Clarke increased the dose of her cymbalta, originally she was taking 30mg twice daily, yesterday he prescribed 60mg once daily. If she is only taking the 60mg once daily it is the same dose that she was previously taking, she would like to know if the directions are correct on the new script.

## 2018-10-30 NOTE — TELEPHONE ENCOUNTER
I thought she was taking 30 mg once a day. I don't want her to go higher than 60 mg total a day.   She can take it all at one time or take 30 mg twice a day

## 2018-11-06 ENCOUNTER — HOSPITAL ENCOUNTER (OUTPATIENT)
Dept: PHYSICAL THERAPY | Age: 78
Discharge: HOME | End: 2018-11-06
Payer: MEDICARE

## 2018-11-06 PROCEDURE — G8984 CARRY CURRENT STATUS: HCPCS

## 2018-11-06 PROCEDURE — 97110 THERAPEUTIC EXERCISES: CPT

## 2018-11-06 PROCEDURE — G8985 CARRY GOAL STATUS: HCPCS

## 2018-11-06 PROCEDURE — 97161 PT EVAL LOW COMPLEX 20 MIN: CPT

## 2018-11-06 PROCEDURE — 97535 SELF CARE MNGMENT TRAINING: CPT

## 2018-11-06 NOTE — PROGRESS NOTES
[facility] Physical Therapy [id number] [address], [address] [place] Phone: [phone]  Fax: [phone] Plan of Care/Statement of Necessity for Physical Therapy Services  2-15 Patient name: Alyson Thomas  : 1940  Provider#: [id number] Referral source: Leslie Thao MD     
Medical/Treatment Diagnosis: Left arm pain [M79.602] Prior Hospitalization: see medical history Comorbidities: GERD, CAD, chronic back pain, anxiety and depression, lumbar spinal stenosis with neurogenic, gastroparesis, s/p colon resection, dextroscoliosis, bilateral leg edema, HTN, mild obstructive sleep apnea, osteopenia, persistent disorder of initiating or maintaining sleep, hypercholesteremia, chronic neck and back pain, iron deficiency anemia, chronically on opiate therapy, left supracondylar fracture- closed, toxic metabolic encephalopathy, acute renal failure, opioid abuse with intoxication delirium Prior Level of Function: chronic pain; uses rollator or SPC for ambulation Medications: Verified on Patient Summary List 
Start of Care: 2018     Onset Date: 2018 The Plan of Care and following information is based on the information from the initial evaluation. Assessment/ key information: Pt is a 70year old female who is referred to Physical Therapy by Dr. Linda Thorpe, s/p [address] [place] [address] left supracondylar humerus [facility], 2018, due to fall at home on 2018. Pt demonstrates decreased ROM in left elbow and wrist.  Weakness noted in left UE and . Patient will benefit from skilled PT services to modify and progress therapeutic interventions, address functional mobility deficits, address ROM deficits, address strength deficits, analyze and address soft tissue restrictions, analyze and cue movement patterns, analyze and modify body mechanics/ergonomics and assess and modify postural abnormalities to attain pt/PT goals. Evaluation Complexity History HIGH Complexity :3+ comorbidities / personal factors will impact the outcome/ POC ; Examination HIGH Complexity : 4+ Standardized tests and measures addressing body structure, function, activity limitation and / or participation in recreation  ;Presentation LOW Complexity : Stable, uncomplicated  ;Clinical Decision Making MEDIUM Complexity; Overall Complexity Rating: LOW Problem List: pain affecting function, decrease ROM, decrease strength, edema affecting function, decrease ADL/ functional abilitiies, decrease activity tolerance, decrease flexibility/ joint mobility and decrease transfer abilities Treatment Plan may include any combination of the following: Therapeutic exercise, Therapeutic activities, Neuromuscular re-education, Physical agent/modality, Manual therapy and Patient education Patient / Family readiness to learn indicated by: asking questions, trying to perform skills and interest 
Persons(s) to be included in education: patient (P) Barriers to Learning/Limitations: None Patient Goal (s): Be able to use left arm.  Patient Self Reported Health Status: good Rehabilitation Potential: good Short Term Goals: To be accomplished in 4 weeks: 
1) Pt will be independent with HEP. 2) Pt will demonstrate improved left wrist and elbow ROM by at least 8 degrees, in all directions. Long Term Goals: To be accomplished in 8 weeks: 
1) Pt will be able to use left arm to perform daily activities, included grooming and household chores. 2) Pt will demonstrate  strength >/= 80% of uninvolved side. 3) Pt will be able to drive car without pain. Frequency / Duration: Patient to be seen 2-3 times per week for 8 weeks. Patient/ Caregiver education and instruction: self care, activity modification and exercises 
 
[x]  Plan of care has been reviewed with PTA 
 
G-Codes (GP) Carry  Current  CL= 60-79%  Goal  CJ= 20-39% The severity rating is based on clinical judgment. Certification Period: 11/06/2018-01/06/2019 Daniel Holly PT, DPT   11/6/2018 1:57 PM 
 
________________________________________________________________________ I certify that the above Therapy Services are being furnished while the patient is under my care. I agree with the treatment plan and certify that this therapy is necessary. [de-identified] Signature:____________________  Date:____________Time: _________

## 2018-11-06 NOTE — PROGRESS NOTES
PT INITIAL EVALUATION NOTE - [facility] 2-15 Patient Name: Cristina Naylor Date:2018 : 1940 [x]  Patient  Verified Payor: [place] MED[place]ARE / Plan: Gary Reed / Product Type: Medicare / In time:2:30pm  Out time:3:30pm 
Total Treatment Time (min): 60 Total Timed Codes (min): 15 
1:1 Treatment Time ( only): 15 Visit #: 1 Treatment Area: Left arm pain [M79.602] SUBJECTIVE Pain Level (0-10 scale): 5/10 Any medication changes, allergies to medications, adverse drug reactions, diagnosis change, or new procedure performed?: [] No    [x] Yes (see summary sheet for update) Subjective:   
Pt reports having dinner with her grandson, in her apartment, on 2018. Pt got up to give a tip to the  and stubbed her toe on and fell on grocery cart (in her apartment); pt tried to catch herself on the handlebar but fell to the floor. Pt's grandson sought help; pt was taken by ambulance to [facility], where x-rays confirmed \"oblique minimally comminute angulated fracture of the distal shaft of the humerus\". Pt had surgery on 2018- ORIF for left supracondylar humerus fracture. Pt stayed in the hospital for a few days then was discharged home, where she had home health 3x/week. Pt reports having a lot of swelling in left UE and has been trying to \"milk\" it, by using a technique shown to her by the home health PT. Pt using pain medication to manage pain. Pt ambulates with SPC in right hand. Pt is right hand dominant. PLOF: chronic pain; no left arm pain or limitation Mechanism of Injury: fall on 2018 Previous Treatment/Compliance: home health PT, pain medication PMHx/Surgical Hx: GERD, CAD, chronic back pain, anxiety and depression, spinal stenosis in lumbar region with neurogenic claudication, s/p lumbar laminectomy L3-S1, fusion T7-S1, placement dorsal column stimulator December 2012, chronic gastroparesis, s/p colon resection for acute bowel obstruction, dextro scoliosis, bilateral leg edema, HTN, mild obstructive sleep apnea, osteopenia, persistent disorder of initiating or maintaining sleep, hypercholesteremia, chronic neck and back pain, iron deficiency anemia, chronically on opiate therapy- sees pain management Work Hx: retired Living Situation: lives alone in apartment Pt Goals: \"Be able to use left UE\". Barriers: chronic pain, age Motivation: high Substance use: long-term use of prescription medication FABQ Score: no captured OBJECTIVE/EXAMINATION Posture:  Guarded left UE; left elbow held in flexion Functional  and Pinch:  Measured at  setting 3: right= 48 pounds; left= 23 pounds Palpation: tenderness to palpation along incisional scar Other Observations: small scab on incision Left Elbow PROM: 
Flexion: 87 degrees Extension: -8 degrees Supination: 70 degrees Pronation:  50 degrees, pain Wrist flexion: 83 degrees Wrist extension: 52 degrees Left Elbow AROM: 
Flexion: 82 degrees Extension: -10 degrees Supination: 70 degrees, pain Pronation: 50 degrees Wrist flexion: 78 degrees Wrist extension: 50 degrees Joint Mobility Assessment: Radiohumeral: hypomobility in all directions Flexibility: tightness in left biceps, pecs, wrist flexors, wrist extensors UPPER QUARTER   MUSCLE STRENGTH 
KEY       R  L 
0 - No Contraction   Flexion  --  3/5 
1 - Trace    Extension --  3/5 
2 - Poor    Abduction --  3/5 
3 - Fair     IR  --  3/5 
4 - Good    ER  --  3/5 
5 - Normal    
 
Neurological: Reflexes / Sensations: sensation to light touch intact bilateral UEs Special Tests: NT 
 
15 min Therapeutic Exercise:  [x] See flow sheet :  
Rationale: increase ROM, increase strength and improve coordination to improve the patients ability to use left UE to perform functional activities With 
 [] TE 
 [] TA 
 [] neuro [] other: Patient Education: [x] Review HEP- pt given handout with exercises for HEP [] Progressed/Changed HEP based on:  
[] positioning   [] body mechanics   [] transfers   [] heat/ice application   
[] other:   
 
Other Objective/Functional Measures: FOTO:  Not captured Pain Level (0-10 scale) post treatment: 5/10 ASSESSMENT/Changes in Function:  
Pt is a 70year old female who is referred to Physical Therapy by Dr. Sera Ruiz, s/p [address] [facility] left supracondylar humerus [facility], 09/17/2018, due to fall at home on 09/11/2018. Pt demonstrates decreased ROM in left elbow and wrist.  Weakness noted in left UE and . Patient will benefit from skilled PT services to modify and progress therapeutic interventions, address functional mobility deficits, address ROM deficits, address strength deficits, analyze and address soft tissue restrictions, analyze and cue movement patterns, analyze and modify body mechanics/ergonomics and assess and modify postural abnormalities to attain pt/PT goals.  
 
Ronal Markham, PT, DPT   11/6/2018  1:56 PM

## 2018-11-08 ENCOUNTER — HOSPITAL ENCOUNTER (OUTPATIENT)
Dept: PHYSICAL THERAPY | Age: 78
Discharge: HOME | End: 2018-11-08
Payer: MEDICARE

## 2018-11-08 PROCEDURE — 97140 MANUAL THERAPY 1/> REGIONS: CPT

## 2018-11-08 PROCEDURE — 97110 THERAPEUTIC EXERCISES: CPT

## 2018-11-08 NOTE — PROGRESS NOTES
PT DAILY TREATMENT NOTE - [facility] 2-15 Patient Name: Yossi Padilla Date:2018 : 1940 [x]  Patient  Verified Payor: [place] MEDICARE / Plan: Gary Hooky / Product Type: Medicare / In time:2:30pm  Out time:3:35pm 
Total Treatment Time (min): 65 Total Timed Codes (min): 55 
1:1 Treatment Time ( only): 55 Visit #: 2 Treatment Area: Left arm pain [M79.602] SUBJECTIVE Pain Level (0-10 scale): 5/10 Any medication changes, allergies to medications, adverse drug reactions, diagnosis change, or new procedure performed?: [x] No    [] Yes (see summary sheet for update) Subjective functional status/changes:   [] No changes reported Pt reports compliance with HEP. Pt states that left UE feels \"about the same\". OBJECTIVE Modality rationale: decrease pain and increase tissue extensibility to improve pt's ability to use left UE. Type Additional Details  
[] Estim: []Att   []Unatt    []TENS instruct []IFC  []Premod   []NMES []Other:  []w/US   []w/ice   []w/heat Position: Location:  
[]  Traction: [] Cervical       []Lumbar 
                     [] Prone          []Supine []Intermittent   []Continuous Lbs: 
[] before manual 
[] after manual 
[]w/heat  
[]  Ultrasound: []Continuous   [] Pulsed  
                    at: []1MHz   []3MHz Location: 
W/cm2:  
[] Paraffin Location:  
[]w/heat  
[]  Ice     [x]  Heat x 10 minutes  
[]  Ice massage Position: supine with LEs supported on wedge Location: left elbow at end of session  
[]  Laser 
[]  Other: Position: Location:  
[]  Vasopneumatic Device Pressure:       [] lo [] med [] hi  
Temperature:   
[x] Skin assessment post-treatment:  [x]intact []redness- no adverse reaction 
  []redness  adverse reaction:  
 
30 min Therapeutic Exercise:  [x] See flow sheet :  
Rationale: increase ROM and increase strength to improve the patients ability to perform functional activities with increased ease 25 min Manual Therapy: STM left upper and lower arm; PROM left elbow flexion, extension, supination and pronation to pt's tolerance Rationale: decrease pain, increase ROM, increase tissue extensibility, decrease edema  and increase postural awareness to restore functional use of left UE With 
 [] TE 
 [] TA 
 [] neuro 
 [] other: Patient Education: [x] Review HEP [] Progressed/Changed HEP based on:  
[] positioning   [] body mechanics   [] transfers   [] heat/ice application   
[] other:   
 
Other Objective/Functional Measures: --  
 
Pain Level (0-10 scale) post treatment: 3/10 ASSESSMENT/Changes in Function:  
Pt reported improvement in pain after manual.  Pt tolerated progression of therapeutic exercises without increase pain. Patient will continue to benefit from skilled PT services to modify and progress therapeutic interventions, address functional mobility deficits, address ROM deficits, address strength deficits, analyze and address soft tissue restrictions, analyze and cue movement patterns, analyze and modify body mechanics/ergonomics and assess and modify postural abnormalities to attain remaining goals. []  See Plan of Care 
[]  See progress note/recertification 
[]  See Discharge Summary Progress towards goals / Updated goals: 
Short Term Goals: To be accomplished in 4 weeks: 
1) Pt will be independent with HEP. 2) Pt will demonstrate improved left wrist and elbow ROM by at least 8 degrees, in all directions. 
  
Long Term Goals: To be accomplished in 8 weeks: 
1) Pt will be able to use left arm to perform daily activities, included grooming and household chores. 2) Pt will demonstrate  strength >/= 80% of uninvolved side. 3) Pt will be able to drive car without pain. PLAN [x]  Upgrade activities as tolerated     [x]  Continue plan of care 
[]  Update interventions per flow sheet []  Discharge due to:_ 
[]  Other:_   
 
Enio Mei PT, DPT   11/8/2018  2:24 PM

## 2018-11-13 ENCOUNTER — HOSPITAL ENCOUNTER (OUTPATIENT)
Dept: PHYSICAL THERAPY | Age: 78
Discharge: HOME | End: 2018-11-13
Payer: MEDICARE

## 2018-11-13 PROCEDURE — 97140 MANUAL THERAPY 1/> REGIONS: CPT

## 2018-11-13 PROCEDURE — 97110 THERAPEUTIC EXERCISES: CPT

## 2018-11-13 NOTE — PROGRESS NOTES
PT DAILY TREATMENT NOTE - [facility] 2-15 Patient Name: Yossi Padilla Date:2018 : 1940 [x]  Patient  Verified Payor: [place] MEDICARE / Plan: Gary Reed / Product Type: Medicare / In time: 3:10pm  Out time: 4:15pm 
Total Treatment Time (min): 65 Total Timed Codes (min): 55 
1:1 Treatment Time ( only): 55 Visit #: 3 Treatment Area: Left arm pain [M79.602] SUBJECTIVE Pain Level (0-10 scale): 7/10 Any medication changes, allergies to medications, adverse drug reactions, diagnosis change, or new procedure performed?: [x] No    [] Yes (see summary sheet for update) Subjective functional status/changes:   [] No changes reported Pt complains of increased pain \"all over\" today due to OA- cold, damp weather. OBJECTIVE Modality rationale: decrease pain and increase tissue extensibility to improve pt's ability to use left UE. Type Additional Details  
[] Estim: []Att   []Unatt    []TENS instruct []IFC  []Premod   []NMES []Other:  []w/US   []w/ice   []w/heat Position: Location:  
[]  Traction: [] Cervical       []Lumbar 
                     [] Prone          []Supine []Intermittent   []Continuous Lbs: 
[] before manual 
[] after manual 
[]w/heat  
[]  Ultrasound: []Continuous   [] Pulsed  
                    at: []1MHz   []3MHz Location: 
W/cm2:  
[] Paraffin Location:  
[]w/heat  
[]  Ice     [x]  Heat x 10 minutes  
[]  Ice massage Position: right side-lying with pillow between knees Location: left elbow at end of session  
[]  Laser 
[]  Other: Position: Location:  
[]  Vasopneumatic Device Pressure:       [] lo [] med [] hi  
Temperature:   
[x] Skin assessment post-treatment:  [x]intact []redness- no adverse reaction 
  []redness  adverse reaction:  
 
30 min Therapeutic Exercise:  [x] See flow sheet :  
Rationale: increase ROM and increase strength to improve the patients ability to perform functional activities with increased ease 25 min Manual Therapy: STM left upper and lower arm; PROM left elbow flexion, extension, supination and pronation to pt's tolerance Rationale: decrease pain, increase ROM, increase tissue extensibility, decrease edema  and increase postural awareness to restore functional use of left UE With 
 [] TE 
 [] TA 
 [] neuro 
 [] other: Patient Education: [x] Review HEP [] Progressed/Changed HEP based on:  
[] positioning   [] body mechanics   [] transfers   [] heat/ice application   
[] other:   
 
Other Objective/Functional Measures: --  
 
Pain Level (0-10 scale) post treatment: 3/10 ASSESSMENT/Changes in Function:  
Pt demonstrates improved left elbow flexion A/PROM. Patient will continue to benefit from skilled PT services to modify and progress therapeutic interventions, address functional mobility deficits, address ROM deficits, address strength deficits, analyze and address soft tissue restrictions, analyze and cue movement patterns, analyze and modify body mechanics/ergonomics and assess and modify postural abnormalities to attain remaining goals. []  See Plan of Care 
[]  See progress note/recertification 
[]  See Discharge Summary Progress towards goals / Updated goals: 
Short Term Goals: To be accomplished in 4 weeks: 
1) Pt will be independent with HEP. 2) Pt will demonstrate improved left wrist and elbow ROM by at least 8 degrees, in all directions. 
  
Long Term Goals: To be accomplished in 8 weeks: 
1) Pt will be able to use left arm to perform daily activities, included grooming and household chores. 2) Pt will demonstrate  strength >/= 80% of uninvolved side. 3) Pt will be able to drive car without pain. PLAN [x]  Upgrade activities as tolerated     [x]  Continue plan of care 
[]  Update interventions per flow sheet      
[]  Discharge due to:_ 
[]  Other:_   
 
 Shay Markham, PT, DPT   11/13/2018  2:24 PM

## 2018-11-15 ENCOUNTER — HOSPITAL ENCOUNTER (OUTPATIENT)
Dept: PHYSICAL THERAPY | Age: 78
Discharge: HOME | End: 2018-11-15
Payer: MEDICARE

## 2018-11-15 PROCEDURE — 97110 THERAPEUTIC EXERCISES: CPT | Performed by: PHYSICAL THERAPIST

## 2018-11-15 PROCEDURE — 97140 MANUAL THERAPY 1/> REGIONS: CPT | Performed by: PHYSICAL THERAPIST

## 2018-11-15 NOTE — PROGRESS NOTES
PT DAILY TREATMENT NOTE - [facility] 2-15 Patient Name: Alyson Thomas Date:11/15/2018 : 1940 [x]  Patient  Verified Payor: [place] MEDICARE / Plan: Gary Hooky / Product Type: Medicare / In time:235p  Out time:400p Total Treatment Time (min): 75 Total Timed Codes (min): 65 
1:1 Treatment Time ([address] only): 60 Visit #: 4 Treatment Area: Left arm pain [M79.602] SUBJECTIVE Pain Level (0-10 scale): 8/10 Any medication changes, allergies to medications, adverse drug reactions, diagnosis change, or new procedure performed?: [x] No    [] Yes (see summary sheet for update) Subjective functional status/changes:   [] No changes reported Pt states that her arm feels better after therapy but is sore today. OBJECTIVE Modality rationale: decrease pain and increase tissue extensibility to improve pt's ability to use left UE. Type Additional Details   
[] Estim: []Att   []Unatt    []TENS instruct []IFC  []Premod   []NMES []Other:  []w/US   []w/ice   []w/heat Position: Location:   
[]  Traction: [] Cervical       []Lumbar 
                     [] Prone          []Supine []Intermittent   []Continuous Lbs: 
[] before manual 
[] after manual 
[]w/heat   
[]  Ultrasound: []Continuous   [] Pulsed  
                    at: []1MHz   []3MHz Location: 
W/cm2:   
[] Paraffin Location:  
[]w/heat   
[]  Ice     [x]  Heat x 10 minutes  
[]  Ice massage Position: right side-lying with pillow between knees Location: left elbow at end of session   
[]  Laser 
[]  Other: Position: Location:   
[]  Vasopneumatic Device Pressure:       [] lo [] med [] hi  
Temperature:    
[x] Skin assessment post-treatment:  [x]intact []redness- no adverse reaction 
  []redness  adverse reaction:  
  
30 min Therapeutic Exercise:  [x] See flow sheet :  
Rationale: increase ROM and increase strength to improve the patients ability to perform functional activities with increased ease 
  
35 min Manual Therapy: STM left upper and lower arm; PROM left elbow flexion, extension, supination and pronation to pt's tolerance Rationale: decrease pain, increase ROM, increase tissue extensibility, decrease edema  and increase postural awareness to restore functional use of left UE With 
 [] TE 
 [] TA 
 [] neuro 
 [] other: Patient Education: [x] Review HEP [] Progressed/Changed HEP based on:  
[] positioning   [] body mechanics   [] transfers   [] heat/ice application   
[] other:   
  
Other Objective/Functional Measures: --PROM Elbow Ext -3, flex 140    
  
Pain Level (0-10 scale) post treatment: 4/10 
  
ASSESSMENT/Changes in Function: Pushed end range flexion and extension within patient tolerance. Patient will continue to benefit from skilled PT services to modify and progress therapeutic interventions, address functional mobility deficits, address ROM deficits, address strength deficits, analyze and address soft tissue restrictions, analyze and cue movement patterns, analyze and modify body mechanics/ergonomics and assess and modify postural abnormalities to attain remaining goals. []  See Plan of Care 
[]  See progress note/recertification 
[]  See Discharge Summary Progress towards goals / Updated goals: 
Short Term Goals: To be accomplished in 4 weeks: 
1) Pt will be independent with HEP. 2) Pt will demonstrate improved left wrist and elbow ROM by at least 8 degrees, in all directions. 
  
Long Term Goals: To be accomplished in 8 weeks: 
1) Pt will be able to use left arm to perform daily activities, included grooming and household chores. 2) Pt will demonstrate  strength >/= 80% of uninvolved side. 3) Pt will be able to drive car without pain. 
  
PLAN [x]  Upgrade activities as tolerated     [x]  Continue plan of care []  Update interventions per flow sheet      
[]  Discharge due to:_ 
[]  Other:_   
 
 
 
 
 
 
Alecia Nair PT, DPT 11/15/2018  2:46 PM

## 2018-11-19 ENCOUNTER — OFFICE VISIT (OUTPATIENT)
Dept: INTERNAL MEDICINE CLINIC | Age: 78
End: 2018-11-19

## 2018-11-19 VITALS
TEMPERATURE: 98.5 F | SYSTOLIC BLOOD PRESSURE: 160 MMHG | HEIGHT: 64 IN | HEART RATE: 82 BPM | OXYGEN SATURATION: 98 % | RESPIRATION RATE: 17 BRPM | DIASTOLIC BLOOD PRESSURE: 84 MMHG | WEIGHT: 179.5 LBS | BODY MASS INDEX: 30.64 KG/M2

## 2018-11-19 DIAGNOSIS — M54.2 CHRONIC NECK AND BACK PAIN: Primary | ICD-10-CM

## 2018-11-19 DIAGNOSIS — M54.9 CHRONIC NECK AND BACK PAIN: Primary | ICD-10-CM

## 2018-11-19 DIAGNOSIS — G89.29 CHRONIC NECK AND BACK PAIN: Primary | ICD-10-CM

## 2018-11-19 DIAGNOSIS — I10 BENIGN ESSENTIAL HYPERTENSION: ICD-10-CM

## 2018-11-19 RX ORDER — VALSARTAN 160 MG/1
160 TABLET ORAL DAILY
Qty: 90 TAB | Refills: 4 | Status: SHIPPED | OUTPATIENT
Start: 2018-11-19

## 2018-11-19 RX ORDER — AZITHROMYCIN 250 MG/1
TABLET, FILM COATED ORAL
Qty: 6 TAB | Refills: 0 | Status: SHIPPED | OUTPATIENT
Start: 2018-11-19 | End: 2018-11-24

## 2018-11-19 NOTE — PROGRESS NOTES
Exam Room #11  Angelica Hathaway is a 70 y.o. female  Chief Complaint   Patient presents with    Acupuncture     1. Have you been to the ER, urgent care clinic since your last visit? Hospitalized since your last visit? No    2. Have you seen or consulted any other health care providers outside of the [address] [place] [address] since your last visit? Include any pap smears or colon screening.  No    Visit Vitals  /84 (BP 1 Location: Right arm, BP Patient Position: Sitting)   Pulse 82   Temp 98.5 °F (36.9 °C) (Oral)   Resp 17   Ht 5' 3.5\" (1.613 m)   Wt 179 lb 8 oz (81.4 kg)   SpO2 98%   BMI 31.30 kg/m²

## 2018-11-19 NOTE — PROGRESS NOTES
Acupuncture       HPI:  Angelica Hathaway is a 70y.o. year old female who is here for a follow up visit. She was last seen by me on 10/29/2018. She reports the following:    Taking 60 mg cymbalta once a day instead of 30 mg twice a day. Feeling less tired  Not confined to the house  Going to PT Twice a week  Arm is doing quite well  Leaving on cruise this week    Bee sting improved. Itches still at that place. No redness. Repeat /84        Assessment and Plan        1. Chronic neck and back pain  Spasm present  TIME OUT performed immediately prior to start of procedure:   I, David Montgomery MD, have performed the following reviews on Angelica Hathaway   prior to the start of the procedure:     * Patient was identified by name and date of birth   * Agreement on procedure being performed was verified   * Risks and Benefits explained to the patient   * Procedure site verified and marked as necessary   * Patient was positioned for comfort   * Consent was given by patient    Date of procedure: 11/19/2018  Procedure performed by:Ricki Negron MD   Patient assisted by: self   How tolerated by patient: tolerated the procedure well with no complications   Comments: none         Patient explained the risks and benefits of acupuncture to help with the acute problem. There can be some soreness afterwards and the effect can be immediate to slightly delayed (2-3 days). If the problem persists or gets worse, please call back or send a my chart message. If you experience shortness of breath, please let me know immediately. Patient agreed to proceed with treatment. Seirin packaged needle used No. 5 (0.25) 30 mm  QTY 4  Points palpated and inserted 5-10 mm at marked points  Patient tolerated procedure and felt very good relief. 2. Benign essential hypertension  Change back to valsartan 160 mg as it worked compared to irbesartan.   Call with readings              Visit Vitals  /84 (BP 1 Location: Right arm, BP Patient Position: Sitting)   Pulse 82   Temp 98.5 °F (36.9 °C) (Oral)   Resp 17   Ht 5' 3.5\" (1.613 m)   Wt 179 lb 8 oz (81.4 kg)   SpO2 98%   BMI 31.30 kg/m²       Historical Data    Past Medical History:   Diagnosis Date    Autoimmune disease ([facility] [address]     FIBROMYALGIA    Beta-blocker therapy     CAD (coronary artery disease) 1999    MI >> stent x3, cath 2010: [place]    Chronic pain     back pain    Delayed gastric emptying     Depression     Depression with anxiety     Dyspepsia and other specified disorders of function of stomach     GERD (gastroesophageal reflux disease)     Hypercholesterolemia     Hypertension     JORDAN on CPAP     DOES NOT USE CPAP       Past Surgical History:   Procedure Laterality Date    CARDIAC SURG PROCEDURE UNLIST  1999    STENTS    HX CATARACT REMOVAL      HX CORONARY STENT PLACEMENT      HX DILATION AND CURETTAGE      HX HEENT      ORAL SX    HX LUMBAR LAMINECTOMY  12/2012    Dr. Smitha Farley    HX ORTHOPAEDIC  2012    spinal fusion    HX OTHER SURGICAL      BIRTH DONOVAN EXCISED    HX OTHER SURGICAL      RECTAL SX TWICE; ABCESS; FISTULA    HX SMALL BOWEL RESECTION  10/31/14    Obstruction, Dr. Orin Brittle, 25 in removed    HX TONSILLECTOMY         Outpatient Encounter Medications as of 11/19/2018   Medication Sig Dispense Refill    irbesartan (AVAPRO) 150 mg tablet Take 1 Tab by mouth daily. 90 Tab 3    DULoxetine (CYMBALTA) 60 mg capsule Take 1 Cap by mouth daily. 30 Cap 3    HYDROcodone-acetaminophen (NORCO)  mg tablet Take 1 Tab by mouth every six (6) hours as needed for Pain. Max Daily Amount: 4 Tabs. 10 Tab 0    polyethylene glycol (MIRALAX) 17 gram packet 1 packet as needed daily. No more than three times a week 24 Packet 3    omeprazole (PRILOSEC) 20 mg capsule TAKE ONE CAPSULE BY MOUTH TWICE A  Cap 1    bumetanide (BUMEX) 2 mg tablet 2 mg.  cyclobenzaprine (FLEXERIL) 10 mg tablet 10 mg two (2) times a day.       LYRICA 225 mg capsule 225 mg two (2) times a day.  Omega-3 Fatty Acids (FISH OIL) 300 mg cap Take 1 Tab by mouth.  pravastatin (PRAVACHOL) 40 mg tablet Take 1 Tab by mouth nightly. 90 Tab 3    Walker (ULTRA-LIGHT ROLLATOR) misc Walker with seat 1 Each 0    melatonin 5 mg cap capsule Take 10 mg by mouth nightly.  cloNIDine HCl (CATAPRES) 0.2 mg tablet Take 0.2 mg by mouth two (2) times a day.  multivitamin with iron tablet Take 1 Tab by mouth daily.  B.infantis-B.ani-B.long-B.bifi 10-15 mg TbEC Take 1 Tab by mouth daily as needed.  cetirizine (ZYRTEC) 10 mg tablet Take 10 mg by mouth daily.  metoprolol (LOPRESSOR) 100 mg tablet Take 1 Tab by mouth two (2) times a day. 180 Tab 1    Calcium Carbonate-Vit D3-Min 600 mg calcium- 400 unit Tab Take 1 Tab by mouth daily.  docusate sodium (COLACE) 100 mg capsule Take 100 mg by mouth daily as needed.  DOMPERIDONE, BULK, Take 10 mg by mouth two (2) times a day.  aspirin 81 mg tablet Take 81 mg by mouth. No facility-administered encounter medications on file as of 2018. No Known Allergies     Social History     Socioeconomic History    Marital status:      Spouse name: Not on file    Number of children: Not on file    Years of education: Not on file    Highest education level: Not on file   Social Needs    Financial resource strain: Not on file    Food insecurity - worry: Not on file    Food insecurity - inability: Not on file    Transportation needs - medical: Not on file   [employer] needs - non-medical: Not on file   Occupational History    Not on file   Tobacco Use    Smoking status: Current Every Day Smoker     Packs/day: 1.00     Types: Cigarettes     Last attempt to quit: 2006     Years since quittin.8    Smokeless tobacco: Never Used   Substance and Sexual Activity    Alcohol use:  Yes     Alcohol/week: 1.8 oz     Types: 3 Standard drinks or equivalent per week     Comment: OCCASIONALLY  Drug use: No    Sexual activity: Not Currently   Other Topics Concern    Not on file   Social History Narrative    Julia lives independently. One daughter is nurse at [employer], one daughter  at [employer].         family history includes Depression in her maternal aunt, maternal grandfather, maternal grandmother, and maternal uncle; Heart Disease in her brother, father, and mother; Hypertension in her brother and mother; Stroke in her father. Review of Systems   Constitutional: Negative for weight loss. Eyes: Negative for blurred vision. Respiratory: Negative for shortness of breath. Cardiovascular: Negative for chest pain. Gastrointestinal: Negative for abdominal pain. Genitourinary: Negative for dysuria and frequency. Musculoskeletal: Positive for back pain and neck pain. Skin: Negative for rash. Neurological: Negative for dizziness, focal weakness, weakness and headaches. Endo/Heme/Allergies: Negative for environmental allergies. Does not bruise/bleed easily. Physical Exam   Constitutional: She appears well-developed and well-nourished. She is active. Non-toxic appearance. She does not have a sickly appearance. She does not appear ill. No distress. Eyes: Conjunctivae are normal. Right eye exhibits no discharge. Neck: Carotid bruit is not present. No thyroid mass and no thyromegaly present. Cardiovascular: Normal rate, regular rhythm, S1 normal, S2 normal, normal heart sounds and normal pulses. Exam reveals no gallop and no friction rub. Pulmonary/Chest: Effort normal and breath sounds normal. No respiratory distress. Abdominal: Soft. Bowel sounds are normal.   Musculoskeletal: She exhibits no edema or deformity. Thoracic back: She exhibits spasm. She exhibits no swelling, no edema and no deformity. Back:    Neurological: She is alert. Coordination normal.   Skin: Skin is warm and dry. No rash noted. No pallor.    Psychiatric: She has a normal mood and affect. Her behavior is normal.   Vitals reviewed. Ortho Exam      No orders of the defined types were placed in this encounter. I have reviewed the patient's medical history in detail and updated the computerized patient record. We had a prolonged discussion about these complex clinical issues and went over the various important aspects to consider. All questions were answered. Advised her to call back or return to office if symptoms do not improve, change in nature, or persist.    She was given an after visit summary or informed of [employer] [facility] which includes patient instructions, diagnoses, current medications, & vitals. She expressed understanding with the diagnosis and plan.

## 2018-11-20 ENCOUNTER — HOSPITAL ENCOUNTER (OUTPATIENT)
Dept: PHYSICAL THERAPY | Age: 78
Discharge: HOME | End: 2018-11-20
Payer: MEDICARE

## 2018-11-20 PROCEDURE — 97110 THERAPEUTIC EXERCISES: CPT

## 2018-11-20 PROCEDURE — 97140 MANUAL THERAPY 1/> REGIONS: CPT

## 2018-11-20 NOTE — PROGRESS NOTES
PT DAILY TREATMENT NOTE - [facility] 2-15 Patient Name: Noble Morin Date:2018 : 1940 [x]  Patient  Verified Payor: [place] MED[place] / Plan: Gary Reed / Product Type: Medicare / In time: 3:05pm  Out time:4:15pm 
Total Treatment Time (min): 70 Total Timed Codes (min): 60 
1:1 Treatment Time ( [address] only): 60 Visit #: 0 Treatment Area: Left arm pain [M79.602] SUBJECTIVE Pain Level (0-10 scale): 6/10 Any medication changes, allergies to medications, adverse drug reactions, diagnosis change, or new procedure performed?: [x] No    [] Yes (see summary sheet for update) Subjective functional status/changes:   [] No changes reported Pt reports being able to push up with left UE a little easier. OBJECTIVE Modality rationale: decrease pain and increase tissue extensibility to improve pt's ability to use left UE. Type Additional Details   
[] Estim: []Att   []Unatt    []TENS instruct []IFC  []Premod   []NMES []Other:  []w/US   []w/ice   []w/heat Position: Location:   
[]  Traction: [] Cervical       []Lumbar 
                     [] Prone          []Supine []Intermittent   []Continuous Lbs: 
[] before manual 
[] after manual 
[]w/heat   
[]  Ultrasound: []Continuous   [] Pulsed  
                    at: []1MHz   []3MHz Location: 
W/cm2:   
[] Paraffin Location:  
[]w/heat   
[]  Ice     [x]  Heat x 10 minutes  
[]  Ice massage Position: right side-lying with pillow between knees Location: left elbow at end of session   
[]  Laser 
[]  Other: Position: Location:   
[]  Vasopneumatic Device Pressure:       [] lo [] med [] hi  
Temperature:    
[x] Skin assessment post-treatment:  [x]intact []redness- no adverse reaction 
  []redness  adverse reaction:  
  
30 min Therapeutic Exercise:  [x] See flow sheet :  
Rationale: increase ROM and increase strength to improve the patients ability to perform functional activities with increased ease 
  
30 min Manual Therapy: STM left upper and lower arm; PROM left elbow flexion, extension, supination and pronation to pt's tolerance Rationale: decrease pain, increase ROM, increase tissue extensibility, decrease edema  and increase postural awareness to restore functional use of left UE With 
 [] TE 
 [] TA 
 [] neuro 
 [] other: Patient Education: [x] Review HEP [] Progressed/Changed HEP based on:  
[] positioning   [] body mechanics   [] transfers   [] heat/ice application   
[] other:   
  
Other Objective/Functional Measures: --- Pain Level (0-10 scale) post treatment: 4/10 
  
ASSESSMENT/Changes in Function:  
Pt reported feeling \"pop\" during manual stretching left forearm pronation/supination, followed by immediate relief and improvement in ROM. Patient will continue to benefit from skilled PT services to modify and progress therapeutic interventions, address functional mobility deficits, address ROM deficits, address strength deficits, analyze and address soft tissue restrictions, analyze and cue movement patterns, analyze and modify body mechanics/ergonomics and assess and modify postural abnormalities to attain remaining goals. []  See Plan of Care 
[]  See progress note/recertification 
[]  See Discharge Summary Progress towards goals / Updated goals: 
Short Term Goals: To be accomplished in 4 weeks: 
1) Pt will be independent with HEP. MET 
2) Pt will demonstrate improved left wrist and elbow ROM by at least 8 degrees, in all directions. 
  
Long Term Goals: To be accomplished in 8 weeks: 
1) Pt will be able to use left arm to perform daily activities, included grooming and household chores. 2) Pt will demonstrate  strength >/= 80% of uninvolved side.  
3) Pt will be able to drive car without pain. 
  
PLAN 
 [x]  Upgrade activities as tolerated     [x]  Continue plan of care 
[]  Update interventions per flow sheet      
[]  Discharge due to:_ 
[]  Other:_   
 
Mohan Saenz, PT, DPT  11/20/2018  2:46 PM

## 2018-12-04 ENCOUNTER — HOSPITAL ENCOUNTER (OUTPATIENT)
Dept: PHYSICAL THERAPY | Age: 78
Discharge: HOME | End: 2018-12-04
Payer: MEDICARE

## 2018-12-04 PROCEDURE — 97110 THERAPEUTIC EXERCISES: CPT

## 2018-12-04 PROCEDURE — 97140 MANUAL THERAPY 1/> REGIONS: CPT

## 2018-12-04 NOTE — PROGRESS NOTES
PT DAILY TREATMENT NOTE - [facility] 2-15 Patient Name: Johnny Bill Date:2018 : 1940 [x]  Patient  Verified Payor: [place] MEDICARE / Plan: Gary Reed / Product Type: Medicare / In time: 2:50pm  Out time: 4:10pm 
Total Treatment Time (min): 80 Total Timed Codes (min): 60 
1:1 Treatment Time ( only): 30 Visit #: 6 Treatment Area: Left arm pain [M79.602] SUBJECTIVE Pain Level (0-10 scale): 6/10 Any medication changes, allergies to medications, adverse drug reactions, diagnosis change, or new procedure performed?: [x] No    [] Yes (see summary sheet for update) Subjective functional status/changes:   [] No changes reported Pt just returned from a 10-day cruise. Pt admits non-compliance with HEP. OBJECTIVE Modality rationale: decrease pain and increase tissue extensibility to improve pt's ability to use left UE. Type Additional Details   
[] Estim: []Att   []Unatt    []TENS instruct []IFC  []Premod   []NMES []Other:  []w/US   []w/ice   []w/heat Position: Location:   
[]  Traction: [] Cervical       []Lumbar 
                     [] Prone          []Supine []Intermittent   []Continuous Lbs: 
[] before manual 
[] after manual 
[]w/heat   
[]  Ultrasound: []Continuous   [] Pulsed  
                    at: []1MHz   []3MHz Location: 
W/cm2:   
[] Paraffin Location:  
[]w/heat   
[]  Ice     [x]  Heat x 15 minutes  
[]  Ice massage Position: right side-lying with pillow between knees Location: left elbow at end of session   
[]  Laser 
[]  Other: Position: Location:   
[]  Vasopneumatic Device Pressure:       [] lo [] med [] hi  
Temperature:    
[x] Skin assessment post-treatment:  [x]intact []redness- no adverse reaction 
  []redness  adverse reaction:  
  
30 min Therapeutic Exercise:  [x] See flow sheet :  
Rationale: increase ROM and increase strength to improve the patients ability to perform functional activities with increased ease 
  
30 min Manual Therapy: STM left upper and lower arm; PROM left elbow flexion, extension, supination and pronation to pt's tolerance; passive stretching left wrist flexors and wrist extensors and triceps Rationale: decrease pain, increase ROM, increase tissue extensibility, decrease edema  and increase postural awareness to restore functional use of left UE With 
 [] TE 
 [] TA 
 [] neuro 
 [] other: Patient Education: [x] Review HEP [] Progressed/Changed HEP based on:  
[] positioning   [] body mechanics   [] transfers   [] heat/ice application   
[] other:   
  
Other Objective/Functional Measures: --- Pain Level (0-10 scale) post treatment: 4/10 
  
ASSESSMENT/Changes in Function:  
Pt demonstrates improved pain-free left elbow flexion and extension. Pt fatigues quickly. Patient will continue to benefit from skilled PT services to modify and progress therapeutic interventions, address functional mobility deficits, address ROM deficits, address strength deficits, analyze and address soft tissue restrictions, analyze and cue movement patterns, analyze and modify body mechanics/ergonomics and assess and modify postural abnormalities to attain remaining goals. []  See Plan of Care 
[]  See progress note/recertification 
[]  See Discharge Summary Progress towards goals / Updated goals: 
Short Term Goals: To be accomplished in 4 weeks: 
1) Pt will be independent with HEP. MET 
2) Pt will demonstrate improved left wrist and elbow ROM by at least 8 degrees, in all directions. MET 
  
Long Term Goals: To be accomplished in 8 weeks: 
1) Pt will be able to use left arm to perform daily activities, included grooming and household chores. 2) Pt will demonstrate  strength >/= 80% of uninvolved side.  
3) Pt will be able to drive car without pain. 
  
PLAN 
 [x]  Upgrade activities as tolerated     [x]  Continue plan of care 
[]  Update interventions per flow sheet      
[]  Discharge due to:_ 
[]  Other:_   
 
Leopoldo Juárez, PT, DPT  12/4/2018  2:46 PM

## 2018-12-06 ENCOUNTER — HOSPITAL ENCOUNTER (OUTPATIENT)
Dept: PHYSICAL THERAPY | Age: 78
Discharge: HOME | End: 2018-12-06
Payer: MEDICARE

## 2018-12-06 PROCEDURE — 97140 MANUAL THERAPY 1/> REGIONS: CPT

## 2018-12-06 PROCEDURE — G8984 CARRY CURRENT STATUS: HCPCS

## 2018-12-06 PROCEDURE — 97110 THERAPEUTIC EXERCISES: CPT

## 2018-12-06 PROCEDURE — G8985 CARRY GOAL STATUS: HCPCS

## 2018-12-06 NOTE — PROGRESS NOTES
PT DAILY TREATMENT NOTE - [facility] 2-15 Patient Name: Carmencita Yañez Date:2018 : 1940 [x]  Patient  Verified Payor: [place] MEDICARE / Plan: Gary Reed / Product Type: Medicare / In time: 3:00pm  Out time: 4:15pm 
Total Treatment Time (min): 75 Total Timed Codes (min): 65 
1:1 Treatment Time ( [address] only): 65 Visit #: 0 Treatment Area: Left arm pain [M79.602] SUBJECTIVE Pain Level (0-10 scale): 810 Any medication changes, allergies to medications, adverse drug reactions, diagnosis change, or new procedure performed?: [x] No    [] Yes (see summary sheet for update) Subjective functional status/changes:   [] No changes reported Pt reports using heating pad on left elbow several times/day. Pt complains of increased pain in left elbow, that started bothering her last night after she got home from going out to eat. OBJECTIVE Modality rationale: decrease pain and increase tissue extensibility to improve pt's ability to use left UE. Type Additional Details   
[] Estim: []Att   []Unatt    []TENS instruct []IFC  []Premod   []NMES []Other:  []w/US   []w/ice   []w/heat Position: Location:   
[]  Traction: [] Cervical       []Lumbar 
                     [] Prone          []Supine []Intermittent   []Continuous Lbs: 
[] before manual 
[] after manual 
[]w/heat   
[]  Ultrasound: []Continuous   [] Pulsed  
                    at: []1MHz   []3MHz Location: 
W/cm2:   
[] Paraffin Location:  
[]w/heat   
[x]  Ice x 10 minutes     [x]  Heat  
[]  Ice massage Position: right side-lying with pillow between knees Location: left elbow at end of session   
[]  Laser 
[]  Other: Position: Location:   
[]  Vasopneumatic Device Pressure:       [] lo [] med [] hi  
Temperature:    
[x] Skin assessment post-treatment:  [x]intact []redness- no adverse reaction 
  []redness  adverse reaction:  
  
 45 min Therapeutic Exercise:  [x] See flow sheet :  
Rationale: increase ROM and increase strength to improve the patients ability to perform functional activities with increased ease 
  
20 min Manual Therapy: STM left upper and lower arm; PROM left elbow flexion, extension, supination and pronation to pt's tolerance; passive stretching left wrist flexors and wrist extensors and triceps Rationale: decrease pain, increase ROM, increase tissue extensibility, decrease edema  and increase postural awareness to restore functional use of left UE With 
 [] TE 
 [] TA 
 [] neuro 
 [] other: Patient Education: [x] Review HEP [] Progressed/Changed HEP based on:  
[] positioning   [] body mechanics   [] transfers   [] heat/ice application   
[] other:   
  
Other Objective/Functional Measures: --- Pain Level (0-10 scale) post treatment: 4/10 
  
ASSESSMENT/Changes in Function:  
Observed small red spot on left elbow, near incision- pt states that this spot is tender. Pt educated about using electric heating pad at home; recommend avoid using heating pad more than 1x/day and no longer than 15 minutes each time. Patient will continue to benefit from skilled PT services to modify and progress therapeutic interventions, address functional mobility deficits, address ROM deficits, address strength deficits, analyze and address soft tissue restrictions, analyze and cue movement patterns, analyze and modify body mechanics/ergonomics and assess and modify postural abnormalities to attain remaining goals. []  See Plan of Care 
[]  See progress note/recertification 
[]  See Discharge Summary Progress towards goals / Updated goals: 
Short Term Goals: To be accomplished in 4 weeks: 
1) Pt will be independent with HEP. MET 
2) Pt will demonstrate improved left wrist and elbow ROM by at least 8 degrees, in all directions.  MET 
  
 Long Term Goals: To be accomplished in 8 weeks: 
1) Pt will be able to use left arm to perform daily activities, included grooming and household chores. 2) Pt will demonstrate  strength >/= 80% of uninvolved side. 3) Pt will be able to drive car without pain. 
  
PLAN [x]  Upgrade activities as tolerated     [x]  Continue plan of care 
[]  Update interventions per flow sheet      
[]  Discharge due to:_ 
[]  Other:_   
 
Enio Mei PT, DPT  12/6/2018  2:46 PM

## 2018-12-11 ENCOUNTER — HOSPITAL ENCOUNTER (OUTPATIENT)
Dept: PHYSICAL THERAPY | Age: 78
Discharge: HOME | End: 2018-12-11
Payer: MEDICARE

## 2018-12-11 PROCEDURE — 97140 MANUAL THERAPY 1/> REGIONS: CPT

## 2018-12-11 PROCEDURE — 97110 THERAPEUTIC EXERCISES: CPT

## 2018-12-11 RX ORDER — PRAVASTATIN SODIUM 40 MG/1
TABLET ORAL
Qty: 90 TAB | Refills: 2 | Status: SHIPPED | OUTPATIENT
Start: 2018-12-11

## 2018-12-11 NOTE — PROGRESS NOTES
PT DAILY TREATMENT NOTE - [facility] 2-15    Patient Name: Delma Nino  Date:2018  : 1940  [x]  Patient  Verified  Payor: [place] MEDICARE / Plan: VA MEDICARE PART A & B / Product Type: Medicare /    In time: 1:45pm  Out time: 2:55pm  Total Treatment Time (min): 70   Total Timed Codes (min): 60  1:1 Treatment Time ( [address] only): 60  Visit #: 8    Treatment Area: Left arm pain [M79.602]    SUBJECTIVE  Pain Level (0-10 scale): 8/10  Any medication changes, allergies to medications, adverse drug reactions, diagnosis change, or new procedure performed?: [x] No    [] Yes (see summary sheet for update)  Subjective functional status/changes:   [] No changes reported  Pt had follow-up with surgeon and was given new prescription, meloxicam, which has helped a little with pain. OBJECTIVE        Modality rationale: decrease pain and increase tissue extensibility to improve pt's ability to use left UE.    Type Additional Details    [] Estim: []Att   []Unatt    []TENS instruct                  []IFC  []Premod   []NMES                     []Other:  []w/US   []w/ice   []w/heat  Position:  Location:    []  Traction: [] Cervical       []Lumbar                       [] Prone          []Supine                       []Intermittent   []Continuous Lbs:  [] before manual  [] after manual  []w/heat    []  Ultrasound: []Continuous   [] Pulsed                       at: []1MHz   []3MHz Location:  W/cm2:    [] Paraffin         Location:   []w/heat    [x]  Ice x 10 minutes     [x]  Heat   []  Ice massage Position: right side-lying with pillow between knees  Location: left elbow at end of session    []  Laser  []  Other: Position:  Location:    []  Vasopneumatic Device Pressure:       [] lo [] med [] hi   Temperature:     [x] Skin assessment post-treatment:  [x]intact []redness- no adverse reaction    []redness  adverse reaction:      45 min Therapeutic Exercise:  [x] See flow sheet :   Rationale: increase ROM and increase strength to improve the patients ability to perform functional activities with increased ease     20 min Manual Therapy: STM left upper and lower arm; PROM left elbow flexion, extension, supination and pronation to pt's tolerance; passive stretching left wrist flexors and wrist extensors and triceps   Rationale: decrease pain, increase ROM, increase tissue extensibility, decrease edema  and increase postural awareness to restore functional use of left UE                                                                 With   [] TE   [] TA   [] neuro   [] other: Patient Education: [x] Review HEP    [] Progressed/Changed HEP based on:   [] positioning   [] body mechanics   [] transfers   [] heat/ice application    [] other:       Other Objective/Functional Measures: ---  Pain Level (0-10 scale) post treatment: 3/10     ASSESSMENT/Changes in Function:   Pt fatigued quickly with wrist and elbow strengthening exercises; required frequent rest breaks. Patient will continue to benefit from skilled PT services to modify and progress therapeutic interventions, address functional mobility deficits, address ROM deficits, address strength deficits, analyze and address soft tissue restrictions, analyze and cue movement patterns, analyze and modify body mechanics/ergonomics and assess and modify postural abnormalities to attain remaining goals. []  See Plan of Care  []  See progress note/recertification  []  See Discharge Summary         Progress towards goals / Updated goals:  Short Term Goals: To be accomplished in 4 weeks:  1) Pt will be independent with HEP. MET  2) Pt will demonstrate improved left wrist and elbow ROM by at least 8 degrees, in all directions. MET     Long Term Goals: To be accomplished in 8 weeks:  1) Pt will be able to use left arm to perform daily activities, included grooming and household chores. 2) Pt will demonstrate  strength >/= 80% of uninvolved side.   3) Pt will be able to drive car without pain.     PLAN  [x]  Upgrade activities as tolerated     [x]  Continue plan of care  []  Update interventions per flow sheet       []  Discharge due to:_  []  Other:_      Vikas Srivastava PT, DPT  12/11/2018  2:46 PM

## 2018-12-13 ENCOUNTER — HOSPITAL ENCOUNTER (OUTPATIENT)
Dept: PHYSICAL THERAPY | Age: 78
Discharge: HOME | End: 2018-12-13
Payer: MEDICARE

## 2018-12-13 PROCEDURE — 97140 MANUAL THERAPY 1/> REGIONS: CPT

## 2018-12-13 PROCEDURE — 97110 THERAPEUTIC EXERCISES: CPT

## 2018-12-13 NOTE — PROGRESS NOTES
PT DAILY TREATMENT NOTE - [facility] 2-15    Patient Name: Jasmine Madsen  Date:2018  : 1940  [x]  Patient  Verified  Payor: VA MEDICARE / Plan: VA MEDICARE PART A & B / Product Type: Medicare /    In time: 3:30pm  Out time: 4:40pm  Total Treatment Time (min): 70   Total Timed Codes (min): 60  1:1 Treatment Time ( [address] only): 60  Visit #: 9    Treatment Area: Left arm pain [M79.602]    SUBJECTIVE  Pain Level (0-10 scale): 8/10  Any medication changes, allergies to medications, adverse drug reactions, diagnosis change, or new procedure performed?: [x] No    [] Yes (see summary sheet for update)  Subjective functional status/changes:   [] No changes reported  Pt complains of increased pain \"all over today\". Pt feels that she may be getting a cold. OBJECTIVE        Modality rationale: decrease pain and increase tissue extensibility to improve pt's ability to use left UE.    Type Additional Details    [] Estim: []Att   []Unatt    []TENS instruct                  []IFC  []Premod   []NMES                     []Other:  []w/US   []w/ice   []w/heat  Position:  Location:    []  Traction: [] Cervical       []Lumbar                       [] Prone          []Supine                       []Intermittent   []Continuous Lbs:  [] before manual  [] after manual  []w/heat    []  Ultrasound: []Continuous   [] Pulsed                       at: []1MHz   []3MHz Location:  W/cm2:    [] Paraffin         Location:   []w/heat    [x]  Ice x 10 minutes     [x]  Heat   []  Ice massage Position: right side-lying with pillow between knees  Location: left elbow at end of session    []  Laser  []  Other: Position:  Location:    []  Vasopneumatic Device Pressure:       [] lo [] med [] hi   Temperature:     [x] Skin assessment post-treatment:  [x]intact []redness- no adverse reaction    []redness  adverse reaction:      45 min Therapeutic Exercise:  [x] See flow sheet :   Rationale: increase ROM and increase strength to improve the patients ability to perform functional activities with increased ease     15 min Manual Therapy: STM left upper and lower arm; PROM left elbow flexion, extension, supination and pronation to pt's tolerance; passive stretching left wrist flexors and wrist extensors; TPR left ECRL   Rationale: decrease pain, increase ROM, increase tissue extensibility, decrease edema  and increase postural awareness to restore functional use of left UE                                                                 With   [] TE   [] TA   [] neuro   [] other: Patient Education: [x] Review HEP    [] Progressed/Changed HEP based on:   [] positioning   [] body mechanics   [] transfers   [] heat/ice application    [] other:       Other Objective/Functional Measures: ---  Pain Level (0-10 scale) post treatment: 4/10     ASSESSMENT/Changes in Function:   Plan to increase left shoulder/rotator cuff strengthening exercises next visit. Patient will continue to benefit from skilled PT services to modify and progress therapeutic interventions, address functional mobility deficits, address ROM deficits, address strength deficits, analyze and address soft tissue restrictions, analyze and cue movement patterns, analyze and modify body mechanics/ergonomics and assess and modify postural abnormalities to attain remaining goals. []  See Plan of Care  []  See progress note/recertification  []  See Discharge Summary         Progress towards goals / Updated goals:  Short Term Goals: To be accomplished in 4 weeks:  1) Pt will be independent with HEP. MET  2) Pt will demonstrate improved left wrist and elbow ROM by at least 8 degrees, in all directions. MET     Long Term Goals: To be accomplished in 8 weeks:  1) Pt will be able to use left arm to perform daily activities, included grooming and household chores. 2) Pt will demonstrate  strength >/= 80% of uninvolved side.   3) Pt will be able to drive car without pain.     PLAN  [x]  Upgrade activities as tolerated     [x]  Continue plan of care  []  Update interventions per flow sheet       []  Discharge due to:_  []  Other:_      Ozell Schwab, PT, DPT  12/13/2018  2:46 PM

## 2018-12-14 ENCOUNTER — APPOINTMENT (OUTPATIENT)
Dept: PHYSICAL THERAPY | Age: 78
End: 2018-12-14
Payer: MEDICARE

## 2018-12-18 ENCOUNTER — APPOINTMENT (OUTPATIENT)
Dept: GENERAL RADIOLOGY | Age: 78
End: 2018-12-18
Attending: EMERGENCY MEDICINE
Payer: MEDICARE

## 2018-12-18 ENCOUNTER — HOSPITAL ENCOUNTER (OUTPATIENT)
Dept: PHYSICAL THERAPY | Age: 78
Discharge: HOME | End: 2018-12-18
Payer: MEDICARE

## 2018-12-18 ENCOUNTER — HOSPITAL ENCOUNTER (EMERGENCY)
Age: 78
Discharge: HOME | End: 2018-12-18
Attending: EMERGENCY MEDICINE
Payer: MEDICARE

## 2018-12-18 ENCOUNTER — APPOINTMENT (OUTPATIENT)
Dept: CT IMAGING | Age: 78
End: 2018-12-18
Attending: EMERGENCY MEDICINE
Payer: MEDICARE

## 2018-12-18 VITALS
DIASTOLIC BLOOD PRESSURE: 78 MMHG | WEIGHT: 170 LBS | RESPIRATION RATE: 17 BRPM | OXYGEN SATURATION: 94 % | BODY MASS INDEX: 30.12 KG/M2 | HEART RATE: 69 BPM | SYSTOLIC BLOOD PRESSURE: 178 MMHG | TEMPERATURE: 98 F | HEIGHT: 63 IN

## 2018-12-18 DIAGNOSIS — J43.9 PULMONARY EMPHYSEMA, UNSPECIFIED EMPHYSEMA TYPE (HCC): Primary | ICD-10-CM

## 2018-12-18 LAB
ALBUMIN SERPL-MCNC: 3.5 G/DL (ref 3.5–5)
ALBUMIN/GLOB SERPL: 0.9 {RATIO} (ref 1.1–2.2)
ALP SERPL-CCNC: 105 U/L (ref 45–117)
ALT SERPL-CCNC: 24 U/L (ref 12–78)
ANION GAP SERPL CALC-SCNC: 7 MMOL/L (ref 5–15)
AST SERPL-CCNC: 16 U/L (ref 15–37)
BASOPHILS # BLD: 0 K/UL (ref 0–0.1)
BASOPHILS NFR BLD: 1 % (ref 0–1)
BILIRUB SERPL-MCNC: 0.5 MG/DL (ref 0.2–1)
BUN SERPL-MCNC: 15 MG/DL (ref 6–20)
BUN/CREAT SERPL: 18 (ref 12–20)
CALCIUM SERPL-MCNC: 8.5 MG/DL (ref 8.5–10.1)
CHLORIDE SERPL-SCNC: 107 MMOL/L (ref 97–108)
CO2 SERPL-SCNC: 27 MMOL/L (ref 21–32)
COMMENT, HOLDF: NORMAL
CREAT SERPL-MCNC: 0.85 MG/DL (ref 0.55–1.02)
DIFFERENTIAL METHOD BLD: NORMAL
EOSINOPHIL # BLD: 0.2 K/UL (ref 0–0.4)
EOSINOPHIL NFR BLD: 3 % (ref 0–7)
ERYTHROCYTE [DISTWIDTH] IN BLOOD BY AUTOMATED COUNT: 13.5 % (ref 11.5–14.5)
GLOBULIN SER CALC-MCNC: 3.9 G/DL (ref 2–4)
GLUCOSE SERPL-MCNC: 69 MG/DL (ref 65–100)
HCT VFR BLD AUTO: 43 % (ref 35–47)
HGB BLD-MCNC: 14.3 G/DL (ref 11.5–16)
IMM GRANULOCYTES # BLD: 0 K/UL (ref 0–0.04)
IMM GRANULOCYTES NFR BLD AUTO: 0 % (ref 0–0.5)
LYMPHOCYTES # BLD: 2.3 K/UL (ref 0.8–3.5)
LYMPHOCYTES NFR BLD: 30 % (ref 12–49)
MCH RBC QN AUTO: 31.2 PG (ref 26–34)
MCHC RBC AUTO-ENTMCNC: 33.3 G/DL (ref 30–36.5)
MCV RBC AUTO: 93.9 FL (ref 80–99)
MONOCYTES # BLD: 0.6 K/UL (ref 0–1)
MONOCYTES NFR BLD: 8 % (ref 5–13)
NEUTS SEG # BLD: 4.6 K/UL (ref 1.8–8)
NEUTS SEG NFR BLD: 59 % (ref 32–75)
NRBC # BLD: 0 K/UL (ref 0–0.01)
NRBC BLD-RTO: 0 PER 100 WBC
PLATELET # BLD AUTO: 185 K/UL (ref 150–400)
PMV BLD AUTO: 10.7 FL (ref 8.9–12.9)
POTASSIUM SERPL-SCNC: 4.2 MMOL/L (ref 3.5–5.1)
PROT SERPL-MCNC: 7.4 G/DL (ref 6.4–8.2)
RBC # BLD AUTO: 4.58 M/UL (ref 3.8–5.2)
SAMPLES BEING HELD,HOLD: NORMAL
SODIUM SERPL-SCNC: 141 MMOL/L (ref 136–145)
TROPONIN I SERPL-MCNC: <0.05 NG/ML
WBC # BLD AUTO: 7.7 K/UL (ref 3.6–11)

## 2018-12-18 PROCEDURE — 85025 COMPLETE CBC W/AUTO DIFF WBC: CPT

## 2018-12-18 PROCEDURE — 36415 COLL VENOUS BLD VENIPUNCTURE: CPT

## 2018-12-18 PROCEDURE — 71275 CT ANGIOGRAPHY CHEST: CPT

## 2018-12-18 PROCEDURE — 74011000250 HC RX REV CODE- 250: Performed by: EMERGENCY MEDICINE

## 2018-12-18 PROCEDURE — 97110 THERAPEUTIC EXERCISES: CPT

## 2018-12-18 PROCEDURE — 74011000258 HC RX REV CODE- 258: Performed by: RADIOLOGY

## 2018-12-18 PROCEDURE — 93005 ELECTROCARDIOGRAM TRACING: CPT

## 2018-12-18 PROCEDURE — 74011250637 HC RX REV CODE- 250/637: Performed by: EMERGENCY MEDICINE

## 2018-12-18 PROCEDURE — 97140 MANUAL THERAPY 1/> REGIONS: CPT

## 2018-12-18 PROCEDURE — 94640 AIRWAY INHALATION TREATMENT: CPT

## 2018-12-18 PROCEDURE — 84484 ASSAY OF TROPONIN QUANT: CPT

## 2018-12-18 PROCEDURE — 80053 COMPREHEN METABOLIC PANEL: CPT

## 2018-12-18 PROCEDURE — 74011636320 HC RX REV CODE- 636/320: Performed by: RADIOLOGY

## 2018-12-18 PROCEDURE — 99285 EMERGENCY DEPT VISIT HI MDM: CPT

## 2018-12-18 PROCEDURE — 94664 DEMO&/EVAL PT USE INHALER: CPT

## 2018-12-18 RX ORDER — ALBUTEROL SULFATE 90 UG/1
2 AEROSOL, METERED RESPIRATORY (INHALATION)
Qty: 1 INHALER | Refills: 0 | Status: SHIPPED | OUTPATIENT
Start: 2018-12-18

## 2018-12-18 RX ORDER — SODIUM CHLORIDE 0.9 % (FLUSH) 0.9 %
10 SYRINGE (ML) INJECTION
Status: COMPLETED | OUTPATIENT
Start: 2018-12-18 | End: 2018-12-18

## 2018-12-18 RX ORDER — IPRATROPIUM BROMIDE AND ALBUTEROL SULFATE 2.5; .5 MG/3ML; MG/3ML
3 SOLUTION RESPIRATORY (INHALATION)
Status: COMPLETED | OUTPATIENT
Start: 2018-12-18 | End: 2018-12-18

## 2018-12-18 RX ORDER — CLONIDINE HYDROCHLORIDE 0.1 MG/1
0.2 TABLET ORAL
Status: COMPLETED | OUTPATIENT
Start: 2018-12-18 | End: 2018-12-18

## 2018-12-18 RX ORDER — HYDROCODONE BITARTRATE AND ACETAMINOPHEN 5; 325 MG/1; MG/1
1 TABLET ORAL
Status: COMPLETED | OUTPATIENT
Start: 2018-12-18 | End: 2018-12-18

## 2018-12-18 RX ADMIN — IOPAMIDOL 80 ML: 755 INJECTION, SOLUTION INTRAVENOUS at 20:51

## 2018-12-18 RX ADMIN — IPRATROPIUM BROMIDE AND ALBUTEROL SULFATE 3 ML: .5; 3 SOLUTION RESPIRATORY (INHALATION) at 22:20

## 2018-12-18 RX ADMIN — Medication 10 ML: at 20:51

## 2018-12-18 RX ADMIN — CLONIDINE HYDROCHLORIDE 0.2 MG: 0.1 TABLET ORAL at 21:14

## 2018-12-18 RX ADMIN — SODIUM CHLORIDE 100 ML: 900 INJECTION, SOLUTION INTRAVENOUS at 20:51

## 2018-12-18 RX ADMIN — HYDROCODONE BITARTRATE AND ACETAMINOPHEN 1 TABLET: 5; 325 TABLET ORAL at 21:25

## 2018-12-19 LAB
ATRIAL RATE: 80 BPM
CALCULATED P AXIS, ECG09: 41 DEGREES
CALCULATED R AXIS, ECG10: -23 DEGREES
CALCULATED T AXIS, ECG11: 8 DEGREES
DIAGNOSIS, 93000: NORMAL
P-R INTERVAL, ECG05: 254 MS
Q-T INTERVAL, ECG07: 376 MS
QRS DURATION, ECG06: 92 MS
QTC CALCULATION (BEZET), ECG08: 433 MS
VENTRICULAR RATE, ECG03: 80 BPM

## 2018-12-19 NOTE — ED TRIAGE NOTES
Patient reports Shortness of breath at [facility] earlier today[facility] and was [facility] to [facility]. Patient sent to ED from [facility] for elevated D-dimer.   Recent car travel to [place] for cruise to the [facility]

## 2018-12-19 NOTE — ED PROVIDER NOTES
70 y.o. female with past medical history significant for hypercholesterolemia, HTN, dyspepsia, fibromyalgia, CAD, MI, and GERD who presents from [facility] with chief complaint of SOB. Pt reports she was shopping at [employer] when she suddenly became SOB 4-5 hours ago. She was evaluated at [address] at that time who referred her to [facility] for further evaluation. [facility] found that Pt's D-dimer was elevated, so they then referred her to the ED. Troponin at [facility] was negative; CXR was normal. Pt notes she has 3 stents which were last checked by Dr. Indra Scott several years ago. Pt notes she recently had surgery for a fractured L humerus. Pt denies chest pain, N/V, and diaphoresis. There are no other acute medical concerns at this time.     PCP: Jadiel Hanna MD    Note written by Rolando Last, as dictated by Noam Goins MD 8:21 PM             Past Medical History:   Diagnosis Date    Autoimmune disease ([facility] [address]     FIBROMYALGIA    Beta-blocker therapy     CAD (coronary artery disease) 1999    MI >> stent x3, cath 2010: [place]    Chronic pain     back pain    Delayed gastric emptying     Depression     Depression with anxiety     Dyspepsia and other specified disorders of function of stomach     GERD (gastroesophageal reflux disease)     Hypercholesterolemia     Hypertension     JORDAN on CPAP     DOES NOT USE CPAP       Past Surgical History:   Procedure Laterality Date    CARDIAC SURG PROCEDURE UNLIST  1999    STENTS    HX CATARACT REMOVAL      HX CORONARY STENT PLACEMENT      HX DILATION AND CURETTAGE      HX HEENT      ORAL SX    HX LUMBAR LAMINECTOMY  12/2012    Dr. Fabiana Membreno HX ORTHOPAEDIC  2012    spinal fusion    HX OTHER SURGICAL      BIRTH DONOVAN EXCISED    HX OTHER SURGICAL      RECTAL SX TWICE; ABCESS; FISTULA    HX SMALL BOWEL RESECTION  10/31/14    Obstruction, Dr. Jese Roldan, 22 in removed    HX TONSILLECTOMY           Family History:   Problem Relation Age of Onset    Hypertension Mother     Heart Disease Mother     Heart Disease Father     Stroke Father     Heart Disease Brother     Hypertension Brother     Depression Maternal Aunt     Depression Maternal Uncle     Depression Maternal Grandmother     Depression Maternal Grandfather     Anesth Problems Neg Hx        Social History     Socioeconomic History    Marital status:      Spouse name: Not on file    Number of children: Not on file    Years of education: Not on file    Highest education level: Not on file   Social Needs    Financial resource strain: Not on file    Food insecurity - worry: Not on file    Food insecurity - inability: Not on file   [employer] needs - medical: Not on file   [employer] needs - non-medical: Not on file   Occupational History    Not on file   Tobacco Use    Smoking status: Current Every Day Smoker     Packs/day: 1.00     Types: Cigarettes     Last attempt to quit: 2006     Years since quittin.9    Smokeless tobacco: Never Used   Substance and Sexual Activity    Alcohol use: Yes     Alcohol/week: 1.8 oz     Types: 3 Standard drinks or equivalent per week     Comment: OCCASIONALLY    Drug use: No    Sexual activity: Not Currently   Other Topics Concern    Not on file   Social History Narrative    Julia lives independently. One daughter is nurse at [employer] hospital, one daughter  at [employer]. ALLERGIES: Patient has no known allergies. Review of Systems   Constitutional: Negative for chills, diaphoresis and fever. Respiratory: Positive for shortness of breath. Cardiovascular: Negative for chest pain. Gastrointestinal: Negative for abdominal pain, constipation, diarrhea, nausea and vomiting. Neurological: Negative for dizziness and light-headedness. All other systems reviewed and are negative.       Vitals:    18 2023 18 2028 18 2045 18 2114   BP:  (!) 222/124 (!) 210/108 186/82   Pulse: 83 85 80   Resp: 17  15    Temp: 98.9 °F (37.2 °C)      SpO2: 95%  94%    Weight: 77.1 kg (170 lb)      Height: 5' 3\" (1.6 m)               Physical Exam   Constitutional: She is oriented to person, place, and time. She appears well-developed and well-nourished. HENT:   Head: Normocephalic and atraumatic. Eyes: No scleral icterus. Neck: Normal range of motion. Cardiovascular: Tachycardia present. Pulmonary/Chest: Effort normal.   Abdominal: She exhibits no distension. Neurological: She is alert and oriented to person, place, and time. Skin: No rash noted. No erythema. Nursing note and vitals reviewed. Note written by Rolando Delatorre, as dictated by Ludmila Brewster MD 8:21 PM    MDM  Number of Diagnoses or Management Options  Pulmonary emphysema, unspecified emphysema type [facility]):   Diagnosis management comments: Pt presents after an episode of dyspnea. The patient is resting comfortably and feels better, is alert and in no distress. The repeat examination is unremarkable and benign. The electrocardiogram shows no signs of acute ischemia and the history, exam, diagnostic testing and current condition do not suggest that this patient is having an acute myocardial infarction, significant arrhythmia, unstable angina, esophageal perforation, pulmonary embolism, aortic dissection, pneumothorax, severe pneumonia, sepsis or other significant pathology that would warrant further testing, continued ED treatment, admission, or cardiology or other specialist consultation at this point. The vital signs have been stable. The patient's condition is stable and appropriate for discharge. The patient will pursue further outpatient evaluation with the primary care physician, other designated physician or cardiologist. The patient and/or caregivers have expressed a clear and thorough understanding and agree to follow up as instructed.            Procedures      ED EKG interpretation:  Rhythm: normal sinus rhythm; Rate (approx.): 80; Axis: left axis deviation; ST/T wave: no acute ST/T wave changes; no ectopy; Note written by Rolando Sánchez, as dictated by Myrtle Galicia MD 8:30 PM      PROGRESS NOTE:  10:09 PM  Pt's 2nd troponin is negative. CT is negative for PE and PNA. Pt has emphysema. Plan to discharge Pt home w/ an inhaler. The patient's results have been reviewed with them and/or available family. Patient and/or family verbally conveyed their understanding and agreement of the patient's signs, symptoms, diagnosis, treatment and prognosis and additionally agree to follow up as recommended in the discharge instructions or to return to the Emergency Room should their condition change prior to their follow-up appointment. The patient/family verbally agrees with the care-plan and verbally conveys that all of their questions have been answered. The discharge instructions have also been provided to the patient and/or family with some educational information regarding the patient's diagnosis as well a list of reasons why the patient would want to return to the ER prior to their follow-up appointment, should their condition change.

## 2018-12-19 NOTE — ED NOTES
Discharge instructions and prescriptions given to patient by Dr. Harish Milligan, all questions answered and patient verbalized understanding. Patient ambulatory to ED lobby with home walker.

## 2018-12-19 NOTE — DISCHARGE INSTRUCTIONS
Chronic Obstructive Pulmonary Disease (COPD): Care Instructions  Your Care Instructions    Chronic obstructive pulmonary disease (COPD) is a general term for a group of lung diseases, including emphysema and chronic bronchitis. People with COPD have decreased airflow in and out of the lungs, which makes it hard to breathe. The airways also can get clogged with thick mucus. Cigarette smoking is a major cause of COPD. Although there is no cure for COPD, you can slow its progress. Following your treatment plan and taking care of yourself can help you feel better and live longer. Follow-up care is a key part of your treatment and safety. Be sure to make and go to all appointments, and call your doctor if you are having problems. It's also a good idea to know your test results and keep a list of the medicines you take. How can you care for yourself at home?   Staying healthy    · Do not smoke. This is the most important step you can take to prevent more damage to your lungs. If you need help quitting, talk to your doctor about stop-smoking programs and medicines. These can increase your chances of quitting for good.     · Avoid colds and flu. Get a pneumococcal vaccine shot. If you have had one before, ask your doctor whether you need a second dose. Get the flu vaccine every fall. If you must be around people with colds or the flu, wash your hands often.     · Avoid secondhand smoke, air pollution, and high altitudes. Also avoid cold, dry air and hot, humid air. Stay at home with your windows closed when air pollution is bad.    Medicines and oxygen therapy    · Take your medicines exactly as prescribed. Call your doctor if you think you are having a problem with your medicine.     · You may be taking medicines such as:  ? Bronchodilators. These help open your airways and make breathing easier. Bronchodilators are either short-acting (work for 6 to 9 hours) or long-acting (work for 24 hours).  You inhale most bronchodilators, so they start to act quickly. Always carry your quick-relief inhaler with you in case you need it while you are away from home. ? Corticosteroids (prednisone, budesonide). These reduce airway inflammation. They come in pill or inhaled form. You must take these medicines every day for them to work well.     · A spacer may help you get more inhaled medicine to your lungs. Ask your doctor or pharmacist if a spacer is right for you. If it is, ask how to use it properly.     · Do not take any vitamins, over-the-counter medicine, or herbal products without talking to your doctor first.     · If your doctor prescribed antibiotics, take them as directed. Do not stop taking them just because you feel better. You need to take the full course of antibiotics.     · Oxygen therapy boosts the amount of oxygen in your blood and helps you breathe easier. Use the flow rate your doctor has recommended, and do not change it without talking to your doctor first.   Activity    · Get regular exercise. Walking is an easy way to get exercise. Start out slowly, and walk a little more each day.     · Pay attention to your breathing. You are exercising too hard if you cannot talk while you are exercising.     · Take short rest breaks when doing household chores and other activities.     · Learn breathing methods--such as breathing through pursed lips--to help you become less short of breath.     · If your doctor has not set you up with a pulmonary rehabilitation program, talk to him or her about whether rehab is right for you. Rehab includes exercise programs, education about your disease and how to manage it, help with diet and other changes, and emotional support. Diet    · Eat regular, healthy meals. Use bronchodilators about 1 hour before you eat to make it easier to eat. Eat several small meals instead of three large ones.  Drink beverages at the end of the meal. Avoid foods that are hard to chew.     · Eat foods that contain protein so that you do not lose muscle mass.     · Talk with your doctor if you gain too much weight or if you lose weight without trying.    Mental health    · Talk to your family, friends, or a therapist about your feelings. It is normal to feel frightened, angry, hopeless, helpless, and even guilty. Talking openly about bad feelings can help you cope. If these feelings last, talk to your doctor. When should you call for help? Call 911 anytime you think you may need emergency care. For example, call if:    · You have severe trouble breathing.    Call your doctor now or seek immediate medical care if:    · You have new or worse trouble breathing.     · You cough up blood.     · You have a fever.    Watch closely for changes in your health, and be sure to contact your doctor if:    · You cough more deeply or more often, especially if you notice more mucus or a change in the color of your mucus.     · You have new or worse swelling in your legs or belly.     · You are not getting better as expected. Where can you learn more? Go to http://boo-blanca.info/. Natanael Domingo in the search box to learn more about \"Chronic Obstructive Pulmonary Disease (COPD): Care Instructions. \"  Current as of: December 6, 2017  Content Version: 11.8  © 3248-3536 [employer]. Care instructions adapted under license by [employer] (which disclaims liability or warranty for this information). If you have questions about a medical condition or this instruction, always ask your healthcare professional. [place] [address] any warranty or liability for your use of this information.

## 2018-12-20 ENCOUNTER — HOSPITAL ENCOUNTER (OUTPATIENT)
Dept: PHYSICAL THERAPY | Age: 78
Discharge: HOME | End: 2018-12-20
Payer: MEDICARE

## 2018-12-20 PROCEDURE — 97140 MANUAL THERAPY 1/> REGIONS: CPT

## 2018-12-20 PROCEDURE — 97110 THERAPEUTIC EXERCISES: CPT

## 2018-12-20 NOTE — PROGRESS NOTES
PT DAILY TREATMENT NOTE - [facility] 2-15    Patient Name: Alyson Thomas  Date:2018  : 1940  [x]  Patient  Verified  Payor: [place] MEDICARE / Plan: [place] MEDICARE PART A & B / Product Type: Medicare /    In time: 12:00P  Out time: 1:05P  Total Treatment Time (min): 65   Total Timed Codes (min): 55  1:1 Treatment Time ( [address] only): 40  Visit #: 11    Treatment Area: Left arm pain [M79.602]    SUBJECTIVE  Pain Level (0-10 scale): 8/10  Any medication changes, allergies to medications, adverse drug reactions, diagnosis change, or new procedure performed?: [x] No    [] Yes (see summary sheet for update)  Subjective functional status/changes:   [] No changes reported  Pt reported becoming SOB while in [facility] on Tuesday. [facility] who referred her to a physician next door. They took blood and  Results showed D-DIMER which they told her was an indication of a possible blood clot. Pt was refereed to [facility] ER. Pt drove herself. All tests came back negative for a clot  told her she has a respiratory infection. OBJECTIVE        Modality rationale: decrease pain and increase tissue extensibility to improve pt's ability to use left UE.    Type Additional Details    [] Estim: []Att   []Unatt    []TENS instruct                  []IFC  []Premod   []NMES                     []Other:  []w/US   []w/ice   []w/heat  Position:  Location:    []  Traction: [] Cervical       []Lumbar                       [] Prone          []Supine                       []Intermittent   []Continuous Lbs:  [] before manual  [] after manual  []w/heat    []  Ultrasound: []Continuous   [] Pulsed                       at: []1MHz   []3MHz Location:  W/cm2:    [] Paraffin         Location:   []w/heat    [x]  Ice x 10 minutes     [x]  Heat   []  Ice massage Position: right side-lying with pillow between knees  Location: left elbow at end of session    []  Laser  []  Other: Position:  Location:    []  Vasopneumatic Device Pressure:       [] lo [] med [] hi   Temperature:     [x] Skin assessment post-treatment:  [x]intact []redness- no adverse reaction    []redness  adverse reaction:      25 min Therapeutic Exercise:  [x] See flow sheet : assessment of current status   Rationale: increase ROM and increase strength to improve the patients ability to perform functional activities with increased ease     15 min Manual Therapy: STM left upper and lower arm; PROM left elbow flexion, extension, supination and pronation to pt's tolerance; passive stretching left wrist flexors and wrist extensors; TPR left ECRL   Rationale: decrease pain, increase ROM, increase tissue extensibility, decrease edema  and increase postural awareness to restore functional use of left UE                                                                 With   [] TE   [] TA   [] neuro   [] other: Patient Education: [x] Review HEP    [] Progressed/Changed HEP based on:   [] positioning   [] body mechanics   [] transfers   [] heat/ice application    [] other:       Other Objective/Functional Measures:   -    Pain Level (0-10 scale) post treatment: 5/10     ASSESSMENT/Changes in Function:   Attempted to added tband ER/IR. Pt reported pain in elbow with exercise. Pt required extra rest breaks during today's session secondary to fatigue. Progressing well towards LTG. Pt reports that she does not do much with house chores or meal prep. Reports increase in ability to bathe self without pain or compensation. Patient will continue to benefit from skilled PT services to modify and progress therapeutic interventions, address functional mobility deficits, address ROM deficits, address strength deficits, analyze and address soft tissue restrictions, analyze and cue movement patterns, analyze and modify body mechanics/ergonomics and assess and modify postural abnormalities to attain remaining goals.      [x]  See Plan of Care  []  See progress note/recertification  []  See Discharge Summary Progress towards goals / Updated goals:  Short Term Goals: To be accomplished in 4 weeks:  1) Pt will be independent with HEP. MET  2) Pt will demonstrate improved left wrist and elbow ROM by at least 8 degrees, in all directions. MET     Long Term Goals: To be accomplished in 8 weeks:  1) Pt will be able to use left arm to perform daily activities, included grooming and household chores. Progressing  2) Pt will demonstrate  strength >/= 80% of uninvolved side. Progressing  3) Pt will be able to drive car without pain.  Progressing     PLAN  [x]  Upgrade activities as tolerated     [x]  Continue plan of care  []  Update interventions per flow sheet       []  Discharge due to:_  []  Other:_      Julia Saucedo PTA, OPTA  12/20/2018  2:46 PM

## 2018-12-24 ENCOUNTER — HOSPITAL ENCOUNTER (OUTPATIENT)
Dept: PHYSICAL THERAPY | Age: 78
Discharge: HOME | End: 2018-12-24
Payer: MEDICARE

## 2018-12-24 PROCEDURE — 97110 THERAPEUTIC EXERCISES: CPT

## 2018-12-24 PROCEDURE — 97140 MANUAL THERAPY 1/> REGIONS: CPT

## 2018-12-24 NOTE — PROGRESS NOTES
[facility]   [facility]  [place], [address] [place] [address]  Phone: [phone] Fax: [phone]    Progress Note    Name: Lele Tang   : 1940   MD: Reza James MD       Treatment Diagnosis: Left arm pain [M79.602]  Start of Care: 2018    Visits from Start of Care: 7  Missed Visits: 0    Summary of Care: Pt has participated in 7 outpatient PT sessions, 2018-2018, s/p left supracondylar humerus fracture, ORIF 2018. Treatment has included therapeutic exercise, manual therapy, moist hot pack, cold pack, pt education and home exercise program.      Assessment / Recommendations:  Pt demonstrates improvement in left elbow ROM. Pt fatigues quickly with strengthening exercises. Plan to continue outpatient PT 2x/week x at least 4 more weeks to achieve long-term PT goals and maximize rehab potential.    Progress towards goals / Updated goals:  Short Term Goals: To be accomplished in 4 weeks:  1) Pt will be independent with HEP. MET  2) Pt will demonstrate improved left wrist and elbow ROM by at least 8 degrees, in all directions. MET     Long Term Goals: To be accomplished in 8 weeks:  1) Pt will be able to use left arm to perform daily activities, included grooming and household chores. Progressing  2) Pt will demonstrate  strength >/= 80% of uninvolved side. Progressing  3) Pt will be able to drive car without pain. Progressing     G-Codes (GP)  Carry   Current  CK= 40-59%   [id number] Goal  CJ= 20-39%  The severity rating is based on clinical judgement. Nicola Obregon, PT, DPT   2018 12:43 PM    ________________________________________________________________________  NOTE TO PHYSICIAN:  Please complete the following and fax to: [facility]: Fax: [phone]. Retain this original for your records. If you are unable to process this request in 24 hours, please contact our office.        ____ I have read the above report and request that my patient continue therapy with the following changes/special instructions:  ____ I have read the above report and request that my patient be discharged from therapy    Physician's Signature:_________________ Date:___________Time:__________

## 2018-12-24 NOTE — PROGRESS NOTES
PT DAILY TREATMENT NOTE - [facility] 2-15    Patient Name: Lashonda Weinstein  Date:2018  : 1940  [x]  Patient  Verified  Payor: [place] MEDICARE / Plan: VA MEDICARE PART A & B / Product Type: Medicare /    In time: 11:40am Out time: 1:00pm  Total Treatment Time (min): 80   Total Timed Codes (min): 65  1:1 Treatment Time ( [address] only): 65  Visit #: 12    Treatment Area: Left arm pain [M79.602]    SUBJECTIVE  Pain Level (0-10 scale): 6/10  Any medication changes, allergies to medications, adverse drug reactions, diagnosis change, or new procedure performed?: [x] No    [] Yes (see summary sheet for update)  Subjective functional status/changes:   [] No changes reported  Pt states that she is still not able to touch her back with left hand but feels that overall, functional use of left UE is improving. Pt complains that left arm gets tired quickly when holding the steering wheel, when driving. Pt had follow-up with ortho MD; x-ray \"looks good\". Plan to return to ortho in 2 months for final follow-up. OBJECTIVE        Modality rationale: decrease pain and increase tissue extensibility to improve pt's ability to use left UE.    Type Additional Details    [] Estim: []Att   []Unatt    []TENS instruct                  []IFC  []Premod   []NMES                     []Other:  []w/US   []w/ice   []w/heat  Position:  Location:    []  Traction: [] Cervical       []Lumbar                       [] Prone          []Supine                       []Intermittent   []Continuous Lbs:  [] before manual  [] after manual  []w/heat    []  Ultrasound: []Continuous   [] Pulsed                       at: []1MHz   []3MHz Location:  W/cm2:    [] Paraffin         Location:   []w/heat    [x]  Ice x 15 minutes     [x]  Heat   []  Ice massage Position: right side-lying with pillow between knees  Location: left elbow at end of session    []  Laser  []  Other: Position:  Location:    []  Vasopneumatic Device Pressure:       [] lo [] med [] hi Temperature:     [x] Skin assessment post-treatment:  [x]intact []redness- no adverse reaction    []redness  adverse reaction:      35 min Therapeutic Exercise:  [x] See flow sheet : assessment of current status   Rationale: increase ROM and increase strength to improve the patients ability to perform functional activities with increased ease     30 min Manual Therapy: PROM left elbow flexion, extension, supination and pronation to pt's tolerance; passive stretching left wrist flexors and wrist extensors; left shoulder PNF D2 flexion/extension- active-assisted   Rationale: decrease pain, increase ROM, increase tissue extensibility, decrease edema  and increase postural awareness to restore functional use of left UE                                                                 With   [] TE   [] TA   [] neuro   [] other: Patient Education: [x] Review HEP    [] Progressed/Changed HEP based on:   [] positioning   [] body mechanics   [] transfers   [] heat/ice application    [] other:       Other Objective/Functional Measures: --    Pain Level (0-10 scale) post treatment: 4/10     ASSESSMENT/Changes in Function:   Pt fatigues quickly with shoulder strengthening/stability exercises. Reinforced importance of compliance with HEP; not especial tightness in left wrist extensors. Patient will continue to benefit from skilled PT services to modify and progress therapeutic interventions, address functional mobility deficits, address ROM deficits, address strength deficits, analyze and address soft tissue restrictions, analyze and cue movement patterns, analyze and modify body mechanics/ergonomics and assess and modify postural abnormalities to attain remaining goals. [x]  See Plan of Care  []  See progress note/recertification  []  See Discharge Summary         Progress towards goals / Updated goals:  Short Term Goals: To be accomplished in 4 weeks:  1) Pt will be independent with HEP.  MET  2) Pt will demonstrate improved left wrist and elbow ROM by at least 8 degrees, in all directions. MET     Long Term Goals: To be accomplished in 8 weeks:  1) Pt will be able to use left arm to perform daily activities, included grooming and household chores. Progressing  2) Pt will demonstrate  strength >/= 80% of uninvolved side. Progressing  3) Pt will be able to drive car without pain.  Progressing     PLAN  [x]  Upgrade activities as tolerated     [x]  Continue plan of care  []  Update interventions per flow sheet       []  Discharge due to:_  []  Other:_      Audrey Davis, PT, DPT  12/24/2018  2:46 PM

## 2018-12-27 ENCOUNTER — HOSPITAL ENCOUNTER (OUTPATIENT)
Dept: PHYSICAL THERAPY | Age: 78
Discharge: HOME | End: 2018-12-27
Payer: MEDICARE

## 2018-12-27 PROCEDURE — 97140 MANUAL THERAPY 1/> REGIONS: CPT

## 2018-12-27 PROCEDURE — 97110 THERAPEUTIC EXERCISES: CPT

## 2018-12-27 NOTE — PROGRESS NOTES
PT DAILY TREATMENT NOTE - [facility] 2-15    Patient Name: Benny Elliott  Date:2018  : 1940  [x]  Patient  Verified  Payor: [place] MEDICARE / Plan: VA MEDICARE PART A & B / Product Type: Medicare /    In time: 2:30pm Out time: 3:40pm  Total Treatment Time (min): 70   Total Timed Codes (min): 55  1:1 Treatment Time ([facility] only): 30  Visit #: 13    Treatment Area: Left arm pain [M79.602]    SUBJECTIVE  Pain Level (0-10 scale): 8/10  Any medication changes, allergies to medications, adverse drug reactions, diagnosis change, or new procedure performed?: [x] No    [] Yes (see summary sheet for update)  Subjective functional status/changes:   [] No changes reported  Pt reports increase in LBP today. Pt states that left UE pain is about the same. OBJECTIVE        Modality rationale: decrease pain and increase tissue extensibility to improve pt's ability to use left UE.    Type Additional Details    [] Estim: []Att   []Unatt    []TENS instruct                  []IFC  []Premod   []NMES                     []Other:  []w/US   []w/ice   []w/heat  Position:  Location:    []  Traction: [] Cervical       []Lumbar                       [] Prone          []Supine                       []Intermittent   []Continuous Lbs:  [] before manual  [] after manual  []w/heat    []  Ultrasound: []Continuous   [] Pulsed                       at: []1MHz   []3MHz Location:  W/cm2:    [] Paraffin         Location:   []w/heat    [x]  Ice x 15 minutes     [x]  Heat   []  Ice massage Position: right side-lying with pillow between knees  Location: left elbow at end of session    []  Laser  []  Other: Position:  Location:    []  Vasopneumatic Device Pressure:       [] lo [] med [] hi   Temperature:     [x] Skin assessment post-treatment:  [x]intact []redness- no adverse reaction    []redness  adverse reaction:      40 min Therapeutic Exercise:  [x] See flow sheet : assessment of current status   Rationale: increase ROM and increase strength to improve the patients ability to perform functional activities with increased ease     15 min Manual Therapy: PROM left elbow flexion, extension, supination and pronation to pt's tolerance; passive stretching left wrist flexors and wrist extensors; left shoulder PNF D2 flexion/extension- active-assisted   Rationale: decrease pain, increase ROM, increase tissue extensibility, decrease edema  and increase postural awareness to restore functional use of left UE                                                                 With   [] TE   [] TA   [] neuro   [] other: Patient Education: [x] Review HEP    [] Progressed/Changed HEP based on:   [] positioning   [] body mechanics   [] transfers   [] heat/ice application    [] other:       Other Objective/Functional Measures: --    Pain Level (0-10 scale) post treatment: 6/10     ASSESSMENT/Changes in Function:   Pt tolerance of standing exercises for left UE strengthening limited due to LBP. Patient will continue to benefit from skilled PT services to modify and progress therapeutic interventions, address functional mobility deficits, address ROM deficits, address strength deficits, analyze and address soft tissue restrictions, analyze and cue movement patterns, analyze and modify body mechanics/ergonomics and assess and modify postural abnormalities to attain remaining goals. [x]  See Plan of Care  []  See progress note/recertification  []  See Discharge Summary         Progress towards goals / Updated goals:  Short Term Goals: To be accomplished in 4 weeks:  1) Pt will be independent with HEP. MET  2) Pt will demonstrate improved left wrist and elbow ROM by at least 8 degrees, in all directions. MET     Long Term Goals: To be accomplished in 8 weeks:  1) Pt will be able to use left arm to perform daily activities, included grooming and household chores. Progressing  2) Pt will demonstrate  strength >/= 80% of uninvolved side.  Progressing  3) Pt will be able to drive car without pain.  Progressing     PLAN  [x]  Upgrade activities as tolerated     [x]  Continue plan of care  []  Update interventions per flow sheet       []  Discharge due to:_  []  Other:_      Arnoldo Farr PT, DPT  12/27/2018  2:46 PM

## 2019-01-03 ENCOUNTER — HOSPITAL ENCOUNTER (OUTPATIENT)
Dept: PHYSICAL THERAPY | Age: 79
Discharge: HOME | End: 2019-01-03
Payer: MEDICARE

## 2019-01-03 PROCEDURE — 97140 MANUAL THERAPY 1/> REGIONS: CPT

## 2019-01-03 PROCEDURE — 97110 THERAPEUTIC EXERCISES: CPT

## 2019-01-03 NOTE — PROGRESS NOTES
PT DAILY TREATMENT NOTE - [facility] 2-15 Patient Name: Sameer Santo Date:1/3/2019 : 1940 [x]  Patient  Verified Payor: [place] MEDICARE / Plan: Gary Reed / Product Type: Medicare / In time:2:35 pm  Out time:3:42 pm 
Total Treatment Time (min): 67 minutes Total Timed Codes (min): 57 minutes 1:1 Treatment Time ( only):40 minutes Visit #: 09 Treatment Area: Left arm pain [M79.602] SUBJECTIVE Pain Level (0-10 scale): 8/10 back; 6.5/10 left arm Any medication changes, allergies to medications, adverse drug reactions, diagnosis change, or new procedure performed?: [x] No    [] Yes (see summary sheet for update) Subjective functional status/changes:   [] No changes reported Pt reports significant back pain today secondary to the rain. She is experiencing 6-7/10 left arm pain along her incision. OBJECTIVE 
      
         
Modality rationale: decrease pain and increase tissue extensibility to improve pt's ability to use left UE. Type Additional Details    
[] Estim: []Att   []Unatt    []TENS instruct 
                []IFC  []Premod   []NMES   
                 []Other:  []w/US   []w/ice   []w/heat Position: Location:    
[]  Traction: [] Cervical       []Lumbar 
                     [] Prone          []Supine 
                     []Intermittent   []Continuous Lbs: 
[] before manual 
[] after manual 
[]w/heat    
[]  Ultrasound: []Continuous   [] Pulsed  
                    LE: []2QQH   []3MHz Location: 
W/cm2:    
[] Paraffin  
  Location:  
[]w/heat    
[x]  Ice x 15 minutes     [x]  Heat  
[]  Ice massage Position: right side-lying with pillow between knees Location: left elbow at end of session    
[]  Laser 
[]  Other: Position: Location:    
[]  Vasopneumatic Device Pressure:       [] lo [] med [] hi  
Temperature:     
[x] Skin assessment post-treatment:  [x]intact []redness- no adverse reaction 
  []redness  adverse reaction:  
  
 32 min Therapeutic Exercise:  [x] See flow sheet : assessment of current status Rationale: increase ROM and increase strength to improve the patients ability to perform functional activities with increased ease 
  
25 min Manual Therapy: PROM left elbow flexion, extension, supination and pronation to pt's tolerance; passive stretching left wrist flexors and wrist extensors; left shoulder PNF D2 flexion/extension- active-assisted; STM of triceps, wrist flexors, and biceps Rationale: decrease pain, increase ROM, increase tissue extensibility, decrease edema  and increase postural awareness to restore functional use of left UE 
  
With 
 [] TE 
 [] TA 
 [] neuro 
 [] other: Patient Education: [x] Review HEP   
[] Progressed/Changed HEP based on:  
[] positioning   [] body mechanics   [] transfers   [] heat/ice application   
[] other:   
  
Other Objective/Functional Measures:  
Elbow flexion: improved from 132 to 144 post-STM of triceps musculature  
 
  
Pain Level (0-10 scale) post treatment: 5/10 
  
ASSESSMENT/Changes in Function:  
Noted significant tenderness and turgor within left triceps, biceps and wrist flexor musculature; provided STM which improved elbow and wrist ROM. Pt noted soreness and fatigue in triceps at end of session, though was able to perform exercises without increased pain. Patient will continue to benefit from skilled PT services to modify and progress therapeutic interventions, address functional mobility deficits, address ROM deficits, address strength deficits, analyze and address soft tissue restrictions, analyze and cue movement patterns, analyze and modify body mechanics/ergonomics and assess and modify postural abnormalities to attain remaining goals. 
  
[x]  See Plan of Care 
[]  See progress note/recertification 
[]  See Discharge Summary 
  
Progress towards goals / Updated goals: 
Short Term Goals: To be accomplished in 4 weeks: 
1) Pt will be independent with HEP.  MET 
 2) Pt will demonstrate improved left wrist and elbow ROM by at least 8 degrees, in all directions. MET 
  
Long Term Goals: To be accomplished in 8 weeks: 
1) Pt will be able to use left arm to perform daily activities, included grooming and household chores. Progressing 2) Pt will demonstrate  strength >/= 80% of uninvolved side. Progressing 3) Pt will be able to drive car without pain. Progressing 
  
PLAN [x]  Upgrade activities as tolerated     [x]  Continue plan of care 
[]  Update interventions per flow sheet      
[]  Discharge due to:_ 
[]  Other:_   
 
Shad Orozco 1/3/2019  1:56 PM

## 2019-01-08 ENCOUNTER — HOSPITAL ENCOUNTER (OUTPATIENT)
Dept: PHYSICAL THERAPY | Age: 79
Discharge: HOME | End: 2019-01-08
Payer: MEDICARE

## 2019-01-08 PROCEDURE — 97110 THERAPEUTIC EXERCISES: CPT

## 2019-01-08 PROCEDURE — 97140 MANUAL THERAPY 1/> REGIONS: CPT

## 2019-01-08 NOTE — PROGRESS NOTES
PT DAILY TREATMENT NOTE - [facility] 2-15 Patient Name: Kayla Victor Date:2019 : 1940 [x]  Patient  Verified Payor: [place] / Plan: Gary Reed / Product Type: Medicare / In time: 2:05pm pm  Out time: 3:15pm 
Total Treatment Time (min): 70 Total Timed Codes (min): 60 
1:1 Treatment Time ( [address] only): 60 Visit #: 15  
 
Treatment Area: Left arm pain [F23.104] SUBJECTIVE Pain Level (0-10 scale): 8/10 Any medication changes, allergies to medications, adverse drug reactions, diagnosis change, or new procedure performed?: [x] No    [x] Yes (see summary sheet for update) Subjective functional status/changes:   [] No changes reported Pt reports trying to use her left UE more with functional activities. Pt's primary complaint is LBP, which is a chronic issue. OBJECTIVE 
      
         
Modality rationale: decrease pain and increase tissue extensibility to improve pt's ability to use left UE. Type Additional Details    
[] Estim: []Att   []Unatt    []TENS instruct 
                []IFC  []Premod   []NMES   
                 []Other:  []w/US   []w/ice   []w/heat Position: Location:    
[]  Traction: [] Cervical       []Lumbar 
                     [] Prone          []Supine 
                     []Intermittent   []Continuous Lbs: 
[] before manual 
[] after manual 
[]w/heat    
[]  Ultrasound: []Continuous   [] Pulsed  
                    HB: []2JHX   []3MHz Location: 
W/cm2:    
[] Paraffin  
  Location:  
[]w/heat    
[x]  Ice x 15 minutes     [x]  Heat  
[]  Ice massage Position: right side-lying with pillow between knees Location: left elbow at end of session    
[]  Laser 
[]  Other: Position: Location:    
[]  Vasopneumatic Device Pressure:       [] lo [] med [] hi  
Temperature:     
[x] Skin assessment post-treatment:  [x]intact []redness- no adverse reaction 
  []redness  adverse reaction:  
  
 45 min Therapeutic Exercise:  [x] See flow sheet : assessment of current status Rationale: increase ROM and increase strength to improve the patients ability to perform functional activities with increased ease 
  
15 min Manual Therapy: PROM left elbow flexion, extension, supination and pronation to pt's tolerance; passive stretching left wrist flexors and wrist extensors Rationale: decrease pain, increase ROM, increase tissue extensibility, decrease edema  and increase postural awareness to restore functional use of left UE 
  
With 
 [] TE 
 [] TA 
 [] neuro 
 [] other: Patient Education: [x] Review HEP   
[] Progressed/Changed HEP based on:  
[] positioning   [] body mechanics   [] transfers   [] heat/ice application   
[] other:   
  
Other Objective/Functional Measures: -- 
  
Pain Level (0-10 scale) post treatment: 5/10 
  
ASSESSMENT/Changes in Function:  
Pt fatigued quickly with left UE strengthening exercises but denies increase in pain. Patient will continue to benefit from skilled PT services to modify and progress therapeutic interventions, address functional mobility deficits, address ROM deficits, address strength deficits, analyze and address soft tissue restrictions, analyze and cue movement patterns, analyze and modify body mechanics/ergonomics and assess and modify postural abnormalities to attain remaining goals. 
  
[x]  See Plan of Care 
[]  See progress note/recertification 
[]  See Discharge Summary 
  
Progress towards goals / Updated goals: 
Short Term Goals: To be accomplished in 4 weeks: 
1) Pt will be independent with HEP. MET 
2) Pt will demonstrate improved left wrist and elbow ROM by at least 8 degrees, in all directions. MET 
  
Long Term Goals: To be accomplished in 8 weeks: 
1) Pt will be able to use left arm to perform daily activities, included grooming and household chores. Progressing 2) Pt will demonstrate  strength >/= 80% of uninvolved side. Progressing 3) Pt will be able to drive car without pain. Progressing 
  
PLAN [x]  Upgrade activities as tolerated     [x]  Continue plan of care 
[]  Update interventions per flow sheet      
[]  Discharge due to:_ 
[]  Other:_   
 
Kenzie Pedroza PT, DPT   1/8/2019  1:56 PM

## 2019-01-10 ENCOUNTER — HOSPITAL ENCOUNTER (OUTPATIENT)
Dept: PHYSICAL THERAPY | Age: 79
Discharge: HOME | End: 2019-01-10
Payer: MEDICARE

## 2019-01-10 PROCEDURE — 97110 THERAPEUTIC EXERCISES: CPT

## 2019-01-10 PROCEDURE — 97140 MANUAL THERAPY 1/> REGIONS: CPT

## 2019-01-10 NOTE — PROGRESS NOTES
PT DAILY TREATMENT NOTE - [facility] 2-15 Patient Name: Cristian Slaughter Date:1/10/2019 : 1940 [x]  Patient  Verified Payor: [place] MEDICARE / Plan: Gary Watts y / Product Type: Medicare / In time: 2:30pm  Out time: 3:35pm 
Total Treatment Time (min): 65 Total Timed Codes (min): 55  
1:1 Treatment Time ( [address] only): 30 Visit #: 85 Treatment Area: Left arm pain [M79.602] SUBJECTIVE Pain Level (0-10 scale): 8/10 Any medication changes, allergies to medications, adverse drug reactions, diagnosis change, or new procedure performed?: [x] No    [] Yes (see summary sheet for update) Subjective functional status/changes:   [] No changes reported Pt states that pain is about the same; however, pt was able to curl her hair with a new curling iron without increased pain or feeling limited in using left UE. OBJECTIVE 
      
         
Modality rationale: decrease pain and increase tissue extensibility to improve pt's ability to use left UE. Type Additional Details    
[] Estim: []Att   []Unatt    []TENS instruct 
                []IFC  []Premod   []NMES   
                 []Other:  []w/US   []w/ice   []w/heat Position: Location:    
[]  Traction: [] Cervical       []Lumbar 
                     [] Prone          []Supine 
                     []Intermittent   []Continuous Lbs: 
[] before manual 
[] after manual 
[]w/heat    
[]  Ultrasound: []Continuous   [] Pulsed  
                    HW: []2TNK   []3MHz Location: 
W/cm2:    
[] Paraffin  
  Location:  
[]w/heat    
[x]  Ice x 15 minutes     [x]  Heat  
[]  Ice massage Position: right side-lying with pillow between knees Location: left elbow at end of session    
[]  Laser 
[]  Other: Position: Location:    
[]  Vasopneumatic Device Pressure:       [] lo [] med [] hi  
Temperature:     
[x] Skin assessment post-treatment:  [x]intact []redness- no adverse reaction 
  []redness  adverse reaction:  
  
 45 min Therapeutic Exercise:  [x] See flow sheet : assessment of current status Rationale: increase ROM and increase strength to improve the patients ability to perform functional activities with increased ease 
  
10 min Manual Therapy: PROM left elbow flexion, extension, supination and pronation to pt's tolerance; passive stretching left wrist flexors and wrist extensors and left biceps Rationale: decrease pain, increase ROM, increase tissue extensibility, decrease edema  and increase postural awareness to restore functional use of left UE 
  
With 
 [] TE 
 [] TA 
 [] neuro 
 [] other: Patient Education: [x] Review HEP   
[] Progressed/Changed HEP based on:  
[] positioning   [] body mechanics   [] transfers   [] heat/ice application   
[] other:   
  
Other Objective/Functional Measures: -- 
  
Pain Level (0-10 scale) post treatment: 6/10 
  
ASSESSMENT/Changes in Function:  
Pt reported significant benefit left biceps stretch, without pain in posterior elbow. Patient will continue to benefit from skilled PT services to modify and progress therapeutic interventions, address functional mobility deficits, address ROM deficits, address strength deficits, analyze and address soft tissue restrictions, analyze and cue movement patterns, analyze and modify body mechanics/ergonomics and assess and modify postural abnormalities to attain remaining goals. 
  
[x]  See Plan of Care 
[]  See progress note/recertification 
[]  See Discharge Summary 
  
Progress towards goals / Updated goals: 
Short Term Goals: To be accomplished in 4 weeks: 
1) Pt will be independent with HEP. MET 
2) Pt will demonstrate improved left wrist and elbow ROM by at least 8 degrees, in all directions. MET 
  
Long Term Goals: To be accomplished in 8 weeks: 
1) Pt will be able to use left arm to perform daily activities, included grooming and household chores. MET 
2) Pt will demonstrate  strength >/= 80% of uninvolved side. Progressing 3) Pt will be able to drive car without pain. Progressing 
  
PLAN [x]  Upgrade activities as tolerated     [x]  Continue plan of care 
[]  Update interventions per flow sheet      
[]  Discharge due to:_ 
[]  Other:_   
 
Nuzhta Markham, PT 1/10/2019  1:56 PM

## 2019-01-15 ENCOUNTER — HOSPITAL ENCOUNTER (OUTPATIENT)
Dept: PHYSICAL THERAPY | Age: 79
Discharge: HOME | End: 2019-01-15
Payer: MEDICARE

## 2019-01-15 PROCEDURE — 97110 THERAPEUTIC EXERCISES: CPT | Performed by: PHYSICAL MEDICINE & REHABILITATION

## 2019-01-15 PROCEDURE — 97140 MANUAL THERAPY 1/> REGIONS: CPT | Performed by: PHYSICAL MEDICINE & REHABILITATION

## 2019-01-15 NOTE — PROGRESS NOTES
PT DAILY TREATMENT NOTE - [facility] 2-15 Patient Name: Jigar Yoder Date:1/15/2019 : 1940 [x]  Patient  Verified Payor: [place] MEDICARE / Plan: Gary Reed / Product Type: Medicare / In time: 2:25PM  Out time: 3:40PM 
Total Treatment Time (min): 75 Total Timed Codes (min): 60 
1:1 Treatment Time ( [address] only): 60 Visit #: 01 Treatment Area: Left arm pain [M79.602] SUBJECTIVE Pain Level (0-10 scale): 6 Any medication changes, allergies to medications, adverse drug reactions, diagnosis change, or new procedure performed?: [x] No    [] Yes (see summary sheet for update) Subjective functional status/changes:   [] No changes reported Pt states pain behind L elbow. Pt states she is unable to scratch her back with her left arm, and reports she is feeling stiff in her L UE. Pt reports she is self-massaging her left arm to reduce inflammation. Pt reports difficulty with grocery shopping due to L UE stiffness and lack of ROM. OBJECTIVE Modality rationale: decrease edema, decrease inflammation, decrease pain and increase tissue extensibility to improve the patients ability to increase ADL efficiency. Min Type Additional Details  
 [] Estim: []Att   []Unatt        []TENS instruct []IFC  []Premod   []NMES []Other:  []w/US   []w/ice   []w/heat Position: Location:  
 []  Traction: [] Cervical       []Lumbar 
                     [] Prone          []Supine []Intermittent   []Continuous Lbs: 
[] before manual 
[] after manual 
[]w/heat  
 []  Ultrasound: []Continuous   [] Pulsed at: 
                         []1MHz   []3MHz Location: 
W/cm2:  
 [] Paraffin Location:  
[]w/heat  
15 []  Ice     [x]  Heat 
[]  Ice massage Position: Sidelying Location: Lumbar & L elbow  
 []  Laser 
[]  Other: Position: Location:  
 
 []  Vasopneumatic Device Pressure:       [] lo [] med [] hi  
Temperature: [x] Skin assessment post-treatment:  [x]intact []redness- no adverse reaction 
  []redness  adverse reaction:  
 
45 min Therapeutic Exercise:  [x] See flow sheet :  
Rationale: increase ROM, increase strength, improve coordination, improve balance and increase proprioception to improve the patients ability to increase ADL efficiency. 15 min Manual Therapy: PROM to L UE, soft tissue massage to L UE at incision site Rationale: decrease pain, increase ROM, increase tissue extensibility, decrease edema , correct positional vertigo and decrease trigger points to improve the patients ability to increase ADL efficiency. With 
 [x] TE 
 [] TA 
 [] neuro 
 [] other: Patient Education: [x] Review HEP [] Progressed/Changed HEP based on:  
[] positioning   [] body mechanics   [] transfers   [] heat/ice application   
[] other:   
 
Other Objective/Functional Measures: Pt performs exercises with mod fatigue. Pt requires vc/tc for proper body positioning and mechanics with exercises. Pt unable to perform sitting exercises for prolonged period of time due to back pain. Pain Level (0-10 scale) post treatment: 5 
 
ASSESSMENT/Changes in Function:  
 
Patient will continue to benefit from skilled PT services to modify and progress therapeutic interventions, address functional mobility deficits, address ROM deficits, address strength deficits, analyze and address soft tissue restrictions, analyze and cue movement patterns, analyze and modify body mechanics/ergonomics, assess and modify postural abnormalities and address imbalance/dizziness to attain remaining goals. []  See Plan of Care 
[]  See progress note/recertification 
[]  See Discharge Summary Progress towards goals / Updated goals: 
Pt tolerates PT well and is slowly progressing towards STG/LTGs. Pt will do well progressing strengthening exercises in absence of pain. PLAN [x]  Upgrade activities as tolerated     [x]  Continue plan of care [x]  Update interventions per flow sheet      
[]  Discharge due to:_ 
[]  Other:_ Mike Mcclain PTA, CPT 1/15/2019  3:27 PM

## 2019-01-17 ENCOUNTER — HOSPITAL ENCOUNTER (OUTPATIENT)
Dept: PHYSICAL THERAPY | Age: 79
Discharge: HOME | End: 2019-01-17
Payer: MEDICARE

## 2019-01-17 PROCEDURE — 97140 MANUAL THERAPY 1/> REGIONS: CPT

## 2019-01-17 PROCEDURE — 97110 THERAPEUTIC EXERCISES: CPT

## 2019-01-17 NOTE — PROGRESS NOTES
PT DAILY TREATMENT NOTE - [facility] 2-15 Patient Name: Get Madrigal Date:2019 : 1940 [x]  Patient  Verified Payor: [place] MEDICARE / Plan: Gary Reed / Product Type: Medicare / In time: 2:30PM  Out time: 3:30pm 
Total Treatment Time (min): 60 Total Timed Codes (min): 50 
1:1 Treatment Time ( only): 40 Visit #: 25 Treatment Area: Left arm pain [M79.602] SUBJECTIVE Pain Level (0-10 scale): 6/10 Any medication changes, allergies to medications, adverse drug reactions, diagnosis change, or new procedure performed?: [x] No    [] Yes (see summary sheet for update) Subjective functional status/changes:   [] No changes reported Pt complains that she has not been feeling well and admits non-compliance with HEP. OBJECTIVE Modality rationale: decrease edema, decrease inflammation, decrease pain and increase tissue extensibility to improve the patients ability to increase ADL efficiency. Type Additional Details  
[] Estim: []Att   []Unatt        []TENS instruct []IFC  []Premod   []NMES []Other:  []w/US   []w/ice   []w/heat Position: Location:  
[]  Traction: [] Cervical       []Lumbar 
                     [] Prone          []Supine []Intermittent   []Continuous Lbs: 
[] before manual 
[] after manual 
[]w/heat  
[]  Ultrasound: []Continuous   [] Pulsed at: 
                         []1MHz   []3MHz Location: 
W/cm2:  
[] Paraffin Location:  
[]w/heat  
[]  Ice     [x]  Heat x 10 minutes 
[]  Ice massage Position: Sidelying with pillow between knees Location: Lumbar & L elbow at end of sessing  
[]  Laser 
[]  Other: Position: Location:  
[]  Vasopneumatic Device Pressure:       [] lo [] med [] hi  
Temperature:   
[x] Skin assessment post-treatment:  [x]intact []redness- no adverse reaction 
  []redness  adverse reaction:  
 
40 min Therapeutic Exercise:  [x] See flow sheet :  
 Rationale: increase ROM, increase strength, improve coordination, improve balance and increase proprioception to improve the patients ability to increase ADL efficiency. 10 min Manual Therapy: PROM to L elbow flexion and extension; PROM left shoulder flexion, abduction and ER to pt's tolerance, soft tissue massage to L upper arm and lower arm Rationale: decrease pain, increase ROM, increase tissue extensibility, decrease edema , correct positional vertigo and decrease trigger points to improve the patients ability to increase ADL efficiency. With 
 [x] TE 
 [] TA 
 [] neuro 
 [] other: Patient Education: [x] Review HEP [] Progressed/Changed HEP based on:  
[] positioning   [] body mechanics   [] transfers   [] heat/ice application   
[] other:   
 
Other Objective/Functional Measures: -- 
Pain Level (0-10 scale) post treatment: 5/10 ASSESSMENT/Changes in Function:  
Added scapular strengthening/stabilization exercises without increase in pain. Patient will continue to benefit from skilled PT services to modify and progress therapeutic interventions, address functional mobility deficits, address ROM deficits, address strength deficits, analyze and address soft tissue restrictions, analyze and cue movement patterns, analyze and modify body mechanics/ergonomics, assess and modify postural abnormalities and address imbalance/dizziness to attain remaining goals. []  See Plan of Care 
[]  See progress note/recertification 
[]  See Discharge Summary Progress towards goals / Updated goals: 
Short Term Goals: To be accomplished in 4 weeks: 
1) Pt will be independent with HEP. MET 
2) Pt will demonstrate improved left wrist and elbow ROM by at least 8 degrees, in all directions. MET 
  
Long Term Goals: To be accomplished in 8 weeks: 
1) Pt will be able to use left arm to perform daily activities, included grooming and household chores.  MET 
 2) Pt will demonstrate  strength >/= 80% of uninvolved side. Progressing 3) Pt will be able to drive car without pain. Progressing PLAN [x]  Upgrade activities as tolerated     [x]  Continue plan of care [x]  Update interventions per flow sheet      
[]  Discharge due to:_ 
[]  Other:_   
 
Paulina Wadsworth, PT, DPT  1/17/2019  3:27 PM

## 2019-01-22 ENCOUNTER — HOSPITAL ENCOUNTER (OUTPATIENT)
Dept: PHYSICAL THERAPY | Age: 79
Discharge: HOME | End: 2019-01-22
Payer: MEDICARE

## 2019-01-22 PROCEDURE — 97140 MANUAL THERAPY 1/> REGIONS: CPT

## 2019-01-22 PROCEDURE — 97110 THERAPEUTIC EXERCISES: CPT

## 2019-01-22 NOTE — PROGRESS NOTES
PT DAILY TREATMENT NOTE - [facility] 2-15 Patient Name: Rachana Cisse Date:2019 : 1940 [x]  Patient  Verified Payor: [place] MEDICARE / Plan: Gary Hooky / Product Type: Medicare / In time: 3:00pm  Out time: 4:10pm 
Total Treatment Time (min): 70 Total Timed Codes (min): 60 
1:1 Treatment Time ( only): 45 Visit #: 23 Treatment Area: Left arm pain [M79.602] SUBJECTIVE Pain Level (0-10 scale): 8/10 Any medication changes, allergies to medications, adverse drug reactions, diagnosis change, or new procedure performed?: [x] No    [] Yes (see summary sheet for update) Subjective functional status/changes:   [] No changes reported Pt complains of tingling in left UE that radiates to middle finger. Pt believes this is due to the way she slept and because she had a lot of personal stress over the weekend. OBJECTIVE Modality rationale: decrease edema, decrease inflammation, decrease pain and increase tissue extensibility to improve the patients ability to increase ADL efficiency. Type Additional Details  
[] Estim: []Att   []Unatt        []TENS instruct []IFC  []Premod   []NMES []Other:  []w/US   []w/ice   []w/heat Position: Location:  
[]  Traction: [] Cervical       []Lumbar 
                     [] Prone          []Supine []Intermittent   []Continuous Lbs: 
[] before manual 
[] after manual 
[]w/heat  
[]  Ultrasound: []Continuous   [] Pulsed at: 
                         []1MHz   []3MHz Location: 
W/cm2:  
[] Paraffin Location:  
[]w/heat  
[]  Ice     [x]  Heat 
[]  Ice massage Position: Sidelying Location: Lumbar & L elbow  
[]  Laser 
[]  Other: Position: Location:  
[]  Vasopneumatic Device Pressure:       [] lo [] med [] hi  
Temperature:   
 
[x] Skin assessment post-treatment:  [x]intact []redness- no adverse reaction 
  []redness  adverse reaction: 45 min Therapeutic Exercise:  [x] See flow sheet :  
Rationale: increase ROM, increase strength, improve coordination, improve balance and increase proprioception to improve the patients ability to increase ADL efficiency. 15 min Manual Therapy: PROM to left elbow flexion/extension and forearm pronation and supination; left median nerve glide Rationale: decrease pain, increase ROM, increase tissue extensibility, decrease edema , correct positional vertigo and decrease trigger points to improve the patients ability to increase ADL efficiency. With 
 [x] TE 
 [] TA 
 [] neuro 
 [] other: Patient Education: [x] Review HEP [] Progressed/Changed HEP based on:  
[] positioning   [] body mechanics   [] transfers   [] heat/ice application   
[] other:   
 
Other Objective/Functional Measures:-- 
Pain Level (0-10 scale) post treatment: 5/10 ASSESSMENT/Changes in Function:  
Pt reported improvement in tingling at end of session. Reinforced postural awareness and wrist/forearm stretches for HEP. Patient will continue to benefit from skilled PT services to modify and progress therapeutic interventions, address functional mobility deficits, address ROM deficits, address strength deficits, analyze and address soft tissue restrictions, analyze and cue movement patterns, analyze and modify body mechanics/ergonomics, assess and modify postural abnormalities and address imbalance/dizziness to attain remaining goals. []  See Plan of Care 
[]  See progress note/recertification 
[]  See Discharge Summary Progress towards goals / Updated goals: 
Short Term Goals: To be accomplished in 4 weeks: 
1) Pt will be independent with HEP. MET 
2) Pt will demonstrate improved left wrist and elbow ROM by at least 8 degrees, in all directions. MET 
  
Long Term Goals: To be accomplished in 8 weeks: 
1) Pt will be able to use left arm to perform daily activities, included grooming and household chores.  MET 
 2) Pt will demonstrate  strength >/= 80% of uninvolved side. Progressing 3) Pt will be able to drive car without pain. Progressing PLAN [x]  Upgrade activities as tolerated     [x]  Continue plan of care [x]  Update interventions per flow sheet      
[]  Discharge due to:_ 
[]  Other:_   
 
Lizz Mcmillan PT, DPT  1/22/2019  3:27 PM

## 2019-01-24 ENCOUNTER — HOSPITAL ENCOUNTER (OUTPATIENT)
Dept: PHYSICAL THERAPY | Age: 79
Discharge: HOME | End: 2019-01-24
Payer: MEDICARE

## 2019-01-24 PROCEDURE — 97110 THERAPEUTIC EXERCISES: CPT

## 2019-01-24 PROCEDURE — 97140 MANUAL THERAPY 1/> REGIONS: CPT

## 2019-01-24 NOTE — PROGRESS NOTES
PT DAILY TREATMENT NOTE - [facility] 2-15 Patient Name: Mark Hernández Date:2019 : 1940 [x]  Patient  Verified Payor: [place] MED[place]ARE / Plan: Gary Reed / Product Type: Medicare / In time: 2:15PM  Out time: 3:34PM 
Total Treatment Time (min): 79 Total Timed Codes (min):59 
1:1 Treatment Time ([facility] only): 59 Visit #: 20 
 
 
Treatment Area: Left arm pain [I84.588] SUBJECTIVE Pain Level (0-10 scale): 6 Any medication changes, allergies to medications, adverse drug reactions, diagnosis change, or new procedure performed?: [x] No    [] Yes (see summary sheet for update) Subjective functional status/changes:   [] No changes reported Pt reports feeling a little better since last PT session. OBJECTIVE Modality rationale: decrease edema, decrease inflammation, decrease pain and increase tissue extensibility to improve the patients ability to increase ADL efficiency. Type Additional Details  
[] Estim: []Att   []Unatt        []TENS instruct []IFC  []Premod   []NMES []Other:  []w/US   []w/ice   []w/heat Position: Location:  
[]  Traction: [] Cervical       []Lumbar 
                     [] Prone          []Supine []Intermittent   []Continuous Lbs: 
[] before manual 
[] after manual 
[]w/heat  
[]  Ultrasound: []Continuous   [] Pulsed at: 
                         []1MHz   []3MHz Location: 
W/cm2:  
[] Paraffin Location:  
[]w/heat  
[]  Ice     [x]  Heat x 20 minutes 
[]  Ice massage Position: Sidelying Location: Lumbar & L elbow at end of session  
[]  Laser 
[]  Other: Position: Location:  
[]  Vasopneumatic Device Pressure:       [] lo [] med [] hi  
Temperature:   
[x] Skin assessment post-treatment:  [x]intact []redness- no adverse reaction 
  []redness  adverse reaction:  
 
45 min Therapeutic Exercise:  [x] See flow sheet :  
Rationale: increase ROM, increase strength, improve coordination, improve balance and increase proprioception to improve the patients ability to increase ADL efficiency. 14 min Manual Therapy: PROM to left elbow flexion/extension and forearm pronation and supination; left median nerve glide Rationale: decrease pain, increase ROM, increase tissue extensibility, decrease edema , correct positional vertigo and decrease trigger points to improve the patients ability to increase ADL efficiency. With 
 [x] TE 
 [] TA 
 [] neuro 
 [] other: Patient Education: [x] Review HEP [] Progressed/Changed HEP based on:  
[] positioning   [] body mechanics   [] transfers   [] heat/ice application   
[] other:   
 
Other Objective/Functional Measures: --  
 
Pain Level (0-10 scale) post treatment: 4/10 ASSESSMENT/Changes in Function:  
Pt demonstrates improved left elbow flexion; pt is able to touch shoulder with hand. Patient will continue to benefit from skilled PT services to modify and progress therapeutic interventions, address functional mobility deficits, address ROM deficits, address strength deficits, analyze and address soft tissue restrictions, analyze and cue movement patterns, analyze and modify body mechanics/ergonomics, assess and modify postural abnormalities and address imbalance/dizziness to attain remaining goals. []  See Plan of Care 
[]  See progress note/recertification 
[]  See Discharge Summary Progress towards goals / Updated goals: 
Short Term Goals: To be accomplished in 4 weeks: 
1) Pt will be independent with HEP. MET 
2) Pt will demonstrate improved left wrist and elbow ROM by at least 8 degrees, in all directions. MET 
  
Long Term Goals: To be accomplished in 8 weeks: 
1) Pt will be able to use left arm to perform daily activities, included grooming and household chores.  MET 
2) Pt will demonstrate  strength >/= 80% of uninvolved side. Progressing 3) Pt will be able to drive car without pain. Progressing PLAN 
 [x]  Upgrade activities as tolerated     [x]  Continue plan of care [x]  Update interventions per flow sheet      
[]  Discharge due to:_ 
[]  Other:_   
 
Richard Martino, PT, DPT  1/24/2019  3:27 PM

## 2019-01-29 ENCOUNTER — APPOINTMENT (OUTPATIENT)
Dept: PHYSICAL THERAPY | Age: 79
End: 2019-01-29
Payer: MEDICARE

## 2019-01-31 ENCOUNTER — APPOINTMENT (OUTPATIENT)
Dept: PHYSICAL THERAPY | Age: 79
End: 2019-01-31
Payer: MEDICARE

## 2019-02-07 ENCOUNTER — HOSPITAL ENCOUNTER (OUTPATIENT)
Dept: PHYSICAL THERAPY | Age: 79
Discharge: HOME | End: 2019-02-07
Payer: MEDICARE

## 2019-02-07 PROCEDURE — 97140 MANUAL THERAPY 1/> REGIONS: CPT

## 2019-02-07 PROCEDURE — 97110 THERAPEUTIC EXERCISES: CPT

## 2019-02-07 NOTE — PROGRESS NOTES
PT DAILY TREATMENT NOTE - [facility] 2-15 Patient Name: Tong Velasquez Date:2019 : 1940 [x]  Patient  Verified Payor: [place] MEDICARE / Plan: Gary Hooky / Product Type: Medicare / In time: 3:31PM  Out time: 4:38PM 
Total Treatment Time (min): 67 Total Timed Codes (min):57 
1:1 Treatment Time ( [address] only): 57 Visit #: 00 Treatment Area: Left arm pain [M79.602] SUBJECTIVE Pain Level (0-10 scale): 7/10 Any medication changes, allergies to medications, adverse drug reactions, diagnosis change, or new procedure performed?: [x] No    [] Yes (see summary sheet for update) Subjective functional status/changes:   [] No changes reported Pt complains of radiating pain from left elbow to fingers. OBJECTIVE Modality rationale: decrease edema, decrease inflammation, decrease pain and increase tissue extensibility to improve the patients ability to increase ADL efficiency. Type Additional Details  
[] Estim: []Att   []Unatt        []TENS instruct []IFC  []Premod   []NMES []Other:  []w/US   []w/ice   []w/heat Position: Location:  
[]  Traction: [] Cervical       []Lumbar 
                     [] Prone          []Supine []Intermittent   []Continuous Lbs: 
[] before manual 
[] after manual 
[]w/heat  
[]  Ultrasound: []Continuous   [] Pulsed at: 
                         []1MHz   []3MHz Location: 
W/cm2:  
[] Paraffin Location:  
[]w/heat  
[]  Ice     [x]  Heat x 20 minutes 
[]  Ice massage Position: Sidelying Location: Lumbar & L elbow at end of session  
[]  Laser 
[]  Other: Position: Location:  
[]  Vasopneumatic Device Pressure:       [] lo [] med [] hi  
Temperature:   
[x] Skin assessment post-treatment:  [x]intact []redness- no adverse reaction 
  []redness  adverse reaction:  
 
47 min Therapeutic Exercise:  [x] See flow sheet :  
Rationale: increase ROM, increase strength, improve coordination, improve balance and increase proprioception to improve the patients ability to increase ADL efficiency. 10 min Manual Therapy: PROM to left elbow flexion/extension and forearm pronation and supination; left median nerve glide Rationale: decrease pain, increase ROM, increase tissue extensibility, decrease edema , correct positional vertigo and decrease trigger points to improve the patients ability to increase ADL efficiency. With 
 [x] TE 
 [] TA 
 [] neuro 
 [] other: Patient Education: [x] Review HEP [] Progressed/Changed HEP based on:  
[] positioning   [] body mechanics   [] transfers   [] heat/ice application   
[] other:   
 
Other Objective/Functional Measures: --  
 
Pain Level (0-10 scale) post treatment: 5/10 ASSESSMENT/Changes in Function:  
Pt requires supervision for proper form with exercises to avoid compensation and encouragement to complete sets and stay focused on task. Patient will continue to benefit from skilled PT services to modify and progress therapeutic interventions, address functional mobility deficits, address ROM deficits, address strength deficits, analyze and address soft tissue restrictions, analyze and cue movement patterns, analyze and modify body mechanics/ergonomics, assess and modify postural abnormalities and address imbalance/dizziness to attain remaining goals. []  See Plan of Care 
[]  See progress note/recertification 
[]  See Discharge Summary Progress towards goals / Updated goals: 
Short Term Goals: To be accomplished in 4 weeks: 
1) Pt will be independent with HEP. MET 
2) Pt will demonstrate improved left wrist and elbow ROM by at least 8 degrees, in all directions. MET 
  
Long Term Goals: To be accomplished in 8 weeks: 
1) Pt will be able to use left arm to perform daily activities, included grooming and household chores.  MET 
2) Pt will demonstrate  strength >/= 80% of uninvolved side. Progressing 3) Pt will be able to drive car without pain. Progressing PLAN [x]  Upgrade activities as tolerated     [x]  Continue plan of care [x]  Update interventions per flow sheet      
[]  Discharge due to:_ 
[]  Other:_   
 
Veronika Casillas, PT, DPT  2/7/2019  3:27 PM

## 2019-02-11 ENCOUNTER — HOSPITAL ENCOUNTER (OUTPATIENT)
Dept: PHYSICAL THERAPY | Age: 79
Discharge: HOME | End: 2019-02-11
Payer: MEDICARE

## 2019-02-11 PROCEDURE — 97140 MANUAL THERAPY 1/> REGIONS: CPT

## 2019-02-11 PROCEDURE — 97110 THERAPEUTIC EXERCISES: CPT

## 2019-02-11 NOTE — PROGRESS NOTES
PT DAILY TREATMENT NOTE - [facility] 2-15 Patient Name: Tadeo Forbes Date:2019 : 1940 [x]  Patient  Verified Payor: [place] MEDICARE / Plan: Gary Reed / Product Type: Medicare / In time: 2:50PM  Out time:3:40PM 
Total Treatment Time (min): 50 Total Timed Codes (min):40 
1:1 Treatment Time ( [address] only): 40 Visit #: 88 Treatment Area: Left arm pain [M79.602] SUBJECTIVE Pain Level (0-10 scale): 10 Any medication changes, allergies to medications, adverse drug reactions, diagnosis change, or new procedure performed?: [x] No    [] Yes (see summary sheet for update) Subjective functional status/changes:   [] No changes reported Pt reports no limitation in functional use of left UE; however, pt complains of pain \"all over\". Pt is treated in pain management and recently received \"several\" injections in LB. Pt states that these injections \"really stirred things up\". OBJECTIVE Modality rationale: decrease edema, decrease inflammation, decrease pain and increase tissue extensibility to improve the patients ability to increase ADL efficiency. Type Additional Details  
[] Estim: []Att   []Unatt        []TENS instruct []IFC  []Premod   []NMES []Other:  []w/US   []w/ice   []w/heat Position: Location:  
[]  Traction: [] Cervical       []Lumbar 
                     [] Prone          []Supine []Intermittent   []Continuous Lbs: 
[] before manual 
[] after manual 
[]w/heat  
[]  Ultrasound: []Continuous   [] Pulsed at: 
                         []1MHz   []3MHz Location: 
W/cm2:  
[] Paraffin Location:  
[]w/heat  
[]  Ice     [x]  Heat x 20 minutes 
[]  Ice massage Position: Sidelying Location: Lumbar & L elbow at end of session  
[]  Laser 
[]  Other: Position: Location:  
[]  Vasopneumatic Device Pressure:       [] lo [] med [] hi  
Temperature: [x] Skin assessment post-treatment:  [x]intact []redness- no adverse reaction 
  []redness  adverse reaction:  
 
30 min Therapeutic Exercise:  [x] See flow sheet :  
Rationale: increase ROM, increase strength, improve coordination, improve balance and increase proprioception to improve the patients ability to increase ADL efficiency. 10 min Manual Therapy: PROM to left elbow flexion/extension and forearm pronation and supination; ulnar nerve glide Rationale: decrease pain, increase ROM, increase tissue extensibility, decrease edema , correct positional vertigo and decrease trigger points to improve the patients ability to increase ADL efficiency. With 
 [x] TE 
 [] TA 
 [] neuro 
 [] other: Patient Education: [x] Review HEP [] Progressed/Changed HEP based on:  
[] positioning   [] body mechanics   [] transfers   [] heat/ice application   
[] other:   
 
Other Objective/Functional Measures: --  
 
Pain Level (0-10 scale) post treatment: 5/10 ASSESSMENT/Changes in Function:  
Reinforced the importance of compliance with HEP. Tingling in left hand no reproduced with neural tension. Patient will continue to benefit from skilled PT services to modify and progress therapeutic interventions, address functional mobility deficits, address ROM deficits, address strength deficits, analyze and address soft tissue restrictions, analyze and cue movement patterns, analyze and modify body mechanics/ergonomics, assess and modify postural abnormalities and address imbalance/dizziness to attain remaining goals. []  See Plan of Care 
[]  See progress note/recertification 
[]  See Discharge Summary Progress towards goals / Updated goals: 
Short Term Goals: To be accomplished in 4 weeks: 
1) Pt will be independent with HEP. MET 
2) Pt will demonstrate improved left wrist and elbow ROM by at least 8 degrees, in all directions. MET 
  
Long Term Goals: To be accomplished in 8 weeks: 1) Pt will be able to use left arm to perform daily activities, included grooming and household chores.  MET 
2) Pt will demonstrate  strength >/= 80% of uninvolved side. Progressing 3) Pt will be able to drive car without pain. Progressing PLAN [x]  Upgrade activities as tolerated     [x]  Continue plan of care [x]  Update interventions per flow sheet      
[]  Discharge due to:_ 
[]  Other:_   
 
Veronika Casillas, PT, DPT  2/11/2019  3:27 PM

## 2019-02-14 ENCOUNTER — HOSPITAL ENCOUNTER (OUTPATIENT)
Dept: PHYSICAL THERAPY | Age: 79
Discharge: HOME | End: 2019-02-14
Payer: MEDICARE

## 2019-02-14 PROCEDURE — 97110 THERAPEUTIC EXERCISES: CPT

## 2019-02-14 PROCEDURE — 97140 MANUAL THERAPY 1/> REGIONS: CPT

## 2019-02-14 NOTE — PROGRESS NOTES
PT DAILY TREATMENT NOTE - [facility] 2-15 Patient Name: Rachana Cisse Date:2019 : 1940 [x]  Patient  Verified Payor: [place] MEDICARE / Plan: Gary Hooky / Product Type: Medicare / In time: 2:50PM  Out time:3:40PM 
Total Treatment Time (min): 50 Total Timed Codes (min):40 
1:1 Treatment Time ( [address] only): 40 Visit #: 21 Treatment Area: Left arm pain [M79.602] SUBJECTIVE Pain Level (0-10 scale): Any medication changes, allergies to medications, adverse drug reactions, diagnosis change, or new procedure performed?: [x] No    [] Yes (see summary sheet for update) Subjective functional status/changes:   [] No changes reported More swelling today, unsure as to reason. Relative to ADL's she still has minor pain when driving with her hands at 10 and 2, and some difficulty opening coke bottles. Independent with a HEP OBJECTIVE  strength 19    Right 43    Left 32   Average of two trials Modality rationale: decrease edema, decrease inflammation, decrease pain and increase tissue extensibility to improve the patients ability to increase ADL efficiency. Type Additional Details  
[] Estim: []Att   []Unatt        []TENS instruct []IFC  []Premod   []NMES []Other:  []w/US   []w/ice   []w/heat Position: Location:  
[]  Traction: [] Cervical       []Lumbar 
                     [] Prone          []Supine []Intermittent   []Continuous Lbs: 
[] before manual 
[] after manual 
[]w/heat  
[]  Ultrasound: []Continuous   [] Pulsed at: 
                         []1MHz   []3MHz Location: 
W/cm2:  
[] Paraffin Location:  
[]w/heat  
[]  Ice     [x]  Heat x 20 minutes 
[]  Ice massage Position: Sidelying Location: Lumbar & L elbow at end of session  
[]  Laser 
[]  Other: Position: Location:  
[]  Vasopneumatic Device Pressure:       [] lo [] med [] hi  
Temperature: [x] Skin assessment post-treatment:  [x]intact []redness- no adverse reaction 
  []redness  adverse reaction:  
 
30 min Therapeutic Exercise:  [x] See flow sheet :  
Rationale: increase ROM, increase strength, improve coordination, improve balance and increase proprioception to improve the patients ability to increase ADL efficiency. 10 min Manual Therapy: PROM to left elbow flexion/extension and forearm pronation and supination; ulnar nerve glide Rationale: decrease pain, increase ROM, increase tissue extensibility, decrease edema , correct positional vertigo and decrease trigger points to improve the patients ability to increase ADL efficiency. With 
 [x] TE 
 [] TA 
 [] neuro 
 [] other: Patient Education: [x] Review HEP [] Progressed/Changed HEP based on:  
[] positioning   [] body mechanics   [] transfers   [] heat/ice application   
[] other:   
 
Other Objective/Functional Measures: --  
 
Pain Level (0-10 scale) post treatment: 5/10 ASSESSMENT/Changes in Function:  
Improved  strength vs initial visit  32 vs 23 lbs [place] []  See Plan of Care 
[]  See progress note/recertification 
[]  See Discharge Summary Progress towards goals / Updated goals: 
Short Term Goals: To be accomplished in 4 weeks: 
1) Pt will be independent with HEP. MET 
2) Pt will demonstrate improved left wrist and elbow ROM by at least 8 degrees, in all directions. MET 
  
Long Term Goals: To be accomplished in 8 weeks: 
1) Pt will be able to use left arm to perform daily activities, included grooming and household chores.  MET 
2) Pt will demonstrate  strength >/= 80% of uninvolved side. Progressing 3) Pt will be able to drive car without pain. Progressing PLAN 
[]  Upgrade activities as tolerated     []  Continue plan of care 
[]  Update interventions per flow sheet [x]  Discharge due to: goals either met or progressing towards.    MD visit Monday. Plan is to DC her to a HEP if her MD approves. []  Other:_ Mihaela Lyon, PT, DPT  2/14/2019  3:27 PM

## 2019-02-21 ENCOUNTER — OFFICE VISIT (OUTPATIENT)
Dept: INTERNAL MEDICINE CLINIC | Age: 79
End: 2019-02-21

## 2019-02-21 VITALS
BODY MASS INDEX: 32.87 KG/M2 | OXYGEN SATURATION: 95 % | WEIGHT: 185.5 LBS | SYSTOLIC BLOOD PRESSURE: 131 MMHG | TEMPERATURE: 98.4 F | DIASTOLIC BLOOD PRESSURE: 70 MMHG | RESPIRATION RATE: 18 BRPM | HEART RATE: 61 BPM | HEIGHT: 63 IN

## 2019-02-21 DIAGNOSIS — R05.9 COUGH: ICD-10-CM

## 2019-02-21 DIAGNOSIS — J43.9 PULMONARY EMPHYSEMA, UNSPECIFIED EMPHYSEMA TYPE (HCC): ICD-10-CM

## 2019-02-21 DIAGNOSIS — Z13.6 SCREENING FOR ISCHEMIC HEART DISEASE: ICD-10-CM

## 2019-02-21 DIAGNOSIS — Z13.39 SCREENING FOR ALCOHOLISM: ICD-10-CM

## 2019-02-21 DIAGNOSIS — Z13.31 SCREENING FOR DEPRESSION: ICD-10-CM

## 2019-02-21 DIAGNOSIS — Z00.00 MEDICARE ANNUAL WELLNESS VISIT, SUBSEQUENT: Primary | ICD-10-CM

## 2019-02-21 DIAGNOSIS — F39 MOOD DISORDER (HCC): ICD-10-CM

## 2019-02-21 RX ORDER — METHYLPREDNISOLONE 4 MG/1
TABLET ORAL
Qty: 1 DOSE PACK | Refills: 0 | Status: SHIPPED | OUTPATIENT
Start: 2019-02-21 | End: 2019-05-14 | Stop reason: SDUPTHER

## 2019-02-21 RX ORDER — AZITHROMYCIN 250 MG/1
TABLET, FILM COATED ORAL
Qty: 6 TAB | Refills: 0 | Status: SHIPPED | OUTPATIENT
Start: 2019-02-21 | End: 2019-02-26

## 2019-02-21 NOTE — PROGRESS NOTES
This is the Subsequent Medicare Annual Wellness Exam, performed 12 months or more after the Initial AWV or the last Subsequent AWV I have reviewed the patient's medical history in detail and updated the computerized patient record. History Patient complains of cough for 2 days. Patient states she was in the ER 2 weeks ago with rule out of pulmonary embolism. She has not had a cough since but was diagnosed with emphysema at the time. She did take up of her albuterol inhaler last night which did help her. Denies any chest pain shortness of breath MUÑOZ currently Past Medical History:  
Diagnosis Date  Autoimmune disease ([address] FIBROMYALGIA  Beta-blocker therapy  CAD (coronary artery disease) 1999 MI >> stent x3, cath 2010: [place]  Chronic pain   
 back pain  Delayed gastric emptying  Depression  Depression with anxiety  Dyspepsia and other specified disorders of function of stomach  GERD (gastroesophageal reflux disease)  Hypercholesterolemia  Hypertension  JORDAN on CPAP   
 DOES NOT USE CPAP Past Surgical History:  
Procedure Laterality Date [place] STENTS  
 HX CATARACT REMOVAL    
 HX CORONARY STENT PLACEMENT    
 HX DILATION AND CURETTAGE    
 HX HEENT    
 ORAL SX  
 HX LUMBAR LAMINECTOMY  12/2012 Dr. Jace Sutton  HX ORTHOPAEDIC  2012  
 spinal fusion  HX OTHER SURGICAL BIRTH DONOVAN EXCISED  HX OTHER SURGICAL    
 RECTAL SX TWICE; ABCESS; FISTULA  HX SMALL BOWEL RESECTION  10/31/14 Obstruction, Dr. Lucio Georges, 25 in removed  HX TONSILLECTOMY Current Outpatient Medications Medication Sig Dispense Refill  DULoxetine (CYMBALTA) 60 mg capsule TAKE ONE CAPSULE BY MOUTH DAILY 90 Cap 3  
 albuterol (PROVENTIL HFA, VENTOLIN HFA, PROAIR HFA) 90 mcg/actuation inhaler Take 2 Puffs by inhalation every four (4) hours as needed for Wheezing.  1 Inhaler 0  
  inhalational spacing device 1 Each by Does Not Apply route as needed. 1 Device 0  
 pravastatin (PRAVACHOL) 40 mg tablet TAKE ONE TABLET BY MOUTH EVERY EVENING 90 Tab 2  
 valsartan (DIOVAN) 160 mg tablet Take 1 Tab by mouth daily. 90 Tab 4  
 HYDROcodone-acetaminophen (NORCO)  mg tablet Take 1 Tab by mouth every six (6) hours as needed for Pain. Max Daily Amount: 4 Tabs. 10 Tab 0  
 polyethylene glycol (MIRALAX) 17 gram packet 1 packet as needed daily. No more than three times a week 24 Packet 3  
 omeprazole (PRILOSEC) 20 mg capsule TAKE ONE CAPSULE BY MOUTH TWICE A  Cap 1  
 bumetanide (BUMEX) 2 mg tablet 2 mg.  cyclobenzaprine (FLEXERIL) 10 mg tablet 10 mg two (2) times a day.  LYRICA 225 mg capsule 225 mg two (2) times a day.  Omega-3 Fatty Acids (FISH OIL) 300 mg cap Take 1 Tab by mouth.  Walker (ULTRA-LIGHT ROLLATOR) misc Walker with seat 1 Each 0  
 melatonin 5 mg cap capsule Take 10 mg by mouth nightly.  cloNIDine HCl (CATAPRES) 0.2 mg tablet Take 0.2 mg by mouth two (2) times a day.  multivitamin with iron tablet Take 1 Tab by mouth daily.  B.infantis-B.ani-B.long-B.bifi 10-15 mg TbEC Take 1 Tab by mouth daily as needed.  cetirizine (ZYRTEC) 10 mg tablet Take 10 mg by mouth daily.  metoprolol (LOPRESSOR) 100 mg tablet Take 1 Tab by mouth two (2) times a day. 180 Tab 1  
 Calcium Carbonate-Vit D3-Min 600 mg calcium- 400 unit Tab Take 1 Tab by mouth daily.  docusate sodium (COLACE) 100 mg capsule Take 100 mg by mouth daily as needed.  DOMPERIDONE, BULK, Take 10 mg by mouth two (2) times a day.  aspirin 81 mg tablet Take 81 mg by mouth. No Known Allergies Family History Problem Relation Age of Onset  Hypertension Mother  Heart Disease Mother  Heart Disease Father  Stroke Father  Heart Disease Brother  Hypertension Brother  Depression Maternal Aunt  Depression Maternal Uncle  Depression Maternal Grandmother  Depression Maternal Grandfather  Anesth Problems Neg Hx Social History Tobacco Use  Smoking status: Current Every Day Smoker Packs/day: 1.00 Types: Cigarettes Last attempt to quit: 2006 Years since quittin.1  Smokeless tobacco: Never Used Substance Use Topics  Alcohol use: Yes Alcohol/week: 1.8 oz Types: 3 Standard drinks or equivalent per week Comment: OCCASIONALLY Patient Active Problem List  
Diagnosis Code  GERD (gastroesophageal reflux disease) K21.9  CAD (coronary artery disease) I25.10  Chronic back pain M54.9, G89.29  
 Anxiety and depression F41.9, F32.9  Spinal stenosis, lumbar region, with neurogenic claudication M48.062  Gastroparesis K31.84  
 S/P colon resection Z90.49  Dextroscoliosis M41.80  Bilateral leg edema R60.0  Benign essential hypertension I10  
 Mild obstructive sleep apnea G47.33  
 Osteopenia M85.80  Persistent disorder of initiating or maintaining sleep G47.00  Hypercholesteremia E78.00  Chronic neck and back pain M54.2, M54.9, G89.29  
 Iron deficiency anemia D50.9  Chronically on opiate therapy Z79.891  Left supracondylar humerus fracture, closed, initial encounter S42.412A Depression Risk Factor Screening:  
 
3 most recent PHQ Screens 10/2/2018 Little interest or pleasure in doing things Not at all Feeling down, depressed, irritable, or hopeless Nearly every day Total Score PHQ 2 3 Alcohol Risk Factor Screening: On any occasion in the past three months you have had more than 3 drinks containing alcohol. Functional Ability and Level of Safety:  
Hearing Loss Hearing is good. Activities of Daily Living The home contains: handrails, grab bars and shower bench, raised toilet seat Patient does total self care Fall Risk Fall Risk Assessment, last 12 mths 2019 Able to walk? Yes Fall in past 12 months?  Yes  
 Fall with injury? Yes  
Number of falls in past 12 months 1 Fall Risk Score 2 Abuse Screen Patient is not abused Cognitive Screening Evaluation of Cognitive Function: 
Has your family/caregiver stated any concerns about your memory: no 
Normal 
 
Patient Care Team  
Patient Care Team: 
Tara Mai MD as PCP - [facility]) Rudy Gomes MD (Cardiology) Ha Samano MD (Neurosurgery) Royce Rutherford DPM (Podiatry) Yenny Kruger MD (Anesthesiology) Feliz Shepard MD (Psychiatry) Assessment/Plan Education and counseling provided: 
Are appropriate based on today's review and evaluation Diagnoses and all orders for this visit: 
 
1. Medicare annual wellness visit, subsequent 2. Screening for alcoholism -     [place] ANNUAL ALCOHOL SCREEN 15 MIN 3. Screening for depression 
-     [facility] 4. Screening for ischemic heart disease -     LIPID PANEL 5. Cough -     XR CHEST PA LAT; Future 6. Mood disorder ([address] 7. Pulmonary emphysema, unspecified emphysema type ([address] Other orders 
-     azithromycin (ZITHROMAX) 250 mg tablet; Take 2 tablets today, then take 1 tablet daily 
-     methylPREDNISolone (MEDROL DOSEPACK) 4 mg tablet; Take as directed Health Maintenance Due Topic Date Due  GLAUCOMA SCREENING Q2Y  05/15/2007  MEDICARE YEARLY EXAM  12/14/2018

## 2019-02-21 NOTE — PATIENT INSTRUCTIONS
Medicare Wellness Visit, Female The best way to live healthy is to have a lifestyle where you eat a well-balanced diet, exercise regularly, limit alcohol use, and quit all forms of tobacco/nicotine, if applicable. Regular preventive services are another way to keep healthy. Preventive services (vaccines, screening tests, monitoring & exams) can help personalize your care plan, which helps you manage your own care. Screening tests can find health problems at the earliest stages, when they are easiest to treat. Giorgio Kohli follows the current, evidence-based guidelines published by the [facility] Oniel Jj ([facility]) when recommending preventive services for our patients. Because we follow these guidelines, sometimes recommendations change over time as research supports it. (For example, mammograms used to be recommended annually. Even though Medicare will still pay for an annual mammogram, the newer guidelines recommend a mammogram every two years for women of average risk.) Of course, you and your doctor may decide to screen more often for some diseases, based on your risk and your health status. Preventive services for you include: - Medicare offers their members a free annual wellness visit, which is time for you and your primary care provider to discuss and plan for your preventive service needs. Take advantage of this benefit every year! 
-All adults over the age of 72 should receive the recommended pneumonia vaccines. Current USPSTF guidelines recommend a series of two vaccines for the best pneumonia protection.  
-All adults should have a flu vaccine yearly and a tetanus vaccine every 10 years. All adults age 61 and older should receive a shingles vaccine once in their lifetime.   
-A bone mass density test is recommended when a woman turns 65 to screen for osteoporosis. This test is only recommended one time, as a screening. Some providers will use this same test as a disease monitoring tool if you already have osteoporosis. -All adults age 38-68 who are overweight should have a diabetes screening test once every three years.  
-Other screening tests and preventive services for persons with diabetes include: an eye exam to screen for diabetic retinopathy, a kidney function test, a foot exam, and stricter control over your cholesterol.  
-Cardiovascular screening for adults with routine risk involves an electrocardiogram (ECG) at intervals determined by your doctor.  
-Colorectal cancer screenings should be done for adults age 54-65 with no increased risk factors for colorectal cancer. There are a number of acceptable methods of screening for this type of cancer. Each test has its own benefits and drawbacks. Discuss with your doctor what is most appropriate for you during your annual wellness visit. The different tests include: colonoscopy (considered the best screening method), a fecal occult blood test, a fecal DNA test, and sigmoidoscopy. -Breast cancer screenings are recommended every other year for women of normal risk, age 54-69. 
-Cervical cancer screenings for women over age 72 are only recommended with certain risk factors.  
-All adults born between [facility] should be screened once for Hepatitis C. Here is a list of your current Health Maintenance items (your personalized list of preventive services) with a due date: 
Health Maintenance Due Topic Date Due  Glaucoma Screening   05/15/2007 Kadie Annual Well Visit  12/14/2018

## 2019-02-21 NOTE — ACP (ADVANCE CARE PLANNING)
Advance Care Planning (ACP) Provider Conversation Snapshot    Date of ACP Conversation: 02/21/19  Persons included in Conversation:  patient  Length of ACP Conversation in minutes:  <16 minutes (Non-Billable)    Authorized Decision Maker (if patient is incapable of making informed decisions): This person is:   Healthcare Agent/Medical Power of  under Advance Directive            For Patients with Decision Making Capacity:   Values/Goals: Exploration of values, goals, and preferences if recovery is not expected, even with continued medical treatment in the event of:  Imminent death  Severe, permanent brain injury  \"In these circumstances, what matters most to you? \"  Care focused more on comfort or quality of life. Care focused more on quantity (length) of life.     Conversation Outcomes / Follow-Up Plan:   Recommended completion of Advance Directive form after review of ACP materials and conversation with prospective healthcare agent

## 2019-02-22 LAB
CHOLEST SERPL-MCNC: 139 MG/DL (ref 100–199)
HDLC SERPL-MCNC: 35 MG/DL
LDLC SERPL CALC-MCNC: 69 MG/DL (ref 0–99)
TRIGL SERPL-MCNC: 176 MG/DL (ref 0–149)
VLDLC SERPL CALC-MCNC: 35 MG/DL (ref 5–40)

## 2019-02-22 NOTE — ANCILLARY DISCHARGE INSTRUCTIONS
[facility] Physical Therapy [address] [address], [address] [place], [address] [place] [address] Phone: [phone] Fax: [phone] Discharge Summary 2-15 Patient name: Braeden John  : 1940  Provider#: [id number] Referral source: Mckayla Scott MD     
Medical/Treatment Diagnosis: Left arm pain [M79.602] Prior Hospitalization: see medical history Comorbidities: GERD, CAD, chronic back pain, anxiety and depression, lumbar spinal stenosis with neurogenic, gastroparesis, s/p colon resection, dextroscoliosis, bilateral leg edema, HTN, mild obstructive sleep apnea, osteopenia, persistent disorder of initiating or maintaining sleep, hypercholesteremia, chronic neck and back pain, iron deficiency anemia, chronically on opiate therapy, left supracondylar fracture- closed, toxic metabolic encephalopathy, acute renal failure, opioid abuse with intoxication delirium Prior Level of Function:chronic pain; uses rollator or SPC for ambulation Medications: Verified on Patient Summary List 
 
Start of Care: 2018      Onset Date:2018 Visits from Start of Care: 23     Missed Visits: 0 Reporting Period : 2018 to 2019 Progress towards goals / Updated goals: 
Short Term Goals: To be accomplished in 4 weeks: 
1) Pt will be independent with HEP. MET 
2) Pt will demonstrate improved left wrist and elbow ROM by at least 8 degrees, in all directions. MET 
  
Long Term Goals: To be accomplished in 8 weeks: 
1) Pt will be able to use left arm to perform daily activities, included grooming and household chores.  MET 
2) Pt will demonstrate  strength >/= 80% of uninvolved side. MET 
3) Pt will be able to drive car without pain.  NOT MET (pain if hands are held at \"10 and 2\"; otherwise, no pain)  
  
Assessment/Summary of care: Pt participated in 23 outpatient PT sessions, 2018-2019, s/p ORIF left supracondylar humerus fracture, 09/17/2018, due to a fall at home on 09/11/2018. Treatment included therapeutic exercise, manual therapy, moist hot pack, pt education and home exercise program.  Pt progressed well with PT, with improved left UE ROM and functional use. Pt met all goals, except one. Recommend that pt transition to independence with HEP and that pt is discharged at this time. RECOMMENDATIONS: 
[x]Discontinue therapy: [x]Patient has reached or is progressing toward set goals []Patient is non-compliant or has abdicated 
   []Due to lack of appreciable progress towards set goals Jovana Nogueira, PT , DPT  2/22/2019

## 2019-02-22 NOTE — PROGRESS NOTES
[facility] Physical Therapy [address] [address], [address] [place], [facility] Jodi Rangel Phone: [phone] Fax: [phone] Continued Plan of Care/ Re-certification for Physical Therapy Services Delphine Lazomax1940 Bruna June MD   Provider # [id number] Diagnosis: Left arm pain [M79.602] Onset Date: 09/17/2018 Prior Hospitalization: see chart Visits from Start of Care: 15 Missed Visits: 0 Start of Care: 11/06/2018 Prior Level of Function: chronic pain, usees rollator or SPC for ambulation The Plan of Care and following information is based on the patient's current status: 
 
Key functional changes: Pt reports trying to use her left UE more with functional activities. Pt's primary complaint is LBP, which is a chronic issue. Problems/ barriers to goal attainment: chronic pain, smoker Problem List: pain affecting function, decrease ROM, decrease strength, edema affecting function, decrease ADL/ functional abilitiies, decrease activity tolerance, decrease flexibility/ joint mobility and decrease transfer abilities Treatment Plan: Therapeutic exercise, Therapeutic activities, Neuromuscular re-education, Physical agent/modality, Manual therapy and Patient education Updated Goals: continuation of previously established long-term PT goals. All short-term goals have been met. Goals for this certification period to be accomplished in 6 weeks: 
Progress towards goals / Updated goals: 
Short Term Goals: To be accomplished in 4 weeks: 
1) Pt will be independent with HEP. MET 
2) Pt will demonstrate improved left wrist and elbow ROM by at least 8 degrees, in all directions. MET 
  
Long Term Goals: To be accomplished in 8 weeks: 
1) Pt will be able to use left arm to perform daily activities, included grooming and household chores. Progressing 2) Pt will demonstrate  strength >/= 80% of uninvolved side. Progressing 3) Pt will be able to drive car without pain. Progressing Frequency / Duration: Patient to be seen 2 times per week for 6 weeks: 
Assessment / Recommendations:Pt has participated in 15 outpatient PT sessions, 11/06/2018-01/08/2019,   s/p EDUARD left supracondylar humerus arun, 09/17/2018, due to fall at home on 09/11/2018. Pt fatigued quickly with left UE strengthening exercises but denies increase in pain. Patient will continue to benefit from skilled PT services to modify and progress therapeutic interventions, address functional mobility deficits, address ROM deficits, address strength deficits, analyze and address soft tissue restrictions, analyze and cue movement patterns, analyze and modify body mechanics/ergonomics and assess and modify postural abnormalities to attain remaining goals. Certification Period: 01/08/2019-04/08/219 Cheyenne Bermudez PT, DPT  2/22/2019 
 
________________________________________________________________________ I certify that the above Therapy Services are being furnished while the patient is under my care. I agree with the treatment plan and certify that this therapy is necessary. Y or N I have read the above and request that my patient continue as recommended. Y or N I have read the above report and request that my patient continue therapy with the following changes/special instructions Y or N I have read the above report and request that my patient be discharged from therapy [de-identified] Signature:_________________ Date:___________Time:__________

## 2019-05-14 ENCOUNTER — OFFICE VISIT (OUTPATIENT)
Dept: INTERNAL MEDICINE CLINIC | Age: 79
End: 2019-05-14

## 2019-05-14 VITALS
SYSTOLIC BLOOD PRESSURE: 126 MMHG | DIASTOLIC BLOOD PRESSURE: 74 MMHG | OXYGEN SATURATION: 97 % | BODY MASS INDEX: 32.32 KG/M2 | TEMPERATURE: 98.3 F | WEIGHT: 182.4 LBS | HEIGHT: 63 IN | HEART RATE: 71 BPM

## 2019-05-14 DIAGNOSIS — J40 BRONCHITIS: Primary | ICD-10-CM

## 2019-05-14 DIAGNOSIS — Z13.5 GLAUCOMA SCREENING: ICD-10-CM

## 2019-05-14 DIAGNOSIS — R61 EXCESS, SECRETION, SWEAT: ICD-10-CM

## 2019-05-14 RX ORDER — METHYLPREDNISOLONE 4 MG/1
TABLET ORAL
Qty: 1 DOSE PACK | Refills: 0 | Status: SHIPPED | OUTPATIENT
Start: 2019-05-14 | End: 2020-11-13

## 2019-05-14 NOTE — PATIENT INSTRUCTIONS
Viral Respiratory Infection: Care Instructions Your Care Instructions Viruses are very small organisms. They grow in number after they enter your body. There are many types that cause different illnesses, such as colds and the mumps. The symptoms of a viral respiratory infection often start quickly. They include a fever, sore throat, and runny nose. You may also just not feel well. Or you may not want to eat much. Most viral respiratory infections are not serious. They usually get better with time and self-care. Antibiotics are not used to treat a viral infection. That's because antibiotics will not help cure a viral illness. In some cases, antiviral medicine can help your body fight a serious viral infection. Follow-up care is a key part of your treatment and safety. Be sure to make and go to all appointments, and call your doctor if you are having problems. It's also a good idea to know your test results and keep a list of the medicines you take. How can you care for yourself at home? · Rest as much as possible until you feel better. · Be safe with medicines. Take your medicine exactly as prescribed. Call your doctor if you think you are having a problem with your medicine. You will get more details on the specific medicine your doctor prescribes. · Take an over-the-counter pain medicine, such as acetaminophen (Tylenol), ibuprofen (Advil, Motrin), or naproxen (Aleve), as needed for pain and fever. Read and follow all instructions on the label. Do not give aspirin to anyone younger than 20. It has been linked to Reye syndrome, a serious illness. · Drink plenty of fluids, enough so that your urine is light yellow or clear like water. Hot fluids, such as tea or soup, may help relieve congestion in your nose and throat. If you have kidney, heart, or liver disease and have to limit fluids, talk with your doctor before you increase the amount of fluids you drink. · Try to clear mucus from your lungs by breathing deeply and coughing. · Gargle with warm salt water once an hour. This can help reduce swelling and throat pain. Use 1 teaspoon of salt mixed in 1 cup of warm water. · Do not smoke or allow others to smoke around you. If you need help quitting, talk to your doctor about stop-smoking programs and medicines. These can increase your chances of quitting for good. To avoid spreading the virus · Cough or sneeze into a tissue. Then throw the tissue away. · If you don't have a tissue, use your hand to cover your cough or sneeze. Then clean your hand. You can also cough into your sleeve. · Wash your hands often. Use soap and warm water. Wash for 15 to 20 seconds each time. · If you don't have soap and water near you, you can clean your hands with alcohol wipes or gel. When should you call for help? Call your doctor now or seek immediate medical care if: 
  · You have a new or higher fever.  
  · Your fever lasts more than 48 hours.  
  · You have trouble breathing.  
  · You have a fever with a stiff neck or a severe headache.  
  · You are sensitive to light.  
  · You feel very sleepy or confused.  
 Watch closely for changes in your health, and be sure to contact your doctor if: 
  · You do not get better as expected. Where can you learn more? Go to http://boo-blanca.info/. Enter N380 in the search box to learn more about \"Viral Respiratory Infection: Care Instructions. \" Current as of: September 5, 2018 Content Version: 11.9 © 6272-6613 [employer]. Care instructions adapted under license by [employer] (which disclaims liability or warranty for this information). If you have questions about a medical condition or this instruction, always ask your healthcare professional. [address] any warranty or liability for your use of this information.

## 2019-05-14 NOTE — PROGRESS NOTES
Chief Complaint Patient presents with  Diabetes  
 
she is a 70y.o. year old female who presents for evaluation of cough. Patient states she had a cough for about 1-1/2 weeks. Denies any sputum production but has been using her albuterol inhaler with minimal relief. Patient states that she was with her grandson about 2 weeks ago and believes that she may have caught something from him. Denies any fever chills but has had some cold sweats. Reviewed and agree with Nurse Note and duplicated in this note. Reviewed PmHx, RxHx, FmHx, SocHx, AllgHx and updated and dated in the chart. Family History Problem Relation Age of Onset  Hypertension Mother  Heart Disease Mother  Heart Disease Father  Stroke Father  Heart Disease Brother  Hypertension Brother  Depression Maternal Aunt  Depression Maternal Uncle  Depression Maternal Grandmother  Depression Maternal Grandfather  Anesth Problems Neg Hx Past Medical History:  
Diagnosis Date  Autoimmune disease ([facility] [address] FIBROMYALGIA  Beta-blocker therapy  CAD (coronary artery disease)  [place] >> stent x3, cath 2010: [place]  Chronic pain   
 back pain  Delayed gastric emptying  Depression  Depression with anxiety  Dyspepsia and other specified disorders of function of stomach  GERD (gastroesophageal reflux disease)  Hypercholesterolemia  Hypertension  JORDAN on CPAP   
 DOES NOT USE CPAP Social History Socioeconomic History  Marital status:  Spouse name: Not on file  Number of children: Not on file  Years of education: Not on file  Highest education level: Not on file Tobacco Use  Smoking status: Current Every Day Smoker Packs/day: 1.00 Types: Cigarettes Last attempt to quit: 2006 Years since quittin.3  Smokeless tobacco: Never Used Substance and Sexual Activity  Alcohol use: Yes   Alcohol/week: 1.8 oz  
 Types: 3 Standard drinks or equivalent per week Comment: OCCASIONALLY  Drug use: No  
 Sexual activity: Not Currently Social History Narrative Julia lives independently. One daughter is nurse at [employer], one daughter  at [employer]. Review of Systems - negative except as listed above Objective:  
 
Vitals:  
 05/14/19 1422 BP: 152/82 Pulse: 73 Temp: 98.3 °F (36.8 °C) SpO2: 97% Weight: 182 lb 6.4 oz (82.7 kg) Height: 5' 3\" (1.6 m) Physical Examination: General appearance - alert, well appearing, and in no distress Eyes - pupils equal and reactive, extraocular eye movements intact Ears - bilateral TM's and external ear canals normal 
Nose - normal and patent, no erythema, discharge or polyps Mouth - mucous membranes moist, pharynx normal without lesions Neck - supple, no significant adenopathy Chest - clear to auscultation, no wheezes, rales or rhonchi, symmetric air entry Heart - normal rate, regular rhythm, normal S1, S2, no murmurs, rubs, clicks or gallops Abdomen - soft, nontender, nondistended, no masses or organomegaly Back exam - full range of motion, no tenderness, palpable spasm or pain on motion Neurological - alert, oriented, normal speech, no focal findings or movement disorder noted Musculoskeletal - no joint tenderness, deformity or swelling Extremities - peripheral pulses normal, no pedal edema, no clubbing or cyanosis Skin - normal coloration and turgor, no rashes, no suspicious skin lesions noted Assessment/ Plan:  
Diagnoses and all orders for this visit: 1. Bronchitis -     XR CHEST PA LAT; Future 2. Excess, secretion, sweat -     QUANTIFERON-TB GOLD PLUS 
-     CBC W/O DIFF 
-     METABOLIC PANEL, COMPREHENSIVE Other orders 
-     methylPREDNISolone (MEDROL DOSEPACK) 4 mg tablet; Take as directed I have discussed the diagnosis with the patient and the intended plan as seen in the above orders. The patient has received an after-visit summary and questions were answered concerning future plans. Medication Side Effects and Warnings were discussed with patient, Patient Labs were reviewed and or requested, and 
Patient Past Records were reviewed and or requested  yes Pt agrees to call or return to clinic and/or go to closest ER with any worsening of symptoms. This may include, but not limited to increased fever (>100.4) with NSAIDS or Tylenol, increased edema, confusion, rash, worsening of presenting symptoms.

## 2019-05-16 LAB
ALBUMIN SERPL-MCNC: 4.3 G/DL (ref 3.5–4.8)
ALBUMIN/GLOB SERPL: 1.7 {RATIO} (ref 1.2–2.2)
ALP SERPL-CCNC: 102 IU/L (ref 39–117)
ALT SERPL-CCNC: 28 IU/L (ref 0–32)
AST SERPL-CCNC: 26 IU/L (ref 0–40)
BILIRUB SERPL-MCNC: 0.4 MG/DL (ref 0–1.2)
BUN SERPL-MCNC: 11 MG/DL (ref 8–27)
BUN/CREAT SERPL: 17 (ref 12–28)
CALCIUM SERPL-MCNC: 9.4 MG/DL (ref 8.7–10.3)
CHLORIDE SERPL-SCNC: 104 MMOL/L (ref 96–106)
CO2 SERPL-SCNC: 26 MMOL/L (ref 20–29)
CREAT SERPL-MCNC: 0.64 MG/DL (ref 0.57–1)
ERYTHROCYTE [DISTWIDTH] IN BLOOD BY AUTOMATED COUNT: 13.9 % (ref 12.3–15.4)
GLOBULIN SER CALC-MCNC: 2.6 G/DL (ref 1.5–4.5)
GLUCOSE SERPL-MCNC: 75 MG/DL (ref 65–99)
HCT VFR BLD AUTO: 41.7 % (ref 34–46.6)
HGB BLD-MCNC: 13.9 G/DL (ref 11.1–15.9)
MCH RBC QN AUTO: 32.1 PG (ref 26.6–33)
MCHC RBC AUTO-ENTMCNC: 33.3 G/DL (ref 31.5–35.7)
MCV RBC AUTO: 96 FL (ref 79–97)
PLATELET # BLD AUTO: 224 X10E3/UL (ref 150–379)
POTASSIUM SERPL-SCNC: 4.7 MMOL/L (ref 3.5–5.2)
PROT SERPL-MCNC: 6.9 G/DL (ref 6–8.5)
RBC # BLD AUTO: 4.33 X10E6/UL (ref 3.77–5.28)
SODIUM SERPL-SCNC: 145 MMOL/L (ref 134–144)
WBC # BLD AUTO: 8.9 X10E3/UL (ref 3.4–10.8)

## 2019-05-18 LAB
GAMMA INTERFERON BACKGROUND BLD IA-ACNC: 0.09 IU/ML
M TB IFN-G BLD-IMP: NEGATIVE
M TB IFN-G CD4+ BCKGRND COR BLD-ACNC: 0.07 IU/ML
MITOGEN IGNF BLD-ACNC: >10 IU/ML
QUANTIFERON INCUBATION, QF1T: NORMAL
QUANTIFERON TB2 AG: 0.05 IU/ML
SERVICE CMNT-IMP: NORMAL

## 2019-06-20 RX ORDER — OMEPRAZOLE 20 MG/1
CAPSULE, DELAYED RELEASE ORAL
Qty: 90 CAP | Refills: 0 | Status: SHIPPED | OUTPATIENT
Start: 2019-06-20 | End: 2019-09-18 | Stop reason: SDUPTHER

## 2019-06-20 NOTE — TELEPHONE ENCOUNTER
Last refill- 3/21/19  Last office visit - 2/21/19  Next office visit - No future appointments.       Requested Prescriptions     Pending Prescriptions Disp Refills    omeprazole (PRILOSEC) 20 mg capsule [Pharmacy Med Name: OMEPRAZOLE DR 20 MG CAPSULE] 90 Cap 0     Sig: TAKE ONE CAPSULE BY MOUTH DAILY

## 2019-09-19 RX ORDER — OMEPRAZOLE 20 MG/1
CAPSULE, DELAYED RELEASE ORAL
Qty: 90 CAP | Refills: 0 | Status: SHIPPED | OUTPATIENT
Start: 2019-09-19 | End: 2019-12-02 | Stop reason: SDUPTHER

## 2019-12-03 RX ORDER — OMEPRAZOLE 20 MG/1
CAPSULE, DELAYED RELEASE ORAL
Qty: 90 CAP | Refills: 0 | Status: SHIPPED | OUTPATIENT
Start: 2019-12-03 | End: 2020-02-27

## 2020-02-27 RX ORDER — OMEPRAZOLE 20 MG/1
CAPSULE, DELAYED RELEASE ORAL
Qty: 90 CAP | Refills: 0 | Status: SHIPPED | OUTPATIENT
Start: 2020-02-27 | End: 2020-06-18

## 2020-06-18 RX ORDER — OMEPRAZOLE 20 MG/1
CAPSULE, DELAYED RELEASE ORAL
Qty: 90 CAP | Refills: 0 | Status: SHIPPED | OUTPATIENT
Start: 2020-06-18 | End: 2020-09-24

## 2020-07-06 ENCOUNTER — OFFICE VISIT (OUTPATIENT)
Dept: INTERNAL MEDICINE CLINIC | Age: 80
End: 2020-07-06

## 2020-07-06 VITALS
TEMPERATURE: 98.1 F | WEIGHT: 183.6 LBS | SYSTOLIC BLOOD PRESSURE: 172 MMHG | BODY MASS INDEX: 32.53 KG/M2 | DIASTOLIC BLOOD PRESSURE: 78 MMHG | RESPIRATION RATE: 14 BRPM | OXYGEN SATURATION: 95 % | HEART RATE: 57 BPM | HEIGHT: 63 IN

## 2020-07-06 DIAGNOSIS — F39 MOOD DISORDER (HCC): ICD-10-CM

## 2020-07-06 DIAGNOSIS — F32.A ANXIETY AND DEPRESSION: ICD-10-CM

## 2020-07-06 DIAGNOSIS — Z13.39 SCREENING FOR ALCOHOLISM: ICD-10-CM

## 2020-07-06 DIAGNOSIS — I25.10 CORONARY ARTERY DISEASE INVOLVING NATIVE CORONARY ARTERY OF NATIVE HEART WITHOUT ANGINA PECTORIS: ICD-10-CM

## 2020-07-06 DIAGNOSIS — I10 BENIGN ESSENTIAL HYPERTENSION: Primary | ICD-10-CM

## 2020-07-06 DIAGNOSIS — E78.00 HYPERCHOLESTEREMIA: ICD-10-CM

## 2020-07-06 DIAGNOSIS — Z00.00 MEDICARE ANNUAL WELLNESS VISIT, SUBSEQUENT: ICD-10-CM

## 2020-07-06 DIAGNOSIS — F41.9 ANXIETY AND DEPRESSION: ICD-10-CM

## 2020-07-06 DIAGNOSIS — D50.9 IRON DEFICIENCY ANEMIA, UNSPECIFIED IRON DEFICIENCY ANEMIA TYPE: ICD-10-CM

## 2020-07-06 DIAGNOSIS — Z13.6 SCREENING FOR ISCHEMIC HEART DISEASE: ICD-10-CM

## 2020-07-06 DIAGNOSIS — Z13.31 SCREENING FOR DEPRESSION: ICD-10-CM

## 2020-07-06 DIAGNOSIS — M48.062 SPINAL STENOSIS, LUMBAR REGION, WITH NEUROGENIC CLAUDICATION: ICD-10-CM

## 2020-07-06 RX ORDER — TRIAMCINOLONE ACETONIDE 1 MG/G
OINTMENT TOPICAL 2 TIMES DAILY
Qty: 30 G | Refills: 0 | Status: SHIPPED | OUTPATIENT
Start: 2020-07-06

## 2020-07-06 NOTE — PATIENT INSTRUCTIONS
Medicare Wellness Visit, Female The best way to live healthy is to have a lifestyle where you eat a well-balanced diet, exercise regularly, limit alcohol use, and quit all forms of tobacco/nicotine, if applicable. Regular preventive services are another way to keep healthy. Preventive services (vaccines, screening tests, monitoring & exams) can help personalize your care plan, which helps you manage your own care. Screening tests can find health problems at the earliest stages, when they are easiest to treat. Yasmeenjennifer follows the current, evidence-based guidelines published by the [facility] Oniel Gardner ([facility]) when recommending preventive services for our patients. Because we follow these guidelines, sometimes recommendations change over time as research supports it. (For example, mammograms used to be recommended annually. Even though Medicare will still pay for an annual mammogram, the newer guidelines recommend a mammogram every two years for women of average risk). Of course, you and your doctor may decide to screen more often for some diseases, based on your risk and your co-morbidities (chronic disease you are already diagnosed with). Preventive services for you include: - Medicare offers their members a free annual wellness visit, which is time for you and your primary care provider to discuss and plan for your preventive service needs. Take advantage of this benefit every year! 
-All adults over the age of 72 should receive the recommended pneumonia vaccines. Current USPSTF guidelines recommend a series of two vaccines for the best pneumonia protection.  
-All adults should have a flu vaccine yearly and a tetanus vaccine every 10 years.  
-All adults age 48 and older should receive the shingles vaccines (series of two vaccines). -All adults age 38-68 who are overweight should have a diabetes screening test once every three years. -All adults born between [birth year] and 1963 should be screened once for Hepatitis C. 
-Other screening tests and preventive services for persons with diabetes include: an eye exam to screen for diabetic retinopathy, a kidney function test, a foot exam, and stricter control over your cholesterol.  
-Cardiovascular screening for adults with routine risk involves an electrocardiogram (ECG) at intervals determined by your doctor.  
-Colorectal cancer screenings should be done for adults age 54-65 with no increased risk factors for colorectal cancer. There are a number of acceptable methods of screening for this type of cancer. Each test has its own benefits and drawbacks. Discuss with your doctor what is most appropriate for you during your annual wellness visit. The different tests include: colonoscopy (considered the best screening method), a fecal occult blood test, a fecal DNA test, and sigmoidoscopy. 
 
-A bone mass density test is recommended when a woman turns 65 to screen for osteoporosis. This test is only recommended one time, as a screening. Some providers will use this same test as a disease monitoring tool if you already have osteoporosis. -Breast cancer screenings are recommended every other year for women of normal risk, age 54-69. 
-Cervical cancer screenings for women over age 72 are only recommended with certain risk factors. Here is a list of your current Health Maintenance items (your personalized list of preventive services) with a due date: 
Health Maintenance Due Topic Date Due  
 DTaP/Tdap/Td  (1 - Tdap) 05/15/1963  Shingles Vaccine (1 of 2) 05/15/1992  Glaucoma Screening   05/15/2007  Cholesterol Test   02/21/2020 Juana Morales Annual Well Visit  02/22/2020

## 2020-07-06 NOTE — PROGRESS NOTES
Chief Complaint   Patient presents with    Physical     she is a 68y.o. year old female who presents for evaluation of patient states that she went to go see her pain management specialist on Monday and had a disagreement with him. Patient states that he recommend that she see a psychiatrist as he cannot help her with her pain and psychiatric problems. Patient states that she does feel like she has more depression than normal due to the current duration of coronavirus. Patient states that she has no suicidal homicidal ideations and has been treated by psychiatry in the past.  Patient also states that she has had a rash on her right wrist where she wears a fit bit. This is been off about 1 week. This is itchy raised in nature with no pus or drainage. She has not tried any topical treatments for this yet    Reviewed and agree with Nurse Note and duplicated in this note. Reviewed PmHx, RxHx, FmHx, SocHx, AllgHx and updated and dated in the chart.     Family History   Problem Relation Age of Onset    Hypertension Mother     Heart Disease Mother     Heart Disease Father     Stroke Father     Heart Disease Brother     Hypertension Brother     Depression Maternal Aunt     Depression Maternal Uncle     Depression Maternal Grandmother     Depression Maternal Grandfather     Anesth Problems Neg Hx        Past Medical History:   Diagnosis Date    Autoimmune disease ([facility] [address]     FIBROMYALGIA    Beta-blocker therapy     CAD (coronary artery disease) 1999    MI >> stent x3, cath 2010: [place]    Chronic pain     back pain    Delayed gastric emptying     Depression     Depression with anxiety     Dyspepsia and other specified disorders of function of stomach     GERD (gastroesophageal reflux disease)     Hypercholesterolemia     Hypertension     JORDAN on CPAP     DOES NOT USE CPAP      Social History     Socioeconomic History    Marital status:      Spouse name: Not on file    Number of children: Not on file    Years of education: Not on file    Highest education level: Not on file   Tobacco Use    Smoking status: Current Every Day Smoker     Packs/day: 1.00     Types: Cigarettes     Last attempt to quit: 2006     Years since quittin.5    Smokeless tobacco: Never Used   Substance and Sexual Activity    Alcohol use: Yes     Alcohol/week: 3.0 standard drinks     Types: 3 Standard drinks or equivalent per week     Comment: OCCASIONALLY    Drug use: No    Sexual activity: Not Currently   Social History Narrative    Julia lives independently. One daughter is nurse at [employer], one daughter  at [employer].          Review of Systems - negative except as listed above      Objective:     Vitals:    20 1334   BP: 172/78   Pulse: (!) 57   Resp: 14   Temp: 98.1 °F (36.7 °C)   TempSrc: Oral   SpO2: 95%   Weight: 183 lb 9.6 oz (83.3 kg)   Height: 5' 3\" (1.6 m)       Physical Examination: General appearance - alert, well appearing, and in no distress  Eyes - pupils equal and reactive, extraocular eye movements intact  Ears - bilateral TM's and external ear canals normal  Nose - normal and patent, no erythema, discharge or polyps  Mouth - mucous membranes moist, pharynx normal without lesions  Neck - supple, no significant adenopathy  Chest - clear to auscultation, no wheezes, rales or rhonchi, symmetric air entry  Heart - normal rate, regular rhythm, normal S1, S2, no murmurs, rubs, clicks or gallops  Abdomen - soft, nontender, nondistended, no masses or organomegaly  Back exam - full range of motion, no tenderness, palpable spasm or pain on motion  Neurological - alert, oriented, normal speech, no focal findings or movement disorder noted  Musculoskeletal - no joint tenderness, deformity or swelling  Extremities - peripheral pulses normal, no pedal edema, no clubbing or cyanosis  Skin - normal coloration and turgor, no rashes, no suspicious skin lesions noted     Assessment/ Plan: Diagnoses and all orders for this visit:    1. Benign essential hypertension    2. Iron deficiency anemia, unspecified iron deficiency anemia type  -     CBC W/O DIFF    3. Hypercholesteremia  -     METABOLIC PANEL, COMPREHENSIVE    4. Coronary artery disease involving native coronary artery of native heart without angina pectoris    5. Spinal stenosis, lumbar region, with neurogenic claudication  -     REFERRAL TO PAIN MANAGEMENT    6. Medicare annual wellness visit, subsequent    7. Screening for depression  -     DEPRESSION SCREEN ANNUAL    8. Screening for alcoholism  -     [place] ANNUAL ALCOHOL SCREEN 15 MIN    9. Screening for ischemic heart disease  -     LIPID PANEL                I have discussed the diagnosis with the patient and the intended plan as seen in the above orders. The patient has received an after-visit summary and questions were answered concerning future plans. Medication Side Effects and Warnings were discussed with patient,  Patient Labs were reviewed and or requested, and  Patient Past Records were reviewed and or requested  yes       Pt agrees to call or return to clinic and/or go to closest ER with any worsening of symptoms. This may include, but not limited to increased fever (>100.4) with NSAIDS or Tylenol, increased edema, confusion, rash, worsening of presenting symptoms. Please note that this dictation was completed with archify, the computer voice recognition software. Quite often unanticipated grammatical, syntax, homophones, and other interpretive errors are inadvertently transcribed by the computer software. Please disregard these errors. Please excuse any errors that have escaped final proofreading. Thank you.

## 2020-07-07 LAB
ALBUMIN SERPL-MCNC: 4.4 G/DL (ref 3.7–4.7)
ALBUMIN/GLOB SERPL: 1.9 {RATIO} (ref 1.2–2.2)
ALP SERPL-CCNC: 81 IU/L (ref 39–117)
ALT SERPL-CCNC: 26 IU/L (ref 0–32)
AST SERPL-CCNC: 28 IU/L (ref 0–40)
BILIRUB SERPL-MCNC: 0.5 MG/DL (ref 0–1.2)
BUN SERPL-MCNC: 17 MG/DL (ref 8–27)
BUN/CREAT SERPL: 20 (ref 12–28)
CALCIUM SERPL-MCNC: 9.2 MG/DL (ref 8.7–10.3)
CHLORIDE SERPL-SCNC: 102 MMOL/L (ref 96–106)
CHOLEST SERPL-MCNC: 175 MG/DL (ref 100–199)
CO2 SERPL-SCNC: 25 MMOL/L (ref 20–29)
CREAT SERPL-MCNC: 0.85 MG/DL (ref 0.57–1)
ERYTHROCYTE [DISTWIDTH] IN BLOOD BY AUTOMATED COUNT: 13.1 % (ref 11.7–15.4)
GLOBULIN SER CALC-MCNC: 2.3 G/DL (ref 1.5–4.5)
GLUCOSE SERPL-MCNC: 76 MG/DL (ref 65–99)
HCT VFR BLD AUTO: 44 % (ref 34–46.6)
HDLC SERPL-MCNC: 41 MG/DL
HGB BLD-MCNC: 14.8 G/DL (ref 11.1–15.9)
LDLC SERPL CALC-MCNC: 67 MG/DL (ref 0–99)
MCH RBC QN AUTO: 32.1 PG (ref 26.6–33)
MCHC RBC AUTO-ENTMCNC: 33.6 G/DL (ref 31.5–35.7)
MCV RBC AUTO: 95 FL (ref 79–97)
PLATELET # BLD AUTO: 142 X10E3/UL (ref 150–450)
POTASSIUM SERPL-SCNC: 4.1 MMOL/L (ref 3.5–5.2)
PROT SERPL-MCNC: 6.7 G/DL (ref 6–8.5)
RBC # BLD AUTO: 4.61 X10E6/UL (ref 3.77–5.28)
SODIUM SERPL-SCNC: 143 MMOL/L (ref 134–144)
TRIGL SERPL-MCNC: 334 MG/DL (ref 0–149)
VLDLC SERPL CALC-MCNC: 67 MG/DL (ref 5–40)
WBC # BLD AUTO: 8.6 X10E3/UL (ref 3.4–10.8)

## 2020-09-24 RX ORDER — OMEPRAZOLE 20 MG/1
CAPSULE, DELAYED RELEASE ORAL
Qty: 90 CAP | Refills: 0 | Status: SHIPPED | OUTPATIENT
Start: 2020-09-24 | End: 2020-11-17

## 2020-10-12 ENCOUNTER — OFFICE VISIT (OUTPATIENT)
Dept: INTERNAL MEDICINE CLINIC | Age: 80
End: 2020-10-12
Payer: MEDICARE

## 2020-10-12 VITALS
TEMPERATURE: 97.9 F | HEART RATE: 58 BPM | DIASTOLIC BLOOD PRESSURE: 63 MMHG | WEIGHT: 183.9 LBS | OXYGEN SATURATION: 94 % | BODY MASS INDEX: 32.59 KG/M2 | SYSTOLIC BLOOD PRESSURE: 106 MMHG | HEIGHT: 63 IN | RESPIRATION RATE: 14 BRPM

## 2020-10-12 DIAGNOSIS — E78.00 HYPERCHOLESTEREMIA: Primary | ICD-10-CM

## 2020-10-12 DIAGNOSIS — R61 EXCESSIVE SWEATING: ICD-10-CM

## 2020-10-12 DIAGNOSIS — Z79.899 MEDICATION MANAGEMENT: ICD-10-CM

## 2020-10-12 DIAGNOSIS — L82.1 SK (SEBORRHEIC KERATOSIS): ICD-10-CM

## 2020-10-12 DIAGNOSIS — Z23 NEEDS FLU SHOT: ICD-10-CM

## 2020-10-12 PROCEDURE — 1090F PRES/ABSN URINE INCON ASSESS: CPT | Performed by: FAMILY MEDICINE

## 2020-10-12 PROCEDURE — G8427 DOCREV CUR MEDS BY ELIG CLIN: HCPCS | Performed by: FAMILY MEDICINE

## 2020-10-12 PROCEDURE — G8752 SYS BP LESS 140: HCPCS | Performed by: FAMILY MEDICINE

## 2020-10-12 PROCEDURE — G8417 CALC BMI ABV UP PARAM F/U: HCPCS | Performed by: FAMILY MEDICINE

## 2020-10-12 PROCEDURE — G9717 DOC PT DX DEP/BP F/U NT REQ: HCPCS | Performed by: FAMILY MEDICINE

## 2020-10-12 PROCEDURE — 99214 OFFICE O/P EST MOD 30 MIN: CPT | Performed by: FAMILY MEDICINE

## 2020-10-12 PROCEDURE — G8754 DIAS BP LESS 90: HCPCS | Performed by: FAMILY MEDICINE

## 2020-10-12 PROCEDURE — G8536 NO DOC ELDER MAL SCRN: HCPCS | Performed by: FAMILY MEDICINE

## 2020-10-12 PROCEDURE — G8399 PT W/DXA RESULTS DOCUMENT: HCPCS | Performed by: FAMILY MEDICINE

## 2020-10-12 PROCEDURE — 1101F PT FALLS ASSESS-DOCD LE1/YR: CPT | Performed by: FAMILY MEDICINE

## 2020-10-12 RX ORDER — PROMETHAZINE HYDROCHLORIDE 12.5 MG/1
TABLET ORAL
COMMUNITY

## 2020-10-12 NOTE — PROGRESS NOTES
Chief Complaint   Patient presents with    Mole    Excessive Sweating     she is a 68y.o. year old female who presents for evaluation of moles on back and sweating. Patient states that she recently had an epidural in the physician noted many moles on her back that he recommended getting seen for. Patient states that she does have sun exposure history and no cancer history. Patient also states that she has had on and off sweating over the last few years. She had a work-up done but that has been negative. She has not seen a specialist for her swelling. Swelling occurs all the time. Patient is also following up on cholesterol. States she is taking her medication as directed and is due for her follow-up as it was slightly elevated last time. Denies any side effects from medication  Patient also states that her GI doctor recommends that she get an EKG due to medication that she is taking. She has no cardiac complaints today. Reviewed and agree with Nurse Note and duplicated in this note. Reviewed PmHx, RxHx, FmHx, SocHx, AllgHx and updated and dated in the chart.     Family History   Problem Relation Age of Onset    Hypertension Mother     Heart Disease Mother     Heart Disease Father     Stroke Father     Heart Disease Brother     Hypertension Brother     Depression Maternal Aunt     Depression Maternal Uncle     Depression Maternal Grandmother     Depression Maternal Grandfather     Anesth Problems Neg Hx        Past Medical History:   Diagnosis Date    Autoimmune disease ([facility] [address]     FIBROMYALGIA    Beta-blocker therapy     CAD (coronary artery disease) 1999    MI >> stent x3, cath 2010: [place]    Chronic pain     back pain    Delayed gastric emptying     Depression     Depression with anxiety     Dyspepsia and other specified disorders of function of stomach     GERD (gastroesophageal reflux disease)     Hypercholesterolemia     Hypertension     JORDAN on CPAP     DOES NOT USE CPAP Social History     Socioeconomic History    Marital status:      Spouse name: Not on file    Number of children: Not on file    Years of education: Not on file    Highest education level: Not on file   Tobacco Use    Smoking status: Current Every Day Smoker     Packs/day: 1.00     Types: Cigarettes     Last attempt to quit: 2006     Years since quittin.7    Smokeless tobacco: Never Used   Substance and Sexual Activity    Alcohol use: Yes     Alcohol/week: 3.0 standard drinks     Types: 3 Standard drinks or equivalent per week     Comment: OCCASIONALLY    Drug use: No    Sexual activity: Not Currently   Social History Narrative    Julia lives independently. One daughter is nurse at [employer], one daughter  at [employer]. Review of Systems - negative except as listed above      Objective:     Vitals:    10/12/20 1413   BP: 106/63   Pulse: (!) 58   Resp: 14   Temp: 97.9 °F (36.6 °C)   TempSrc: Oral   SpO2: 94%   Weight: 183 lb 14.4 oz (83.4 kg)   Height: 5' 3\" (1.6 m)       Physical Examination: General appearance - alert, well appearing, and in no distress  Eyes - pupils equal and reactive, extraocular eye movements intact  Ears - bilateral TM's and external ear canals normal  Nose - normal and patent, no erythema, discharge or polyps  Mouth - mucous membranes moist, pharynx normal without lesions  Neck - supple, no significant adenopathy  Chest - clear to auscultation, no wheezes, rales or rhonchi, symmetric air entry  Heart - normal rate, regular rhythm, normal S1, S2, no murmurs, rubs, clicks or gallops  Abdomen - soft, nontender, nondistended, no masses or organomegaly  Musculoskeletal - no joint tenderness, deformity or swelling  Extremities - peripheral pulses normal, no pedal edema, no clubbing or cyanosis  Skin - normal coloration and turgor, no rashes, no suspicious skin lesions noted     Assessment/ Plan:   Diagnoses and all orders for this visit:    1. Hypercholesteremia  -     LIPID PANEL    2. Medication management  -     AMB POC EKG ROUTINE W/ 12 LEADS, INTER & REP    3. Excessive sweating  -     THYROID PANEL W/TSH  -     REFERRAL TO DERMATOLOGY    4. SK (seborrheic keratosis)  -     REFERRAL TO DERMATOLOGY           I have discussed the diagnosis with the patient and the intended plan as seen in the above orders. The patient has received an after-visit summary and questions were answered concerning future plans. Medication Side Effects and Warnings were discussed with patient,  Patient Labs were reviewed and or requested, and  Patient Past Records were reviewed and or requested  yes       Pt agrees to call or return to clinic and/or go to closest ER with any worsening of symptoms. This may include, but not limited to increased fever (>100.4) with NSAIDS or Tylenol, increased edema, confusion, rash, worsening of presenting symptoms. Please note that this dictation was completed with [employer]ix[employer], the computer voice recognition software. Quite often unanticipated grammatical, syntax, homophones, and other interpretive errors are inadvertently transcribed by the computer software. Please disregard these errors. Please excuse any errors that have escaped final proofreading. Thank you.

## 2020-10-13 DIAGNOSIS — E03.9 HYPOTHYROIDISM, UNSPECIFIED TYPE: Primary | ICD-10-CM

## 2020-10-13 LAB
CHOLEST SERPL-MCNC: 161 MG/DL (ref 100–199)
FT4I SERPL CALC-MCNC: 1.6 (ref 1.2–4.9)
HDLC SERPL-MCNC: 42 MG/DL
LDLC SERPL CALC-MCNC: 84 MG/DL (ref 0–99)
T3RU NFR SERPL: 27 % (ref 24–39)
T4 SERPL-MCNC: 5.9 UG/DL (ref 4.5–12)
TRIGL SERPL-MCNC: 207 MG/DL (ref 0–149)
TSH SERPL DL<=0.005 MIU/L-ACNC: 4.52 UIU/ML (ref 0.45–4.5)
VLDLC SERPL CALC-MCNC: 35 MG/DL (ref 5–40)

## 2020-10-19 PROCEDURE — 90694 VACC AIIV4 NO PRSRV 0.5ML IM: CPT | Performed by: FAMILY MEDICINE

## 2020-10-19 PROCEDURE — G0008 ADMIN INFLUENZA VIRUS VAC: HCPCS | Performed by: FAMILY MEDICINE

## 2020-10-19 NOTE — PATIENT INSTRUCTIONS
Vaccine Information Statement Influenza (Flu) Vaccine (Inactivated or Recombinant): What You Need to Know Many Vaccine Information Statements are available in [place]men and other languages. See www.immunize.org/vis Hojas de información sobre vacunas están disponibles en español y en muchos otros idiomas. Visite www.immunize.org/vis 1. Why get vaccinated? Influenza vaccine can prevent influenza (flu). Flu is a contagious disease that spreads around the [facility] every year, usually between October and May. Anyone can get the flu, but it is more dangerous for some people. Infants and young children, people 72years of age and older, pregnant women, and people with certain health conditions or a weakened immune system are at greatest risk of flu complications. Pneumonia, bronchitis, sinus infections and ear infections are examples of flu-related complications. If you have a medical condition, such as heart disease, cancer or diabetes, flu can make it worse. Flu can cause fever and chills, sore throat, muscle aches, fatigue, cough, headache, and runny or stuffy nose. Some people may have vomiting and diarrhea, though this is more common in children than adults. Each year thousands of people in the [facility] die from flu, and many more are hospitalized. Flu vaccine prevents millions of illnesses and flu-related visits to the doctor each year. 2. Influenza vaccines CDC recommends everyone 10months of age and older get vaccinated every flu season. Children 6 months through 6years of age may need 2 doses during a single flu season. Everyone else needs only 1 dose each flu season. It takes about 2 weeks for protection to develop after vaccination. There are many flu viruses, and they are always changing. Each year a new flu vaccine is made to protect against three or four viruses that are likely to cause disease in the upcoming flu season.  Even when the vaccine doesnt exactly match these viruses, it may still provide some protection. Influenza vaccine does not cause flu. Influenza vaccine may be given at the same time as other vaccines. 3. Talk with your health care provider Tell your vaccine provider if the person getting the vaccine: 
 Has had an allergic reaction after a previous dose of influenza vaccine, or has any severe, life-threatening allergies.  Has ever had Guillain-Barré Syndrome (also called GBS). In some cases, your health care provider may decide to postpone influenza vaccination to a future visit. People with minor illnesses, such as a cold, may be vaccinated. People who are moderately or severely ill should usually wait until they recover before getting influenza vaccine. Your health care provider can give you more information. 4. Risks of a reaction  Soreness, redness, and swelling where shot is given, fever, muscle aches, and headache can happen after influenza vaccine.  There may be a very small increased risk of Guillain-Barré Syndrome (GBS) after inactivated influenza vaccine (the flu shot). Nick Polite children who get the flu shot along with pneumococcal vaccine (PCV13), and/or DTaP vaccine at the same time might be slightly more likely to have a seizure caused by fever. Tell your health care provider if a child who is getting flu vaccine has ever had a seizure. People sometimes faint after medical procedures, including vaccination. Tell your provider if you feel dizzy or have vision changes or ringing in the ears. As with any medicine, there is a very remote chance of a vaccine causing a severe allergic reaction, other serious injury, or death. 5. What if there is a serious problem? An allergic reaction could occur after the vaccinated person leaves the clinic.  If you see signs of a severe allergic reaction (hives, swelling of the face and throat, difficulty breathing, a fast heartbeat, dizziness, or weakness), call 9-1-1 and get the person to the nearest hospital. 
 
For other signs that concern you, call your health care provider. Adverse reactions should be reported to the Vaccine Adverse Event Reporting System (VAERS). Your health care provider will usually file this report, or you can do it yourself. Visit the VAERS website at www.vaers. hhs.gov or call 9-367.786.3863. VAERS is only for reporting reactions, and VAERS staff do not give medical advice. 6. The National Vaccine Injury Compensation Program 
 
The [facility] Vaccine Injury Compensation Program (VICP) is a federal program that was created to compensate people who may have been injured by certain vaccines. Visit the VICP website at www.hrsa.gov/vaccinecompensation or call 6-638.158.9004 to learn about the program and about filing a claim. There is a time limit to file a claim for compensation. 7. How can I learn more?  Ask your health care provider.  Call your local or state health department.  Contact the Centers for Disease Control and Prevention (CDC): 
- Call 6-404.713.8561 (1-800-CDC-INFO) or 
- Visit CDCs influenza website at www.cdc.gov/flu Vaccine Information Statement (Interim) Inactivated Influenza Vaccine 8/15/2019 
42 MAGY Lao 797PT-63 Department of Health and [employer] Centers for Disease Control and Prevention Office Use Only

## 2020-11-13 ENCOUNTER — HOSPITAL ENCOUNTER (EMERGENCY)
Age: 80
Discharge: HOME | End: 2020-11-13
Attending: EMERGENCY MEDICINE | Admitting: EMERGENCY MEDICINE
Payer: MEDICARE

## 2020-11-13 VITALS
TEMPERATURE: 97.6 F | HEART RATE: 55 BPM | OXYGEN SATURATION: 99 % | SYSTOLIC BLOOD PRESSURE: 128 MMHG | RESPIRATION RATE: 18 BRPM | DIASTOLIC BLOOD PRESSURE: 55 MMHG

## 2020-11-13 DIAGNOSIS — M54.50 ACUTE MIDLINE LOW BACK PAIN WITHOUT SCIATICA: Primary | ICD-10-CM

## 2020-11-13 PROCEDURE — 99282 EMERGENCY DEPT VISIT SF MDM: CPT

## 2020-11-13 PROCEDURE — 74011250637 HC RX REV CODE- 250/637: Performed by: EMERGENCY MEDICINE

## 2020-11-13 RX ORDER — OXYCODONE AND ACETAMINOPHEN 5; 325 MG/1; MG/1
1 TABLET ORAL
Status: COMPLETED | OUTPATIENT
Start: 2020-11-13 | End: 2020-11-13

## 2020-11-13 RX ORDER — LIDOCAINE 4 G/100G
1 PATCH TOPICAL EVERY 24 HOURS
Qty: 7 PATCH | Refills: 0 | Status: SHIPPED | OUTPATIENT
Start: 2020-11-13 | End: 2020-11-20

## 2020-11-13 RX ADMIN — OXYCODONE HYDROCHLORIDE AND ACETAMINOPHEN 1 TABLET: 5; 325 TABLET ORAL at 19:34

## 2020-11-13 NOTE — ED TRIAGE NOTES
Triage Note:  Had a caudal procedure done on 11/10/2020. She had it at [address] [place] [address]. usually it doesn't hurt. Tuesday, and Wednesday didn't move as well. Heaviness in the abdomin and can't feel my feet. Able to walk with walker to the triage gipson.   Sent here for evaluation

## 2020-11-14 NOTE — ED PROVIDER NOTES
Lumbar spine injection 3 days ago by Dr. Lavonne Bhand[facility] ([facility] Injection was to treat chronic pain. Since injection, Pt reports increased pain at injection site. Pt was able to get home using walker and slept for 3 hours. This morning pt reporting tingling down both legs with numbness. No focal weakness. Decreased sensation in both feet, R worse than L. Never had reaction like this following injections in the past.  Has worsening fatigue and generalized weakness. No falls or injury. No fever. Takes baby asa but no other blood thinners. No h/o IVDA.            Past Medical History:   Diagnosis Date    Autoimmune disease ([facility] [address]     FIBROMYALGIA    Beta-blocker therapy     CAD (coronary artery disease) 1999    MI >> stent x3, cath 2010: [place]    Chronic pain     back pain    Delayed gastric emptying     Depression     Depression with anxiety     Dyspepsia and other specified disorders of function of stomach     GERD (gastroesophageal reflux disease)     Hypercholesterolemia     Hypertension     JORDAN on CPAP     DOES NOT USE CPAP       Past Surgical History:   Procedure Laterality Date    CARDIAC SURG PROCEDURE UNLIST  1999    STENTS    HX CATARACT REMOVAL      HX CORONARY STENT PLACEMENT      HX DILATION AND CURETTAGE      HX HEENT      ORAL SX    HX LUMBAR LAMINECTOMY  12/2012    [facility] HX ORTHOPAEDIC  2012    spinal fusion    HX OTHER SURGICAL      BIRTH DONOVAN EXCISED    HX OTHER SURGICAL      RECTAL SX TWICE; ABCESS; FISTULA    HX SMALL BOWEL RESECTION  10/31/14    Obstruction, Dr. Maria Luisa Beebe, 22 in removed    HX TONSILLECTOMY           Family History:   Problem Relation Age of Onset    Hypertension Mother     Heart Disease Mother     Heart Disease Father     Stroke Father     Heart Disease Brother     Hypertension Brother     Depression Maternal Aunt     Depression Maternal Uncle     Depression Maternal Grandmother     Depression Maternal Grandfather  Anesth Problems Neg Hx        Social History     Socioeconomic History    Marital status:      Spouse name: Not on file    Number of children: Not on file    Years of education: Not on file    Highest education level: Not on file   Occupational History    Not on file   Social Needs    Financial resource strain: Not on file    Food insecurity     Worry: Not on file     Inability: Not on file    Transportation needs     Medical: Not on file     Non-medical: Not on file   Tobacco Use    Smoking status: Current Every Day Smoker     Packs/day: 1.00     Types: Cigarettes     Last attempt to quit: 2006     Years since quittin.8    Smokeless tobacco: Never Used   Substance and Sexual Activity    Alcohol use: Yes     Alcohol/week: 3.0 standard drinks     Types: 3 Standard drinks or equivalent per week     Comment: OCCASIONALLY    Drug use: No    Sexual activity: Not Currently   Lifestyle    Physical activity     Days per week: Not on file     Minutes per session: Not on file    Stress: Not on file   Relationships    Social connections     Talks on phone: Not on file     Gets together: Not on file     Attends Scientologist service: Not on file     Active member of club or organization: Not on file     Attends meetings of clubs or organizations: Not on file     Relationship status: Not on file    Intimate partner violence     Fear of current or ex partner: Not on file     Emotionally abused: Not on file     Physically abused: Not on file     Forced sexual activity: Not on file   Other Topics Concern    Not on file   Social History Narrative    Julia lives independently. One daughter is nurse at [employer], [employer], one daughter  at [employer]. ALLERGIES: Patient has no known allergies. Review of Systems   Constitutional: Negative for fever. HENT: Negative for facial swelling. Eyes: Negative for visual disturbance. Respiratory: Negative for chest tightness. Cardiovascular: Negative for chest pain. Gastrointestinal: Negative for abdominal pain. Genitourinary: Negative for difficulty urinating and dysuria. Musculoskeletal: Negative for arthralgias. Skin: Negative for rash. Neurological: Negative for headaches. Hematological: Negative for adenopathy. Psychiatric/Behavioral: Negative for suicidal ideas. Vitals:    11/13/20 1819   BP: (!) 128/55   Pulse: (!) 55   Resp: 18   Temp: 97.6 °F (36.4 °C)   SpO2: 99%            Physical Exam  Vitals signs and nursing note reviewed. Constitutional:       General: She is not in acute distress. Appearance: She is well-developed. HENT:      Head: Normocephalic and atraumatic. Eyes:      General: No scleral icterus. Conjunctiva/sclera: Conjunctivae normal.      Pupils: Pupils are equal, round, and reactive to light. Neck:      Musculoskeletal: Normal range of motion and neck supple. Cardiovascular:      Rate and Rhythm: Normal rate. Heart sounds: No murmur. Pulmonary:      Effort: Pulmonary effort is normal. No respiratory distress. Abdominal:      General: There is no distension. Musculoskeletal: Normal range of motion. Skin:     General: Skin is warm and dry. Findings: No rash. Neurological:      General: No focal deficit present. Mental Status: She is alert and oriented to person, place, and time. Comments: Steady gait. Symmetric numbness to both lower extremities. Normal reflexes. MDM  Number of Diagnoses or Management Options  Acute midline low back pain without sciatica:   Diagnosis management comments: Pt with lumbar spine and leg symptoms following recent pain injection. Symptoms are consistent with side effects of this procedure and not highly concerning for acute cord compression or cauda equina. Pt ambulatory with steady gait. Generalized numbness and weakness with no specific neurologic distribution.   No fever or risk factors of epidural abscess. Pt unable to have MRI due to presence of spinal stimulator. Pt understands limitations of testing and is agreeable to discharge home to f/u with her pain specialist as needed or to [facility] if any of her symptoms worsen.            Procedures

## 2020-11-17 RX ORDER — OMEPRAZOLE 20 MG/1
CAPSULE, DELAYED RELEASE ORAL
Qty: 90 CAP | Refills: 0 | Status: SHIPPED | OUTPATIENT
Start: 2020-11-17 | End: 2021-02-15

## 2021-02-15 RX ORDER — OMEPRAZOLE 20 MG/1
CAPSULE, DELAYED RELEASE ORAL
Qty: 90 CAP | Refills: 0 | Status: SHIPPED | OUTPATIENT
Start: 2021-02-15 | End: 2021-02-25

## 2021-02-25 RX ORDER — OMEPRAZOLE 20 MG/1
CAPSULE, DELAYED RELEASE ORAL
Qty: 90 CAP | Refills: 0 | Status: SHIPPED | OUTPATIENT
Start: 2021-02-25 | End: 2021-08-20

## 2021-05-03 ENCOUNTER — APPOINTMENT (OUTPATIENT)
Dept: GENERAL RADIOLOGY | Age: 81
End: 2021-05-03
Attending: EMERGENCY MEDICINE
Payer: MEDICARE

## 2021-05-03 ENCOUNTER — HOSPITAL ENCOUNTER (EMERGENCY)
Age: 81
Discharge: HOME | End: 2021-05-03
Attending: EMERGENCY MEDICINE | Admitting: EMERGENCY MEDICINE
Payer: MEDICARE

## 2021-05-03 VITALS
BODY MASS INDEX: 32.78 KG/M2 | RESPIRATION RATE: 20 BRPM | OXYGEN SATURATION: 92 % | WEIGHT: 185 LBS | DIASTOLIC BLOOD PRESSURE: 71 MMHG | SYSTOLIC BLOOD PRESSURE: 110 MMHG | TEMPERATURE: 98.7 F | HEART RATE: 61 BPM | HEIGHT: 63 IN

## 2021-05-03 DIAGNOSIS — R00.2 PALPITATIONS: Primary | ICD-10-CM

## 2021-05-03 LAB
ALBUMIN SERPL-MCNC: 3.7 G/DL (ref 3.5–5)
ALBUMIN/GLOB SERPL: 1.1 {RATIO} (ref 1.1–2.2)
ALP SERPL-CCNC: 92 U/L (ref 45–117)
ALT SERPL-CCNC: 26 U/L (ref 12–78)
ANION GAP SERPL CALC-SCNC: 5 MMOL/L (ref 5–15)
AST SERPL-CCNC: 24 U/L (ref 15–37)
ATRIAL RATE: 77 BPM
BASOPHILS # BLD: 0.1 K/UL (ref 0–0.1)
BASOPHILS NFR BLD: 1 % (ref 0–1)
BILIRUB SERPL-MCNC: 0.6 MG/DL (ref 0.2–1)
BUN SERPL-MCNC: 11 MG/DL (ref 6–20)
BUN/CREAT SERPL: 16 (ref 12–20)
CALCIUM SERPL-MCNC: 8.9 MG/DL (ref 8.5–10.1)
CALCULATED P AXIS, ECG09: 16 DEGREES
CALCULATED R AXIS, ECG10: -29 DEGREES
CALCULATED T AXIS, ECG11: 148 DEGREES
CHLORIDE SERPL-SCNC: 108 MMOL/L (ref 97–108)
CO2 SERPL-SCNC: 28 MMOL/L (ref 21–32)
COMMENT, HOLDF: NORMAL
CREAT SERPL-MCNC: 0.69 MG/DL (ref 0.55–1.02)
DIAGNOSIS, 93000: NORMAL
DIFFERENTIAL METHOD BLD: ABNORMAL
EOSINOPHIL # BLD: 0.1 K/UL (ref 0–0.4)
EOSINOPHIL NFR BLD: 1 % (ref 0–7)
ERYTHROCYTE [DISTWIDTH] IN BLOOD BY AUTOMATED COUNT: 13.6 % (ref 11.5–14.5)
GLOBULIN SER CALC-MCNC: 3.5 G/DL (ref 2–4)
GLUCOSE SERPL-MCNC: 111 MG/DL (ref 65–100)
HCT VFR BLD AUTO: 44 % (ref 35–47)
HGB BLD-MCNC: 15.3 G/DL (ref 11.5–16)
IMM GRANULOCYTES # BLD AUTO: 0 K/UL (ref 0–0.04)
IMM GRANULOCYTES NFR BLD AUTO: 1 % (ref 0–0.5)
LYMPHOCYTES # BLD: 1.6 K/UL (ref 0.8–3.5)
LYMPHOCYTES NFR BLD: 21 % (ref 12–49)
MAGNESIUM SERPL-MCNC: 1.9 MG/DL (ref 1.6–2.4)
MCH RBC QN AUTO: 31.9 PG (ref 26–34)
MCHC RBC AUTO-ENTMCNC: 34.8 G/DL (ref 30–36.5)
MCV RBC AUTO: 91.9 FL (ref 80–99)
MONOCYTES # BLD: 0.5 K/UL (ref 0–1)
MONOCYTES NFR BLD: 6 % (ref 5–13)
NEUTS SEG # BLD: 5.5 K/UL (ref 1.8–8)
NEUTS SEG NFR BLD: 70 % (ref 32–75)
NRBC # BLD: 0 K/UL (ref 0–0.01)
NRBC BLD-RTO: 0 PER 100 WBC
P-R INTERVAL, ECG05: 248 MS
PLATELET # BLD AUTO: 161 K/UL (ref 150–400)
PMV BLD AUTO: 11.3 FL (ref 8.9–12.9)
POTASSIUM SERPL-SCNC: 3.5 MMOL/L (ref 3.5–5.1)
PROT SERPL-MCNC: 7.2 G/DL (ref 6.4–8.2)
Q-T INTERVAL, ECG07: 346 MS
QRS DURATION, ECG06: 86 MS
QTC CALCULATION (BEZET), ECG08: 391 MS
RBC # BLD AUTO: 4.79 M/UL (ref 3.8–5.2)
SAMPLES BEING HELD,HOLD: NORMAL
SODIUM SERPL-SCNC: 141 MMOL/L (ref 136–145)
TROPONIN I SERPL-MCNC: <0.05 NG/ML
VENTRICULAR RATE, ECG03: 77 BPM
WBC # BLD AUTO: 7.7 K/UL (ref 3.6–11)

## 2021-05-03 PROCEDURE — 84484 ASSAY OF TROPONIN QUANT: CPT

## 2021-05-03 PROCEDURE — 93005 ELECTROCARDIOGRAM TRACING: CPT

## 2021-05-03 PROCEDURE — 36415 COLL VENOUS BLD VENIPUNCTURE: CPT

## 2021-05-03 PROCEDURE — 83735 ASSAY OF MAGNESIUM: CPT

## 2021-05-03 PROCEDURE — 85025 COMPLETE CBC W/AUTO DIFF WBC: CPT

## 2021-05-03 PROCEDURE — 99285 EMERGENCY DEPT VISIT HI MDM: CPT

## 2021-05-03 PROCEDURE — 80053 COMPREHEN METABOLIC PANEL: CPT

## 2021-05-03 PROCEDURE — 71045 X-RAY EXAM CHEST 1 VIEW: CPT

## 2021-05-03 NOTE — ED PROVIDER NOTES
HPI       67y F here with palpitations. Is on chronic narcotics for pain and was switched from hydrocodone to oxycodone a few days ago. Since then has had intermittent palpitations. This AM it seemed like they lasted normal. No chest pain. No trouble breathing. No recent illnesses. No vomiting/diarrhea. No rash. No abdominal pain.  No hx of similar problems in the past.    Past Medical History:   Diagnosis Date    Autoimmune disease ([facility] [address]     FIBROMYALGIA    Beta-blocker therapy     CAD (coronary artery disease) 1999    MI >> stent x3, cath 2010: [place]    Chronic pain     back pain    Delayed gastric emptying     Depression     Depression with anxiety     Dyspepsia and other specified disorders of function of stomach     GERD (gastroesophageal reflux disease)     Hypercholesterolemia     Hypertension     JORDAN on CPAP     DOES NOT USE CPAP       Past Surgical History:   Procedure Laterality Date    HX CATARACT REMOVAL      HX CORONARY STENT PLACEMENT      HX DILATION AND CURETTAGE      HX HEENT      ORAL SX    HX LUMBAR LAMINECTOMY  12/2012    Dr. Ian Franklin    HX ORTHOPAEDIC  2012    spinal fusion    HX OTHER SURGICAL      BIRTH DONOVAN EXCISED    HX OTHER SURGICAL      RECTAL SX TWICE; ABCESS; FISTULA    HX SMALL BOWEL RESECTION  10/31/14    Obstruction, Dr. Rupa Burnett, 22 in removed    HX TONSILLECTOMY      [place] CARDIAC SURG PROCEDURE UNLIST  1999    STENTS         Family History:   Problem Relation Age of Onset    Hypertension Mother     Heart Disease Mother     Heart Disease Father     Stroke Father     Heart Disease Brother     Hypertension Brother     Depression Maternal Aunt     Depression Maternal Uncle     Depression Maternal Grandmother     Depression Maternal Grandfather     Anesth Problems Neg Hx        Social History     Socioeconomic History    Marital status:      Spouse name: Not on file    Number of children: Not on file    Years of education: Not on file   [employer] Highest education level: Not on file   Occupational History    Not on file   Social Needs    Financial resource strain: Not on file    Food insecurity     Worry: Not on file     Inability: Not on file    Transportation needs     Medical: Not on file     Non-medical: Not on file   Tobacco Use    Smoking status: Current Every Day Smoker     Packs/day: 1.00     Types: Cigarettes     Last attempt to quit: 1/1/2006     Years since quitting: 15.3    Smokeless tobacco: Never Used   Substance and Sexual Activity    Alcohol use: Yes     Alcohol/week: 3.0 standard drinks     Types: 3 Standard drinks or equivalent per week     Comment: OCCASIONALLY    Drug use: No    Sexual activity: Not Currently   Lifestyle    Physical activity     Days per week: Not on file     Minutes per session: Not on file    Stress: Not on file   Relationships    Social connections     Talks on phone: Not on file     Gets together: Not on file     Attends Islam service: Not on file     Active member of club or organization: Not on file     Attends meetings of clubs or organizations: Not on file     Relationship status: Not on file    Intimate partner violence     Fear of current or ex partner: Not on file     Emotionally abused: Not on file     Physically abused: Not on file     Forced sexual activity: Not on file   Other Topics Concern    Not on file   Social History Narrative    Julia lives independently. One daughter is nurse at [employer], one daughter  at [employer]. ALLERGIES: Patient has no known allergies. Review of Systems   Review of Systems   Constitutional: (-) weight loss. HEENT: (-) stiff neck   Eyes: (-) discharge. Respiratory: (-) cough. Cardiovascular: (-) syncope. Gastrointestinal: (-) blood in stool. Genitourinary: (-) hematuria. Musculoskeletal: (-) myalgias. Neurological: (-) seizure.    Skin: (-) petechiae  Lymph/Immunologic: (-) enlarged lymph nodes  All other systems reviewed and are negative. Vitals:    05/03/21 0959   BP: (!) 194/105   Pulse: 86   Resp: 16   Temp: 98.7 °F (37.1 °C)   SpO2: 94%   Weight: 83.9 kg (185 lb)   Height: 5' 3\" (1.6 m)            Physical Exam Nursing note and vitals reviewed. Constitutional: oriented to person, place, and time. appears well-developed and well-nourished. No distress. Head: Normocephalic and atraumatic. Sclera anicteric  Nose: No rhinorrhea  Mouth/Throat: Oropharynx is clear and moist. Pharynx normal  Eyes: Conjunctivae are normal. Pupils are equal, round, and reactive to light. Right eye exhibits no discharge. Left eye exhibits no discharge. Neck: Painless normal range of motion. Neck supple. No LAD. Cardiovascular: Normal rate, regular rhythm, normal heart sounds and intact distal pulses. Exam reveals no gallop and no friction rub. No murmur heard. Pulmonary/Chest:  No respiratory distress. No wheezes. No rales. No rhonchi. No increased work of breathing. No accessory muscle use. Good air exchange throughout. Abdominal: soft, non-tender, no rebound or guarding. No hepatosplenomegaly. Normal bowel sounds throughout. Back: no tenderness to palpation, no deformities, no CVA tenderness  Extremities/Musculoskeletal: Normal range of motion. no tenderness. No edema. Distal extremities are neurovasc intact. Lymphadenopathy:   No adenopathy. Neurological:  Alert and oriented to person, place, and time. Coordination normal. CN 2-12 intact. Motor and sensory function intact. Skin: Skin is warm and dry. No rash noted. No pallor. MDM 67y F here with palpitations. Plan for labs, ECG, and cardiac monitoring. Procedures    ED EKG interpretation:  Rhythm: sinus rhythm; and regular . Rate (approx.): 77; Axis: normal; P wave: normal; QRS interval: normal ; ST/T wave: normal;  This EKG was interpreted by Gogo Ordonez MD,ED Provider. 12:22 PM  Labs ok. Has been ok on the monitor.  Spoke with Dr. Constanza Siddiqui (her cardiologist) - ok to follow-up as outpatient.

## 2021-05-03 NOTE — ED TRIAGE NOTES
Patient arrives via EMS from home for palpitations. Reports palpitations began around 8am. Denies chest pain or shortness of breath. Ambulatory with steady gait.

## 2021-05-17 ENCOUNTER — VIRTUAL VISIT (OUTPATIENT)
Dept: INTERNAL MEDICINE CLINIC | Age: 81
End: 2021-05-17
Payer: MEDICARE

## 2021-05-17 DIAGNOSIS — N39.0 URINARY TRACT INFECTION WITHOUT HEMATURIA, SITE UNSPECIFIED: Primary | ICD-10-CM

## 2021-05-17 DIAGNOSIS — R00.2 PALPITATIONS: ICD-10-CM

## 2021-05-17 PROCEDURE — 99214 OFFICE O/P EST MOD 30 MIN: CPT | Performed by: FAMILY MEDICINE

## 2021-05-17 PROCEDURE — G8427 DOCREV CUR MEDS BY ELIG CLIN: HCPCS | Performed by: FAMILY MEDICINE

## 2021-05-17 PROCEDURE — 1090F PRES/ABSN URINE INCON ASSESS: CPT | Performed by: FAMILY MEDICINE

## 2021-05-17 PROCEDURE — 1101F PT FALLS ASSESS-DOCD LE1/YR: CPT | Performed by: FAMILY MEDICINE

## 2021-05-17 PROCEDURE — G8756 NO BP MEASURE DOC: HCPCS | Performed by: FAMILY MEDICINE

## 2021-05-17 PROCEDURE — G9717 DOC PT DX DEP/BP F/U NT REQ: HCPCS | Performed by: FAMILY MEDICINE

## 2021-05-17 PROCEDURE — G8399 PT W/DXA RESULTS DOCUMENT: HCPCS | Performed by: FAMILY MEDICINE

## 2021-05-17 RX ORDER — NITROFURANTOIN (MACROCRYSTALS) 100 MG/1
100 CAPSULE ORAL 2 TIMES DAILY
Qty: 8 CAP | Refills: 0 | Status: SHIPPED | OUTPATIENT
Start: 2021-05-17 | End: 2021-05-21

## 2021-05-17 NOTE — PROGRESS NOTES
La Roa is a 80 y.o. female who was seen by synchronous (real-time) audio-video technology on 5/17/2021 for No chief complaint on file. Assessment & Plan:   Diagnoses and all orders for this visit:    1. Urinary tract infection without hematuria, site unspecified  -     URINALYSIS W/ RFLX MICROSCOPIC; Future    2. Palpitations    Other orders  -     nitrofurantoin (MACRODANTIN) 100 mg capsule; Take 1 Cap by mouth two (2) times a day for 4 days. Treatment per orders - also push fluids, may use Pyridium OTC prn. Call or return to clinic prn if these symptoms worsen or fail to improve as anticipated. Subjective:      La Roa is a 80 y.o. female who complains of urinary frequency, urgency and dysuria x 5 days, without flank pain, fever, chills, or abnormal vaginal discharge or bleeding. Patient is also following up on ER visit for palpitations. She has a follow-up with her cardiologist scheduled will likely need a monitor. Patient states that she has not had any palpitations since her episode that took her to the hospital.  She denies any chest pain shortness of breath MUÑOZ currently. Prior to Admission medications    Medication Sig Start Date End Date Taking? Authorizing Provider   omeprazole (PRILOSEC) 20 mg capsule TAKE ONE CAPSULE BY MOUTH DAILY 2/25/21   Carolee Mehta MD   promethazine (PHENERGAN) 12.5 mg tablet Take  by mouth every six (6) hours as needed for Nausea. Provider, Jonah   triamcinolone acetonide (KENALOG) 0.1 % ointment Apply  to affected area two (2) times a day. use thin layer 7/6/20   Carolee Mehta MD   DULoxetine (CYMBALTA) 60 mg capsule TAKE ONE CAPSULE BY MOUTH DAILY  Patient taking differently: 20 mg. 2/15/19   Xavi Villatoro MD   albuterol (PROVENTIL HFA, VENTOLIN HFA, PROAIR HFA) 90 mcg/actuation inhaler Take 2 Puffs by inhalation every four (4) hours as needed for Wheezing.  12/18/18   Lisa Boles MD   inhalational spacing device 1 Each by Does Not Apply route as needed. 12/18/18   Marcelo Coburn MD   pravastatin (PRAVACHOL) 40 mg tablet TAKE ONE TABLET BY MOUTH EVERY EVENING 12/11/18   Reddy Qiu MD   valsartan (DIOVAN) 160 mg tablet Take 1 Tab by mouth daily. 11/19/18   Reddy Qiu MD   HYDROcodone-acetaminophen [facility])  mg tablet Take 1 Tab by mouth every six (6) hours as needed for Pain. Max Daily Amount: 4 Tabs. 9/11/18   Rufino Begin, DO   polyethylene glycol (MIRALAX) 17 gram packet 1 packet as needed daily. No more than three times a week 3/20/18   Reddy Qiu MD   [facility]) 2 mg tablet 2 mg. 11/20/17   Provider, Historical   cyclobenzaprine (FLEXERIL) 10 mg tablet 10 mg two (2) times a day. 11/25/17   Provider, Historical   LYRICA 225 mg capsule 225 mg two (2) times a day. 11/16/17   Provider, Historical   Omega-3 Fatty Acids (FISH OIL) 300 mg cap Take 1 Tab by mouth. Provider, Historical   Walker (ULTRA-LIGHT ROLLATOR) [facility] Walker with seat 12/13/17   Reddy Qiu MD   melatonin 5 mg cap capsule Take 10 mg by mouth nightly. Provider, Historical   cloNIDine HCl (CATAPRES) 0.2 mg tablet Take 0.2 mg by mouth two (2) times a day. 7/25/16   Provider, Historical   multivitamin with iron tablet Take 1 Tab by mouth daily. Provider, Historical   B.infantis-B.ani-B.long-B.bifi 10-15 mg TbEC Take 1 Tab by mouth daily as needed. Provider, Historical   cetirizine (ZYRTEC) 10 mg tablet Take 10 mg by mouth daily. Provider, Historical   metoprolol (LOPRESSOR) 100 mg tablet Take 1 Tab by mouth two (2) times a day. 3/25/15   Severino Mirza MD   Calcium Carbonate-Vit D3-Min 600 mg calcium- 400 unit Tab Take 1 Tab by mouth daily. Provider, Historical   docusate sodium (COLACE) 100 mg capsule Take 100 mg by mouth daily as needed. Provider, Historical   DOMPERIDONE, BULK, Take 10 mg by mouth two (2) times a day. Provider, Historical   aspirin 81 mg tablet Take 81 mg by mouth.     Provider, Historical     Patient Active Problem List    Diagnosis Date Noted    Left supracondylar humerus fracture, closed, initial encounter 09/17/2018    Chronically on opiate therapy 04/26/2017    Iron deficiency anemia 08/10/2016    Chronic neck and back pain 03/22/2016    Persistent disorder of initiating or maintaining sleep 01/12/2016    Hypercholesteremia 01/12/2016    Osteopenia 09/08/2015    Mild obstructive sleep apnea 04/29/2015    Benign essential hypertension 09/08/2014    Bilateral leg edema 05/22/2014    S/P colon resection 02/27/2014    Dextroscoliosis 02/27/2014    Gastroparesis 11/08/2013    Spinal stenosis, lumbar region, with neurogenic claudication 12/26/2012    Chronic back pain 04/09/2012    Anxiety and depression 04/09/2012    GERD (gastroesophageal reflux disease) 09/19/2011    CAD (coronary artery disease) 09/19/2011     Current Outpatient Medications   Medication Sig Dispense Refill    nitrofurantoin (MACRODANTIN) 100 mg capsule Take 1 Cap by mouth two (2) times a day for 4 days. 8 Cap 0    omeprazole (PRILOSEC) 20 mg capsule TAKE ONE CAPSULE BY MOUTH DAILY 90 Cap 0    promethazine (PHENERGAN) 12.5 mg tablet Take  by mouth every six (6) hours as needed for Nausea.  triamcinolone acetonide (KENALOG) 0.1 % ointment Apply  to affected area two (2) times a day. use thin layer 30 g 0    DULoxetine (CYMBALTA) 60 mg capsule TAKE ONE CAPSULE BY MOUTH DAILY (Patient taking differently: 20 mg.) 90 Cap 3    albuterol (PROVENTIL HFA, VENTOLIN HFA, PROAIR HFA) 90 mcg/actuation inhaler Take 2 Puffs by inhalation every four (4) hours as needed for Wheezing. 1 Inhaler 0    inhalational spacing device 1 Each by Does Not Apply route as needed. 1 Device 0    pravastatin (PRAVACHOL) 40 mg tablet TAKE ONE TABLET BY MOUTH EVERY EVENING 90 Tab 2    valsartan (DIOVAN) 160 mg tablet Take 1 Tab by mouth daily.  90 Tab 4    HYDROcodone-acetaminophen (NORCO)  mg tablet Take 1 Tab by mouth every six (6) hours as needed for Pain. Max Daily Amount: 4 Tabs. 10 Tab 0    polyethylene glycol (MIRALAX) 17 gram packet 1 packet as needed daily. No more than three times a week 24 Packet 3    bumetanide (BUMEX) 2 mg tablet 2 mg.  cyclobenzaprine (FLEXERIL) 10 mg tablet 10 mg two (2) times a day.  LYRICA 225 mg capsule 225 mg two (2) times a day.  Omega-3 Fatty Acids (FISH OIL) 300 mg cap Take 1 Tab by mouth.  Walker (ULTRA-LIGHT ROLLATOR) misc Walker with seat 1 Each 0    melatonin 5 mg cap capsule Take 10 mg by mouth nightly.  cloNIDine HCl (CATAPRES) 0.2 mg tablet Take 0.2 mg by mouth two (2) times a day.  multivitamin with iron tablet Take 1 Tab by mouth daily.  B.infantis-B.ani-B.long-B.bifi 10-15 mg TbEC Take 1 Tab by mouth daily as needed.  cetirizine (ZYRTEC) 10 mg tablet Take 10 mg by mouth daily.  metoprolol (LOPRESSOR) 100 mg tablet Take 1 Tab by mouth two (2) times a day. 180 Tab 1    Calcium Carbonate-Vit D3-Min 600 mg calcium- 400 unit Tab Take 1 Tab by mouth daily.  docusate sodium (COLACE) 100 mg capsule Take 100 mg by mouth daily as needed.  DOMPERIDONE, BULK, Take 10 mg by mouth two (2) times a day.  aspirin 81 mg tablet Take 81 mg by mouth.        No Known Allergies  Past Medical History:   Diagnosis Date    Autoimmune disease ([facility] [address]     FIBROMYALGIA    Beta-blocker therapy     CAD (coronary artery disease) 1999    MI >> stent x3, cath 2010: [place]    Chronic pain     back pain    Delayed gastric emptying     Depression     Depression with anxiety     Dyspepsia and other specified disorders of function of stomach     GERD (gastroesophageal reflux disease)     Hypercholesterolemia     Hypertension     JORDAN on CPAP     DOES NOT USE CPAP     Past Surgical History:   Procedure Laterality Date    HX CATARACT REMOVAL      HX CORONARY STENT PLACEMENT      HX DILATION AND CURETTAGE      HX HEENT      ORAL SX    HX LUMBAR LAMINECTOMY  12/2012    Dr. Finch Pry HX ORTHOPAEDIC  2012    spinal fusion    HX OTHER SURGICAL      BIRTH DONOVAN EXCISED    HX OTHER SURGICAL      RECTAL SX TWICE; ABCESS; FISTULA    HX SMALL BOWEL RESECTION  10/31/14    Obstruction, Dr. Brinda Salomon, 22 in removed    HX TONSILLECTOMY      [place] CARDIAC SURG PROCEDURE UNLIST  1999    STENTS     Family History   Problem Relation Age of Onset    Hypertension Mother     Heart Disease Mother     Heart Disease Father     Stroke Father     Heart Disease Brother     Hypertension Brother     Depression Maternal Aunt     Depression Maternal Uncle     Depression Maternal Grandmother     Depression Maternal Grandfather     Anesth Problems Neg Hx        ROS    Objective:   No flowsheet data found.      [INSTRUCTIONS:  \"[x]\" Indicates a positive item  \"[]\" Indicates a negative item  -- DELETE ALL ITEMS NOT EXAMINED]    Constitutional: [x] Appears well-developed and well-nourished [x] No apparent distress      [] Abnormal -     Mental status: [x] Alert and awake  [x] Oriented to person/place/time [x] Able to follow commands    [] Abnormal -     Eyes:   EOM    [x]  Normal    [] Abnormal -   Sclera  [x]  Normal    [] Abnormal -          Discharge [x]  None visible   [] Abnormal -     HENT: [x] Normocephalic, atraumatic  [] Abnormal -   [x] Mouth/Throat: Mucous membranes are moist    External Ears [x] Normal  [] Abnormal -    Neck: [x] No visualized mass [] Abnormal -     Pulmonary/Chest: [x] Respiratory effort normal   [x] No visualized signs of difficulty breathing or respiratory distress        [] Abnormal -      Musculoskeletal:   [x] Normal gait with no signs of ataxia         [x] Normal range of motion of neck        [] Abnormal -     Neurological:        [x] No Facial Asymmetry (Cranial nerve 7 motor function) (limited exam due to video visit)          [x] No gaze palsy        [] Abnormal -          Skin:        [x] No significant exanthematous lesions or discoloration noted on facial skin         [] Abnormal -            Psychiatric:       [x] Normal Affect [] Abnormal -        [x] No Hallucinations    Other pertinent observable physical exam findings:-        We discussed the expected course, resolution and complications of the diagnosis(es) in detail. Medication risks, benefits, costs, interactions, and alternatives were discussed as indicated. I advised her to contact the office if her condition worsens, changes or fails to improve as anticipated. She expressed understanding with the diagnosis(es) and plan. Shirin Sheehan, was evaluated through a synchronous (real-time) audio-video encounter. The patient (or guardian if applicable) is aware that this is a billable service. Verbal consent to proceed has been obtained within the past 12 months. The visit was conducted pursuant to the emergency declaration under the [address] [place] [address] authority and the [employer] General Act. Patient identification was verified, and a caregiver was present when appropriate. The patient was located in a state where the provider was credentialed to provide care.       Verito Chiu MD

## 2021-08-20 RX ORDER — OMEPRAZOLE 20 MG/1
CAPSULE, DELAYED RELEASE ORAL
Qty: 90 CAPSULE | Refills: 0 | Status: SHIPPED | OUTPATIENT
Start: 2021-08-20 | End: 2021-11-12

## 2021-10-08 NOTE — PROGRESS NOTES
Health Maintenance Due   Topic Date Due   • Pneumococcal Vaccine 65+ (1 of 1 - PPSV23) Never done   • Shingles Vaccine (2 of 3) 05/30/2012   • DTaP/Tdap/Td Vaccine (3 - Td or Tdap) 07/01/2020   • Influenza Vaccine (1) 09/01/2021   • Medicare Wellness Visit  02/16/2022       Patient is due for the topics as listed above and wishes to proceed with Influenza vaccine at this time.    Over the last 2 weeks, how often have you been bothered by the following problems?          PHQ2 Score: 0  PHQ2 Score Interpretation: No further screening needed  1. Little interest or pleasure in activity?: 0  2. Feeling down, depressed, or hopeless?: 0              PT DAILY TREATMENT NOTE - [facility] 2-15    Patient Name: Melissa Pickett  Date:2018  : 1940  [x]  Patient  Verified  Payor: [place] MEDICARE / Plan: [place] MEDICARE PART A & B / Product Type: Medicare /    In time: 12:10P  Out time: 1:10P  Total Treatment Time (min): 60   Total Timed Codes (min): 50  1:1 Treatment Time ( [address] only): 30  Visit #: 10    Treatment Area: Left arm pain [M79.602]    SUBJECTIVE  Pain Level (0-10 scale): 610  Any medication changes, allergies to medications, adverse drug reactions, diagnosis change, or new procedure performed?: [x] No    [] Yes (see summary sheet for update)  Subjective functional status/changes:   [] No changes reported  Pt reported feeling okay. Has been sick the last 3 days so this is her first day up and out of the house. OBJECTIVE        Modality rationale: decrease pain and increase tissue extensibility to improve pt's ability to use left UE.    Type Additional Details    [] Estim: []Att   []Unatt    []TENS instruct                  []IFC  []Premod   []NMES                     []Other:  []w/US   []w/ice   []w/heat  Position:  Location:    []  Traction: [] Cervical       []Lumbar                       [] Prone          []Supine                       []Intermittent   []Continuous Lbs:  [] before manual  [] after manual  []w/heat    []  Ultrasound: []Continuous   [] Pulsed                       at: []1MHz   []3MHz Location:  W/cm2:    [] Paraffin         Location:   []w/heat    [x]  Ice x 10 minutes     [x]  Heat   []  Ice massage Position: right side-lying with pillow between knees  Location: left elbow at end of session    []  Laser  []  Other: Position:  Location:    []  Vasopneumatic Device Pressure:       [] lo [] med [] hi   Temperature:     [x] Skin assessment post-treatment:  [x]intact []redness- no adverse reaction    []redness  adverse reaction:      35 min Therapeutic Exercise:  [x] See flow sheet : assessment of current status   Rationale: increase ROM and increase strength to improve the patients ability to perform functional activities with increased ease     15 min Manual Therapy: STM left upper and lower arm; PROM left elbow flexion, extension, supination and pronation to pt's tolerance; passive stretching left wrist flexors and wrist extensors; TPR left ECRL   Rationale: decrease pain, increase ROM, increase tissue extensibility, decrease edema  and increase postural awareness to restore functional use of left UE                                                                 With   [] TE   [] TA   [] neuro   [] other: Patient Education: [x] Review HEP    [] Progressed/Changed HEP based on:   [] positioning   [] body mechanics   [] transfers   [] heat/ice application    [] other:       Other Objective/Functional Measures:   L  33 lbs (23 lbs on eval)    Pain Level (0-10 scale) post treatment: 3/10     ASSESSMENT/Changes in Function:   Plan to increase left shoulder/rotator cuff strengthening exercises next visit. Noted improved  strengthening today. Progressing well towards LTG. Pt reports that she does not do much with house chores or meal prep. Reports increase in ability to bathe self without pain or compensation. Patient will continue to benefit from skilled PT services to modify and progress therapeutic interventions, address functional mobility deficits, address ROM deficits, address strength deficits, analyze and address soft tissue restrictions, analyze and cue movement patterns, analyze and modify body mechanics/ergonomics and assess and modify postural abnormalities to attain remaining goals. [x]  See Plan of Care  []  See progress note/recertification  []  See Discharge Summary         Progress towards goals / Updated goals:  Short Term Goals: To be accomplished in 4 weeks:  1) Pt will be independent with HEP. MET  2) Pt will demonstrate improved left wrist and elbow ROM by at least 8 degrees, in all directions.  MET     Long Term Goals: To be accomplished in 8 weeks:  1) Pt will be able to use left arm to perform daily activities, included grooming and household chores. Progressing  2) Pt will demonstrate  strength >/= 80% of uninvolved side. Progressing  3) Pt will be able to drive car without pain.  Progressing     PLAN  [x]  Upgrade activities as tolerated     [x]  Continue plan of care  []  Update interventions per flow sheet       []  Discharge due to:_  []  Other:_      Kip Lorena, PTA, OPT[facility]  12/18/2018  2:46 PM

## 2021-11-12 RX ORDER — OMEPRAZOLE 20 MG/1
CAPSULE, DELAYED RELEASE ORAL
Qty: 90 CAPSULE | Refills: 0 | Status: SHIPPED | OUTPATIENT
Start: 2021-11-12 | End: 2022-02-22

## 2022-02-22 RX ORDER — OMEPRAZOLE 20 MG/1
CAPSULE, DELAYED RELEASE ORAL
Qty: 90 CAPSULE | Refills: 0 | Status: SHIPPED | OUTPATIENT
Start: 2022-02-22 | End: 2022-07-07 | Stop reason: SDUPTHER

## 2022-03-18 PROBLEM — Z79.891 CHRONICALLY ON OPIATE THERAPY: Status: ACTIVE | Noted: 2017-04-26

## 2022-03-19 PROBLEM — S42.412A LEFT SUPRACONDYLAR HUMERUS FRACTURE, CLOSED, INITIAL ENCOUNTER: Status: ACTIVE | Noted: 2018-09-17

## 2022-07-07 ENCOUNTER — OFFICE VISIT (OUTPATIENT)
Dept: INTERNAL MEDICINE CLINIC | Age: 82
End: 2022-07-07
Payer: MEDICARE

## 2022-07-07 VITALS
HEART RATE: 57 BPM | SYSTOLIC BLOOD PRESSURE: 146 MMHG | TEMPERATURE: 98.7 F | BODY MASS INDEX: 33.13 KG/M2 | RESPIRATION RATE: 16 BRPM | DIASTOLIC BLOOD PRESSURE: 75 MMHG | HEIGHT: 63 IN | OXYGEN SATURATION: 95 % | WEIGHT: 187 LBS

## 2022-07-07 DIAGNOSIS — E78.00 HYPERCHOLESTEREMIA: ICD-10-CM

## 2022-07-07 DIAGNOSIS — N95.1 POST MENOPAUSAL SYNDROME: ICD-10-CM

## 2022-07-07 DIAGNOSIS — Z00.00 MEDICARE ANNUAL WELLNESS VISIT, SUBSEQUENT: Primary | ICD-10-CM

## 2022-07-07 DIAGNOSIS — D50.9 IRON DEFICIENCY ANEMIA, UNSPECIFIED IRON DEFICIENCY ANEMIA TYPE: ICD-10-CM

## 2022-07-07 DIAGNOSIS — F39 MOOD DISORDER (HCC): ICD-10-CM

## 2022-07-07 DIAGNOSIS — Z13.39 SCREENING FOR ALCOHOLISM: ICD-10-CM

## 2022-07-07 DIAGNOSIS — I10 BENIGN ESSENTIAL HYPERTENSION: ICD-10-CM

## 2022-07-07 PROCEDURE — G8754 DIAS BP LESS 90: HCPCS | Performed by: FAMILY MEDICINE

## 2022-07-07 PROCEDURE — G8753 SYS BP > OR = 140: HCPCS | Performed by: FAMILY MEDICINE

## 2022-07-07 PROCEDURE — G8428 CUR MEDS NOT DOCUMENT: HCPCS | Performed by: FAMILY MEDICINE

## 2022-07-07 PROCEDURE — 1090F PRES/ABSN URINE INCON ASSESS: CPT | Performed by: FAMILY MEDICINE

## 2022-07-07 PROCEDURE — G8417 CALC BMI ABV UP PARAM F/U: HCPCS | Performed by: FAMILY MEDICINE

## 2022-07-07 PROCEDURE — G8536 NO DOC ELDER MAL SCRN: HCPCS | Performed by: FAMILY MEDICINE

## 2022-07-07 PROCEDURE — G0439 PPPS, SUBSEQ VISIT: HCPCS | Performed by: FAMILY MEDICINE

## 2022-07-07 PROCEDURE — G9717 DOC PT DX DEP/BP F/U NT REQ: HCPCS | Performed by: FAMILY MEDICINE

## 2022-07-07 PROCEDURE — 1123F ACP DISCUSS/DSCN MKR DOCD: CPT | Performed by: FAMILY MEDICINE

## 2022-07-07 PROCEDURE — 1101F PT FALLS ASSESS-DOCD LE1/YR: CPT | Performed by: FAMILY MEDICINE

## 2022-07-07 PROCEDURE — G8399 PT W/DXA RESULTS DOCUMENT: HCPCS | Performed by: FAMILY MEDICINE

## 2022-07-07 PROCEDURE — 99213 OFFICE O/P EST LOW 20 MIN: CPT | Performed by: FAMILY MEDICINE

## 2022-07-07 RX ORDER — OMEPRAZOLE 20 MG/1
20 CAPSULE, DELAYED RELEASE ORAL DAILY
Qty: 90 CAPSULE | Refills: 0 | Status: SHIPPED | OUTPATIENT
Start: 2022-07-07 | End: 2022-09-29

## 2022-07-07 NOTE — PATIENT INSTRUCTIONS

## 2022-07-07 NOTE — PROGRESS NOTES
This is the Subsequent Medicare Annual Wellness Exam, performed 12 months or more after the Initial AWV or the last Subsequent AWV    I have reviewed the patient's medical history in detail and updated the computerized patient record. she is a [de-identified]y.o. year old female who presents for CPE  Complete Physical Exam Questions:    LMP -  n/a  Last Pap (q 3 years, or q5 with HPV) - unknown  Last Mammogram (50-74 biennially)- Unknown   Hx of abnl Pap - No    1. Do you follow a low fat diet?  no  2. Are you up to date on your Tdap (<10 years)? Unknown  3. Have you ever had a Pneumovax vaccine (>65)? Yes   PCV13 Yes   PPSV23 Yes  4. Have you had Zoster vaccine (>60)? No  5. Have you had the HPV - Gardasil (13- 26)? Not applicable  6. Do you follow an exercise program?  no  7. Do you smoke? Yes  8. Do you consider yourself overweight?  no  9. Is there a family history of CAD< age 48? No  10. Is there a family history of Cancer?  no   11. Do you know your Cancer risks? No  12. Have you had a colonoscopy? yes  13. Have you been tested for HIV or other STI's? No HIV testing today(18-66 y/o)? No  14. Have had a bone density scan or DEXA done(>65)? Yes  15. Have you had an EKG performed in the last five years (>50)? Yes    Assessment/Plan   Education and counseling provided:  Are appropriate based on today's review and evaluation    1. Medicare annual wellness visit, subsequent  2. Mood disorder ([facility] [address]  Assessment & Plan:   well controlled, continue current medications  3. Screening for alcoholism  -     [place] ANNUAL ALCOHOL SCREEN 15 MIN  4. Iron deficiency anemia, unspecified iron deficiency anemia type  -     CBC W/O DIFF; Future  -     IRON PROFILE; Future  5. Hypercholesteremia  -     LIPID PANEL; Future  -     METABOLIC PANEL, COMPREHENSIVE; Future  6.  Benign essential hypertension       Depression Risk Factor Screening     3 most recent PHQ Screens 10/2/2018   Little interest or pleasure in doing things Not at all   Feeling down, depressed, irritable, or hopeless Nearly every day   Total Score PHQ 2 3       Alcohol & Drug Abuse Risk Screen    Do you average more than 1 drink per night or more than 7 drinks a week:  No    On any one occasion in the past three months have you have had more than 3 drinks containing alcohol:  Yes          Functional Ability and Level of Safety    Hearing: Hearing is good. Activities of Daily Living: The home contains: no safety equipment. Patient does total self care      Ambulation: with no difficulty     Fall Risk:  Fall Risk Assessment, last 12 mths 10/12/2020   Able to walk? Yes   Fall in past 12 months? No   Number of falls in past 12 months -   Fall with injury? -      Abuse Screen:  Patient is not abused       Cognitive Screening    Has your family/caregiver stated any concerns about your memory: no     Cognitive Screening: Normal - Verbal Fluency Test    Health Maintenance Due     Health Maintenance Due   Topic Date Due    COVID-19 Vaccine (1) Never done    Depression Monitoring  Never done    DTaP/Tdap/Td series (1 - Tdap) Never done    Shingrix Vaccine Age 50> (1 of 2) Never done    Medicare Yearly Exam  07/07/2021    Lipid Screen  10/12/2021       Patient Care Team   Patient Care Team:  Dao Bocanegra MD as PCP - General (Family Medicine)  Dao Bocanegra MD as PCP - [address] [place] [address] Provider  Missy Donaldson MD (Cardiovascular Disease Physician)  Angel Mckenna MD (Neurosurgery)  Shemar Tate DPM (Migel Case)  Jarrod Lehman MD (Anesthesiology)  Bony Ortega MD (Psychiatry)    History     Patient Active Problem List   Diagnosis Code    GERD (gastroesophageal reflux disease) K21.9    CAD (coronary artery disease) I25.10    Chronic back pain M54.9, G89.29    Anxiety and depression F41.9, F32. 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    Hypercholesteremia E78.00    Chronic neck and back pain M54.2, M54.9, G89.29    Iron deficiency anemia D50.9    Chronically on opiate therapy Z79.891    Left supracondylar humerus fracture, closed, initial encounter S42.412A    Mood disorder ([facility] [address] F39     Past Medical History:   Diagnosis Date    Autoimmune disease ([facility] [address]     FIBROMYALGIA    Beta-blocker therapy     CAD (coronary artery disease) 1999    MI >> stent x3, cath 2010: [place]    Chronic pain     back pain    Delayed gastric emptying     Depression     Depression with anxiety     Dyspepsia and other specified disorders of function of stomach     GERD (gastroesophageal reflux disease)     Hypercholesterolemia     Hypertension     JORDAN on CPAP     DOES NOT USE CPAP      Past Surgical History:   Procedure Laterality Date    HX CATARACT REMOVAL      HX CORONARY STENT PLACEMENT      HX DILATION AND CURETTAGE      HX HEENT      ORAL SX    HX LUMBAR LAMINECTOMY  12/2012    Dr. Cathy Avery HX ORTHOPAEDIC  2012    spinal fusion    HX OTHER SURGICAL      BIRTH DONOVAN EXCISED    HX OTHER SURGICAL      RECTAL SX TWICE; ABCESS; FISTULA    HX SMALL BOWEL RESECTION  10/31/14    Obstruction, Dr. Adrian Guerrero, 25 in removed    HX TONSILLECTOMY      [place] CARDIAC SURG PROCEDURE UNLIST  1999    STENTS     Current Outpatient Medications   Medication Sig Dispense Refill    omeprazole (PRILOSEC) 20 mg capsule TAKE ONE CAPSULE BY MOUTH DAILY 90 Capsule 0    promethazine (PHENERGAN) 12.5 mg tablet Take  by mouth every six (6) hours as needed for Nausea.  triamcinolone acetonide (KENALOG) 0.1 % ointment Apply  to affected area two (2) times a day.  use thin layer 30 g 0    DULoxetine (CYMBALTA) 60 mg capsule TAKE ONE CAPSULE BY MOUTH DAILY (Patient taking differently: 20 mg.) 90 Cap 3    albuterol (PROVENTIL HFA, VENTOLIN HFA, PROAIR HFA) 90 mcg/actuation inhaler Take 2 Puffs by inhalation every four (4) hours as needed for Wheezing. 1 Inhaler 0    inhalational spacing device 1 Each by Does Not Apply route as needed. 1 Device 0    pravastatin (PRAVACHOL) 40 mg tablet TAKE ONE TABLET BY MOUTH EVERY EVENING 90 Tab 2    valsartan (DIOVAN) 160 mg tablet Take 1 Tab by mouth daily. 90 Tab 4    polyethylene glycol (MIRALAX) 17 gram packet 1 packet as needed daily. No more than three times a week 24 Packet 3    bumetanide (BUMEX) 2 mg tablet 2 mg.  cyclobenzaprine (FLEXERIL) 10 mg tablet 10 mg two (2) times a day.  LYRICA 225 mg capsule 225 mg two (2) times a day.  Omega-3 Fatty Acids (Fish OiL) 300 mg cap Take 1 Tablet by mouth.  Walker (ULTRA-LIGHT ROLLATOR) misc Walker with seat 1 Each 0    melatonin 5 mg cap capsule Take 10 mg by mouth nightly.  cloNIDine HCl (CATAPRES) 0.2 mg tablet Take 0.2 mg by mouth two (2) times a day.  multivitamin with iron tablet Take 1 Tab by mouth daily.  B.infantis-B.ani-B.long-B.bifi 10-15 mg TbEC Take 1 Tab by mouth daily as needed.  cetirizine (ZYRTEC) 10 mg tablet Take 10 mg by mouth daily.  metoprolol (LOPRESSOR) 100 mg tablet Take 1 Tab by mouth two (2) times a day. 180 Tab 1    Calcium Carbonate-Vit D3-Min 600 mg calcium- 400 unit Tab Take 1 Tab by mouth daily.  docusate sodium (COLACE) 100 mg capsule Take 100 mg by mouth daily as needed.  DOMPERIDONE, BULK, Take 10 mg by mouth two (2) times a day.  aspirin 81 mg tablet Take 81 mg by mouth.  HYDROcodone-acetaminophen (NORCO)  mg tablet Take 1 Tab by mouth every six (6) hours as needed for Pain. Max Daily Amount: 4 Tabs.  (Patient not taking: Reported on 7/7/2022) 10 Tab 0     No Known Allergies    Family History   Problem Relation Age of Onset    Hypertension Mother     Heart Disease Mother     Heart Disease Father     Stroke Father     Heart Disease Brother     Hypertension Brother     Depression Maternal Aunt     Depression Maternal Uncle     Depression Maternal Grandmother     Depression Maternal Grandfather     Anesth Problems Neg Hx      Social History     Tobacco Use    Smoking status: Current Every Day Smoker     Packs/day: 1.00     Types: Cigarettes     Last attempt to quit: 2006     Years since quittin.5    Smokeless tobacco: Never Used   Substance Use Topics    Alcohol use: Yes     Alcohol/week: 3.0 standard drinks     Types: 3 Standard drinks or equivalent per week     Comment: OCCASIONALLY     Chief Complaint   Patient presents with    Complete Physical     Patient is here for a medicare wellness visit. she is a [de-identified]y.o. year old female who presents for evaluation of postmenopausal spotting. Patient states that her postmenopausal spine causes her increased anxiety and frustration. She will to get some treatment for this. Due to psychiatry history we will avoid Prozac. Patient is also currently taking blood pressure medications and cholesterol medications compliantly. Has had no side effects from these medications. Patient states that she has no episodes of chest pain, shortness of breath or MUÑOZ. Reviewed and agree with Nurse Note and duplicated in this note. Reviewed PmHx, RxHx, FmHx, SocHx, AllgHx and updated and dated in the chart.     Family History   Problem Relation Age of Onset    Hypertension Mother     Heart Disease Mother     Heart Disease Father     Stroke Father     Heart Disease Brother     Hypertension Brother     Depression Maternal Aunt     Depression Maternal Uncle     Depression Maternal Grandmother     Depression Maternal Grandfather     Anesth Problems Neg Hx        Past Medical History:   Diagnosis Date    Autoimmune disease ([facility] [address]     FIBROMYALGIA    Beta-blocker therapy     CAD (coronary artery disease)     MI >> stent x3, cath 2010: [place]    Chronic pain back pain    Delayed gastric emptying     Depression     Depression with anxiety     Dyspepsia and other specified disorders of function of stomach     GERD (gastroesophageal reflux disease)     Hypercholesterolemia     Hypertension     JORDAN on CPAP     DOES NOT USE CPAP      Social History     Socioeconomic History    Marital status:    Tobacco Use    Smoking status: Current Every Day Smoker     Packs/day: 1.00     Types: Cigarettes     Last attempt to quit: 2006     Years since quittin.5    Smokeless tobacco: Never Used   Substance and Sexual Activity    Alcohol use: Yes     Alcohol/week: 3.0 standard drinks     Types: 3 Standard drinks or equivalent per week     Comment: OCCASIONALLY    Drug use: No    Sexual activity: Not Currently   Social History Narrative    Julia lives independently. One daughter is nurse at [employer], one daughter  at [employer].          Review of Systems - negative except as listed above      Objective:     Vitals:    22 1455   BP: (!) 146/75   Pulse: (!) 57   Resp: 16   Temp: 98.7 °F (37.1 °C)   SpO2: 95%   Weight: 187 lb (84.8 kg)   Height: 5' 3\" (1.6 m)       Physical Examination: General appearance - alert, well appearing, and in no distress  Eyes - pupils equal and reactive, extraocular eye movements intact  Ears - bilateral TM's and external ear canals normal  Nose - normal and patent, no erythema, discharge or polyps  Mouth - mucous membranes moist, pharynx normal without lesions  Neck - supple, no significant adenopathy  Chest - clear to auscultation, no wheezes, rales or rhonchi, symmetric air entry  Heart - normal rate, regular rhythm, normal S1, S2, no murmurs, rubs, clicks or gallops  Abdomen - soft, nontender, nondistended, no masses or organomegaly  Neurological - alert, oriented, normal speech, no focal findings or movement disorder noted  Musculoskeletal - no joint tenderness, deformity or swelling  Extremities - peripheral pulses normal, no pedal edema, no clubbing or cyanosis  Skin - normal coloration and turgor, no rashes, no suspicious skin lesions noted     Assessment/ Plan:   Diagnoses and all orders for this visit:    1. Medicare annual wellness visit, subsequent    2. Mood disorder ([facility] [address]  Assessment & Plan:   well controlled, continue current medications      3. Screening for alcoholism  -     [place] ANNUAL ALCOHOL SCREEN 15 MIN    4. Iron deficiency anemia, unspecified iron deficiency anemia type  -     CBC W/O DIFF; Future  -     IRON PROFILE; Future    5. Hypercholesteremia  -     LIPID PANEL; Future  -     METABOLIC PANEL, COMPREHENSIVE; Future    6. Benign essential hypertension    7. Post menopausal syndrome  -     REFERRAL TO OBSTETRICS AND GYNECOLOGY  Recommend follow-up with OB/GYN for input on postmenopausal spotting  Other orders  -     omeprazole (PRILOSEC) 20 mg capsule; Take 1 Capsule by mouth daily. I have discussed the diagnosis with the patient and the intended plan as seen in the above orders. The patient has received an after-visit summary and questions were answered concerning future plans. Medication Side Effects and Warnings were discussed with patient,  Patient Labs were reviewed and or requested, and  Patient Past Records were reviewed and or requested  yes       Pt agrees to call or return to clinic and/or go to closest ER with any worsening of symptoms. This may include, but not limited to increased fever (>100.4) with NSAIDS or Tylenol, increased edema, confusion, rash, worsening of presenting symptoms. Please note that this dictation was completed with [employer], the computer voice recognition software. Quite often unanticipated grammatical, syntax, homophones, and other interpretive errors are inadvertently transcribed by the computer software. Please disregard these errors. Please excuse any errors that have escaped final proofreading. Thank you.

## 2022-07-08 DIAGNOSIS — R73.9 ELEVATED BLOOD SUGAR: Primary | ICD-10-CM

## 2022-07-08 LAB
ALBUMIN SERPL-MCNC: 3.6 G/DL (ref 3.5–5)
ALBUMIN/GLOB SERPL: 1.2 {RATIO} (ref 1.1–2.2)
ALP SERPL-CCNC: 85 U/L (ref 45–117)
ALT SERPL-CCNC: 24 U/L (ref 12–78)
ANION GAP SERPL CALC-SCNC: 8 MMOL/L (ref 5–15)
AST SERPL-CCNC: 23 U/L (ref 15–37)
BILIRUB SERPL-MCNC: 0.4 MG/DL (ref 0.2–1)
BUN SERPL-MCNC: 15 MG/DL (ref 6–20)
BUN/CREAT SERPL: 17 (ref 12–20)
CALCIUM SERPL-MCNC: 8.6 MG/DL (ref 8.5–10.1)
CHLORIDE SERPL-SCNC: 104 MMOL/L (ref 97–108)
CHOLEST SERPL-MCNC: 141 MG/DL
CO2 SERPL-SCNC: 28 MMOL/L (ref 21–32)
CREAT SERPL-MCNC: 0.88 MG/DL (ref 0.55–1.02)
ERYTHROCYTE [DISTWIDTH] IN BLOOD BY AUTOMATED COUNT: 14 % (ref 11.5–14.5)
GLOBULIN SER CALC-MCNC: 3 G/DL (ref 2–4)
GLUCOSE SERPL-MCNC: 110 MG/DL (ref 65–100)
HCT VFR BLD AUTO: 39 % (ref 35–47)
HDLC SERPL-MCNC: 35 MG/DL
HDLC SERPL: 4 {RATIO} (ref 0–5)
HGB BLD-MCNC: 12.7 G/DL (ref 11.5–16)
IRON SATN MFR SERPL: 28 % (ref 20–50)
IRON SERPL-MCNC: 97 UG/DL (ref 35–150)
LDLC SERPL CALC-MCNC: 31.2 MG/DL (ref 0–100)
MCH RBC QN AUTO: 31.5 PG (ref 26–34)
MCHC RBC AUTO-ENTMCNC: 32.6 G/DL (ref 30–36.5)
MCV RBC AUTO: 96.8 FL (ref 80–99)
NRBC # BLD: 0 K/UL (ref 0–0.01)
NRBC BLD-RTO: 0 PER 100 WBC
PLATELET # BLD AUTO: 123 K/UL (ref 150–400)
POTASSIUM SERPL-SCNC: 3.9 MMOL/L (ref 3.5–5.1)
PROT SERPL-MCNC: 6.6 G/DL (ref 6.4–8.2)
RBC # BLD AUTO: 4.03 M/UL (ref 3.8–5.2)
SODIUM SERPL-SCNC: 140 MMOL/L (ref 136–145)
TIBC SERPL-MCNC: 348 UG/DL (ref 250–450)
TRIGL SERPL-MCNC: 374 MG/DL (ref ?–150)
VLDLC SERPL CALC-MCNC: 74.8 MG/DL
WBC # BLD AUTO: 6.7 K/UL (ref 3.6–11)

## 2022-09-13 ENCOUNTER — OFFICE VISIT (OUTPATIENT)
Dept: OBGYN CLINIC | Age: 82
End: 2022-09-13
Payer: MEDICARE

## 2022-09-13 VITALS — SYSTOLIC BLOOD PRESSURE: 146 MMHG | BODY MASS INDEX: 33.3 KG/M2 | DIASTOLIC BLOOD PRESSURE: 86 MMHG | WEIGHT: 188 LBS

## 2022-09-13 DIAGNOSIS — R61 EXCESSIVE SWEATING: Primary | ICD-10-CM

## 2022-09-13 PROCEDURE — 1123F ACP DISCUSS/DSCN MKR DOCD: CPT | Performed by: OBSTETRICS & GYNECOLOGY

## 2022-09-13 PROCEDURE — 99203 OFFICE O/P NEW LOW 30 MIN: CPT | Performed by: OBSTETRICS & GYNECOLOGY

## 2022-09-13 NOTE — PROGRESS NOTES
Problem Visit    Shanon Avilez is a [de-identified] y.o.  presenting for problem visit. Her main concern today is excessive sweating. She notes that she has had these episodes of excessive sweating starting 3 years ago. She did not have any of these episodes when she first went through menopause (around age 50). She notes that the sweating episodes happen regardless of weather outside. She is bothered by it. She also notes that her daughter was recently diagnosed with Parkinson's. She recently returned from a trip to the beach with 3 of her friends. She went through menopause approximately 20 years ago. She has not had any vaginal bleeding or abnormal discharge since then. She denies hot flashes at the time of menopause. She denies any other symptoms.        Past Medical History:   Diagnosis Date    Autoimmune disease ([address]     FIBROMYALGIA    Beta-blocker therapy     CAD (coronary artery disease) 1999    MI >> stent x3, cath 2010: [place]    Chronic pain     back pain    Delayed gastric emptying     Depression     Depression with anxiety     Dyspepsia and other specified disorders of function of stomach     GERD (gastroesophageal reflux disease)     Hypercholesterolemia     Hypertension     JORDAN on CPAP     DOES NOT USE CPAP       Past Surgical History:   Procedure Laterality Date    HX CATARACT REMOVAL      HX CORONARY STENT PLACEMENT      HX DILATION AND CURETTAGE      HX HEENT      ORAL SX    HX LUMBAR LAMINECTOMY  12/2012    Dr. Cristofer Gallardo ORTHOPAEDIC  2012    spinal fusion    HX OTHER SURGICAL      BIRTH DONOVAN EXCISED    HX OTHER SURGICAL      RECTAL SX TWICE; ABCESS; FISTULA    HX SMALL BOWEL RESECTION  10/31/14    Obstruction, Dr. Hiwot Frazier, 22 in removed    HX TONSILLECTOMY      [address]    STENTS       Family History   Problem Relation Age of Onset    Hypertension Mother     Heart Disease Mother     Heart Disease Father     Stroke Father     Heart Disease Brother     Hypertension Brother     Depression Maternal Aunt     Depression Maternal Uncle     Depression Maternal Grandmother     Depression Maternal Grandfather     Anesth Problems Neg Hx        Social History     Socioeconomic History    Marital status:      Spouse name: Not on file    Number of children: Not on file    Years of education: Not on file    Highest education level: Not on file   Occupational History    Not on file   Tobacco Use    Smoking status: Every Day     Packs/day: 1.00     Types: Cigarettes     Last attempt to quit: 2006     Years since quittin.7    Smokeless tobacco: Never   Substance and Sexual Activity    Alcohol use: Yes     Alcohol/week: 3.0 standard drinks     Types: 3 Standard drinks or equivalent per week     Comment: OCCASIONALLY    Drug use: No    Sexual activity: Not Currently   Other Topics Concern    Not on file   Social History Narrative    Julia lives independently. One daughter is nurse at [employer], one daughter  at [employer]. Social Determinants of Health     Financial Resource Strain: Not on file   Food Insecurity: Not on file   Transportation Needs: Not on file   Physical Activity: Not on file   Stress: Not on file   Social Connections: Not on file   Intimate Partner Violence: Not on file   Housing Stability: Not on file       Current Outpatient Medications   Medication Sig Dispense Refill    omeprazole (PRILOSEC) 20 mg capsule Take 1 Capsule by mouth daily. 90 Capsule 0    promethazine (PHENERGAN) 12.5 mg tablet Take  by mouth every six (6) hours as needed for Nausea. triamcinolone acetonide (KENALOG) 0.1 % ointment Apply  to affected area two (2) times a day. use thin layer 30 g 0    DULoxetine (CYMBALTA) 60 mg capsule TAKE ONE CAPSULE BY MOUTH DAILY (Patient taking differently: 20 mg.) 90 Cap 3    albuterol (PROVENTIL HFA, VENTOLIN HFA, PROAIR HFA) 90 mcg/actuation inhaler Take 2 Puffs by inhalation every four (4) hours as needed for Wheezing.  1 Inhaler 0    inhalational spacing device 1 Each by Does Not Apply route as needed. 1 Device 0    pravastatin (PRAVACHOL) 40 mg tablet TAKE ONE TABLET BY MOUTH EVERY EVENING 90 Tab 2    valsartan (DIOVAN) 160 mg tablet Take 1 Tab by mouth daily. 90 Tab 4    polyethylene glycol (MIRALAX) 17 gram packet 1 packet as needed daily. No more than three times a week 24 Packet 3    bumetanide (BUMEX) 2 mg tablet 2 mg.      cyclobenzaprine (FLEXERIL) 10 mg tablet 10 mg two (2) times a day. LYRICA 225 mg capsule 225 mg two (2) times a day. Omega-3 Fatty Acids (Fish OiL) 300 mg cap Take 1 Tablet by mouth. Walker (ULTRA-LIGHT ROLLATOR) misc Walker with seat 1 Each 0    melatonin 5 mg cap capsule Take 10 mg by mouth nightly. cloNIDine HCl (CATAPRES) 0.2 mg tablet Take 0.2 mg by mouth two (2) times a day. multivitamin with iron tablet Take 1 Tab by mouth daily. B.infantis-B.ani-B.long-B.bifi 10-15 mg TbEC Take 1 Tab by mouth daily as needed. cetirizine (ZYRTEC) 10 mg tablet Take 10 mg by mouth daily. metoprolol (LOPRESSOR) 100 mg tablet Take 1 Tab by mouth two (2) times a day. 180 Tab 1    Calcium Carbonate-Vit D3-Min 600 mg calcium- 400 unit Tab Take 1 Tab by mouth daily. docusate sodium (COLACE) 100 mg capsule Take 100 mg by mouth daily as needed. DOMPERIDONE, BULK, Take 10 mg by mouth two (2) times a day. aspirin 81 mg tablet Take 81 mg by mouth.          No Known Allergies    Review of Systems - History obtained from the patient  Constitutional: negative for weight loss, fever, night sweats  HEENT: negative for hearing loss, earache, congestion, snoring, sorethroat  CV: negative for chest pain, palpitations, edema  Resp: negative for cough, shortness of breath, wheezing  GI: negative for change in bowel habits, abdominal pain, black or bloody stools  : negative for frequency, dysuria, hematuria, vaginal discharge  MSK: negative for back pain, joint pain, muscle pain  Breast: negative for breast lumps, nipple discharge, galactorrhea  Skin :negative for itching, rash, hives  Neuro: negative for dizziness, headache, confusion, weakness  Psych: negative for anxiety, depression, change in mood  Heme/lymph: negative for bleeding, bruising, pallor    Physical Exam    Visit Vitals  BP (!) 146/86 (BP 1 Location: Left upper arm, BP Patient Position: Sitting)   Wt 188 lb (85.3 kg)   BMI 33.30 kg/m²         OBGyn Exam      Constitutional  Appearance: well-nourished, well developed, alert, in no acute distress    HENT  Head and Face: appears normal    Neck  Inspection/Palpation: normal appearance, no masses or tenderness  Thyroid: gland size normal, nontender    Chest  Respiratory Effort: non-labored breathing    Cardiovascular  Extremities: no peripheral edema    Gastrointestinal  Abdominal Examination: abdomen non-distended, non-tender to palpation, no masses present  Liver and spleen: no hepatomegaly present, spleen not palpable  Hernias: no hernias identified    Genitourinary deferred    Skin  General Inspection: no rash, no lesions identified    Neurologic/Psychiatric  Mental Status:  Orientation: grossly oriented to person, place and time  Mood and Affect: mood normal, affect appropriate      Assessment/Plan:    1. Excessive sweating  - Discussed unclear etiology of sweating. Seems unrelated to menopause given started over 20 years from time of onset. Discussed would recheck thyroid hormone as it was last checked in 2020.  - Discussed that she would not be a candidate for HRT given >10 years since the onset of menopause and [de-identified]years old. Discussed alternative medications for management of hot flashes including clonidine (patient is already taking) and effexor but unclear if these will help with symptoms since predominant symptom appears to be sweating  - TSH 3RD GENERATION; Future  - T4, FREE;  Future    Will follow up labs and call patient with results    Syeda Hathaway MD

## 2022-09-14 LAB
T4 FREE SERPL-MCNC: 0.8 NG/DL (ref 0.8–1.5)
TSH SERPL DL<=0.05 MIU/L-ACNC: 4.25 UIU/ML (ref 0.36–3.74)

## 2022-09-16 NOTE — PROGRESS NOTES
Normal thyroid function (elevated TSH but normal free T4). Already on higher dose of clonidine.  Called patient to relay results

## 2022-09-29 RX ORDER — OMEPRAZOLE 20 MG/1
CAPSULE, DELAYED RELEASE ORAL
Qty: 90 CAPSULE | Refills: 0 | Status: SHIPPED | OUTPATIENT
Start: 2022-09-29

## 2022-10-04 ENCOUNTER — NURSE TRIAGE (OUTPATIENT)
Dept: OTHER | Facility: CLINIC | Age: 82
End: 2022-10-04

## 2022-10-04 ENCOUNTER — OFFICE VISIT (OUTPATIENT)
Dept: INTERNAL MEDICINE CLINIC | Age: 82
End: 2022-10-04
Payer: MEDICARE

## 2022-10-04 VITALS
HEIGHT: 63 IN | HEART RATE: 90 BPM | WEIGHT: 188 LBS | OXYGEN SATURATION: 94 % | TEMPERATURE: 98.3 F | DIASTOLIC BLOOD PRESSURE: 68 MMHG | SYSTOLIC BLOOD PRESSURE: 104 MMHG | RESPIRATION RATE: 16 BRPM | BODY MASS INDEX: 33.31 KG/M2

## 2022-10-04 DIAGNOSIS — M79.10 MYALGIA: Primary | ICD-10-CM

## 2022-10-04 DIAGNOSIS — R73.9 ELEVATED BLOOD SUGAR: ICD-10-CM

## 2022-10-04 PROCEDURE — G8417 CALC BMI ABV UP PARAM F/U: HCPCS | Performed by: FAMILY MEDICINE

## 2022-10-04 PROCEDURE — G8428 CUR MEDS NOT DOCUMENT: HCPCS | Performed by: FAMILY MEDICINE

## 2022-10-04 PROCEDURE — 99214 OFFICE O/P EST MOD 30 MIN: CPT | Performed by: FAMILY MEDICINE

## 2022-10-04 PROCEDURE — 1123F ACP DISCUSS/DSCN MKR DOCD: CPT | Performed by: FAMILY MEDICINE

## 2022-10-04 PROCEDURE — G8536 NO DOC ELDER MAL SCRN: HCPCS | Performed by: FAMILY MEDICINE

## 2022-10-04 PROCEDURE — G8399 PT W/DXA RESULTS DOCUMENT: HCPCS | Performed by: FAMILY MEDICINE

## 2022-10-04 PROCEDURE — G8752 SYS BP LESS 140: HCPCS | Performed by: FAMILY MEDICINE

## 2022-10-04 PROCEDURE — 1090F PRES/ABSN URINE INCON ASSESS: CPT | Performed by: FAMILY MEDICINE

## 2022-10-04 PROCEDURE — 1101F PT FALLS ASSESS-DOCD LE1/YR: CPT | Performed by: FAMILY MEDICINE

## 2022-10-04 PROCEDURE — G9717 DOC PT DX DEP/BP F/U NT REQ: HCPCS | Performed by: FAMILY MEDICINE

## 2022-10-04 PROCEDURE — G8754 DIAS BP LESS 90: HCPCS | Performed by: FAMILY MEDICINE

## 2022-10-04 NOTE — PROGRESS NOTES
Chief Complaint   Patient presents with    Generalized Body Aches     Patient is here for a body pain     she is a [de-identified]y.o. year old female who presents for evaluation of Body pain. Patient states she feels like her arms have weights on them as if she can't move her arms. she also states she feels like she cant move her feet. Per Patient she has been sweating a lot. Unknown if any exposure to COVID. Patient has not been able to test her self. Denies any fever cough or congestion. No chest pain shortness of breath or MUÑOZ    Reviewed and agree with Nurse Note and duplicated in this note. Reviewed PmHx, RxHx, FmHx, SocHx, AllgHx and updated and dated in the chart. Family History   Problem Relation Age of Onset    Hypertension Mother     Heart Disease Mother     Heart Disease Father     Stroke Father     Heart Disease Brother     Hypertension Brother     Depression Maternal Aunt     Depression Maternal Uncle     Depression Maternal Grandmother     Depression Maternal Grandfather     Anesth Problems Neg Hx        Past Medical History:   Diagnosis Date    Autoimmune disease ([facility] [address]     FIBROMYALGIA    Beta-blocker therapy     CAD (coronary artery disease)     MI >> stent x3, cath 2010: [place]    Chronic pain     back pain    Delayed gastric emptying     Depression     Depression with anxiety     Dyspepsia and other specified disorders of function of stomach     GERD (gastroesophageal reflux disease)     Hypercholesterolemia     Hypertension     JORDAN on CPAP     DOES NOT USE CPAP      Social History     Socioeconomic History    Marital status:    Tobacco Use    Smoking status: Every Day     Packs/day: 1.00     Types: Cigarettes     Last attempt to quit: 2006     Years since quittin.7    Smokeless tobacco: Never   Substance and Sexual Activity    Alcohol use:  Yes     Alcohol/week: 3.0 standard drinks     Types: 3 Standard drinks or equivalent per week     Comment: OCCASIONALLY    Drug use: No    Sexual activity: Not Currently   Social History Narrative    Julia lives independently. One daughter is nurse at [employer], one daughter  at [employer]. Review of Systems - negative except as listed above      Objective:     Vitals:    10/04/22 1538   BP: 104/68   Pulse: 90   Resp: 16   Temp: 98.3 °F (36.8 °C)   SpO2: 94%   Weight: 188 lb (85.3 kg)   Height: 5' 3\" (1.6 m)       Physical Examination: General appearance - alert, well appearing, and in no distress  Eyes - pupils equal and reactive, extraocular eye movements intact  Ears - bilateral TM's and external ear canals normal  Nose - normal and patent, no erythema, discharge or polyps  Mouth - mucous membranes moist, pharynx normal without lesions  Neck - supple, no significant adenopathy  Chest - clear to auscultation, no wheezes, rales or rhonchi, symmetric air entry  Heart - normal rate, regular rhythm, normal S1, S2, no murmurs, rubs, clicks or gallops  Abdomen - soft, nontender, nondistended, no masses or organomegaly  Extremities - peripheral pulses normal, no pedal edema, no clubbing or cyanosis  Skin - normal coloration and turgor, no rashes, no suspicious skin lesions noted     Assessment/ Plan:   Diagnoses and all orders for this visit:    1. Myalgia  -     CBC W/O DIFF; Future  -     METABOLIC PANEL, BASIC; Future  Patient will stop by [facility] to get COVID tested  Patient will report back to us via MyChart and if positive we will treat with paxlovid           I have discussed the diagnosis with the patient and the intended plan as seen in the above orders. The patient has received an after-visit summary and questions were answered concerning future plans. Medication Side Effects and Warnings were discussed with patient,  Patient Labs were reviewed and or requested, and  Patient Past Records were reviewed and or requested  yes       Pt agrees to call or return to clinic and/or go to closest ER with any worsening of symptoms. This may include, but not limited to increased fever (>100.4) with NSAIDS or Tylenol, increased edema, confusion, rash, worsening of presenting symptoms. Please note that this dictation was completed with [employer], the computer voice recognition software. Quite often unanticipated grammatical, syntax, homophones, and other interpretive errors are inadvertently transcribed by the computer software. Please disregard these errors. Please excuse any errors that have escaped final proofreading. Thank you.

## 2022-10-04 NOTE — TELEPHONE ENCOUNTER
Received call from Troy Thomas at [facility] with [employer] Complaint. Subjective: Caller states \"My arms and shoulders feel heavy. I have body aches. \"     Current Symptoms: upper body across shoulders and down both arms feel heavy. States different from her chronic pain- oxycontin has not helped the aches. Nausea-  FD Leida not working. Waiting on Domperidone from [facility])    Onset: 3 days ago; unchanged    Associated Symptoms: reduced activity, sleeping more. Pain Severity: 7/10; aching and heaviness; constant    Temperature: none     What has been tried: napping    LMP: NA Pregnant: NA    Recommended disposition: See PCP within 3 Days    Care advice provided, patient verbalizes understanding; denies any other questions or concerns; instructed to call back for any new or worsening symptoms. Patient/Caller agrees with recommended disposition; writer provided warm transfer to [facility] at [facility] for appointment scheduling    Attention Provider: Thank you for allowing me to participate in the care of your patient. The patient was connected to triage in response to information provided to the [facility]. Please do not respond through this encounter as the response is not directed to a shared pool.         Reason for Disposition   MILD or MODERATE muscle aches or pain and taking a statin medicine (a lipid or cholesterol lowering drug)    Protocols used: Muscle Aches and Body Pain-ADULT-[place]

## 2022-10-05 LAB
ANION GAP SERPL CALC-SCNC: 5 MMOL/L (ref 5–15)
BUN SERPL-MCNC: 21 MG/DL (ref 6–20)
BUN/CREAT SERPL: 22 (ref 12–20)
CALCIUM SERPL-MCNC: 9.1 MG/DL (ref 8.5–10.1)
CHLORIDE SERPL-SCNC: 106 MMOL/L (ref 97–108)
CO2 SERPL-SCNC: 29 MMOL/L (ref 21–32)
CREAT SERPL-MCNC: 0.97 MG/DL (ref 0.55–1.02)
ERYTHROCYTE [DISTWIDTH] IN BLOOD BY AUTOMATED COUNT: 13.9 % (ref 11.5–14.5)
EST. AVERAGE GLUCOSE BLD GHB EST-MCNC: 108 MG/DL
GLUCOSE SERPL-MCNC: 130 MG/DL (ref 65–100)
HBA1C MFR BLD: 5.4 % (ref 4–5.6)
HCT VFR BLD AUTO: 39 % (ref 35–47)
HGB BLD-MCNC: 12.9 G/DL (ref 11.5–16)
MCH RBC QN AUTO: 31.6 PG (ref 26–34)
MCHC RBC AUTO-ENTMCNC: 33.1 G/DL (ref 30–36.5)
MCV RBC AUTO: 95.6 FL (ref 80–99)
NRBC # BLD: 0 K/UL (ref 0–0.01)
NRBC BLD-RTO: 0 PER 100 WBC
PLATELET # BLD AUTO: 108 K/UL (ref 150–400)
PMV BLD AUTO: 12.1 FL (ref 8.9–12.9)
POTASSIUM SERPL-SCNC: 3.8 MMOL/L (ref 3.5–5.1)
RBC # BLD AUTO: 4.08 M/UL (ref 3.8–5.2)
SODIUM SERPL-SCNC: 140 MMOL/L (ref 136–145)
WBC # BLD AUTO: 6.9 K/UL (ref 3.6–11)

## 2022-10-06 ENCOUNTER — HOSPITAL ENCOUNTER (EMERGENCY)
Age: 82
Discharge: HOME | End: 2022-10-06
Attending: STUDENT IN AN ORGANIZED HEALTH CARE EDUCATION/TRAINING PROGRAM
Payer: MEDICARE

## 2022-10-06 ENCOUNTER — APPOINTMENT (OUTPATIENT)
Dept: GENERAL RADIOLOGY | Age: 82
End: 2022-10-06
Attending: EMERGENCY MEDICINE
Payer: MEDICARE

## 2022-10-06 VITALS
SYSTOLIC BLOOD PRESSURE: 122 MMHG | TEMPERATURE: 98.2 F | RESPIRATION RATE: 16 BRPM | HEART RATE: 85 BPM | DIASTOLIC BLOOD PRESSURE: 67 MMHG | OXYGEN SATURATION: 92 %

## 2022-10-06 DIAGNOSIS — T88.7XXA MEDICATION SIDE EFFECT: Primary | ICD-10-CM

## 2022-10-06 LAB
ALBUMIN SERPL-MCNC: 3.6 G/DL (ref 3.5–5)
ALBUMIN/GLOB SERPL: 1 {RATIO} (ref 1.1–2.2)
ALP SERPL-CCNC: 78 U/L (ref 45–117)
ALT SERPL-CCNC: 29 U/L (ref 12–78)
ANION GAP SERPL CALC-SCNC: 6 MMOL/L (ref 5–15)
APPEARANCE UR: CLEAR
AST SERPL-CCNC: 29 U/L (ref 15–37)
ATRIAL RATE: 79 BPM
BACTERIA URNS QL MICRO: NEGATIVE /HPF
BASOPHILS # BLD: 0 K/UL (ref 0–0.1)
BASOPHILS NFR BLD: 0 % (ref 0–1)
BILIRUB SERPL-MCNC: 0.4 MG/DL (ref 0.2–1)
BILIRUB UR QL: NEGATIVE
BUN SERPL-MCNC: 19 MG/DL (ref 6–20)
BUN/CREAT SERPL: 20 (ref 12–20)
CALCIUM SERPL-MCNC: 9 MG/DL (ref 8.5–10.1)
CALCULATED P AXIS, ECG09: 120 DEGREES
CALCULATED R AXIS, ECG10: -37 DEGREES
CALCULATED T AXIS, ECG11: 145 DEGREES
CHLORIDE SERPL-SCNC: 106 MMOL/L (ref 97–108)
CO2 SERPL-SCNC: 30 MMOL/L (ref 21–32)
COLOR UR: NORMAL
COMMENT, HOLDF: NORMAL
CREAT SERPL-MCNC: 0.93 MG/DL (ref 0.55–1.02)
DIAGNOSIS, 93000: NORMAL
DIFFERENTIAL METHOD BLD: NORMAL
EOSINOPHIL # BLD: 0.1 K/UL (ref 0–0.4)
EOSINOPHIL NFR BLD: 2 % (ref 0–7)
EPITH CASTS URNS QL MICRO: NORMAL /LPF
ERYTHROCYTE [DISTWIDTH] IN BLOOD BY AUTOMATED COUNT: 13.4 % (ref 11.5–14.5)
GLOBULIN SER CALC-MCNC: 3.5 G/DL (ref 2–4)
GLUCOSE SERPL-MCNC: 98 MG/DL (ref 65–100)
GLUCOSE UR STRIP.AUTO-MCNC: NEGATIVE MG/DL
HCT VFR BLD AUTO: 38.9 % (ref 35–47)
HGB BLD-MCNC: 13.4 G/DL (ref 11.5–16)
HGB UR QL STRIP: NEGATIVE
HYALINE CASTS URNS QL MICRO: NORMAL /LPF (ref 0–5)
IMM GRANULOCYTES # BLD AUTO: 0 K/UL (ref 0–0.04)
IMM GRANULOCYTES NFR BLD AUTO: 0 % (ref 0–0.5)
KETONES UR QL STRIP.AUTO: NEGATIVE MG/DL
LEUKOCYTE ESTERASE UR QL STRIP.AUTO: NEGATIVE
LYMPHOCYTES # BLD: 1.8 K/UL (ref 0.8–3.5)
LYMPHOCYTES NFR BLD: 26 % (ref 12–49)
MCH RBC QN AUTO: 32.1 PG (ref 26–34)
MCHC RBC AUTO-ENTMCNC: 34.4 G/DL (ref 30–36.5)
MCV RBC AUTO: 93.1 FL (ref 80–99)
MONOCYTES # BLD: 0.5 K/UL (ref 0–1)
MONOCYTES NFR BLD: 7 % (ref 5–13)
NEUTS SEG # BLD: 4.6 K/UL (ref 1.8–8)
NEUTS SEG NFR BLD: 65 % (ref 32–75)
NITRITE UR QL STRIP.AUTO: NEGATIVE
NRBC # BLD: 0 K/UL (ref 0–0.01)
NRBC BLD-RTO: 0 PER 100 WBC
P-R INTERVAL, ECG05: 264 MS
PH UR STRIP: 5 [PH] (ref 5–8)
PLATELET # BLD AUTO: 180 K/UL (ref 150–400)
PMV BLD AUTO: 11.1 FL (ref 8.9–12.9)
POTASSIUM SERPL-SCNC: 3.7 MMOL/L (ref 3.5–5.1)
PROT SERPL-MCNC: 7.1 G/DL (ref 6.4–8.2)
PROT UR STRIP-MCNC: NEGATIVE MG/DL
Q-T INTERVAL, ECG07: 364 MS
QRS DURATION, ECG06: 88 MS
QTC CALCULATION (BEZET), ECG08: 417 MS
RBC # BLD AUTO: 4.18 M/UL (ref 3.8–5.2)
RBC #/AREA URNS HPF: NORMAL /HPF (ref 0–5)
SAMPLES BEING HELD,HOLD: NORMAL
SARS-COV-2, COV2: NORMAL
SODIUM SERPL-SCNC: 142 MMOL/L (ref 136–145)
SP GR UR REFRACTOMETRY: 1.01 (ref 1–1.03)
UR CULT HOLD, URHOLD: NORMAL
UROBILINOGEN UR QL STRIP.AUTO: 0.2 EU/DL (ref 0.2–1)
VENTRICULAR RATE, ECG03: 79 BPM
WBC # BLD AUTO: 7 K/UL (ref 3.6–11)
WBC URNS QL MICRO: NORMAL /HPF (ref 0–4)

## 2022-10-06 PROCEDURE — U0005 INFEC AGEN DETEC AMPLI PROBE: HCPCS

## 2022-10-06 PROCEDURE — 93005 ELECTROCARDIOGRAM TRACING: CPT

## 2022-10-06 PROCEDURE — 99285 EMERGENCY DEPT VISIT HI MDM: CPT

## 2022-10-06 PROCEDURE — 81001 URINALYSIS AUTO W/SCOPE: CPT

## 2022-10-06 PROCEDURE — 36415 COLL VENOUS BLD VENIPUNCTURE: CPT

## 2022-10-06 PROCEDURE — 85025 COMPLETE CBC W/AUTO DIFF WBC: CPT

## 2022-10-06 PROCEDURE — 80053 COMPREHEN METABOLIC PANEL: CPT

## 2022-10-06 PROCEDURE — 71046 X-RAY EXAM CHEST 2 VIEWS: CPT

## 2022-10-06 RX ORDER — METOCLOPRAMIDE 10 MG/1
10 TABLET ORAL
Qty: 12 TABLET | Refills: 0 | Status: SHIPPED | OUTPATIENT
Start: 2022-10-06 | End: 2022-10-16

## 2022-10-06 NOTE — ED TRIAGE NOTES
Pt c/o intermittent SOB and generalized body aches since Sunday. Went to urgent care and referred to ED for O2 91-94% on RA.  Pts home covid test yesterday was negative

## 2022-10-07 LAB
SARS-COV-2, XPLCVT: NOT DETECTED
SOURCE, COVRS: NORMAL

## 2022-10-19 NOTE — ED PROVIDER NOTES
Patient is a 82-year-old female present emergency department for shortness of breath. Patient states that she has been having shortness of breath since Sunday as well as body aches. She gone to an urgent care for symptoms and due to her O2 sats at 91-94% on room air she was advised to come the emergency department for further evaluation. Patient took a COVID test yesterday that was negative. Patient denies any chest pain dizziness or lightheadedness. Patient states that its been difficult having her blood pressure controlled sometimes her blood pressure is extremely low other times it is extremely high. They have been adjusting her medications for her hypertension recently.        Past Medical History:   Diagnosis Date    Autoimmune disease ([facility] [address]     FIBROMYALGIA    Beta-blocker therapy     CAD (coronary artery disease) 1999    MI >> stent x3, cath 2010: [place]    Chronic pain     back pain    Delayed gastric emptying     Depression     Depression with anxiety     Dyspepsia and other specified disorders of function of stomach     GERD (gastroesophageal reflux disease)     Hypercholesterolemia     Hypertension     JORDAN on CPAP     DOES NOT USE CPAP       Past Surgical History:   Procedure Laterality Date    HX CATARACT REMOVAL      HX CORONARY STENT PLACEMENT      HX DILATION AND CURETTAGE      HX HEENT      ORAL SX    HX LUMBAR LAMINECTOMY  12/2012    Dr. Saida Jordan ORTHOPAEDIC  2012    spinal fusion    HX OTHER SURGICAL      BIRTH DONOVAN EXCISED    HX OTHER SURGICAL      RECTAL SX TWICE; ABCESS; FISTULA    HX SMALL BOWEL RESECTION  10/31/14    Obstruction, Dr. Yoly Marie, 22 in removed    HX TONSILLECTOMY      [address]         Family History:   Problem Relation Age of Onset    Hypertension Mother     Heart Disease Mother     Heart Disease Father     Stroke Father     Heart Disease Brother     Hypertension Brother     Depression Maternal Aunt     Depression Maternal Uncle Depression Maternal Grandmother     Depression Maternal Grandfather     Anesth Problems Neg Hx        Social History     Socioeconomic History    Marital status:      Spouse name: Not on file    Number of children: Not on file    Years of education: Not on file    Highest education level: Not on file   Occupational History    Not on file   Tobacco Use    Smoking status: Every Day     Packs/day: 1.00     Types: Cigarettes     Last attempt to quit: 2006     Years since quittin.8    Smokeless tobacco: Never   Substance and Sexual Activity    Alcohol use: Yes     Alcohol/week: 3.0 standard drinks     Types: 3 Standard drinks or equivalent per week     Comment: OCCASIONALLY    Drug use: No    Sexual activity: Not Currently   Other Topics Concern    Not on file   Social History Narrative    Julia lives independently. One daughter is nurse at [employer], one daughter  at [employer]. Social Determinants of Health     Financial Resource Strain: Not on file   Food Insecurity: Not on file   Transportation Needs: Not on file   Physical Activity: Not on file   Stress: Not on file   Social Connections: Not on file   Intimate Partner Violence: Not on file   Housing Stability: Not on file         ALLERGIES: Patient has no known allergies. Review of Systems   Respiratory:  Positive for shortness of breath. Musculoskeletal:  Positive for arthralgias and myalgias. All other systems reviewed and are negative. Vitals:    10/06/22 1426 10/06/22 1830   BP: 108/72 122/67   Pulse: 87 85   Resp: 18 16   Temp: 98.7 °F (37.1 °C) 98.2 °F (36.8 °C)   SpO2: 94% 92%            Physical Exam  Vitals and nursing note reviewed. Constitutional:       Appearance: She is well-developed. HENT:      Head: Normocephalic and atraumatic. Eyes:      Extraocular Movements: Extraocular movements intact. Pupils: Pupils are equal, round, and reactive to light.    Cardiovascular:      Rate and Rhythm: Normal rate and regular rhythm. Pulmonary:      Effort: Pulmonary effort is normal.      Breath sounds: Normal breath sounds. Abdominal:      General: Bowel sounds are normal.      Palpations: Abdomen is soft. Musculoskeletal:         General: Normal range of motion. Cervical back: Normal range of motion and neck supple. Skin:     General: Skin is warm and dry. Neurological:      General: No focal deficit present. Mental Status: She is alert and oriented to person, place, and time. Psychiatric:         Mood and Affect: Mood normal.         Behavior: Behavior normal.        MDM  Number of Diagnoses or Management Options  Medication side effect  Diagnosis management comments: New onset CHF, airspace disease, medication side effect. 82-year-old female presented emergency department for subjective shortness of breath concerns for hypoxia and body aches. Patient's physical exam reassuring low suspicion for CHF or airspace disease. EKG reassuring chest x-ray also clear. Patient will be discharged. ED Course as of 10/19/22 0722   Thu Oct 06, 2022   1448 EKG shows sinus rhythm at a rate of 79, first-degree AV block, otherwise normal intervals, left axis deviation, no ischemic changes.  [RD]      ED Course User Index  [RD] Hannah Coyle MD       Procedures

## 2023-01-25 ENCOUNTER — OFFICE VISIT (OUTPATIENT)
Dept: INTERNAL MEDICINE CLINIC | Age: 83
End: 2023-01-25
Payer: MEDICARE

## 2023-01-25 VITALS
OXYGEN SATURATION: 95 % | HEIGHT: 63 IN | DIASTOLIC BLOOD PRESSURE: 66 MMHG | HEART RATE: 54 BPM | RESPIRATION RATE: 16 BRPM | TEMPERATURE: 98 F | SYSTOLIC BLOOD PRESSURE: 134 MMHG | BODY MASS INDEX: 32.9 KG/M2 | WEIGHT: 185.7 LBS

## 2023-01-25 DIAGNOSIS — I10 BENIGN ESSENTIAL HYPERTENSION: Primary | ICD-10-CM

## 2023-01-25 DIAGNOSIS — E78.00 HYPERCHOLESTEREMIA: ICD-10-CM

## 2023-01-25 DIAGNOSIS — F39 MOOD DISORDER (HCC): ICD-10-CM

## 2023-01-25 PROCEDURE — 1090F PRES/ABSN URINE INCON ASSESS: CPT | Performed by: FAMILY MEDICINE

## 2023-01-25 PROCEDURE — 1123F ACP DISCUSS/DSCN MKR DOCD: CPT | Performed by: FAMILY MEDICINE

## 2023-01-25 PROCEDURE — 99213 OFFICE O/P EST LOW 20 MIN: CPT | Performed by: FAMILY MEDICINE

## 2023-01-25 PROCEDURE — 3074F SYST BP LT 130 MM HG: CPT | Performed by: FAMILY MEDICINE

## 2023-01-25 PROCEDURE — G8399 PT W/DXA RESULTS DOCUMENT: HCPCS | Performed by: FAMILY MEDICINE

## 2023-01-25 PROCEDURE — G8536 NO DOC ELDER MAL SCRN: HCPCS | Performed by: FAMILY MEDICINE

## 2023-01-25 PROCEDURE — G9717 DOC PT DX DEP/BP F/U NT REQ: HCPCS | Performed by: FAMILY MEDICINE

## 2023-01-25 PROCEDURE — 3078F DIAST BP <80 MM HG: CPT | Performed by: FAMILY MEDICINE

## 2023-01-25 PROCEDURE — G8417 CALC BMI ABV UP PARAM F/U: HCPCS | Performed by: FAMILY MEDICINE

## 2023-01-25 PROCEDURE — G8427 DOCREV CUR MEDS BY ELIG CLIN: HCPCS | Performed by: FAMILY MEDICINE

## 2023-01-25 PROCEDURE — 1101F PT FALLS ASSESS-DOCD LE1/YR: CPT | Performed by: FAMILY MEDICINE

## 2023-01-25 NOTE — PROGRESS NOTES
Titi Myers is a [de-identified] y.o. female  Chief Complaint   Patient presents with    Follow-up    Hypertension     Patient here for follow up high blood pressure. Pt who presents for follow up of a pre-existing problem of hypertension. Diet and Lifestyle: not attempting to follow a low fat, low cholesterol diet  Home BP Monitoring: is not measured at home  Use of agents associated with hypertension: none. Cardiovascular ROS: taking medications as instructed, no medication side effects noted, no TIA's, no chest pain on exertion, no dyspnea on exertion, no swelling of ankles. New concerns: none. Reviewed and agree with Nurse Note and duplicated in this note. Reviewed PmHx, RxHx, FmHx, SocHx, AllgHx and updated and dated in the chart.     Family History   Problem Relation Age of Onset    Hypertension Mother     Heart Disease Mother     Heart Disease Father     Stroke Father     Heart Disease Brother     Hypertension Brother     Depression Maternal Aunt     Depression Maternal Uncle     Depression Maternal Grandmother     Depression Maternal Grandfather     Anesth Problems Neg Hx        Past Medical History:   Diagnosis Date    Autoimmune disease ([facility] [address]     FIBROMYALGIA    Beta-blocker therapy     CAD (coronary artery disease)     MI >> stent x3, cath 2010: OK    Chronic pain     back pain    Delayed gastric emptying     Depression     Depression with anxiety     Dyspepsia and other specified disorders of function of stomach     GERD (gastroesophageal reflux disease)     Hypercholesterolemia     Hypertension     JORDAN on CPAP     DOES NOT USE CPAP      Social History     Socioeconomic History    Marital status:    Tobacco Use    Smoking status: Every Day     Packs/day: 1.00     Types: Cigarettes     Last attempt to quit: 2006     Years since quittin.0    Smokeless tobacco: Never   Vaping Use    Vaping Use: Every day    Substances: Nicotine, Flavoring    Devices: Pre-filled pod   Substance and Sexual Activity    Alcohol use: Yes     Alcohol/week: 3.0 standard drinks     Types: 3 Standard drinks or equivalent per week     Comment: OCCASIONALLY    Drug use: No    Sexual activity: Not Currently   Social History Narrative    Julia lives independently. One daughter is nurse at [employer], one daughter  at [employer]. Review of Systems - negative except as listed above      Objective:     Vitals:    01/25/23 1445   BP: 134/66   Pulse: (!) 54   Resp: 16   Temp: 98 °F (36.7 °C)   TempSrc: Oral   SpO2: 95%   Weight: 185 lb 11.2 oz (84.2 kg)   Height: 5' 3\" (1.6 m)       Physical Examination: General appearance - alert, well appearing, and in no distress  Eyes - pupils equal and reactive, extraocular eye movements intact  Ears - bilateral TM's and external ear canals normal  Nose - normal and patent, no erythema, discharge or polyps  Mouth - mucous membranes moist, pharynx normal without lesions  Neck - supple, no significant adenopathy  Chest - clear to auscultation, no wheezes, rales or rhonchi, symmetric air entry  Heart - normal rate, regular rhythm, normal S1, S2, no murmurs, rubs, clicks or gallops  Abdomen - soft, nontender, nondistended, no masses or organomegaly  Neurological - alert, oriented, normal speech, no focal findings or movement disorder noted  Musculoskeletal - no joint tenderness, deformity or swelling  Extremities - peripheral pulses normal, no pedal edema, no clubbing or cyanosis  Skin - normal coloration and turgor, no rashes, no suspicious skin lesions noted     Assessment/ Plan:   Diagnoses and all orders for this visit:    1. Benign essential hypertension  Blood pressure stable we will continue current medications  2. Mood disorder ([facility] [address]  Assessment & Plan:   monitored by specialist. No acute findings meriting change in the plan      3.  Hypercholesteremia               Medication Side Effects and Warnings were discussed with patient,  Patient Labs were reviewed and or requested, and  Patient Past Records were reviewed and or requested  yes       I have discussed the diagnosis with the patient and the intended plan as seen in the above orders. The patient has received an after-visit summary and questions were answered concerning future plans. Pt agrees to call or return to clinic and/or go to closest ER with any worsening of symptoms. This may include, but not limited to increased fever (>100.4) with NSAIDS or Tylenol, increased edema, confusion, rash, worsening of presenting symptoms. Please note that this dictation was completed with [employer], the computer voice recognition software. Quite often unanticipated grammatical, syntax, homophones, and other interpretive errors are inadvertently transcribed by the computer software. Please disregard these errors. Please excuse any errors that have escaped final proofreading. Thank you.

## 2023-03-08 RX ORDER — OMEPRAZOLE 20 MG/1
CAPSULE, DELAYED RELEASE ORAL
Qty: 90 CAPSULE | Refills: 0 | Status: SHIPPED | OUTPATIENT
Start: 2023-03-08

## 2023-04-28 RX ORDER — ALBUTEROL SULFATE 90 UG/1
2 AEROSOL, METERED RESPIRATORY (INHALATION)
Qty: 1 EACH | Refills: 0 | Status: SHIPPED | OUTPATIENT
Start: 2023-04-28

## 2023-05-26 ENCOUNTER — TELEPHONE (OUTPATIENT)
Facility: CLINIC | Age: 83
End: 2023-05-26

## 2023-05-31 ENCOUNTER — OFFICE VISIT (OUTPATIENT)
Facility: CLINIC | Age: 83
End: 2023-05-31
Payer: COMMERCIAL

## 2023-05-31 VITALS
TEMPERATURE: 98.5 F | OXYGEN SATURATION: 95 % | WEIGHT: 186 LBS | RESPIRATION RATE: 16 BRPM | SYSTOLIC BLOOD PRESSURE: 100 MMHG | DIASTOLIC BLOOD PRESSURE: 62 MMHG | BODY MASS INDEX: 32.96 KG/M2 | HEART RATE: 53 BPM | HEIGHT: 63 IN

## 2023-05-31 DIAGNOSIS — R42 VERTIGO: ICD-10-CM

## 2023-05-31 DIAGNOSIS — Z71.6 ENCOUNTER FOR SMOKING CESSATION COUNSELING: ICD-10-CM

## 2023-05-31 DIAGNOSIS — R26.89 LOSS OF BALANCE: Primary | ICD-10-CM

## 2023-05-31 DIAGNOSIS — J40 BRONCHITIS: ICD-10-CM

## 2023-05-31 LAB
ANION GAP SERPL CALC-SCNC: 3 MMOL/L (ref 5–15)
BASOPHILS # BLD: 0 K/UL (ref 0–0.1)
BASOPHILS NFR BLD: 1 % (ref 0–1)
BUN SERPL-MCNC: 18 MG/DL (ref 6–20)
BUN/CREAT SERPL: 25 (ref 12–20)
CALCIUM SERPL-MCNC: 8.7 MG/DL (ref 8.5–10.1)
CHLORIDE SERPL-SCNC: 107 MMOL/L (ref 97–108)
CO2 SERPL-SCNC: 34 MMOL/L (ref 21–32)
CREAT SERPL-MCNC: 0.71 MG/DL (ref 0.55–1.02)
DIFFERENTIAL METHOD BLD: NORMAL
EOSINOPHIL # BLD: 0.3 K/UL (ref 0–0.4)
EOSINOPHIL NFR BLD: 3 % (ref 0–7)
ERYTHROCYTE [DISTWIDTH] IN BLOOD BY AUTOMATED COUNT: 13.3 % (ref 11.5–14.5)
GLUCOSE SERPL-MCNC: 163 MG/DL (ref 65–100)
HCT VFR BLD AUTO: 37.1 % (ref 35–47)
HGB BLD-MCNC: 12.2 G/DL (ref 11.5–16)
IMM GRANULOCYTES # BLD AUTO: 0 K/UL (ref 0–0.04)
IMM GRANULOCYTES NFR BLD AUTO: 0 % (ref 0–0.5)
LYMPHOCYTES # BLD: 2.5 K/UL (ref 0.8–3.5)
LYMPHOCYTES NFR BLD: 30 % (ref 12–49)
MCH RBC QN AUTO: 31 PG (ref 26–34)
MCHC RBC AUTO-ENTMCNC: 32.9 G/DL (ref 30–36.5)
MCV RBC AUTO: 94.2 FL (ref 80–99)
MONOCYTES # BLD: 0.5 K/UL (ref 0–1)
MONOCYTES NFR BLD: 6 % (ref 5–13)
NEUTS SEG # BLD: 4.9 K/UL (ref 1.8–8)
NEUTS SEG NFR BLD: 60 % (ref 32–75)
NRBC # BLD: 0 K/UL (ref 0–0.01)
NRBC BLD-RTO: 0 PER 100 WBC
PLATELET # BLD AUTO: 168 K/UL (ref 150–400)
PMV BLD AUTO: 12.5 FL (ref 8.9–12.9)
POTASSIUM SERPL-SCNC: 3.8 MMOL/L (ref 3.5–5.1)
RBC # BLD AUTO: 3.94 M/UL (ref 3.8–5.2)
SODIUM SERPL-SCNC: 144 MMOL/L (ref 136–145)
TSH SERPL DL<=0.05 MIU/L-ACNC: 7.24 UIU/ML (ref 0.36–3.74)
WBC # BLD AUTO: 8.3 K/UL (ref 3.6–11)

## 2023-05-31 PROCEDURE — 1123F ACP DISCUSS/DSCN MKR DOCD: CPT | Performed by: FAMILY MEDICINE

## 2023-05-31 PROCEDURE — 3078F DIAST BP <80 MM HG: CPT | Performed by: FAMILY MEDICINE

## 2023-05-31 PROCEDURE — 99214 OFFICE O/P EST MOD 30 MIN: CPT | Performed by: FAMILY MEDICINE

## 2023-05-31 PROCEDURE — 3074F SYST BP LT 130 MM HG: CPT | Performed by: FAMILY MEDICINE

## 2023-05-31 RX ORDER — OXYCODONE AND ACETAMINOPHEN 10; 325 MG/1; MG/1
TABLET ORAL
COMMUNITY
Start: 2023-05-23

## 2023-05-31 RX ORDER — VARENICLINE TARTRATE 0.5 MG/1
.5-1 TABLET, FILM COATED ORAL SEE ADMIN INSTRUCTIONS
Qty: 57 TABLET | Refills: 0 | Status: SHIPPED | OUTPATIENT
Start: 2023-05-31

## 2023-05-31 ASSESSMENT — PATIENT HEALTH QUESTIONNAIRE - PHQ9
4. FEELING TIRED OR HAVING LITTLE ENERGY: 0
1. LITTLE INTEREST OR PLEASURE IN DOING THINGS: 0
10. IF YOU CHECKED OFF ANY PROBLEMS, HOW DIFFICULT HAVE THESE PROBLEMS MADE IT FOR YOU TO DO YOUR WORK, TAKE CARE OF THINGS AT HOME, OR GET ALONG WITH OTHER PEOPLE: 0
5. POOR APPETITE OR OVEREATING: 0
SUM OF ALL RESPONSES TO PHQ QUESTIONS 1-9: 0
SUM OF ALL RESPONSES TO PHQ QUESTIONS 1-9: 0
SUM OF ALL RESPONSES TO PHQ9 QUESTIONS 1 & 2: 0
6. FEELING BAD ABOUT YOURSELF - OR THAT YOU ARE A FAILURE OR HAVE LET YOURSELF OR YOUR FAMILY DOWN: 0
9. THOUGHTS THAT YOU WOULD BE BETTER OFF DEAD, OR OF HURTING YOURSELF: 0
2. FEELING DOWN, DEPRESSED OR HOPELESS: 0
7. TROUBLE CONCENTRATING ON THINGS, SUCH AS READING THE NEWSPAPER OR WATCHING TELEVISION: 0
3. TROUBLE FALLING OR STAYING ASLEEP: 0
SUM OF ALL RESPONSES TO PHQ QUESTIONS 1-9: 0
SUM OF ALL RESPONSES TO PHQ QUESTIONS 1-9: 0
8. MOVING OR SPEAKING SO SLOWLY THAT OTHER PEOPLE COULD HAVE NOTICED. OR THE OPPOSITE, BEING SO FIGETY OR RESTLESS THAT YOU HAVE BEEN MOVING AROUND A LOT MORE THAN USUAL: 0

## 2023-05-31 NOTE — PROGRESS NOTES
Chief Complaint   Patient presents with    Difficulty Walking     Patient is here for a balancing. she is a 82y.o. year old female who presents for evaluation of Balancing issues. Patient states she is having issues with standing and walking. Mostly positional in nature. Denies any falls. Per patient she feels off balance and doing a lot of holding on to furniture. Per patient she has no dizziness and has had symptoms for a week. Patient also states she is sleeping for almost 14 hrs since being ill 2 to 3 weeks ago. Patient states that she believes she had bronchitis which she has now gotten much better from about 2 to 3 weeks ago. Currently denies any chills sweats fatigue but this did set off her dizziness episodes. Patient would also like to stop smoking. She has not tried any medication in the past but would like to start. Reviewed and agree with Nurse Note and duplicated in this note. Reviewed PmHx, RxHx, FmHx, SocHx, AllgHx and updated and dated in the chart. Family History   Problem Relation Age of Onset    Stroke Father     Heart Disease Brother     Hypertension Brother     Depression Maternal Aunt     Heart Disease Father     Heart Disease Mother     Hypertension Mother     Depression Maternal Grandmother     Depression Maternal Uncle     Anesth Problems Neg Hx     Depression Maternal Grandfather        Past Medical History:   Diagnosis Date    Autoimmune disease ([facility] [address]     FIBROMYALGIA    Beta-blocker intolerance     CAD (coronary artery disease) 1999    MI >> stent x3, cath 2010: [place]    Chronic pain     back pain    Delayed gastric emptying     Depression     Depression with anxiety     Dyspepsia and other specified disorders of function of stomach     GERD (gastroesophageal reflux disease)     Hypercholesterolemia     Hypertension     EMILIA on CPAP     DOES NOT USE CPAP      Social History     Socioeconomic History    Marital status:     Tobacco Use    Smoking status: Every Day

## 2023-06-01 DIAGNOSIS — R73.9 ELEVATED BLOOD SUGAR: ICD-10-CM

## 2023-06-01 DIAGNOSIS — E03.9 HYPOTHYROIDISM, UNSPECIFIED TYPE: Primary | ICD-10-CM

## 2023-06-26 RX ORDER — VARENICLINE TARTRATE 0.5 MG/1
TABLET, FILM COATED ORAL
Qty: 56 TABLET | Refills: 1 | Status: SHIPPED | OUTPATIENT
Start: 2023-06-26

## 2023-06-30 ENCOUNTER — HOSPITAL ENCOUNTER (OUTPATIENT)
Facility: HOSPITAL | Age: 83
End: 2023-06-30
Attending: FAMILY MEDICINE
Payer: MEDICARE

## 2023-06-30 DIAGNOSIS — R26.89 LOSS OF BALANCE: ICD-10-CM

## 2023-06-30 PROCEDURE — 6360000004 HC RX CONTRAST MEDICATION: Performed by: STUDENT IN AN ORGANIZED HEALTH CARE EDUCATION/TRAINING PROGRAM

## 2023-06-30 PROCEDURE — 70470 CT HEAD/BRAIN W/O & W/DYE: CPT

## 2023-06-30 RX ADMIN — IOPAMIDOL 100 ML: 612 INJECTION, SOLUTION INTRAVENOUS at 16:29

## 2023-07-12 ENCOUNTER — OFFICE VISIT (OUTPATIENT)
Facility: CLINIC | Age: 83
End: 2023-07-12
Payer: MEDICARE

## 2023-07-12 VITALS
DIASTOLIC BLOOD PRESSURE: 73 MMHG | TEMPERATURE: 98.6 F | BODY MASS INDEX: 32.51 KG/M2 | WEIGHT: 183.5 LBS | HEIGHT: 63 IN | HEART RATE: 66 BPM | SYSTOLIC BLOOD PRESSURE: 148 MMHG | RESPIRATION RATE: 18 BRPM | OXYGEN SATURATION: 96 %

## 2023-07-12 DIAGNOSIS — L03.90 WOUND CELLULITIS: ICD-10-CM

## 2023-07-12 DIAGNOSIS — F32.A ANXIETY AND DEPRESSION: ICD-10-CM

## 2023-07-12 DIAGNOSIS — F41.9 ANXIETY AND DEPRESSION: ICD-10-CM

## 2023-07-12 DIAGNOSIS — I10 ESSENTIAL (PRIMARY) HYPERTENSION: ICD-10-CM

## 2023-07-12 DIAGNOSIS — Z00.00 MEDICARE ANNUAL WELLNESS VISIT, SUBSEQUENT: Primary | ICD-10-CM

## 2023-07-12 DIAGNOSIS — E78.00 PURE HYPERCHOLESTEROLEMIA, UNSPECIFIED: ICD-10-CM

## 2023-07-12 DIAGNOSIS — R73.9 HYPERGLYCEMIA, UNSPECIFIED: ICD-10-CM

## 2023-07-12 PROCEDURE — 1123F ACP DISCUSS/DSCN MKR DOCD: CPT | Performed by: FAMILY MEDICINE

## 2023-07-12 PROCEDURE — 3078F DIAST BP <80 MM HG: CPT | Performed by: FAMILY MEDICINE

## 2023-07-12 PROCEDURE — 3077F SYST BP >= 140 MM HG: CPT | Performed by: FAMILY MEDICINE

## 2023-07-12 PROCEDURE — G0439 PPPS, SUBSEQ VISIT: HCPCS | Performed by: FAMILY MEDICINE

## 2023-07-12 RX ORDER — ONDANSETRON 4 MG/1
TABLET, ORALLY DISINTEGRATING ORAL
COMMUNITY
Start: 2023-07-06

## 2023-07-12 RX ORDER — DULOXETIN HYDROCHLORIDE 60 MG/1
60 CAPSULE, DELAYED RELEASE ORAL DAILY
Qty: 30 CAPSULE | Refills: 1 | Status: SHIPPED | OUTPATIENT
Start: 2023-07-12

## 2023-07-12 RX ORDER — BUSPIRONE HYDROCHLORIDE 5 MG/1
TABLET ORAL
COMMUNITY
Start: 2023-04-22 | End: 2023-07-12 | Stop reason: SDUPTHER

## 2023-07-12 RX ORDER — BUSPIRONE HYDROCHLORIDE 5 MG/1
5 TABLET ORAL
Qty: 30 TABLET | Refills: 0 | Status: SHIPPED | OUTPATIENT
Start: 2023-07-12 | End: 2023-08-11

## 2023-07-12 RX ORDER — DULOXETIN HYDROCHLORIDE 60 MG/1
60 CAPSULE, DELAYED RELEASE ORAL DAILY
Qty: 30 CAPSULE | Refills: 11 | Status: SHIPPED | OUTPATIENT
Start: 2023-07-12

## 2023-07-12 RX ORDER — SULFAMETHOXAZOLE AND TRIMETHOPRIM 800; 160 MG/1; MG/1
1 TABLET ORAL 2 TIMES DAILY
Qty: 20 TABLET | Refills: 0 | Status: SHIPPED | OUTPATIENT
Start: 2023-07-12 | End: 2023-07-22

## 2023-07-12 ASSESSMENT — PATIENT HEALTH QUESTIONNAIRE - PHQ9
9. THOUGHTS THAT YOU WOULD BE BETTER OFF DEAD, OR OF HURTING YOURSELF: 0
SUM OF ALL RESPONSES TO PHQ9 QUESTIONS 1 & 2: 0
SUM OF ALL RESPONSES TO PHQ QUESTIONS 1-9: 0
5. POOR APPETITE OR OVEREATING: 0
3. TROUBLE FALLING OR STAYING ASLEEP: 0
SUM OF ALL RESPONSES TO PHQ QUESTIONS 1-9: 0
10. IF YOU CHECKED OFF ANY PROBLEMS, HOW DIFFICULT HAVE THESE PROBLEMS MADE IT FOR YOU TO DO YOUR WORK, TAKE CARE OF THINGS AT HOME, OR GET ALONG WITH OTHER PEOPLE: 0
4. FEELING TIRED OR HAVING LITTLE ENERGY: 0
8. MOVING OR SPEAKING SO SLOWLY THAT OTHER PEOPLE COULD HAVE NOTICED. OR THE OPPOSITE, BEING SO FIGETY OR RESTLESS THAT YOU HAVE BEEN MOVING AROUND A LOT MORE THAN USUAL: 0
6. FEELING BAD ABOUT YOURSELF - OR THAT YOU ARE A FAILURE OR HAVE LET YOURSELF OR YOUR FAMILY DOWN: 0
7. TROUBLE CONCENTRATING ON THINGS, SUCH AS READING THE NEWSPAPER OR WATCHING TELEVISION: 0
SUM OF ALL RESPONSES TO PHQ QUESTIONS 1-9: 0
1. LITTLE INTEREST OR PLEASURE IN DOING THINGS: 0
SUM OF ALL RESPONSES TO PHQ QUESTIONS 1-9: 0
2. FEELING DOWN, DEPRESSED OR HOPELESS: 0

## 2023-07-12 ASSESSMENT — ANXIETY QUESTIONNAIRES
6. BECOMING EASILY ANNOYED OR IRRITABLE: 0
4. TROUBLE RELAXING: 0
7. FEELING AFRAID AS IF SOMETHING AWFUL MIGHT HAPPEN: 0
GAD7 TOTAL SCORE: 0
2. NOT BEING ABLE TO STOP OR CONTROL WORRYING: 0
1. FEELING NERVOUS, ANXIOUS, OR ON EDGE: 0
IF YOU CHECKED OFF ANY PROBLEMS ON THIS QUESTIONNAIRE, HOW DIFFICULT HAVE THESE PROBLEMS MADE IT FOR YOU TO DO YOUR WORK, TAKE CARE OF THINGS AT HOME, OR GET ALONG WITH OTHER PEOPLE: NOT DIFFICULT AT ALL
5. BEING SO RESTLESS THAT IT IS HARD TO SIT STILL: 0
3. WORRYING TOO MUCH ABOUT DIFFERENT THINGS: 0

## 2023-07-12 ASSESSMENT — LIFESTYLE VARIABLES
HOW OFTEN DO YOU HAVE A DRINK CONTAINING ALCOHOL: 2-4 TIMES A MONTH
HOW MANY STANDARD DRINKS CONTAINING ALCOHOL DO YOU HAVE ON A TYPICAL DAY: 1 OR 2

## 2023-07-12 NOTE — PROGRESS NOTES
Medicare Annual Wellness Visit    Dorcas Velasquez is here for Medicare AWV    Assessment & Plan   Pure hypercholesterolemia, unspecified  -     Lipid Panel; Future  -     Comprehensive Metabolic Panel; Future  Essential (primary) hypertension  Hyperglycemia, unspecified  Wound cellulitis  -     sulfamethoxazole-trimethoprim (BACTRIM DS;SEPTRA DS) 800-160 MG per tablet; Take 1 tablet by mouth 2 times daily for 10 days, Disp-20 tablet, R-0Normal  -     mupirocin (BACTROBAN) 2 % ointment; Apply 0.5 g topically 2 times daily for 7 days Apply topically 3 times daily. , Topical, 2 TIMES DAILY Starting Wed 7/12/2023, Until Wed 7/19/2023, For 7 days, Disp-15 g, R-0, Normal  Anxiety and depression  -     DULoxetine (CYMBALTA) 60 MG extended release capsule; Take 1 capsule by mouth daily, Disp-30 capsule, R-11Normal  Medicare annual wellness visit, subsequent    Recommendations for Preventive Services Due: see orders and patient instructions/AVS.  Recommended screening schedule for the next 5-10 years is provided to the patient in written form: see Patient Instructions/AVS.     Return in 6 months (on 1/12/2024). Subjective       Patient's complete Health Risk Assessment and screening values have been reviewed and are found in Flowsheets. The following problems were reviewed today and where indicated follow up appointments were made and/or referrals ordered. Positive Risk Factor Screenings with Interventions:       Cognitive: Words recalled: 0 Words Recalled   Clock Drawing Test (CDT): (!) Abnormal   Total Score: (!) 0   Total Score Interpretation: Abnormal Mini-Cog      Interventions:  See AVS for additional education material            Opioid Risk: (Low risk score <55) Opioid risk score: 34    Patient is low risk for opioid use disorder or overdose.   Last PDMP [place] as Reviewed:  Review User Review Instant Review Result                  General HRA Questions:  Select all that apply: (!) New or Increased Pain    Pain

## 2023-07-12 NOTE — PROGRESS NOTES
Chief Complaint   Patient presents with    Medicare AWV     she is a 82y.o. year old female who presents for CPE  Complete Physical Exam Questions:    LMP -  n/a  Last Pap (q 3 years, or q5 with HPV) - no  Last Mammogram (50-74 biennially)- no  Hx of abnl Pap - No    1. Do you follow a low fat diet?  no  2. Are you up to date on your Tdap (<10 years)? Yes  3. Have you ever had a Pneumovax vaccine (>65)? Yes   PCV13 Yes   PPSV23 Yes  4. Have you had Zoster vaccine (>60)? No  5. Have you had the HPV - Gardasil (13- 26)? No  6. Do you follow an exercise program?  No  7. Do you smoke? No  8. Do you consider yourself overweight? No  9. Is there a family history of CAD< age 48? No  10. Is there a family history of Cancer? No  11. Do you know your Cancer risks? No  12. Have you had a colonoscopy? Yes  13. Have you been tested for HIV or other STI's? No HIV testing today(18-66 y/o)? No  14. Have had a bone density scan or DEXA done(>65)? No  15. Have you had an EKG performed in the last five years (>50)?   No

## 2023-07-13 LAB
ALBUMIN SERPL-MCNC: 3.9 G/DL (ref 3.5–5)
ALBUMIN/GLOB SERPL: 1.4 (ref 1.1–2.2)
ALP SERPL-CCNC: 85 U/L (ref 45–117)
ALT SERPL-CCNC: 28 U/L (ref 12–78)
ANION GAP SERPL CALC-SCNC: 4 MMOL/L (ref 5–15)
AST SERPL-CCNC: 22 U/L (ref 15–37)
BILIRUB SERPL-MCNC: 0.5 MG/DL (ref 0.2–1)
BUN SERPL-MCNC: 16 MG/DL (ref 6–20)
BUN/CREAT SERPL: 20 (ref 12–20)
CALCIUM SERPL-MCNC: 9 MG/DL (ref 8.5–10.1)
CHLORIDE SERPL-SCNC: 102 MMOL/L (ref 97–108)
CHOLEST SERPL-MCNC: 110 MG/DL
CO2 SERPL-SCNC: 33 MMOL/L (ref 21–32)
CREAT SERPL-MCNC: 0.8 MG/DL (ref 0.55–1.02)
GLOBULIN SER CALC-MCNC: 2.8 G/DL (ref 2–4)
GLUCOSE SERPL-MCNC: 95 MG/DL (ref 65–100)
HDLC SERPL-MCNC: 45 MG/DL
HDLC SERPL: 2.4 (ref 0–5)
LDLC SERPL CALC-MCNC: 27.6 MG/DL (ref 0–100)
POTASSIUM SERPL-SCNC: 3.6 MMOL/L (ref 3.5–5.1)
PROT SERPL-MCNC: 6.7 G/DL (ref 6.4–8.2)
SODIUM SERPL-SCNC: 139 MMOL/L (ref 136–145)
TRIGL SERPL-MCNC: 187 MG/DL
VLDLC SERPL CALC-MCNC: 37.4 MG/DL

## 2023-08-07 RX ORDER — VARENICLINE TARTRATE 0.5 MG/1
TABLET, FILM COATED ORAL
Qty: 56 TABLET | Refills: 1 | Status: SHIPPED | OUTPATIENT
Start: 2023-08-07

## 2023-08-16 ENCOUNTER — HOSPITAL ENCOUNTER (EMERGENCY)
Facility: HOSPITAL | Age: 83
Discharge: HOME | End: 2023-08-16
Attending: EMERGENCY MEDICINE
Payer: MEDICARE

## 2023-08-16 ENCOUNTER — APPOINTMENT (OUTPATIENT)
Facility: HOSPITAL | Age: 83
End: 2023-08-16
Payer: MEDICARE

## 2023-08-16 VITALS
SYSTOLIC BLOOD PRESSURE: 127 MMHG | RESPIRATION RATE: 16 BRPM | BODY MASS INDEX: 32.46 KG/M2 | WEIGHT: 183.2 LBS | HEIGHT: 63 IN | HEART RATE: 71 BPM | TEMPERATURE: 98.1 F | OXYGEN SATURATION: 92 % | DIASTOLIC BLOOD PRESSURE: 64 MMHG

## 2023-08-16 DIAGNOSIS — R22.0 LEFT FACIAL SWELLING: ICD-10-CM

## 2023-08-16 DIAGNOSIS — J01.80 OTHER ACUTE SINUSITIS, RECURRENCE NOT SPECIFIED: Primary | ICD-10-CM

## 2023-08-16 LAB
ALBUMIN SERPL-MCNC: 3.4 G/DL (ref 3.5–5)
ALBUMIN/GLOB SERPL: 1 (ref 1.1–2.2)
ALP SERPL-CCNC: 78 U/L (ref 45–117)
ALT SERPL-CCNC: 25 U/L (ref 12–78)
ANION GAP SERPL CALC-SCNC: 6 MMOL/L (ref 5–15)
AST SERPL-CCNC: 21 U/L (ref 15–37)
BASOPHILS # BLD: 0 K/UL (ref 0–0.1)
BASOPHILS NFR BLD: 0 % (ref 0–1)
BILIRUB SERPL-MCNC: 0.7 MG/DL (ref 0.2–1)
BUN SERPL-MCNC: 18 MG/DL (ref 6–20)
BUN/CREAT SERPL: 20 (ref 12–20)
CALCIUM SERPL-MCNC: 8.9 MG/DL (ref 8.5–10.1)
CHLORIDE SERPL-SCNC: 106 MMOL/L (ref 97–108)
CO2 SERPL-SCNC: 29 MMOL/L (ref 21–32)
COMMENT:: NORMAL
CREAT SERPL-MCNC: 0.92 MG/DL (ref 0.55–1.02)
DIFFERENTIAL METHOD BLD: ABNORMAL
EOSINOPHIL # BLD: 0.1 K/UL (ref 0–0.4)
EOSINOPHIL NFR BLD: 1 % (ref 0–7)
ERYTHROCYTE [DISTWIDTH] IN BLOOD BY AUTOMATED COUNT: 13.2 % (ref 11.5–14.5)
GLOBULIN SER CALC-MCNC: 3.3 G/DL (ref 2–4)
GLUCOSE SERPL-MCNC: 111 MG/DL (ref 65–100)
HCT VFR BLD AUTO: 34 % (ref 35–47)
HGB BLD-MCNC: 11.4 G/DL (ref 11.5–16)
IMM GRANULOCYTES # BLD AUTO: 0 K/UL (ref 0–0.04)
IMM GRANULOCYTES NFR BLD AUTO: 0 % (ref 0–0.5)
LACTATE SERPL-SCNC: 1.8 MMOL/L (ref 0.4–2)
LYMPHOCYTES # BLD: 1.3 K/UL (ref 0.8–3.5)
LYMPHOCYTES NFR BLD: 14 % (ref 12–49)
MCH RBC QN AUTO: 30.2 PG (ref 26–34)
MCHC RBC AUTO-ENTMCNC: 33.5 G/DL (ref 30–36.5)
MCV RBC AUTO: 90.2 FL (ref 80–99)
MONOCYTES # BLD: 0.7 K/UL (ref 0–1)
MONOCYTES NFR BLD: 7 % (ref 5–13)
NEUTS SEG # BLD: 6.7 K/UL (ref 1.8–8)
NEUTS SEG NFR BLD: 78 % (ref 32–75)
NRBC # BLD: 0 K/UL (ref 0–0.01)
NRBC BLD-RTO: 0 PER 100 WBC
PLATELET # BLD AUTO: 173 K/UL (ref 150–400)
PMV BLD AUTO: 10.9 FL (ref 8.9–12.9)
POTASSIUM SERPL-SCNC: 3.8 MMOL/L (ref 3.5–5.1)
PROT SERPL-MCNC: 6.7 G/DL (ref 6.4–8.2)
RBC # BLD AUTO: 3.77 M/UL (ref 3.8–5.2)
SODIUM SERPL-SCNC: 141 MMOL/L (ref 136–145)
SPECIMEN HOLD: NORMAL
WBC # BLD AUTO: 8.8 K/UL (ref 3.6–11)

## 2023-08-16 PROCEDURE — 6360000004 HC RX CONTRAST MEDICATION: Performed by: STUDENT IN AN ORGANIZED HEALTH CARE EDUCATION/TRAINING PROGRAM

## 2023-08-16 PROCEDURE — 85025 COMPLETE CBC W/AUTO DIFF WBC: CPT

## 2023-08-16 PROCEDURE — 70487 CT MAXILLOFACIAL W/DYE: CPT

## 2023-08-16 PROCEDURE — 36415 COLL VENOUS BLD VENIPUNCTURE: CPT

## 2023-08-16 PROCEDURE — 99285 EMERGENCY DEPT VISIT HI MDM: CPT

## 2023-08-16 PROCEDURE — 80053 COMPREHEN METABOLIC PANEL: CPT

## 2023-08-16 PROCEDURE — 87040 BLOOD CULTURE FOR BACTERIA: CPT

## 2023-08-16 PROCEDURE — 83605 ASSAY OF LACTIC ACID: CPT

## 2023-08-16 RX ORDER — AMOXICILLIN AND CLAVULANATE POTASSIUM 875; 125 MG/1; MG/1
1 TABLET, FILM COATED ORAL 2 TIMES DAILY
Qty: 14 TABLET | Refills: 0 | Status: SHIPPED | OUTPATIENT
Start: 2023-08-16 | End: 2023-08-26

## 2023-08-16 RX ADMIN — IOPAMIDOL 100 ML: 612 INJECTION, SOLUTION INTRAVENOUS at 12:21

## 2023-08-16 ASSESSMENT — PAIN DESCRIPTION - PAIN TYPE: TYPE: ACUTE PAIN

## 2023-08-16 ASSESSMENT — PAIN - FUNCTIONAL ASSESSMENT: PAIN_FUNCTIONAL_ASSESSMENT: 0-10

## 2023-08-16 ASSESSMENT — PAIN DESCRIPTION - ORIENTATION: ORIENTATION: LEFT

## 2023-08-16 ASSESSMENT — PAIN SCALES - GENERAL: PAINLEVEL_OUTOF10: 9

## 2023-08-16 ASSESSMENT — PAIN DESCRIPTION - DESCRIPTORS: DESCRIPTORS: ACHING

## 2023-08-16 ASSESSMENT — PAIN DESCRIPTION - ONSET: ONSET: PROGRESSIVE

## 2023-08-16 ASSESSMENT — PAIN DESCRIPTION - LOCATION: LOCATION: FACE

## 2023-08-16 NOTE — ED PROVIDER NOTES
Height: 1.6 m (5' 3\")         Medical Decision Making  Differential diagnosis includes sinusitis, dental fracture, dental abscess, parotitis and others. This is a 82year old female who presents to the emergency room with complaints of acute onset left facial pain and swelling that started yesterday. Patient states she went to sleep normally and woke up with a swollen face that has subsequently gotten more tender. Denies any injury, no teeth fractures, no abscess. Denies any chest pain, shortness of breath, dizziness, nausea or vomiting, fever of chills. No difficulty swallowing. Has not taken any medication prior to arrival. Nothing improves or worsens symptoms. There are no further complaints at this time. After physical assessment, imaging and lab data were ordered. Labs are reassuring with no leukocytosis, no electrolyte abnormality. Blood cultures pending. Imaging negative for abscess but does indicate left maxillary sinusitis. Will place on antibiotics for sinus infection that is causing facial swelling. Discharge to home and follow up with PCP. Return to the emergency room with worsening symptoms. Patient in agreement with plan of care. Any available vitals, labs, images, nursing notes, medications, allergies, PMH, PSH and/or previous records in the chart were reviewed. All of these were considered in the medical decision making process. I individually reviewed any labs and any images obtained. This was discussed with my attending and he/she is in agreement with plan of care. Problems Addressed:  Left facial swelling: acute illness or injury  Other acute sinusitis, recurrence not specified: acute illness or injury    Amount and/or Complexity of Data Reviewed  Labs: ordered. Decision-making details documented in ED Course. Radiology: ordered. Decision-making details documented in ED Course. Risk  Prescription drug management.             REASSESSMENT

## 2023-08-16 NOTE — ED TRIAGE NOTES
Patient rolling walker to triage with swelling to left side of face that is painful to touch. Started yesterday no tooth pain.

## 2023-08-17 ASSESSMENT — ENCOUNTER SYMPTOMS
EYE REDNESS: 0
COUGH: 0
SINUS PRESSURE: 0
COLOR CHANGE: 0
FACIAL SWELLING: 1
VOICE CHANGE: 0
SHORTNESS OF BREATH: 0
SINUS PAIN: 0
PHOTOPHOBIA: 0
ABDOMINAL DISTENTION: 0
ABDOMINAL PAIN: 0
SORE THROAT: 0
TROUBLE SWALLOWING: 0
NAUSEA: 0
VOMITING: 0

## 2023-08-20 LAB
BACTERIA SPEC CULT: NORMAL
BACTERIA SPEC CULT: NORMAL
SERVICE CMNT-IMP: NORMAL
SERVICE CMNT-IMP: NORMAL

## 2023-09-12 ENCOUNTER — TELEMEDICINE (OUTPATIENT)
Facility: CLINIC | Age: 83
End: 2023-09-12
Payer: COMMERCIAL

## 2023-09-12 DIAGNOSIS — J32.9 SINUSITIS, UNSPECIFIED CHRONICITY, UNSPECIFIED LOCATION: Primary | ICD-10-CM

## 2023-09-12 DIAGNOSIS — R53.1 WEAKNESS: ICD-10-CM

## 2023-09-12 PROCEDURE — G8428 CUR MEDS NOT DOCUMENT: HCPCS | Performed by: FAMILY MEDICINE

## 2023-09-12 PROCEDURE — 99214 OFFICE O/P EST MOD 30 MIN: CPT | Performed by: FAMILY MEDICINE

## 2023-09-12 PROCEDURE — 1090F PRES/ABSN URINE INCON ASSESS: CPT | Performed by: FAMILY MEDICINE

## 2023-09-12 PROCEDURE — 1123F ACP DISCUSS/DSCN MKR DOCD: CPT | Performed by: FAMILY MEDICINE

## 2023-09-12 PROCEDURE — G8399 PT W/DXA RESULTS DOCUMENT: HCPCS | Performed by: FAMILY MEDICINE

## 2023-09-12 RX ORDER — AZITHROMYCIN 250 MG/1
250 TABLET, FILM COATED ORAL SEE ADMIN INSTRUCTIONS
Qty: 6 TABLET | Refills: 0 | Status: SHIPPED | OUTPATIENT
Start: 2023-09-12 | End: 2023-09-17

## 2023-09-12 ASSESSMENT — PATIENT HEALTH QUESTIONNAIRE - PHQ9
SUM OF ALL RESPONSES TO PHQ QUESTIONS 1-9: 0
SUM OF ALL RESPONSES TO PHQ QUESTIONS 1-9: 0
1. LITTLE INTEREST OR PLEASURE IN DOING THINGS: 0
2. FEELING DOWN, DEPRESSED OR HOPELESS: 0
SUM OF ALL RESPONSES TO PHQ QUESTIONS 1-9: 0
SUM OF ALL RESPONSES TO PHQ QUESTIONS 1-9: 0
SUM OF ALL RESPONSES TO PHQ9 QUESTIONS 1 & 2: 0

## 2023-09-12 NOTE — PROGRESS NOTES
Patricia Neff, was evaluated through a synchronous (real-time) audio-video encounter. The patient (or guardian if applicable) is aware that this is a billable service, which includes applicable co-pays. This Virtual Visit was conducted with patient's (and/or legal guardian's) consent. Patient identification was verified, and a caregiver was present when appropriate. The patient was located at Home: [address] [facility]  Provider was located at [facility] (Appt Dept): [address] [facility],  [address] [facility] (:  1940) is a Established patient, presenting virtually for evaluation of the following:    Assessment & Plan   Below is the assessment and plan developed based on review of pertinent history, physical exam, labs, studies, and medications. 1. Sinusitis, unspecified chronicity, unspecified location  -     CBC with Auto Differential; Future  -     Basic Metabolic Panel; Future  2. Weakness  -     CBC with Auto Differential; Future  -     Basic Metabolic Panel; Future  Unknown cause to her weakness, possible anemia, will treat for sinusitis that was seen on CT scan as she did not finish her amoxicillin antibiotic  Return if symptoms worsen or fail to improve. Subjective   Patient is a 30-year-old female who is following up after being diagnosed with side situs on  at the ER. Patient states that she had a CT scan which showed sinusitis, she did not finish her antibiotics. This was due to the fact that she was diagnosed with sialadenitis 3 days later by her pain management specialist.  She took lemon drops and this resolved her swelling in her face. Since then she has felt very weak and fatigues easily after just a few steps. She COVID tested herself yesterday and that was negative.   Denies any other symptoms, no chest pain shortness of breath BARAKAT, no fever chills sweats      Review of Systems   All other systems reviewed and are

## 2023-09-22 DIAGNOSIS — E87.6 HYPOKALEMIA: Primary | ICD-10-CM

## 2023-09-22 DIAGNOSIS — R79.89 ELEVATED SERUM CREATININE: ICD-10-CM

## 2023-09-22 LAB
ANION GAP SERPL CALC-SCNC: 6 MMOL/L (ref 5–15)
BASOPHILS # BLD: 0 K/UL (ref 0–0.1)
BASOPHILS NFR BLD: 0 % (ref 0–1)
BUN SERPL-MCNC: 17 MG/DL (ref 6–20)
BUN/CREAT SERPL: 14 (ref 12–20)
CALCIUM SERPL-MCNC: 9 MG/DL (ref 8.5–10.1)
CHLORIDE SERPL-SCNC: 98 MMOL/L (ref 97–108)
CO2 SERPL-SCNC: 34 MMOL/L (ref 21–32)
CREAT SERPL-MCNC: 1.2 MG/DL (ref 0.55–1.02)
DIFFERENTIAL METHOD BLD: ABNORMAL
EOSINOPHIL # BLD: 0.1 K/UL (ref 0–0.4)
EOSINOPHIL NFR BLD: 1 % (ref 0–7)
ERYTHROCYTE [DISTWIDTH] IN BLOOD BY AUTOMATED COUNT: 13 % (ref 11.5–14.5)
GLUCOSE SERPL-MCNC: 102 MG/DL (ref 65–100)
HCT VFR BLD AUTO: 40.1 % (ref 35–47)
HGB BLD-MCNC: 13.6 G/DL (ref 11.5–16)
IMM GRANULOCYTES # BLD AUTO: 0 K/UL (ref 0–0.04)
IMM GRANULOCYTES NFR BLD AUTO: 0 % (ref 0–0.5)
LYMPHOCYTES # BLD: 1.7 K/UL (ref 0.8–3.5)
LYMPHOCYTES NFR BLD: 18 % (ref 12–49)
MCH RBC QN AUTO: 30.1 PG (ref 26–34)
MCHC RBC AUTO-ENTMCNC: 33.9 G/DL (ref 30–36.5)
MCV RBC AUTO: 88.7 FL (ref 80–99)
MONOCYTES # BLD: 0.5 K/UL (ref 0–1)
MONOCYTES NFR BLD: 5 % (ref 5–13)
NEUTS SEG # BLD: 6.8 K/UL (ref 1.8–8)
NEUTS SEG NFR BLD: 76 % (ref 32–75)
NRBC # BLD: 0 K/UL (ref 0–0.01)
NRBC BLD-RTO: 0 PER 100 WBC
PLATELET # BLD AUTO: 197 K/UL (ref 150–400)
PMV BLD AUTO: 12.1 FL (ref 8.9–12.9)
POTASSIUM SERPL-SCNC: 3.1 MMOL/L (ref 3.5–5.1)
RBC # BLD AUTO: 4.52 M/UL (ref 3.8–5.2)
SODIUM SERPL-SCNC: 138 MMOL/L (ref 136–145)
WBC # BLD AUTO: 9.1 K/UL (ref 3.6–11)

## 2023-09-22 RX ORDER — POTASSIUM CHLORIDE 20 MEQ/1
20 TABLET, EXTENDED RELEASE ORAL 2 TIMES DAILY
Qty: 60 TABLET | Refills: 0 | Status: SHIPPED | OUTPATIENT
Start: 2023-09-22 | End: 2023-10-22

## 2023-09-25 ENCOUNTER — TELEMEDICINE (OUTPATIENT)
Facility: CLINIC | Age: 83
End: 2023-09-25
Payer: COMMERCIAL

## 2023-09-25 DIAGNOSIS — R11.0 NAUSEA: Primary | ICD-10-CM

## 2023-09-25 DIAGNOSIS — R53.83 OTHER FATIGUE: ICD-10-CM

## 2023-09-25 PROCEDURE — G8399 PT W/DXA RESULTS DOCUMENT: HCPCS | Performed by: FAMILY MEDICINE

## 2023-09-25 PROCEDURE — G8428 CUR MEDS NOT DOCUMENT: HCPCS | Performed by: FAMILY MEDICINE

## 2023-09-25 PROCEDURE — 99214 OFFICE O/P EST MOD 30 MIN: CPT | Performed by: FAMILY MEDICINE

## 2023-09-25 PROCEDURE — 1090F PRES/ABSN URINE INCON ASSESS: CPT | Performed by: FAMILY MEDICINE

## 2023-09-25 PROCEDURE — 1123F ACP DISCUSS/DSCN MKR DOCD: CPT | Performed by: FAMILY MEDICINE

## 2023-09-25 NOTE — PROGRESS NOTES
Willard Liv, was evaluated through a synchronous (real-time) audio-video encounter. The patient (or guardian if applicable) is aware that this is a billable service, which includes applicable co-pays. This Virtual Visit was conducted with patient's (and/or legal guardian's) consent. Patient identification was verified, and a caregiver was present when appropriate. The patient was located at Home: [address]  [address]  Provider was located at [facility] (Appt Dept): [address] [facility],  [address] [facility] (:  1940) is a Established patient, presenting virtually for evaluation of the following:    Assessment & Plan   Below is the assessment and plan developed based on review of pertinent history, physical exam, labs, studies, and medications. 1. Nausea  2. Other fatigue  We will continue with potassium replenishment  If patient does not feel any better she will proceed to ER  No follow-ups on file. Subjective   Patient is an 82-year-old female who is seen today for continued complaints of nausea and fatigue. Blood work was drawn and her potassium was low, she has not started her potassium supplementation yet. Patient states of been 3 weeks and she has been feeling this way despite antibiotics for possible sinus infection that was given to ER at last visit. Denies any chest pain palpitation shortness of breath      Review of Systems   All other systems reviewed and are negative.          Objective   Patient-Reported Vitals  No data recorded     Physical Exam  [INSTRUCTIONS:  \"[]\" Indicates a positive item  \"[]\" Indicates a negative item  -- DELETE ALL ITEMS NOT EXAMINED]    Constitutional: [x] Appears well-developed and well-nourished [x] No apparent distress      [] Abnormal -     Mental status: [x] Alert and awake  [x] Oriented to person/place/time [x] Able to follow commands    [] Abnormal -     Eyes:   EOM    [x]  Normal    [] Abnormal -

## 2023-10-23 RX ORDER — POTASSIUM CHLORIDE 1500 MG/1
20 TABLET, EXTENDED RELEASE ORAL 2 TIMES DAILY
Qty: 60 TABLET | Refills: 0 | Status: SHIPPED | OUTPATIENT
Start: 2023-10-23

## 2023-10-30 ENCOUNTER — HOSPITAL ENCOUNTER (OUTPATIENT)
Facility: HOSPITAL | Age: 83
Discharge: HOME | End: 2023-11-02
Attending: INTERNAL MEDICINE
Payer: MEDICARE

## 2023-10-30 DIAGNOSIS — R63.4 LOSS OF WEIGHT: ICD-10-CM

## 2023-10-30 DIAGNOSIS — R11.0 NAUSEA: ICD-10-CM

## 2023-10-30 PROCEDURE — 2500000003 HC RX 250 WO HCPCS: Performed by: INTERNAL MEDICINE

## 2023-10-30 PROCEDURE — 74177 CT ABD & PELVIS W/CONTRAST: CPT

## 2023-10-30 PROCEDURE — 6360000004 HC RX CONTRAST MEDICATION: Performed by: INTERNAL MEDICINE

## 2023-10-30 RX ADMIN — IOPAMIDOL 100 ML: 755 INJECTION, SOLUTION INTRAVENOUS at 14:57

## 2023-10-30 RX ADMIN — BARIUM SULFATE 900 ML: 20 SUSPENSION ORAL at 13:10

## 2023-11-03 ENCOUNTER — ANESTHESIA EVENT (OUTPATIENT)
Facility: HOSPITAL | Age: 83
End: 2023-11-03
Payer: MEDICARE

## 2023-11-03 ENCOUNTER — ANESTHESIA (OUTPATIENT)
Facility: HOSPITAL | Age: 83
End: 2023-11-03
Payer: MEDICARE

## 2023-11-03 ENCOUNTER — HOSPITAL ENCOUNTER (OUTPATIENT)
Facility: HOSPITAL | Age: 83
Setting detail: OUTPATIENT SURGERY
Discharge: HOME | End: 2023-11-03
Attending: INTERNAL MEDICINE | Admitting: INTERNAL MEDICINE
Payer: MEDICARE

## 2023-11-03 VITALS
BODY MASS INDEX: 29.59 KG/M2 | HEART RATE: 64 BPM | DIASTOLIC BLOOD PRESSURE: 66 MMHG | TEMPERATURE: 97.1 F | OXYGEN SATURATION: 95 % | HEIGHT: 63 IN | RESPIRATION RATE: 17 BRPM | SYSTOLIC BLOOD PRESSURE: 115 MMHG | WEIGHT: 167 LBS

## 2023-11-03 PROCEDURE — 2709999900 HC NON-CHARGEABLE SUPPLY: Performed by: INTERNAL MEDICINE

## 2023-11-03 PROCEDURE — 2580000003 HC RX 258: Performed by: NURSE ANESTHETIST, CERTIFIED REGISTERED

## 2023-11-03 PROCEDURE — 88305 TISSUE EXAM BY PATHOLOGIST: CPT

## 2023-11-03 PROCEDURE — 3700000000 HC ANESTHESIA ATTENDED CARE: Performed by: INTERNAL MEDICINE

## 2023-11-03 PROCEDURE — 6360000002 HC RX W HCPCS: Performed by: NURSE ANESTHETIST, CERTIFIED REGISTERED

## 2023-11-03 PROCEDURE — 7100000010 HC PHASE II RECOVERY - FIRST 15 MIN: Performed by: INTERNAL MEDICINE

## 2023-11-03 PROCEDURE — 3600007502: Performed by: INTERNAL MEDICINE

## 2023-11-03 PROCEDURE — 7100000011 HC PHASE II RECOVERY - ADDTL 15 MIN: Performed by: INTERNAL MEDICINE

## 2023-11-03 PROCEDURE — 2500000003 HC RX 250 WO HCPCS: Performed by: NURSE ANESTHETIST, CERTIFIED REGISTERED

## 2023-11-03 RX ORDER — ASPIRIN 81 MG/1
81 TABLET, CHEWABLE ORAL DAILY
COMMUNITY

## 2023-11-03 RX ORDER — LIDOCAINE HYDROCHLORIDE 20 MG/ML
INJECTION, SOLUTION EPIDURAL; INFILTRATION; INTRACAUDAL; PERINEURAL PRN
Status: DISCONTINUED | OUTPATIENT
Start: 2023-11-03 | End: 2023-11-03 | Stop reason: SDUPTHER

## 2023-11-03 RX ORDER — 0.9 % SODIUM CHLORIDE 0.9 %
INTRAVENOUS SOLUTION INTRAVENOUS PRN
Status: DISCONTINUED | OUTPATIENT
Start: 2023-11-03 | End: 2023-11-03 | Stop reason: SDUPTHER

## 2023-11-03 RX ORDER — SODIUM CHLORIDE 9 MG/ML
INJECTION, SOLUTION INTRAVENOUS CONTINUOUS
Status: DISCONTINUED | OUTPATIENT
Start: 2023-11-03 | End: 2023-11-03 | Stop reason: HOSPADM

## 2023-11-03 RX ORDER — SODIUM CHLORIDE 0.9 % (FLUSH) 0.9 %
5-40 SYRINGE (ML) INJECTION PRN
Status: DISCONTINUED | OUTPATIENT
Start: 2023-11-03 | End: 2023-11-03 | Stop reason: HOSPADM

## 2023-11-03 RX ORDER — SODIUM CHLORIDE 0.9 % (FLUSH) 0.9 %
5-40 SYRINGE (ML) INJECTION EVERY 12 HOURS SCHEDULED
Status: DISCONTINUED | OUTPATIENT
Start: 2023-11-03 | End: 2023-11-03 | Stop reason: HOSPADM

## 2023-11-03 RX ORDER — SODIUM CHLORIDE 9 MG/ML
25 INJECTION, SOLUTION INTRAVENOUS PRN
Status: DISCONTINUED | OUTPATIENT
Start: 2023-11-03 | End: 2023-11-03 | Stop reason: HOSPADM

## 2023-11-03 RX ADMIN — PROPOFOL 100 MG: 10 INJECTION, EMULSION INTRAVENOUS at 10:42

## 2023-11-03 RX ADMIN — SODIUM CHLORIDE 500 ML: 9 INJECTION, SOLUTION INTRAVENOUS at 10:54

## 2023-11-03 RX ADMIN — LIDOCAINE HYDROCHLORIDE 100 MG: 20 INJECTION, SOLUTION EPIDURAL; INFILTRATION; INTRACAUDAL; PERINEURAL at 10:40

## 2023-11-03 RX ADMIN — PROPOFOL 50 MG: 10 INJECTION, EMULSION INTRAVENOUS at 10:48

## 2023-11-03 NOTE — PROGRESS NOTES

## 2023-11-03 NOTE — ANESTHESIA PRE PROCEDURE
Department of Anesthesiology  Preprocedure Note       Name:  Zeyad Meneses   Age:  82 y.o.  :  1940                                          MRN:  [id number]         Date:  11/3/2023      Surgeon: Dorina Mora): Amy Soria MD    Procedure: Procedure(s):  EGD BIOPSY    Medications prior to admission:   Prior to Admission medications    Medication Sig Start Date End Date Taking?  Authorizing Provider   aspirin 81 MG chewable tablet Take 1 tablet by mouth daily   Yes Provider, Historical, MD   KLOR-CON M20 20 MEQ extended release tablet TAKE 1 TABLET BY MOUTH TWICE A DAY 10/23/23   Thania Pringle MD   varenicline (CHANTIX) 0.5 MG tablet TAKE 1 TABLET BY MOUTH DAILY FOR 3 DAYS THEN TAKE 1 TABLET BY MOUTH TWICE A DAY FOR 4 DAYS THEN TAKE 2 TABLETS BY MOUTH TWICE A DAY 23   Brandon Deleon MD   DULoxetine (CYMBALTA) 60 MG extended release capsule Take 1 capsule by mouth daily 23   Brandon Deleon MD   ondansetron (ZOFRAN-ODT) 4 MG disintegrating tablet  23   ProviderTres MD   DULoxetine (CYMBALTA) 60 MG extended release capsule Take 1 capsule by mouth daily 23   Brandon Deleon MD   oxyCODONE-acetaminophen (PERCOCET)  MG per tablet  23   ProviderTres MD   albuterol sulfate HFA (PROVENTIL;VENTOLIN;PROAIR) 108 (90 Base) MCG/ACT inhaler Inhale 2 puffs into the lungs every 4 hours as needed 18   Automatic Reconciliation, Ar   bumetanide (BUMEX) 2 MG tablet 1 tablet 17   Automatic Reconciliation, Ar   cetirizine (ZYRTEC) 10 MG tablet Take 1 tablet by mouth daily    Automatic Reconciliation, Ar   cloNIDine (CATAPRES) 0.2 MG tablet Take 1 tablet by mouth 2 times daily 16   Automatic Reconciliation, Ar   cyclobenzaprine (FLEXERIL) 10 MG tablet 1 tablet 2 times daily 17   Automatic Reconciliation, Ar   docusate (COLACE, DULCOLAX) 100 MG CAPS Take 100 mg by mouth daily as needed    Automatic Reconciliation, Ar   Melatonin 5 MG CAPS Take 10 mg by mouth

## 2023-11-03 NOTE — H&P
needed   aspirin 81 MG chewable tablet 2023  Yes Yes   Sig: Take 1 tablet by mouth daily   bumetanide (BUMEX) 2 MG tablet 2023  Yes No   Si tablet   cetirizine (ZYRTEC) 10 MG tablet 11/3/2023  Yes No   Sig: Take 1 tablet by mouth daily   cloNIDine (CATAPRES) 0.2 MG tablet 11/3/2023  Yes No   Sig: Take 1 tablet by mouth 2 times daily   cyclobenzaprine (FLEXERIL) 10 MG tablet 11/3/2023  Yes No   Si tablet 2 times daily   docusate (COLACE, DULCOLAX) 100 MG CAPS 2023  Yes No   Sig: Take 100 mg by mouth daily as needed   metoprolol (LOPRESSOR) 100 MG tablet 11/3/2023  Yes No   Sig: Take 1 tablet by mouth 2 times daily   omeprazole (PRILOSEC) 20 MG delayed release capsule 11/3/2023  Yes No   Sig: TAKE 1 CAPSULE EVERY DAY   ondansetron (ZOFRAN-ODT) 4 MG disintegrating tablet 11/3/2023  Yes No   oxyCODONE-acetaminophen (PERCOCET)  MG per tablet 11/3/2023  Yes No   polyethylene glycol (GLYCOLAX) 17 GM/SCOOP powder 2023  Yes No   Si packet as needed daily. No more than three times a week   pregabalin (LYRICA) 225 MG capsule 11/3/2023  Yes No   Si capsule 2 times daily. triamcinolone (KENALOG) 0.1 % ointment   Yes No   Sig: Apply topically 2 times daily   valsartan (DIOVAN) 160 MG tablet 11/3/2023  Yes No   Sig: Take 1 tablet by mouth daily   varenicline (CHANTIX) 0.5 MG tablet 11/3/2023  No No   Sig: TAKE 1 TABLET BY MOUTH DAILY FOR 3 DAYS THEN TAKE 1 TABLET BY MOUTH TWICE A DAY FOR 4 DAYS THEN TAKE 2 TABLETS BY MOUTH TWICE A DAY      Facility-Administered Medications: None       Hospital Medications:  No current facility-administered medications for this encounter.        Family History:  Family History   Problem Relation Age of Onset    Stroke Father     Heart Disease Brother     Hypertension Brother     Depression Maternal Aunt     Heart Disease Father     Heart Disease Mother     Hypertension Mother     Depression Maternal Grandmother     Depression Maternal Uncle     Anesth Problems Neg Hx     Depression Maternal Grandfather        Social History:  Social History     Tobacco Use    Smoking status: Every Day     Packs/day: 1     Types: Cigarettes     Last attempt to quit: 2006     Years since quittin.8    Smokeless tobacco: Never   Substance Use Topics    Alcohol use: Yes     Alcohol/week: 3.0 standard drinks of alcohol         PHYSICAL EXAM PRIOR TO SEDATION:  General: Alert, in no acute distress    Lungs:            CTA bilaterally  Heart:  Normal S1, S2    Abdomen: Soft, Non distended, Non tender. Normoactive bowel sounds. Assessment:   Stable for sedation administration.     Plan:     Endoscopic procedure with sedation     Signed By: Gurmeet Ray MD     11/3/2023  10:40 AM

## 2023-11-03 NOTE — OP NOTE
Joao Zuniga M.D.  [address], [address] [facility]  [phone]               Esophagogastroduodenoscopy (EGD) Procedure Note    NAME: Marv Lomeli  :  1940  MRN:  [id number]    Indications: Vomiting, persitent of unclear etilogy     : Bakari Rousseau MD    Referring Provider:  Cr Dhillon MD    Staff: Circulator: Angelica Mcdermott RN  Endoscopy Technician: Savanna Farfan    Prosthetic devices, grafts, tissues, transplant, or devices implanted: none    Medicine:  MAC anesthesia      Procedure Details:  After informed consent was obtained with all risks and benefits of the procedure explained and preprocedure exam completed, the patient was placed in the left lateral decubitus position. Universal protocol for patient identification was performed and documented in the nursing notes. Throughout the procedure, the patient's blood pressure was monitored at least every five minutes; pulse, and oxygen saturations were monitored continuously. All vital signs were documented in the nursing notes. The endoscope was inserted into the mouth and advanced under direct vision to second portion of the duodenum. A careful inspection was made as the gastroscope was withdrawn, including a retroflexed view of the proximal stomach; findings and interventions are described below. Findings:   Esophagus:normal  Stomach: moderate linear erythema in the antrum s/p biopsies there and throughout the stomach  Duodenum: normal s/p biopsies    Interventions:    biopsy of stomach  and duodenum    Specimens:   ID Type Source Tests Collected by Time Destination   1 : duodenum Tissue Duodenum SURGICAL PATHOLOGY Garrick Tamayo MD 11/3/2023 1044    2 : gastric Tissue Gastric SURGICAL PATHOLOGY Garrick Tamayo MD 11/3/2023 1047         EBL: None          Complications:     No immediate complications        Impression:  -See post-procedure diagnoses. Recommendations:  -Await pathology.  -Increase dose of PPI    Signed by:  Edgar Ford MD         11/3/2023 10:58 AM

## 2023-11-03 NOTE — DISCHARGE INSTRUCTIONS
Joao Baxter M.D.  [place], [address] [facility]  [phone]         EGD [address]  [id number]  1940    DISCOMFORT:  Sore throat- throat lozenges or warm salt water gargle  Redness at IV site- apply warm compress to area; if redness or soreness persist- contact your physician  Gaseous discomfort- walking, belching will help relieve any discomfort  You may not operate a vehicle for 12 hours  You may not engage in an occupation involving machinery or appliances for the  rest of today  You may not drink alcoholic beverages for at least 12 hours  Avoid making any critical decisions for at least 24 hours    DIET:   You may resume your normal diet, but some patients find that heavy or large  meals may lead to indigestion or vomiting. I suggest a light meal as first food  Intake. I recommend a whole food, plant-based diet for your overall health. ACTIVITY:  You may resume your normal daily activities. It is recommended that you spend the remainder of the day resting - avoid any strenuous activity. CALL M.D. IF ANY SIGN OF:   Increasing pain, nausea, vomiting  Abdominal distension (swelling)  Significant bleeding (oral or rectal)  Fever   Pain in chest area  Shortness of breath    Additional Instructions:   Call Dr. Mark Baxter if any questions or problems at [phone]   You should receive the biopsy results by phone or mail within 3 weeks, if not, call my office for the results  EGD showed moderate stomach redness, likely acid related. No mass. Avoid NSAIDs. Will switch from omeprazole to pantoprazole b/c of no possible interaction with citalopram. I sent for increased dose of 40 mg to Rakel Perez

## 2023-11-13 ENCOUNTER — TELEPHONE (OUTPATIENT)
Facility: CLINIC | Age: 83
End: 2023-11-13

## 2023-11-13 RX ORDER — POTASSIUM CHLORIDE 1500 MG/1
20 TABLET, EXTENDED RELEASE ORAL 2 TIMES DAILY
Qty: 60 TABLET | Refills: 0 | Status: SHIPPED | OUTPATIENT
Start: 2023-11-13 | End: 2023-11-14 | Stop reason: SDUPTHER

## 2023-11-13 NOTE — TELEPHONE ENCOUNTER
Arm Pain   WHAT YOU NEED TO KNOW:   Your arm pain may be caused by a number of conditions  Examples include arthritis, nerve problems, or an awkward position while you sleep  X-rays did not show a broken bone in your arm or wrist  Arm pain may be a sign of a serious condition that needs immediate care, such as a heart attack  DISCHARGE INSTRUCTIONS:   Call 911 for any of the following: You have any of the following signs of a heart attack:   · Squeezing, pressure, or pain in your chest that lasts longer than 5 minutes or returns    · Discomfort or pain in your back, neck, jaw, stomach, or arm     · Trouble breathing or a fast, fluttery heartbeat    · Nausea or vomiting    · Lightheadedness or a sudden cold sweat, especially with chest pain or trouble breathing  Return to the emergency department if:   · You have severe pain, or pain that spreads from your arm to other areas  · You have swelling, tingling, or numbness in your hand or fingers, or the skin turns blue  · You cannot move your arm  Contact your healthcare provider if:   · You have questions or concerns about your condition or care  Medicines: You may need any of the following:  · Prescription pain medicine  may be given  Ask how to take this medicine safely  · NSAIDs , such as ibuprofen, help decrease swelling, pain, and fever  This medicine is available with or without a doctor's order  NSAIDs can cause stomach bleeding or kidney problems in certain people  If you take blood thinner medicine, always ask your healthcare provider if NSAIDs are safe for you  Always read the medicine label and follow directions  · Take your medicine as directed  Contact your healthcare provider if you think your medicine is not helping or if you have side effects  Tell him or her if you are allergic to any medicine  Keep a list of the medicines, vitamins, and herbs you take  Include the amounts, and when and why you take them   Bring the list or the pill Linda Nath   [birth year]  [phone]    Says \"all medication in her plastic bag are missing in her apartment, and I need everything refilled asap, need nurse to call me\" !           bottles to follow-up visits  Carry your medicine list with you in case of an emergency  Self-care:   · Rest your arm as directed  A sling may be used to keep your arm from moving while it heals  · Apply ice as directed  Ice helps decrease pain and swelling  Ice may also help prevent tissue damage  Use an ice pack, or put crushed ice in a plastic bag  Cover it with a towel  Apply it to your arm for 20 minutes every few hours, or as directed  Ask how many times to apply ice each day, and for how many days  · Elevate your arm above the level of your heart as often as you can  This will help decrease swelling and pain  Prop your arm on pillows or blankets to keep the area elevated comfortably  · Adjust your position if you work in front of a computer  You may need arm or wrist supports or change the height of your chair  · Keep a pain record  Write down when your pain happens and how severe it is  Include any other symptoms you have with your pain  A record will help you keep track of pain cycles  Bring the record with you to your follow-up visits  It may also help your healthcare provider find out what is causing your pain  Follow up with your healthcare provider as directed: You may need physical therapy  You may need to see an orthopedic specialist  Write down your questions so you remember to ask them during your visits  © 2017 [address] [employer] Information is for End User's use only and may not be sold, redistributed or otherwise used for commercial purposes  All illustrations and images included in CareNotes® are the copyrighted property of A D A M , Inc  or Riccardo Monzon  The above information is an  only  It is not intended as medical advice for individual conditions or treatments  Talk to your doctor, nurse or pharmacist before following any medical regimen to see if it is safe and effective for you

## 2023-11-14 DIAGNOSIS — F41.9 ANXIETY AND DEPRESSION: ICD-10-CM

## 2023-11-14 DIAGNOSIS — F32.A ANXIETY AND DEPRESSION: ICD-10-CM

## 2023-11-14 RX ORDER — METOPROLOL TARTRATE 100 MG/1
100 TABLET ORAL 2 TIMES DAILY
Qty: 60 TABLET | Refills: 0 | Status: SHIPPED | OUTPATIENT
Start: 2023-11-14

## 2023-11-14 RX ORDER — OMEPRAZOLE 20 MG/1
CAPSULE, DELAYED RELEASE ORAL
Qty: 90 CAPSULE | Refills: 10 | Status: SHIPPED | OUTPATIENT
Start: 2023-11-14 | End: 2023-11-14 | Stop reason: SDUPTHER

## 2023-11-14 RX ORDER — POTASSIUM CHLORIDE 20 MEQ/1
20 TABLET, EXTENDED RELEASE ORAL 2 TIMES DAILY
Qty: 60 TABLET | Refills: 0 | Status: SHIPPED | OUTPATIENT
Start: 2023-11-14

## 2023-11-14 RX ORDER — DULOXETIN HYDROCHLORIDE 60 MG/1
60 CAPSULE, DELAYED RELEASE ORAL DAILY
Qty: 30 CAPSULE | Refills: 11 | Status: SHIPPED | OUTPATIENT
Start: 2023-11-14

## 2023-11-14 RX ORDER — ONDANSETRON 4 MG/1
4 TABLET, ORALLY DISINTEGRATING ORAL EVERY 8 HOURS PRN
Qty: 30 TABLET | Refills: 0 | Status: SHIPPED | OUTPATIENT
Start: 2023-11-14

## 2023-11-14 RX ORDER — OMEPRAZOLE 20 MG/1
20 CAPSULE, DELAYED RELEASE ORAL DAILY
Qty: 30 CAPSULE | Refills: 1 | Status: SHIPPED | OUTPATIENT
Start: 2023-11-14

## 2023-11-14 RX ORDER — VALSARTAN 160 MG/1
160 TABLET ORAL DAILY
Qty: 30 TABLET | Refills: 0 | Status: SHIPPED | OUTPATIENT
Start: 2023-11-14

## 2023-11-14 RX ORDER — POLYETHYLENE GLYCOL 3350 17 G/17G
POWDER, FOR SOLUTION ORAL
Qty: 30 EACH | Refills: 0 | Status: SHIPPED | OUTPATIENT
Start: 2023-11-14

## 2023-11-14 RX ORDER — BUMETANIDE 2 MG/1
2 TABLET ORAL DAILY
Qty: 30 TABLET | Refills: 0 | Status: SHIPPED | OUTPATIENT
Start: 2023-11-14

## 2023-11-14 RX ORDER — ASPIRIN 81 MG/1
81 TABLET, CHEWABLE ORAL DAILY
Qty: 30 TABLET | Refills: 1 | Status: SHIPPED | OUTPATIENT
Start: 2023-11-14

## 2023-11-14 RX ORDER — CLONIDINE HYDROCHLORIDE 0.2 MG/1
0.2 TABLET ORAL 2 TIMES DAILY
Qty: 60 TABLET | Refills: 1 | Status: SHIPPED | OUTPATIENT
Start: 2023-11-14

## 2023-12-09 DIAGNOSIS — F41.9 ANXIETY AND DEPRESSION: ICD-10-CM

## 2023-12-09 DIAGNOSIS — F32.A ANXIETY AND DEPRESSION: ICD-10-CM

## 2023-12-11 RX ORDER — ONDANSETRON 4 MG/1
TABLET, ORALLY DISINTEGRATING ORAL
Qty: 30 TABLET | Refills: 0 | OUTPATIENT
Start: 2023-12-11

## 2024-01-05 DIAGNOSIS — F41.9 ANXIETY AND DEPRESSION: ICD-10-CM

## 2024-01-05 DIAGNOSIS — F32.A ANXIETY AND DEPRESSION: ICD-10-CM

## 2024-01-05 RX ORDER — ONDANSETRON 4 MG/1
TABLET, ORALLY DISINTEGRATING ORAL
Qty: 30 TABLET | Refills: 0 | Status: SHIPPED | OUTPATIENT
Start: 2024-01-05

## 2024-01-05 RX ORDER — VARENICLINE TARTRATE 0.5 MG/1
TABLET, FILM COATED ORAL
Qty: 56 TABLET | Refills: 1 | Status: SHIPPED | OUTPATIENT
Start: 2024-01-05

## 2024-01-08 ENCOUNTER — TELEMEDICINE (OUTPATIENT)
Facility: CLINIC | Age: 84
End: 2024-01-08
Payer: MEDICARE

## 2024-01-08 DIAGNOSIS — R26.81 GAIT INSTABILITY: ICD-10-CM

## 2024-01-08 DIAGNOSIS — R26.2 AMBULATORY DYSFUNCTION: ICD-10-CM

## 2024-01-08 DIAGNOSIS — M54.2 NECK PAIN: ICD-10-CM
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
## 2024-01-08 DIAGNOSIS — F39 UNSPECIFIED MOOD (AFFECTIVE) DISORDER (HCC): Primary | ICD-10-CM

## 2024-01-08 PROCEDURE — 99214 OFFICE O/P EST MOD 30 MIN: CPT | Performed by: FAMILY MEDICINE

## 2024-01-08 PROCEDURE — 1123F ACP DISCUSS/DSCN MKR DOCD: CPT | Performed by: FAMILY MEDICINE

## 2024-01-08 PROCEDURE — G8428 CUR MEDS NOT DOCUMENT: HCPCS | Performed by: FAMILY MEDICINE

## 2024-01-08 PROCEDURE — G8399 PT W/DXA RESULTS DOCUMENT: HCPCS | Performed by: FAMILY MEDICINE

## 2024-01-08 PROCEDURE — 1090F PRES/ABSN URINE INCON ASSESS: CPT | Performed by: FAMILY MEDICINE

## 2024-01-08 RX ORDER — LEVOTHYROXINE SODIUM 0.07 MG/1
75 TABLET ORAL DAILY
Qty: 30 TABLET | Refills: 0 | Status: SHIPPED | OUTPATIENT
Start: 2024-01-08

## 2024-01-08 ASSESSMENT — PATIENT HEALTH QUESTIONNAIRE - PHQ9
SUM OF ALL RESPONSES TO PHQ QUESTIONS 1-9: 0
1. LITTLE INTEREST OR PLEASURE IN DOING THINGS: 0
SUM OF ALL RESPONSES TO PHQ QUESTIONS 1-9: 0
SUM OF ALL RESPONSES TO PHQ9 QUESTIONS 1 & 2: 0
SUM OF ALL RESPONSES TO PHQ QUESTIONS 1-9: 0
SUM OF ALL RESPONSES TO PHQ QUESTIONS 1-9: 0
2. FEELING DOWN, DEPRESSED OR HOPELESS: 0

## 2024-01-08 NOTE — PROGRESS NOTES
are negative.         Objective   Patient-Reported Vitals  No data recorded     Physical Exam  [INSTRUCTIONS:  \"[x]\" Indicates a positive item  \"[]\" Indicates a negative item  -- DELETE ALL ITEMS NOT EXAMINED]    Constitutional: [x] Appears well-developed and well-nourished [x] No apparent distress      [] Abnormal -     Mental status: [x] Alert and awake  [x] Oriented to person/place/time [x] Able to follow commands    [] Abnormal -     Eyes:   EOM    [x]  Normal    [] Abnormal -   Sclera  [x]  Normal    [] Abnormal -          Discharge [x]  None visible   [] Abnormal -     HENT: [x] Normocephalic, atraumatic  [] Abnormal -   [x] Mouth/Throat: Mucous membranes are moist    External Ears [x] Normal  [] Abnormal -    Neck: [x] No visualized mass [] Abnormal -     Pulmonary/Chest: [x] Respiratory effort normal   [x] No visualized signs of difficulty breathing or respiratory distress        [] Abnormal -      Musculoskeletal:   [x] Normal gait with no signs of ataxia         [x] Normal range of motion of neck        [] Abnormal -     Neurological:        [x] No Facial Asymmetry (Cranial nerve 7 motor function) (limited exam due to video visit)          [x] No gaze palsy        [] Abnormal -          Skin:        [x] No significant exanthematous lesions or discoloration noted on facial skin         [] Abnormal -            Psychiatric:       [x] Normal Affect [] Abnormal -        [x] No Hallucinations    Other pertinent observable physical exam findings:-             --Will Pringle MD

## 2024-01-10 RX ORDER — ASPIRIN 81 MG
TABLET,CHEWABLE ORAL
Qty: 30 TABLET | Refills: 1 | Status: SHIPPED | OUTPATIENT
Start: 2024-01-10

## 2024-01-22 ENCOUNTER — TELEPHONE (OUTPATIENT)
Facility: CLINIC | Age: 84
End: 2024-01-22

## 2024-01-22 NOTE — TELEPHONE ENCOUNTER
Patient is following up on home health care services its a denial code in there buy patient has received that service once before

## 2024-02-01 RX ORDER — POTASSIUM CHLORIDE 1500 MG/1
20 TABLET, EXTENDED RELEASE ORAL 2 TIMES DAILY
Qty: 60 TABLET | Refills: 0 | Status: SHIPPED | OUTPATIENT
Start: 2024-02-01

## 2024-02-05 RX ORDER — LEVOTHYROXINE SODIUM 0.07 MG/1
75 TABLET ORAL DAILY
Qty: 30 TABLET | Refills: 0 | Status: SHIPPED | OUTPATIENT
Start: 2024-02-05

## 2024-02-06 ENCOUNTER — TELEPHONE (OUTPATIENT)
Facility: CLINIC | Age: 84
End: 2024-02-06

## 2024-02-06 DIAGNOSIS — F39 UNSPECIFIED MOOD (AFFECTIVE) DISORDER (HCC): Primary | ICD-10-CM

## 2024-02-08 ENCOUNTER — HOSPITAL ENCOUNTER (OUTPATIENT)
Facility: HOSPITAL | Age: 84
End: 2024-02-08
Payer: MEDICARE

## 2024-02-08 VITALS
WEIGHT: 160 LBS | SYSTOLIC BLOOD PRESSURE: 109 MMHG | BODY MASS INDEX: 28.35 KG/M2 | RESPIRATION RATE: 21 BRPM | HEART RATE: 67 BPM | HEIGHT: 63 IN | TEMPERATURE: 97 F | OXYGEN SATURATION: 94 % | DIASTOLIC BLOOD PRESSURE: 57 MMHG

## 2024-02-08 DIAGNOSIS — K76.0 HEPATIC STEATOSIS: ICD-10-CM

## 2024-02-08 PROCEDURE — 2500000003 HC RX 250 WO HCPCS: Performed by: NURSE PRACTITIONER

## 2024-02-08 PROCEDURE — 76942 ECHO GUIDE FOR BIOPSY: CPT

## 2024-02-08 PROCEDURE — 2580000003 HC RX 258: Performed by: NURSE PRACTITIONER

## 2024-02-08 PROCEDURE — 6360000002 HC RX W HCPCS: Performed by: NURSE PRACTITIONER

## 2024-02-08 PROCEDURE — 88313 SPECIAL STAINS GROUP 2: CPT

## 2024-02-08 PROCEDURE — 88307 TISSUE EXAM BY PATHOLOGIST: CPT

## 2024-02-08 RX ORDER — SODIUM CHLORIDE 9 MG/ML
INJECTION, SOLUTION INTRAVENOUS CONTINUOUS PRN
Status: COMPLETED | OUTPATIENT
Start: 2024-02-08 | End: 2024-02-08

## 2024-02-08 RX ORDER — LIDOCAINE HYDROCHLORIDE 10 MG/ML
INJECTION, SOLUTION EPIDURAL; INFILTRATION; INTRACAUDAL; PERINEURAL PRN
Status: COMPLETED | OUTPATIENT
Start: 2024-02-08 | End: 2024-02-08

## 2024-02-08 RX ORDER — MIDAZOLAM HYDROCHLORIDE 2 MG/2ML
INJECTION, SOLUTION INTRAMUSCULAR; INTRAVENOUS PRN
Status: COMPLETED | OUTPATIENT
Start: 2024-02-08 | End: 2024-02-08

## 2024-02-08 RX ORDER — FENTANYL CITRATE 50 UG/ML
INJECTION, SOLUTION INTRAMUSCULAR; INTRAVENOUS PRN
Status: COMPLETED | OUTPATIENT
Start: 2024-02-08 | End: 2024-02-08

## 2024-02-08 RX ADMIN — MIDAZOLAM HYDROCHLORIDE 1 MG: 1 INJECTION, SOLUTION INTRAMUSCULAR; INTRAVENOUS at 11:52

## 2024-02-08 RX ADMIN — FENTANYL CITRATE 50 MCG: 50 INJECTION INTRAMUSCULAR; INTRAVENOUS at 11:52

## 2024-02-08 RX ADMIN — SODIUM CHLORIDE 125 ML/HR: 9 INJECTION, SOLUTION INTRAVENOUS at 11:37

## 2024-02-08 RX ADMIN — LIDOCAINE HYDROCHLORIDE 9 ML: 10 INJECTION, SOLUTION EPIDURAL; INFILTRATION; INTRACAUDAL; PERINEURAL at 11:56

## 2024-02-08 ASSESSMENT — PAIN - FUNCTIONAL ASSESSMENT: PAIN_FUNCTIONAL_ASSESSMENT: NONE - DENIES PAIN

## 2024-02-08 NOTE — H&P
INTERVENTIONAL RADIOLOGY  Preoperative History and Physical      Patient:  Aparna Nath  :  1940  Age:  83 y.o.  MRN:  [id number]  Today's Date:  2024      CC / HPI   Aparna Nath is a 83 y.o. female with a history of hepatic steatosis who presents for liver biopsy.    PAST MEDICAL HISTORY  Past Medical History:   Diagnosis Date    Autoimmune disease (HCC)     FIBROMYALGIA    Beta-blocker intolerance     CAD (coronary artery disease)     MI >> stent x3, cath 2010: [place]    Chronic pain     back pain    Delayed gastric emptying     Depression     Depression with anxiety     Dyspepsia and other specified disorders of function of stomach     GERD (gastroesophageal reflux disease)     Hypercholesterolemia     Hypertension     EMILIA on CPAP     DOES NOT USE CPAP     PAST SURGICAL HISTORY  Past Surgical History:   Procedure Laterality Date    CATARACT REMOVAL      CORONARY ANGIOPLASTY WITH STENT PLACEMENT      DILATION AND CURETTAGE OF UTERUS      HEENT      ORAL SX    LUMBAR LAMINECTOMY  2012    Dr. Clark    ORTHOPEDIC SURGERY      spinal fusion    OTHER SURGICAL HISTORY      RECTAL SX TWICE; ABCESS; FISTULA    OTHER SURGICAL HISTORY      BIRTH ALBERTO EXCISED    IL UNLISTED PROCEDURE CARDIAC SURGERY      STENTS    SMALL INTESTINE SURGERY  10/31/14    Obstruction, Dr. Bernstein, 25 in removed    TONSILLECTOMY      UPPER GASTROINTESTINAL ENDOSCOPY N/A 11/3/2023    EGD BIOPSY performed by Lee Padilla MD at [facility] ENDOSCOPY     SOCIAL HISTORY  Social History     Socioeconomic History    Marital status:      Spouse name: Not on file    Number of children: Not on file    Years of education: Not on file    Highest education level: Not on file   Occupational History    Not on file   Tobacco Use    Smoking status: Every Day     Current packs/day: 0.00     Types: Cigarettes     Last attempt to quit: 2006     Years since quittin.1    Smokeless tobacco: Never   Substance and Sexual  Activity    Alcohol use: Yes     Alcohol/week: 3.0 standard drinks of alcohol    Drug use: No    Sexual activity: Not Currently     Partners: Male   Other Topics Concern    Not on file   Social History Narrative    Aparna lives independently.  One daughter is nurse at [employer], one daughter  at [employer].      Social Determinants of Health     Financial Resource Strain: Not on file   Food Insecurity: Not on file   Transportation Needs: Not on file   Physical Activity: Inactive (7/12/2023)    Exercise Vital Sign     Days of Exercise per Week: 0 days     Minutes of Exercise per Session: 0 min   Stress: Not on file   Social Connections: Not on file   Intimate Partner Violence: Not on file   Housing Stability: Not on file     FAMILY HISTORY  Family History   Problem Relation Age of Onset    Stroke Father     Heart Disease Brother     Hypertension Brother     Depression Maternal Aunt     Heart Disease Father     Heart Disease Mother     Hypertension Mother     Depression Maternal Grandmother     Depression Maternal Uncle     Anesth Problems Neg Hx     Depression Maternal Grandfather      CURRENT MEDICATIONS  Current Outpatient Medications   Medication Sig Dispense Refill    levothyroxine (SYNTHROID) 75 MCG tablet TAKE 1 TABLET BY MOUTH DAILY 30 tablet 0    KLOR-CON M20 20 MEQ extended release tablet TAKE 1 TABLET BY MOUTH TWICE A DAY 60 tablet 0    ASPIRIN LOW DOSE 81 MG chewable tablet CHEW 1 TABLET BY MOUTH DAILY 30 tablet 1    varenicline (CHANTIX) 0.5 MG tablet TAKE 1 TABLET BY MOUTH DAILY FOR 3 DAYS, THEN TAKE 1 TABLET TWO TIMES A DAY FOR 4 DAYS, THEN TAKE 2 TABLETS BY MOUTH TWO TIMES A DAY 56 tablet 1    ondansetron (ZOFRAN-ODT) 4 MG disintegrating tablet DISSOLVE ONE TABLET BY MOUTH EVERY 8 HOURS AS NEEDED FOR NAUSEA AND/OR VOMITING 30 tablet 0    DULoxetine (CYMBALTA) 60 MG extended release capsule Take 1 capsule by mouth daily 30 capsule 11    omeprazole (PRILOSEC) 20 MG delayed release capsule

## 2024-02-08 NOTE — DISCHARGE INSTRUCTIONS
Percutaneous Liver Biopsy: What to Expect at Home  Your Recovery  A needle biopsy of the liver is a procedure to take a tiny sample (biopsy) of your liver tissue. The tissue sample is looked at under a microscope. Your doctor can look for infection or other liver problems.  You may have some pain where the biopsy needle entered your skin (the puncture site). You may also have pain in your shoulder. This is called referred pain. It is caused by pain traveling along a nerve near the biopsy site. The referred pain usually lasts less than 12 hours. You may have a small amount of bleeding from the puncture site.  You can probably go home if you have no problems after the test. You will need to take it easy at home for 1 or 2 days after the procedure. You will probably be able to return to work and most of your usual activities after that.  This care sheet gives you a general idea about how long it will take for you to recover. But each person recovers at a different pace. Follow the steps below to get better as quickly as possible.  How can you care for yourself at home?  Activity    Rest when you feel tired. Getting enough sleep will help you recover.     Try to walk each day. Start by walking a little more than you did the day before. Bit by bit, increase the amount you walk. Walking boosts blood flow and helps prevent pneumonia and constipation.     Avoid exercises that use your belly muscles and strenuous activities, such as bicycle riding, jogging, weight lifting, or aerobic exercise, for 1 week or until your doctor says it is okay.         You will probably need to take 1 or 2 days off from work. It depends on the type of work you do and how you feel.     Wait 24 hours to shower. Pat the puncture site dry. Do not take a bath or go swimming for at least 5 days or until site has healed.   Diet    You can eat your normal diet. If your stomach is upset, try bland, low-fat foods like plain rice, broiled chicken,

## 2024-02-08 NOTE — PROGRESS NOTES
1100: Patient arrives to angio/IR for US liver biopsy with moderate sedation, accompanied by friend Donna.    1400: Patient given copy of discharge instructions and verbalized understanding.  Patient wheeled to exit via wheelchair. Patient in NAD. No bleeding noted at puncture site.

## 2024-02-14 DIAGNOSIS — F41.9 ANXIETY AND DEPRESSION: ICD-10-CM

## 2024-02-14 DIAGNOSIS — F32.A ANXIETY AND DEPRESSION: ICD-10-CM

## 2024-02-15 RX ORDER — ONDANSETRON 4 MG/1
TABLET, ORALLY DISINTEGRATING ORAL
Qty: 30 TABLET | Refills: 0 | Status: SHIPPED | OUTPATIENT
Start: 2024-02-15

## 2024-02-22 DIAGNOSIS — F41.9 ANXIETY AND DEPRESSION: ICD-10-CM

## 2024-02-22 DIAGNOSIS — F32.A ANXIETY AND DEPRESSION: ICD-10-CM

## 2024-02-23 ENCOUNTER — HOSPITAL ENCOUNTER (OUTPATIENT)
Facility: HOSPITAL | Age: 84
End: 2024-02-23
Payer: MEDICARE

## 2024-02-23 DIAGNOSIS — K31.84 GASTROPARESIS: ICD-10-CM

## 2024-02-23 PROCEDURE — 78264 GASTRIC EMPTYING IMG STUDY: CPT

## 2024-02-23 PROCEDURE — A9541 TC99M SULFUR COLLOID: HCPCS

## 2024-02-23 PROCEDURE — 3430000000 HC RX DIAGNOSTIC RADIOPHARMACEUTICAL

## 2024-02-23 RX ORDER — ONDANSETRON 4 MG/1
TABLET, ORALLY DISINTEGRATING ORAL
Qty: 30 TABLET | Refills: 0 | Status: SHIPPED | OUTPATIENT
Start: 2024-02-23

## 2024-02-23 RX ORDER — TECHNETIUM TC 99M SULFUR COLLOID 2 MG
1 KIT MISCELLANEOUS ONCE
Status: COMPLETED | OUTPATIENT
Start: 2024-02-23 | End: 2024-02-23

## 2024-02-23 RX ADMIN — TECHNETIUM TC 99M SULFUR COLLOID 1 MILLICURIE: KIT at 09:51

## 2024-02-26 RX ORDER — POLYETHYLENE GLYCOL 3350 17 G/17G
POWDER, FOR SOLUTION ORAL
Qty: 30 PACKET | Refills: 1 | Status: SHIPPED | OUTPATIENT
Start: 2024-02-26

## 2024-02-29 RX ORDER — POTASSIUM CHLORIDE 1500 MG/1
20 TABLET, EXTENDED RELEASE ORAL 2 TIMES DAILY
Qty: 60 TABLET | Refills: 0 | Status: SHIPPED | OUTPATIENT
Start: 2024-02-29

## 2024-03-06 RX ORDER — LEVOTHYROXINE SODIUM 0.07 MG/1
75 TABLET ORAL DAILY
Qty: 30 TABLET | Refills: 0 | Status: SHIPPED | OUTPATIENT
Start: 2024-03-06

## 2024-03-14 DIAGNOSIS — F32.A ANXIETY AND DEPRESSION: ICD-10-CM

## 2024-03-14 DIAGNOSIS — F41.9 ANXIETY AND DEPRESSION: ICD-10-CM

## 2024-03-15 RX ORDER — ONDANSETRON 4 MG/1
TABLET, ORALLY DISINTEGRATING ORAL
Qty: 30 TABLET | Refills: 0 | Status: SHIPPED | OUTPATIENT
Start: 2024-03-15

## 2024-03-18 RX ORDER — LEVOTHYROXINE SODIUM 0.07 MG/1
75 TABLET ORAL DAILY
Qty: 30 TABLET | Refills: 0 | Status: SHIPPED | OUTPATIENT
Start: 2024-03-18

## 2024-03-20 ENCOUNTER — HOSPITAL ENCOUNTER (OUTPATIENT)
Facility: HOSPITAL | Age: 84
Discharge: HOME | End: 2024-03-23
Payer: MEDICARE

## 2024-03-20 DIAGNOSIS — R10.11 ABDOMINAL PAIN, RUQ: ICD-10-CM

## 2024-03-20 PROCEDURE — A9537 TC99M MEBROFENIN: HCPCS

## 2024-03-20 PROCEDURE — 78226 HEPATOBILIARY SYSTEM IMAGING: CPT

## 2024-03-20 PROCEDURE — 3430000000 HC RX DIAGNOSTIC RADIOPHARMACEUTICAL

## 2024-03-20 RX ORDER — KIT FOR THE PREPARATION OF TECHNETIUM TC 99M MEBROFENIN 45 MG/10ML
5.5 INJECTION, POWDER, LYOPHILIZED, FOR SOLUTION INTRAVENOUS
Status: COMPLETED | OUTPATIENT
Start: 2024-03-20 | End: 2024-03-20

## 2024-03-20 RX ADMIN — KIT FOR THE PREPARATION OF TECHNETIUM TC 99M MEBROFENIN 5.5 MILLICURIE: 45 INJECTION, POWDER, LYOPHILIZED, FOR SOLUTION INTRAVENOUS at 13:00

## 2024-03-27 DIAGNOSIS — F41.9 ANXIETY AND DEPRESSION: ICD-10-CM

## 2024-03-27 DIAGNOSIS — F32.A ANXIETY AND DEPRESSION: ICD-10-CM

## 2024-03-27 RX ORDER — POTASSIUM CHLORIDE 1500 MG/1
20 TABLET, EXTENDED RELEASE ORAL 2 TIMES DAILY
Qty: 60 TABLET | Refills: 0 | Status: SHIPPED | OUTPATIENT
Start: 2024-03-27

## 2024-03-28 RX ORDER — ONDANSETRON 4 MG/1
TABLET, ORALLY DISINTEGRATING ORAL
Qty: 30 TABLET | Refills: 0 | OUTPATIENT
Start: 2024-03-28

## 2024-04-24 RX ORDER — POTASSIUM CHLORIDE 1500 MG/1
20 TABLET, EXTENDED RELEASE ORAL 2 TIMES DAILY
Qty: 60 TABLET | Refills: 0 | Status: SHIPPED | OUTPATIENT
Start: 2024-04-24

## 2024-05-01 ENCOUNTER — HOSPITAL ENCOUNTER (OUTPATIENT)
Facility: HOSPITAL | Age: 84
Discharge: HOME | End: 2024-05-04

## 2024-05-01 DIAGNOSIS — M54.2 NECK PAIN: ICD-10-CM

## 2024-05-01 DIAGNOSIS — G89.29 CHRONIC NECK PAIN: ICD-10-CM

## 2024-05-01 DIAGNOSIS — M48.02 CERVICAL STENOSIS OF SPINE: ICD-10-CM

## 2024-05-01 DIAGNOSIS — M54.2 CHRONIC NECK PAIN: ICD-10-CM

## 2024-05-13 RX ORDER — LEVOTHYROXINE SODIUM 0.07 MG/1
75 TABLET ORAL DAILY
Qty: 30 TABLET | Refills: 0 | Status: SHIPPED | OUTPATIENT
Start: 2024-05-13

## 2024-05-21 ENCOUNTER — HOSPITAL ENCOUNTER (OUTPATIENT)
Facility: HOSPITAL | Age: 84
Discharge: HOME | End: 2024-05-24
Attending: INTERNAL MEDICINE
Payer: MEDICARE

## 2024-05-21 DIAGNOSIS — R26.89 OTHER ABNORMALITIES OF GAIT AND MOBILITY: ICD-10-CM

## 2024-05-21 LAB — CREAT BLD-MCNC: 0.9 MG/DL (ref 0.6–1.3)

## 2024-05-21 PROCEDURE — 82565 ASSAY OF CREATININE: CPT

## 2024-05-21 PROCEDURE — 70470 CT HEAD/BRAIN W/O & W/DYE: CPT

## 2024-05-21 PROCEDURE — 6360000004 HC RX CONTRAST MEDICATION

## 2024-05-21 RX ADMIN — IOPAMIDOL 100 ML: 612 INJECTION, SOLUTION INTRAVENOUS at 18:01

## 2024-05-28 RX ORDER — POTASSIUM CHLORIDE 1500 MG/1
20 TABLET, EXTENDED RELEASE ORAL 2 TIMES DAILY
Qty: 60 TABLET | Refills: 0 | Status: SHIPPED | OUTPATIENT
Start: 2024-05-28

## 2024-06-10 RX ORDER — LEVOTHYROXINE SODIUM 0.07 MG/1
75 TABLET ORAL DAILY
Qty: 30 TABLET | Refills: 0 | Status: SHIPPED | OUTPATIENT
Start: 2024-06-10

## 2024-07-02 ENCOUNTER — HOSPITAL ENCOUNTER (OUTPATIENT)
Facility: HOSPITAL | Age: 84
Discharge: HOME | End: 2024-07-05
Payer: MEDICARE

## 2024-07-02 PROCEDURE — 72141 MRI NECK SPINE W/O DYE: CPT

## 2024-07-08 RX ORDER — LEVOTHYROXINE SODIUM 0.07 MG/1
75 TABLET ORAL DAILY
Qty: 90 TABLET | Refills: 1 | Status: SHIPPED | OUTPATIENT
Start: 2024-07-08

## 2024-11-07 RX ORDER — BUMETANIDE 2 MG/1
2 TABLET ORAL DAILY
Qty: 30 TABLET | Refills: 0 | Status: SHIPPED | OUTPATIENT
Start: 2024-11-07

## 2024-12-12 ENCOUNTER — OFFICE VISIT (OUTPATIENT)
Facility: CLINIC | Age: 84
End: 2024-12-12

## 2024-12-12 VITALS
RESPIRATION RATE: 16 BRPM | SYSTOLIC BLOOD PRESSURE: 132 MMHG | TEMPERATURE: 97.9 F | HEART RATE: 58 BPM | HEIGHT: 63 IN | WEIGHT: 169.7 LBS | OXYGEN SATURATION: 93 % | DIASTOLIC BLOOD PRESSURE: 71 MMHG | BODY MASS INDEX: 30.07 KG/M2

## 2024-12-12 DIAGNOSIS — Z13.6 SCREENING FOR CARDIOVASCULAR CONDITION: ICD-10-CM

## 2024-12-12 DIAGNOSIS — I10 ESSENTIAL (PRIMARY) HYPERTENSION: ICD-10-CM

## 2024-12-12 DIAGNOSIS — R73.9 HYPERGLYCEMIA, UNSPECIFIED: ICD-10-CM

## 2024-12-12 DIAGNOSIS — Z00.00 MEDICARE ANNUAL WELLNESS VISIT, SUBSEQUENT: Primary | ICD-10-CM

## 2024-12-12 DIAGNOSIS — D50.9 IRON DEFICIENCY ANEMIA, UNSPECIFIED IRON DEFICIENCY ANEMIA TYPE: ICD-10-CM

## 2024-12-12 DIAGNOSIS — E78.00 PURE HYPERCHOLESTEROLEMIA, UNSPECIFIED: ICD-10-CM

## 2024-12-12 SDOH — ECONOMIC STABILITY: FOOD INSECURITY: WITHIN THE PAST 12 MONTHS, YOU WORRIED THAT YOUR FOOD WOULD RUN OUT BEFORE YOU GOT MONEY TO BUY MORE.: NEVER TRUE

## 2024-12-12 SDOH — ECONOMIC STABILITY: FOOD INSECURITY: WITHIN THE PAST 12 MONTHS, THE FOOD YOU BOUGHT JUST DIDN'T LAST AND YOU DIDN'T HAVE MONEY TO GET MORE.: NEVER TRUE

## 2024-12-12 SDOH — ECONOMIC STABILITY: INCOME INSECURITY: HOW HARD IS IT FOR YOU TO PAY FOR THE VERY BASICS LIKE FOOD, HOUSING, MEDICAL CARE, AND HEATING?: NOT HARD AT ALL

## 2024-12-12 ASSESSMENT — PATIENT HEALTH QUESTIONNAIRE - PHQ9
6. FEELING BAD ABOUT YOURSELF - OR THAT YOU ARE A FAILURE OR HAVE LET YOURSELF OR YOUR FAMILY DOWN: MORE THAN HALF THE DAYS
SUM OF ALL RESPONSES TO PHQ QUESTIONS 1-9: 13
SUM OF ALL RESPONSES TO PHQ9 QUESTIONS 1 & 2: 3
SUM OF ALL RESPONSES TO PHQ QUESTIONS 1-9: 13
10. IF YOU CHECKED OFF ANY PROBLEMS, HOW DIFFICULT HAVE THESE PROBLEMS MADE IT FOR YOU TO DO YOUR WORK, TAKE CARE OF THINGS AT HOME, OR GET ALONG WITH OTHER PEOPLE: SOMEWHAT DIFFICULT
2. FEELING DOWN, DEPRESSED OR HOPELESS: SEVERAL DAYS
SUM OF ALL RESPONSES TO PHQ QUESTIONS 1-9: 13
1. LITTLE INTEREST OR PLEASURE IN DOING THINGS: MORE THAN HALF THE DAYS
5. POOR APPETITE OR OVEREATING: MORE THAN HALF THE DAYS
7. TROUBLE CONCENTRATING ON THINGS, SUCH AS READING THE NEWSPAPER OR WATCHING TELEVISION: MORE THAN HALF THE DAYS
4. FEELING TIRED OR HAVING LITTLE ENERGY: MORE THAN HALF THE DAYS
9. THOUGHTS THAT YOU WOULD BE BETTER OFF DEAD, OR OF HURTING YOURSELF: NOT AT ALL
8. MOVING OR SPEAKING SO SLOWLY THAT OTHER PEOPLE COULD HAVE NOTICED. OR THE OPPOSITE, BEING SO FIGETY OR RESTLESS THAT YOU HAVE BEEN MOVING AROUND A LOT MORE THAN USUAL: NOT AT ALL
3. TROUBLE FALLING OR STAYING ASLEEP: MORE THAN HALF THE DAYS
SUM OF ALL RESPONSES TO PHQ QUESTIONS 1-9: 13

## 2024-12-12 ASSESSMENT — COLUMBIA-SUICIDE SEVERITY RATING SCALE - C-SSRS
2. HAVE YOU ACTUALLY HAD ANY THOUGHTS OF KILLING YOURSELF?: NO
6. HAVE YOU EVER DONE ANYTHING, STARTED TO DO ANYTHING, OR PREPARED TO DO ANYTHING TO END YOUR LIFE?: NO
1. WITHIN THE PAST MONTH, HAVE YOU WISHED YOU WERE DEAD OR WISHED YOU COULD GO TO SLEEP AND NOT WAKE UP?: NO

## 2024-12-12 ASSESSMENT — ANXIETY QUESTIONNAIRES
2. NOT BEING ABLE TO STOP OR CONTROL WORRYING: MORE THAN HALF THE DAYS
7. FEELING AFRAID AS IF SOMETHING AWFUL MIGHT HAPPEN: MORE THAN HALF THE DAYS
GAD7 TOTAL SCORE: 12
4. TROUBLE RELAXING: MORE THAN HALF THE DAYS
3. WORRYING TOO MUCH ABOUT DIFFERENT THINGS: MORE THAN HALF THE DAYS
1. FEELING NERVOUS, ANXIOUS, OR ON EDGE: SEVERAL DAYS
IF YOU CHECKED OFF ANY PROBLEMS ON THIS QUESTIONNAIRE, HOW DIFFICULT HAVE THESE PROBLEMS MADE IT FOR YOU TO DO YOUR WORK, TAKE CARE OF THINGS AT HOME, OR GET ALONG WITH OTHER PEOPLE: SOMEWHAT DIFFICULT
6. BECOMING EASILY ANNOYED OR IRRITABLE: MORE THAN HALF THE DAYS
5. BEING SO RESTLESS THAT IT IS HARD TO SIT STILL: SEVERAL DAYS

## 2024-12-12 NOTE — PROGRESS NOTES
Medicare Annual Wellness Visit    Aparna Nath is here for Hypertension and Medicare AWV    Assessment & Plan   Medicare annual wellness visit, subsequent  Pure hypercholesterolemia, unspecified  -     Comprehensive Metabolic Panel; Future  Essential (primary) hypertension  Hyperglycemia, unspecified  -     Hemoglobin A1C; Future  Iron deficiency anemia, unspecified iron deficiency anemia type  -     CBC with Auto Differential; Future  Screening for cardiovascular condition  -     Lipid Panel; Future  Recommendations for Preventive Services Due: see orders and patient instructions/AVS.  Recommended screening schedule for the next 5-10 years is provided to the patient in written form: see Patient Instructions/AVS.     No follow-ups on file.     Subjective       Patient's complete Health Risk Assessment and screening values have been reviewed and are found in Flowsheets. The following problems were reviewed today and where indicated follow up appointments were made and/or referrals ordered.    Positive Risk Factor Screenings with Interventions:    Fall Risk:  Do you feel unsteady or are you worried about falling? : (!) yes  2 or more falls in past year?: no  Fall with injury in past year?: no     Interventions:    Reviewed medications, home hazards, visual acuity, and co-morbidities that can increase risk for falls     Depression:  PHQ-2 Score: 3  PHQ-9 Total Score: 13  Total Score Interpretation: 10-14 = moderate depression  Interventions:  See AVS for additional education material       Controlled Medication Review:    Today's Pain Level: Pain Score: SIX     Opioid Risk: (Low risk score <55) Opioid risk score: 30    Patient is low risk for opioid use disorder or overdose.    Last PDMP Adolfo as Reviewed:  Review User Review Instant Review Result                   Inactivity:    (!) Abnormal     Interventions:  See AVS for additional education material     Abnormal BMI (obese):  Body mass index is 30.06 kg/m². (!) 
disease) 1999    MI >> stent x3, cath 2010: OK    Chronic pain     back pain    Delayed gastric emptying     Depression     Depression with anxiety     Dyspepsia and other specified disorders of function of stomach     GERD (gastroesophageal reflux disease)     Hypercholesterolemia     Hypertension     EMILIA on CPAP     DOES NOT USE CPAP      Social History     Socioeconomic History    Marital status:      Spouse name: None    Number of children: None    Years of education: None    Highest education level: None   Tobacco Use    Smoking status: Every Day     Current packs/day: 0.10     Average packs/day: 0.9 packs/day for 10.9 years (10.1 ttl pk-yrs)     Types: Cigarettes     Start date: 02/2024     Last attempt to quit: 1/1/2006    Smokeless tobacco: Never   Vaping Use    Vaping status: Every Day    Substances: Nicotine   Substance and Sexual Activity    Alcohol use: Yes     Alcohol/week: 3.0 standard drinks of alcohol    Drug use: No    Sexual activity: Not Currently     Partners: Male   Social History Narrative    Aparna lives independently.  One daughter is nurse at [employer], one daughter  at [employer].      Social Determinants of Health     Financial Resource Strain: Low Risk  (12/12/2024)    Overall Financial Resource Strain (CARDIA)     Difficulty of Paying Living Expenses: Not hard at all   Food Insecurity: No Food Insecurity (12/12/2024)    Hunger Vital Sign     Worried About Running Out of Food in the Last Year: Never true     Ran Out of Food in the Last Year: Never true   Transportation Needs: Unknown (12/12/2024)    PRAPARE - Transportation     Lack of Transportation (Non-Medical): No   Physical Activity: Inactive (7/12/2023)    Exercise Vital Sign     Days of Exercise per Week: 0 days     Minutes of Exercise per Session: 0 min   Housing Stability: Unknown (12/12/2024)    Housing Stability Vital Sign     Homeless in the Last Year: No        Review of Systems - negative except as

## 2024-12-13 LAB
ALBUMIN SERPL-MCNC: 4 G/DL (ref 3.5–5)
ALBUMIN/GLOB SERPL: 1.5 (ref 1.1–2.2)
ALP SERPL-CCNC: 74 U/L (ref 45–117)
ALT SERPL-CCNC: 20 U/L (ref 12–78)
ANION GAP SERPL CALC-SCNC: 6 MMOL/L (ref 2–12)
AST SERPL-CCNC: 25 U/L (ref 15–37)
BASOPHILS # BLD: 0.1 K/UL (ref 0–0.1)
BASOPHILS NFR BLD: 1 % (ref 0–1)
BILIRUB SERPL-MCNC: 0.8 MG/DL (ref 0.2–1)
BUN SERPL-MCNC: 22 MG/DL (ref 6–20)
BUN/CREAT SERPL: 23 (ref 12–20)
CALCIUM SERPL-MCNC: 9.1 MG/DL (ref 8.5–10.1)
CHLORIDE SERPL-SCNC: 104 MMOL/L (ref 97–108)
CHOLEST SERPL-MCNC: 110 MG/DL
CO2 SERPL-SCNC: 28 MMOL/L (ref 21–32)
CREAT SERPL-MCNC: 0.97 MG/DL (ref 0.55–1.02)
DIFFERENTIAL METHOD BLD: NORMAL
EOSINOPHIL # BLD: 0.1 K/UL (ref 0–0.4)
EOSINOPHIL NFR BLD: 2 % (ref 0–7)
ERYTHROCYTE [DISTWIDTH] IN BLOOD BY AUTOMATED COUNT: 13.9 % (ref 11.5–14.5)
EST. AVERAGE GLUCOSE BLD GHB EST-MCNC: 103 MG/DL
GLOBULIN SER CALC-MCNC: 2.6 G/DL (ref 2–4)
GLUCOSE SERPL-MCNC: 79 MG/DL (ref 65–100)
HBA1C MFR BLD: 5.2 % (ref 4–5.6)
HCT VFR BLD AUTO: 37.8 % (ref 35–47)
HDLC SERPL-MCNC: 45 MG/DL
HDLC SERPL: 2.4 (ref 0–5)
HGB BLD-MCNC: 12.2 G/DL (ref 11.5–16)
IMM GRANULOCYTES # BLD AUTO: 0 K/UL (ref 0–0.04)
IMM GRANULOCYTES NFR BLD AUTO: 0 % (ref 0–0.5)
LDLC SERPL CALC-MCNC: 41.4 MG/DL (ref 0–100)
LYMPHOCYTES # BLD: 2 K/UL (ref 0.8–3.5)
LYMPHOCYTES NFR BLD: 23 % (ref 12–49)
MCH RBC QN AUTO: 30.2 PG (ref 26–34)
MCHC RBC AUTO-ENTMCNC: 32.3 G/DL (ref 30–36.5)
MCV RBC AUTO: 93.6 FL (ref 80–99)
MONOCYTES # BLD: 0.4 K/UL (ref 0–1)
MONOCYTES NFR BLD: 5 % (ref 5–13)
NEUTS SEG # BLD: 6.2 K/UL (ref 1.8–8)
NEUTS SEG NFR BLD: 69 % (ref 32–75)
NRBC # BLD: 0 K/UL (ref 0–0.01)
NRBC BLD-RTO: 0 PER 100 WBC
PLATELET # BLD AUTO: 171 K/UL (ref 150–400)
PMV BLD AUTO: 11.8 FL (ref 8.9–12.9)
POTASSIUM SERPL-SCNC: 4.3 MMOL/L (ref 3.5–5.1)
PROT SERPL-MCNC: 6.6 G/DL (ref 6.4–8.2)
RBC # BLD AUTO: 4.04 M/UL (ref 3.8–5.2)
SODIUM SERPL-SCNC: 138 MMOL/L (ref 136–145)
TRIGL SERPL-MCNC: 118 MG/DL
VLDLC SERPL CALC-MCNC: 23.6 MG/DL
WBC # BLD AUTO: 8.8 K/UL (ref 3.6–11)

## 2024-12-28 ENCOUNTER — APPOINTMENT (OUTPATIENT)
Facility: HOSPITAL | Age: 84
DRG: 690 | End: 2024-12-28
Payer: MEDICARE

## 2024-12-28 ENCOUNTER — HOSPITAL ENCOUNTER (INPATIENT)
Facility: HOSPITAL | Age: 84
LOS: 2 days | Discharge: NURSING, REHAB OR LONG-TERM CARE | DRG: 690 | End: 2025-01-04
Attending: EMERGENCY MEDICINE | Admitting: FAMILY MEDICINE
Payer: MEDICARE

## 2024-12-28 DIAGNOSIS — N30.00 ACUTE CYSTITIS WITHOUT HEMATURIA: ICD-10-CM

## 2024-12-28 DIAGNOSIS — R53.1 GENERAL WEAKNESS: Primary | ICD-10-CM

## 2024-12-28 PROBLEM — R31.9 URINARY TRACT INFECTION WITH HEMATURIA, SITE UNSPECIFIED: Status: ACTIVE | Noted: 2024-12-28

## 2024-12-28 PROBLEM — N39.0 URINARY TRACT INFECTION WITH HEMATURIA, SITE UNSPECIFIED: Status: ACTIVE | Noted: 2024-12-28

## 2024-12-28 LAB
ALBUMIN SERPL-MCNC: 4.2 G/DL (ref 3.5–5)
ALBUMIN/GLOB SERPL: 1.4 (ref 1.1–2.2)
ALP SERPL-CCNC: 76 U/L (ref 45–117)
ALT SERPL-CCNC: 20 U/L (ref 12–78)
ANION GAP SERPL CALC-SCNC: 6 MMOL/L (ref 2–12)
APPEARANCE UR: CLEAR
AST SERPL-CCNC: 17 U/L (ref 15–37)
BACTERIA URNS QL MICRO: ABNORMAL /HPF
BASOPHILS # BLD: 0 K/UL (ref 0–0.1)
BASOPHILS NFR BLD: 0 % (ref 0–1)
BILIRUB SERPL-MCNC: 0.5 MG/DL (ref 0.2–1)
BILIRUB UR QL: NEGATIVE
BUN SERPL-MCNC: 22 MG/DL (ref 6–20)
BUN/CREAT SERPL: 25 (ref 12–20)
CALCIUM SERPL-MCNC: 9.1 MG/DL (ref 8.5–10.1)
CHLORIDE SERPL-SCNC: 101 MMOL/L (ref 97–108)
CO2 SERPL-SCNC: 32 MMOL/L (ref 21–32)
COLOR UR: ABNORMAL
COMMENT:: NORMAL
CREAT SERPL-MCNC: 0.88 MG/DL (ref 0.55–1.02)
DIFFERENTIAL METHOD BLD: NORMAL
EOSINOPHIL # BLD: 0.1 K/UL (ref 0–0.4)
EOSINOPHIL NFR BLD: 2 % (ref 0–7)
EPITH CASTS URNS QL MICRO: ABNORMAL /LPF
ERYTHROCYTE [DISTWIDTH] IN BLOOD BY AUTOMATED COUNT: 13.2 % (ref 11.5–14.5)
FLUAV RNA SPEC QL NAA+PROBE: NOT DETECTED
FLUBV RNA SPEC QL NAA+PROBE: NOT DETECTED
GLOBULIN SER CALC-MCNC: 3 G/DL (ref 2–4)
GLUCOSE SERPL-MCNC: 89 MG/DL (ref 65–100)
GLUCOSE UR STRIP.AUTO-MCNC: NEGATIVE MG/DL
HCT VFR BLD AUTO: 38.3 % (ref 35–47)
HGB BLD-MCNC: 12.9 G/DL (ref 11.5–16)
HGB UR QL STRIP: ABNORMAL
IMM GRANULOCYTES # BLD AUTO: 0 K/UL (ref 0–0.04)
IMM GRANULOCYTES NFR BLD AUTO: 0 % (ref 0–0.5)
KETONES UR QL STRIP.AUTO: NEGATIVE MG/DL
LEUKOCYTE ESTERASE UR QL STRIP.AUTO: ABNORMAL
LIPASE SERPL-CCNC: 19 U/L (ref 13–75)
LYMPHOCYTES # BLD: 1.6 K/UL (ref 0.8–3.5)
LYMPHOCYTES NFR BLD: 23 % (ref 12–49)
MCH RBC QN AUTO: 30.1 PG (ref 26–34)
MCHC RBC AUTO-ENTMCNC: 33.7 G/DL (ref 30–36.5)
MCV RBC AUTO: 89.3 FL (ref 80–99)
MONOCYTES # BLD: 0.5 K/UL (ref 0–1)
MONOCYTES NFR BLD: 7 % (ref 5–13)
NEUTS SEG # BLD: 4.6 K/UL (ref 1.8–8)
NEUTS SEG NFR BLD: 68 % (ref 32–75)
NITRITE UR QL STRIP.AUTO: NEGATIVE
NRBC # BLD: 0 K/UL (ref 0–0.01)
NRBC BLD-RTO: 0 PER 100 WBC
PH UR STRIP: 5.5 (ref 5–8)
PLATELET # BLD AUTO: 220 K/UL (ref 150–400)
PMV BLD AUTO: 10.6 FL (ref 8.9–12.9)
POTASSIUM SERPL-SCNC: 3.5 MMOL/L (ref 3.5–5.1)
PROT SERPL-MCNC: 7.2 G/DL (ref 6.4–8.2)
PROT UR STRIP-MCNC: NEGATIVE MG/DL
RBC # BLD AUTO: 4.29 M/UL (ref 3.8–5.2)
RBC #/AREA URNS HPF: ABNORMAL /HPF (ref 0–5)
SARS-COV-2 RNA RESP QL NAA+PROBE: NOT DETECTED
SODIUM SERPL-SCNC: 139 MMOL/L (ref 136–145)
SOURCE: NORMAL
SP GR UR REFRACTOMETRY: 1.01 (ref 1–1.03)
SPECIMEN HOLD: NORMAL
SPECIMEN HOLD: NORMAL
TROPONIN I SERPL HS-MCNC: 9 NG/L (ref 0–51)
UROBILINOGEN UR QL STRIP.AUTO: 0.2 EU/DL (ref 0.2–1)
WBC # BLD AUTO: 6.8 K/UL (ref 3.6–11)
WBC URNS QL MICRO: ABNORMAL /HPF (ref 0–4)

## 2024-12-28 PROCEDURE — 80053 COMPREHEN METABOLIC PANEL: CPT

## 2024-12-28 PROCEDURE — 74177 CT ABD & PELVIS W/CONTRAST: CPT

## 2024-12-28 PROCEDURE — 93005 ELECTROCARDIOGRAM TRACING: CPT | Performed by: PHYSICIAN ASSISTANT

## 2024-12-28 PROCEDURE — 6360000004 HC RX CONTRAST MEDICATION: Performed by: EMERGENCY MEDICINE

## 2024-12-28 PROCEDURE — 83690 ASSAY OF LIPASE: CPT

## 2024-12-28 PROCEDURE — 87086 URINE CULTURE/COLONY COUNT: CPT

## 2024-12-28 PROCEDURE — 81001 URINALYSIS AUTO W/SCOPE: CPT

## 2024-12-28 PROCEDURE — 96374 THER/PROPH/DIAG INJ IV PUSH: CPT

## 2024-12-28 PROCEDURE — 2500000003 HC RX 250 WO HCPCS: Performed by: EMERGENCY MEDICINE

## 2024-12-28 PROCEDURE — 84484 ASSAY OF TROPONIN QUANT: CPT

## 2024-12-28 PROCEDURE — 36415 COLL VENOUS BLD VENIPUNCTURE: CPT

## 2024-12-28 PROCEDURE — 71046 X-RAY EXAM CHEST 2 VIEWS: CPT

## 2024-12-28 PROCEDURE — 6370000000 HC RX 637 (ALT 250 FOR IP): Performed by: EMERGENCY MEDICINE

## 2024-12-28 PROCEDURE — 87636 SARSCOV2 & INF A&B AMP PRB: CPT

## 2024-12-28 PROCEDURE — 85025 COMPLETE CBC W/AUTO DIFF WBC: CPT

## 2024-12-28 PROCEDURE — 99285 EMERGENCY DEPT VISIT HI MDM: CPT

## 2024-12-28 PROCEDURE — G0378 HOSPITAL OBSERVATION PER HR: HCPCS

## 2024-12-28 PROCEDURE — 6360000002 HC RX W HCPCS: Performed by: EMERGENCY MEDICINE

## 2024-12-28 RX ORDER — CLONIDINE HYDROCHLORIDE 0.1 MG/1
0.2 TABLET ORAL
Status: COMPLETED | OUTPATIENT
Start: 2024-12-28 | End: 2024-12-28

## 2024-12-28 RX ORDER — VALSARTAN 160 MG/1
160 TABLET ORAL DAILY
Status: DISCONTINUED | OUTPATIENT
Start: 2024-12-29 | End: 2025-01-04 | Stop reason: HOSPADM

## 2024-12-28 RX ORDER — ASPIRIN 81 MG/1
81 TABLET, CHEWABLE ORAL DAILY
Status: DISCONTINUED | OUTPATIENT
Start: 2024-12-29 | End: 2025-01-04 | Stop reason: HOSPADM

## 2024-12-28 RX ORDER — ACETAMINOPHEN 325 MG/1
650 TABLET ORAL EVERY 6 HOURS PRN
Status: DISCONTINUED | OUTPATIENT
Start: 2024-12-28 | End: 2025-01-04 | Stop reason: HOSPADM

## 2024-12-28 RX ORDER — OXYCODONE HYDROCHLORIDE 5 MG/1
10 TABLET ORAL EVERY 6 HOURS PRN
Status: DISCONTINUED | OUTPATIENT
Start: 2024-12-28 | End: 2025-01-04 | Stop reason: HOSPADM

## 2024-12-28 RX ORDER — ACETAMINOPHEN 650 MG/1
650 SUPPOSITORY RECTAL EVERY 6 HOURS PRN
Status: DISCONTINUED | OUTPATIENT
Start: 2024-12-28 | End: 2025-01-04 | Stop reason: HOSPADM

## 2024-12-28 RX ORDER — PANTOPRAZOLE SODIUM 40 MG/1
40 TABLET, DELAYED RELEASE ORAL
Status: DISCONTINUED | OUTPATIENT
Start: 2024-12-29 | End: 2025-01-04 | Stop reason: HOSPADM

## 2024-12-28 RX ORDER — POTASSIUM CHLORIDE 1500 MG/1
20 TABLET, EXTENDED RELEASE ORAL 2 TIMES DAILY
Status: DISCONTINUED | OUTPATIENT
Start: 2024-12-28 | End: 2024-12-29

## 2024-12-28 RX ORDER — OXYCODONE HYDROCHLORIDE 10 MG/1
10 TABLET ORAL EVERY 6 HOURS PRN
COMMUNITY
Start: 2024-12-11

## 2024-12-28 RX ORDER — SODIUM CHLORIDE 9 MG/ML
INJECTION, SOLUTION INTRAVENOUS PRN
Status: DISCONTINUED | OUTPATIENT
Start: 2024-12-28 | End: 2025-01-04 | Stop reason: HOSPADM

## 2024-12-28 RX ORDER — ONDANSETRON 4 MG/1
4 TABLET, ORALLY DISINTEGRATING ORAL EVERY 8 HOURS PRN
Status: DISCONTINUED | OUTPATIENT
Start: 2024-12-28 | End: 2025-01-04 | Stop reason: HOSPADM

## 2024-12-28 RX ORDER — LEVOTHYROXINE SODIUM 75 UG/1
75 TABLET ORAL DAILY
Status: DISCONTINUED | OUTPATIENT
Start: 2024-12-29 | End: 2025-01-04 | Stop reason: HOSPADM

## 2024-12-28 RX ORDER — BUMETANIDE 1 MG/1
2 TABLET ORAL DAILY
Status: DISCONTINUED | OUTPATIENT
Start: 2024-12-29 | End: 2024-12-29

## 2024-12-28 RX ORDER — OXYCODONE HYDROCHLORIDE 5 MG/1
10 TABLET ORAL
Status: COMPLETED | OUTPATIENT
Start: 2024-12-28 | End: 2024-12-28

## 2024-12-28 RX ORDER — METOPROLOL TARTRATE 25 MG/1
100 TABLET, FILM COATED ORAL 2 TIMES DAILY
Status: DISCONTINUED | OUTPATIENT
Start: 2024-12-28 | End: 2025-01-01

## 2024-12-28 RX ORDER — DOCUSATE SODIUM 100 MG/1
100 CAPSULE, LIQUID FILLED ORAL DAILY PRN
Status: DISCONTINUED | OUTPATIENT
Start: 2024-12-28 | End: 2025-01-04 | Stop reason: HOSPADM

## 2024-12-28 RX ORDER — CETIRIZINE HYDROCHLORIDE 10 MG/1
5 TABLET ORAL DAILY
Status: DISCONTINUED | OUTPATIENT
Start: 2024-12-29 | End: 2025-01-04 | Stop reason: HOSPADM

## 2024-12-28 RX ORDER — SODIUM CHLORIDE 0.9 % (FLUSH) 0.9 %
5-40 SYRINGE (ML) INJECTION PRN
Status: DISCONTINUED | OUTPATIENT
Start: 2024-12-28 | End: 2025-01-04 | Stop reason: HOSPADM

## 2024-12-28 RX ORDER — IOPAMIDOL 755 MG/ML
100 INJECTION, SOLUTION INTRAVASCULAR
Status: COMPLETED | OUTPATIENT
Start: 2024-12-28 | End: 2024-12-28

## 2024-12-28 RX ORDER — ONDANSETRON 2 MG/ML
4 INJECTION INTRAMUSCULAR; INTRAVENOUS EVERY 6 HOURS PRN
Status: DISCONTINUED | OUTPATIENT
Start: 2024-12-28 | End: 2025-01-04 | Stop reason: HOSPADM

## 2024-12-28 RX ORDER — DULOXETIN HYDROCHLORIDE 20 MG/1
60 CAPSULE, DELAYED RELEASE ORAL DAILY
Status: DISCONTINUED | OUTPATIENT
Start: 2024-12-29 | End: 2025-01-04 | Stop reason: HOSPADM

## 2024-12-28 RX ORDER — CLONIDINE HYDROCHLORIDE 0.2 MG/1
0.2 TABLET ORAL 2 TIMES DAILY
Status: DISCONTINUED | OUTPATIENT
Start: 2024-12-28 | End: 2025-01-04 | Stop reason: HOSPADM

## 2024-12-28 RX ORDER — CYCLOBENZAPRINE HCL 10 MG
10 TABLET ORAL 2 TIMES DAILY PRN
Status: DISCONTINUED | OUTPATIENT
Start: 2024-12-28 | End: 2025-01-04 | Stop reason: HOSPADM

## 2024-12-28 RX ORDER — SODIUM CHLORIDE 0.9 % (FLUSH) 0.9 %
5-40 SYRINGE (ML) INJECTION EVERY 12 HOURS SCHEDULED
Status: DISCONTINUED | OUTPATIENT
Start: 2024-12-28 | End: 2025-01-04 | Stop reason: HOSPADM

## 2024-12-28 RX ORDER — POLYETHYLENE GLYCOL 3350 17 G/17G
17 POWDER, FOR SOLUTION ORAL DAILY PRN
Status: DISCONTINUED | OUTPATIENT
Start: 2024-12-28 | End: 2024-12-28

## 2024-12-28 RX ORDER — METOPROLOL TARTRATE 50 MG
100 TABLET ORAL ONCE
Status: COMPLETED | OUTPATIENT
Start: 2024-12-28 | End: 2024-12-28

## 2024-12-28 RX ORDER — ALBUTEROL SULFATE 90 UG/1
2 INHALANT RESPIRATORY (INHALATION) EVERY 4 HOURS PRN
Status: DISCONTINUED | OUTPATIENT
Start: 2024-12-28 | End: 2025-01-04 | Stop reason: HOSPADM

## 2024-12-28 RX ORDER — PREGABALIN 75 MG/1
225 CAPSULE ORAL 2 TIMES DAILY
Status: DISCONTINUED | OUTPATIENT
Start: 2024-12-28 | End: 2025-01-04 | Stop reason: HOSPADM

## 2024-12-28 RX ADMIN — METOPROLOL TARTRATE 100 MG: 50 TABLET, FILM COATED ORAL at 21:10

## 2024-12-28 RX ADMIN — OXYCODONE HYDROCHLORIDE 10 MG: 5 TABLET ORAL at 21:09

## 2024-12-28 RX ADMIN — IOPAMIDOL 100 ML: 755 INJECTION, SOLUTION INTRAVENOUS at 16:31

## 2024-12-28 RX ADMIN — WATER 1000 MG: 1 INJECTION INTRAMUSCULAR; INTRAVENOUS; SUBCUTANEOUS at 19:56

## 2024-12-28 RX ADMIN — CLONIDINE HYDROCHLORIDE 0.2 MG: 0.1 TABLET ORAL at 21:10

## 2024-12-28 ASSESSMENT — PAIN DESCRIPTION - LOCATION
LOCATION: BACK
LOCATION: BACK

## 2024-12-28 ASSESSMENT — PAIN SCALES - GENERAL
PAINLEVEL_OUTOF10: 10
PAINLEVEL_OUTOF10: 9

## 2024-12-28 ASSESSMENT — PAIN - FUNCTIONAL ASSESSMENT
PAIN_FUNCTIONAL_ASSESSMENT: ACTIVITIES ARE NOT PREVENTED
PAIN_FUNCTIONAL_ASSESSMENT: PREVENTS OR INTERFERES SOME ACTIVE ACTIVITIES AND ADLS
PAIN_FUNCTIONAL_ASSESSMENT: 0-10

## 2024-12-28 ASSESSMENT — PAIN DESCRIPTION - DESCRIPTORS
DESCRIPTORS: SPASM
DESCRIPTORS: ACHING

## 2024-12-28 ASSESSMENT — PAIN DESCRIPTION - ONSET: ONSET: ON-GOING

## 2024-12-28 ASSESSMENT — PAIN DESCRIPTION - FREQUENCY: FREQUENCY: CONTINUOUS

## 2024-12-28 ASSESSMENT — PAIN DESCRIPTION - ORIENTATION
ORIENTATION: MID
ORIENTATION: MID;LOWER

## 2024-12-28 ASSESSMENT — PAIN DESCRIPTION - PAIN TYPE: TYPE: ACUTE PAIN

## 2024-12-28 NOTE — ED NOTES
2:29 PM  I have evaluated the patient as the Provider in Rapid Medical Evaluation (RME). I have reviewed her vital signs and the triage nurse assessment. I have talked with the patient and any available family and advised that I am the provider in triage and have ordered the appropriate study to initiate their work up based on the clinical presentation during my assessment. I have advised that the patient will be accommodated in the Main ED as soon as possible. I have also requested to contact the triage nurse or myself immediately if the patient experiences any changes in their condition during this brief waiting period.    Over the holiday drove to [place]... Yesterday had fatigue, \"I just felt exhausted.\"  Pt notes it is worse today.  Notes hx gastroparesis.  Reports pain across the back \"where my bra comes around.\"  Notes had two near falls.Denies cough, congestion, fever, urinary symptoms, vomiting, diarrhea.  Denies CP or SOB.  No edema, hemoptysis.      SIMON Mojica Lindsay H, PA  12/28/24 [id number]

## 2024-12-28 NOTE — ED TRIAGE NOTES
Patient arrives in wheelchair with complaints of fatigue x1 week, worsening. Also reports pain across back, radiating anteriorly. Denies N/V/D, urinary changes, cough, congestion. Denies SOB, CP.

## 2024-12-28 NOTE — ED PROVIDER NOTES
alcohol    Drug use: No    Sexual activity: Not Currently     Partners: Male   Social History Narrative    Aparna lives independently.  One daughter is nurse at [employer], one daughter  at [employer].      Social Determinants of Health     Financial Resource Strain: Low Risk  (12/12/2024)    Overall Financial Resource Strain (CARDIA)     Difficulty of Paying Living Expenses: Not hard at all   Food Insecurity: No Food Insecurity (12/12/2024)    Hunger Vital Sign     Worried About Running Out of Food in the Last Year: Never true     Ran Out of Food in the Last Year: Never true   Transportation Needs: Unknown (12/12/2024)    PRAPARE - Transportation     Lack of Transportation (Non-Medical): No   Physical Activity: Inactive (7/12/2023)    Exercise Vital Sign     Days of Exercise per Week: 0 days     Minutes of Exercise per Session: 0 min   Housing Stability: Unknown (12/12/2024)    Housing Stability Vital Sign     Homeless in the Last Year: No           PHYSICAL EXAM    (up to 7 for level 4, 8 or more for level 5)     ED Triage Vitals [12/28/24 1431]   BP Systolic BP Percentile Diastolic BP Percentile Temp Temp src Pulse Respirations SpO2   (!) 156/87 -- -- 97.2 °F (36.2 °C) -- 56 18 94 %      Height Weight         -- --             There is no height or weight on file to calculate BMI.    Physical Exam  Constitutional:       Appearance: Normal appearance. She is not ill-appearing.   HENT:      Head: Normocephalic and atraumatic.      Mouth/Throat:      Mouth: Mucous membranes are moist.   Eyes:      Conjunctiva/sclera: Conjunctivae normal.   Cardiovascular:      Rate and Rhythm: Normal rate and regular rhythm.      Heart sounds: Normal heart sounds.   Pulmonary:      Effort: Pulmonary effort is normal.      Breath sounds: Normal breath sounds.   Abdominal:      General: There is no distension.      Palpations: Abdomen is soft.      Tenderness: There is abdominal tenderness in the right lower quadrant.  There is guarding. There is no right CVA tenderness, left CVA tenderness or rebound.   Musculoskeletal:      Lumbar back: Spasms present. No bony tenderness.   Skin:     General: Skin is warm and dry.   Neurological:      General: No focal deficit present.      Mental Status: She is alert and oriented to person, place, and time.   Psychiatric:         Mood and Affect: Mood normal.         Behavior: Behavior normal.         Thought Content: Thought content normal.         Judgment: Judgment normal.         DIAGNOSTIC RESULTS     EKG: All EKG's are interpreted by the Emergency Department Physician who either signs or Co-signs this chart in the absence of a cardiologist.        RADIOLOGY:   Non-plain film images such as CT, Ultrasound and MRI are read by the radiologist. Plain radiographic images are visualized and preliminarily interpreted by the emergency physician with the below findings:        Interpretation per the Radiologist below, if available at the time of this note:    CT ABDOMEN PELVIS W IV CONTRAST Additional Contrast? None   Final Result   Normal appendix. Moderate amount of stool in the colon.      Electronically signed by Monico Zazueta MD      XR CHEST (2 VW)   Final Result      No acute cardiopulmonary process on chest x-ray. Increased mid thoracic spine   degenerative disc disease.         Electronically signed by Ezio Hui           LABS:  Labs Reviewed   COMPREHENSIVE METABOLIC PANEL - Abnormal; Notable for the following components:       Result Value    BUN 22 (*)     BUN/Creatinine Ratio 25 (*)     All other components within normal limits   URINALYSIS WITH MICROSCOPIC - Abnormal; Notable for the following components:    Blood, Urine TRACE (*)     Leukocyte Esterase, Urine MODERATE (*)     BACTERIA, URINE 1+ (*)     All other components within normal limits   COVID-19 & INFLUENZA COMBO   URINE CULTURE HOLD SAMPLE   CULTURE, URINE   CBC WITH AUTO DIFFERENTIAL   TROPONIN   LIPASE   EXTRA TUBES

## 2024-12-29 ENCOUNTER — APPOINTMENT (OUTPATIENT)
Facility: HOSPITAL | Age: 84
DRG: 690 | End: 2024-12-29
Payer: MEDICARE

## 2024-12-29 PROBLEM — R20.0 NUMBNESS AND TINGLING OF BOTH LOWER EXTREMITIES: Status: ACTIVE | Noted: 2024-12-29

## 2024-12-29 PROBLEM — R29.898 WEAKNESS OF BOTH LOWER EXTREMITIES: Status: ACTIVE | Noted: 2024-12-29

## 2024-12-29 PROBLEM — R20.2 NUMBNESS AND TINGLING OF BOTH LOWER EXTREMITIES: Status: ACTIVE | Noted: 2024-12-29

## 2024-12-29 LAB
ALBUMIN SERPL-MCNC: 3.7 G/DL (ref 3.5–5)
ALBUMIN/GLOB SERPL: 1.4 (ref 1.1–2.2)
ALP SERPL-CCNC: 74 U/L (ref 45–117)
ALT SERPL-CCNC: 18 U/L (ref 12–78)
ANION GAP SERPL CALC-SCNC: 7 MMOL/L (ref 2–12)
APTT PPP: 25.4 SEC (ref 22.1–31)
AST SERPL-CCNC: 18 U/L (ref 15–37)
BACTERIA SPEC CULT: NORMAL
BASOPHILS # BLD: 0 K/UL (ref 0–0.1)
BASOPHILS NFR BLD: 0 % (ref 0–1)
BILIRUB SERPL-MCNC: 0.5 MG/DL (ref 0.2–1)
BUN SERPL-MCNC: 23 MG/DL (ref 6–20)
BUN/CREAT SERPL: 32 (ref 12–20)
CALCIUM SERPL-MCNC: 9 MG/DL (ref 8.5–10.1)
CC UR VC: NORMAL
CHLORIDE SERPL-SCNC: 101 MMOL/L (ref 97–108)
CO2 SERPL-SCNC: 31 MMOL/L (ref 21–32)
CREAT SERPL-MCNC: 0.73 MG/DL (ref 0.55–1.02)
DIFFERENTIAL METHOD BLD: NORMAL
EKG ATRIAL RATE: 55 BPM
EKG DIAGNOSIS: NORMAL
EKG P AXIS: 38 DEGREES
EKG P-R INTERVAL: 256 MS
EKG Q-T INTERVAL: 482 MS
EKG QRS DURATION: 86 MS
EKG QTC CALCULATION (BAZETT): 461 MS
EKG R AXIS: -38 DEGREES
EKG T AXIS: 53 DEGREES
EKG VENTRICULAR RATE: 55 BPM
EOSINOPHIL # BLD: 0.1 K/UL (ref 0–0.4)
EOSINOPHIL NFR BLD: 1 % (ref 0–7)
ERYTHROCYTE [DISTWIDTH] IN BLOOD BY AUTOMATED COUNT: 13.2 % (ref 11.5–14.5)
GLOBULIN SER CALC-MCNC: 2.7 G/DL (ref 2–4)
GLUCOSE SERPL-MCNC: 104 MG/DL (ref 65–100)
HCT VFR BLD AUTO: 37.1 % (ref 35–47)
HGB BLD-MCNC: 12.2 G/DL (ref 11.5–16)
IMM GRANULOCYTES # BLD AUTO: 0 K/UL (ref 0–0.04)
IMM GRANULOCYTES NFR BLD AUTO: 0 % (ref 0–0.5)
INR PPP: 1 (ref 0.9–1.1)
LYMPHOCYTES # BLD: 1.5 K/UL (ref 0.8–3.5)
LYMPHOCYTES NFR BLD: 21 % (ref 12–49)
MCH RBC QN AUTO: 29.8 PG (ref 26–34)
MCHC RBC AUTO-ENTMCNC: 32.9 G/DL (ref 30–36.5)
MCV RBC AUTO: 90.7 FL (ref 80–99)
MONOCYTES # BLD: 0.5 K/UL (ref 0–1)
MONOCYTES NFR BLD: 6 % (ref 5–13)
NEUTS SEG # BLD: 5.2 K/UL (ref 1.8–8)
NEUTS SEG NFR BLD: 72 % (ref 32–75)
NRBC # BLD: 0 K/UL (ref 0–0.01)
NRBC BLD-RTO: 0 PER 100 WBC
PLATELET # BLD AUTO: 202 K/UL (ref 150–400)
PMV BLD AUTO: 10.6 FL (ref 8.9–12.9)
POTASSIUM SERPL-SCNC: 3.8 MMOL/L (ref 3.5–5.1)
PROT SERPL-MCNC: 6.4 G/DL (ref 6.4–8.2)
PROTHROMBIN TIME: 10.3 SEC (ref 9–11.1)
RBC # BLD AUTO: 4.09 M/UL (ref 3.8–5.2)
SERVICE CMNT-IMP: NORMAL
SODIUM SERPL-SCNC: 139 MMOL/L (ref 136–145)
THERAPEUTIC RANGE: NORMAL SECS (ref 58–77)
WBC # BLD AUTO: 7.3 K/UL (ref 3.6–11)

## 2024-12-29 PROCEDURE — 93010 ELECTROCARDIOGRAM REPORT: CPT | Performed by: INTERNAL MEDICINE

## 2024-12-29 PROCEDURE — G0378 HOSPITAL OBSERVATION PER HR: HCPCS

## 2024-12-29 PROCEDURE — 6360000002 HC RX W HCPCS: Performed by: FAMILY MEDICINE

## 2024-12-29 PROCEDURE — 97116 GAIT TRAINING THERAPY: CPT

## 2024-12-29 PROCEDURE — 70450 CT HEAD/BRAIN W/O DYE: CPT

## 2024-12-29 PROCEDURE — 6370000000 HC RX 637 (ALT 250 FOR IP): Performed by: FAMILY MEDICINE

## 2024-12-29 PROCEDURE — 97165 OT EVAL LOW COMPLEX 30 MIN: CPT

## 2024-12-29 PROCEDURE — 85025 COMPLETE CBC W/AUTO DIFF WBC: CPT

## 2024-12-29 PROCEDURE — 85610 PROTHROMBIN TIME: CPT

## 2024-12-29 PROCEDURE — 96375 TX/PRO/DX INJ NEW DRUG ADDON: CPT

## 2024-12-29 PROCEDURE — 36415 COLL VENOUS BLD VENIPUNCTURE: CPT

## 2024-12-29 PROCEDURE — 80053 COMPREHEN METABOLIC PANEL: CPT

## 2024-12-29 PROCEDURE — 85730 THROMBOPLASTIN TIME PARTIAL: CPT

## 2024-12-29 PROCEDURE — 2500000003 HC RX 250 WO HCPCS: Performed by: FAMILY MEDICINE

## 2024-12-29 PROCEDURE — 97161 PT EVAL LOW COMPLEX 20 MIN: CPT

## 2024-12-29 PROCEDURE — 97530 THERAPEUTIC ACTIVITIES: CPT

## 2024-12-29 PROCEDURE — 6370000000 HC RX 637 (ALT 250 FOR IP): Performed by: STUDENT IN AN ORGANIZED HEALTH CARE EDUCATION/TRAINING PROGRAM

## 2024-12-29 PROCEDURE — 99222 1ST HOSP IP/OBS MODERATE 55: CPT | Performed by: PSYCHIATRY & NEUROLOGY

## 2024-12-29 PROCEDURE — 96376 TX/PRO/DX INJ SAME DRUG ADON: CPT

## 2024-12-29 RX ORDER — POLYETHYLENE GLYCOL 3350 17 G/17G
17 POWDER, FOR SOLUTION ORAL DAILY
Status: DISCONTINUED | OUTPATIENT
Start: 2024-12-29 | End: 2025-01-04 | Stop reason: HOSPADM

## 2024-12-29 RX ADMIN — METOPROLOL TARTRATE 100 MG: 50 TABLET, FILM COATED ORAL at 09:12

## 2024-12-29 RX ADMIN — CLONIDINE HYDROCHLORIDE 0.2 MG: 0.2 TABLET ORAL at 09:10

## 2024-12-29 RX ADMIN — WATER 1000 MG: 1 INJECTION INTRAMUSCULAR; INTRAVENOUS; SUBCUTANEOUS at 18:41

## 2024-12-29 RX ADMIN — OXYCODONE HYDROCHLORIDE 10 MG: 5 TABLET ORAL at 18:41

## 2024-12-29 RX ADMIN — METOPROLOL TARTRATE 100 MG: 50 TABLET, FILM COATED ORAL at 21:54

## 2024-12-29 RX ADMIN — DOCUSATE SODIUM 100 MG: 100 CAPSULE, LIQUID FILLED ORAL at 09:10

## 2024-12-29 RX ADMIN — VALSARTAN 160 MG: 160 TABLET, FILM COATED ORAL at 09:12

## 2024-12-29 RX ADMIN — CYCLOBENZAPRINE 10 MG: 10 TABLET, FILM COATED ORAL at 18:42

## 2024-12-29 RX ADMIN — OXYCODONE HYDROCHLORIDE 10 MG: 5 TABLET ORAL at 13:38

## 2024-12-29 RX ADMIN — CLONIDINE HYDROCHLORIDE 0.2 MG: 0.2 TABLET ORAL at 21:53

## 2024-12-29 RX ADMIN — DULOXETINE HYDROCHLORIDE 60 MG: 60 CAPSULE, DELAYED RELEASE ORAL at 09:12

## 2024-12-29 RX ADMIN — Medication 10 ML: at 09:13

## 2024-12-29 RX ADMIN — PREGABALIN 225 MG: 100 CAPSULE ORAL at 21:53

## 2024-12-29 RX ADMIN — LEVOTHYROXINE SODIUM 75 MCG: 0.07 TABLET ORAL at 09:11

## 2024-12-29 RX ADMIN — OXYCODONE HYDROCHLORIDE 10 MG: 5 TABLET ORAL at 03:35

## 2024-12-29 RX ADMIN — Medication 10 ML: at 21:55

## 2024-12-29 RX ADMIN — PANTOPRAZOLE SODIUM 40 MG: 40 TABLET, DELAYED RELEASE ORAL at 06:46

## 2024-12-29 RX ADMIN — PREGABALIN 225 MG: 100 CAPSULE ORAL at 09:11

## 2024-12-29 RX ADMIN — POLYETHYLENE GLYCOL 3350 17 G: 17 POWDER, FOR SOLUTION ORAL at 18:41

## 2024-12-29 RX ADMIN — CYCLOBENZAPRINE 10 MG: 10 TABLET, FILM COATED ORAL at 09:18

## 2024-12-29 RX ADMIN — CETIRIZINE HYDROCHLORIDE 5 MG: 10 TABLET, FILM COATED ORAL at 09:11

## 2024-12-29 RX ADMIN — ASPIRIN 81 MG CHEWABLE TABLET 81 MG: 81 TABLET CHEWABLE at 09:10

## 2024-12-29 RX ADMIN — ONDANSETRON HYDROCHLORIDE 4 MG: 2 INJECTION INTRAMUSCULAR; INTRAVENOUS at 09:09

## 2024-12-29 ASSESSMENT — PAIN DESCRIPTION - ORIENTATION
ORIENTATION: MID
ORIENTATION: LOWER;MID

## 2024-12-29 ASSESSMENT — PAIN SCALES - GENERAL
PAINLEVEL_OUTOF10: 8
PAINLEVEL_OUTOF10: 8
PAINLEVEL_OUTOF10: 10
PAINLEVEL_OUTOF10: 5
PAINLEVEL_OUTOF10: 3
PAINLEVEL_OUTOF10: 0

## 2024-12-29 ASSESSMENT — PAIN DESCRIPTION - DESCRIPTORS
DESCRIPTORS: ACHING

## 2024-12-29 ASSESSMENT — PAIN SCALES - WONG BAKER: WONGBAKER_NUMERICALRESPONSE: HURTS A LITTLE BIT

## 2024-12-29 ASSESSMENT — PAIN DESCRIPTION - LOCATION
LOCATION: BACK
LOCATION: BACK
LOCATION: GENERALIZED
LOCATION: BACK;ABDOMEN

## 2024-12-29 NOTE — PROGRESS NOTES
Hospitalist Progress Note  Airam Galvez MD  Answering service: [phone] OR [phone] from in house phone        Date of Service:  2024  NAME:  Aparna Nath  :  1940  MRN:  [id number]      Admission Summary:   HPI: \"Aparna Nath is a 84 y.o. female with past medical history of CAD, MI, PTCA stents, hypertension, hyperlipidemia, EMILIA, dyspepsia, GERD, fibromyalgia, chronic pain, lumbar laminectomy/fusion presented to the emergency department with chief complaint of diffuse back pain and fatigue.  Patient notes that she has chronic back pain with history of extensive spinal fusion in the past.  However over the past week she has felt gait imbalance with unsteady gait, increased weakness and numbness atrophy of both feet.  She notes she has had at least 2 near falls.  At baseline she normally ambulates around the house unassisted.  However most recently she has been using her roller walker more frequently.  She has diffuse fatigue and concerned that she may fall.  She also complains of ongoing chronic abdominal pain with approximately 40 pound weight loss within the past 12 months which she attributes to known history of gastroparesis.  She is notably followed by gastroenterologist Dr. Padilla, as an outpatient.  She denies saddle anesthesia.  She does not report any recent fall or trauma.  On arrival in the ED, initial ported vital signs temperature 97.2 °F, /87, heart rate 56, respiratory rate 18, O2 saturation 94% on room air.  12-lead EKG showed sinus bradycardia, first-degree AV block, LVH, ST/T wave changes in the anterior leads at 55 bpm.  Chest x-ray 2 view showed increased mid thoracic spine DDD but no acute process.  CT abdomen pelvis with IV contrast showed moderate amount of stool but normal appendix and no acute process.  Abnormal lab included BUN 22 otherwise labs were unremarkable.   encounter with this patient and independently examined them on 12/29/2024 as outlined below:          General : alert x 3, awake, no acute distress,   HEENT: PEERL, EOMI, moist mucus membrane  Neck: supple, no JVD, no meningeal signs  Chest: Clear to auscultation bilaterally   CVS: S1 S2 heard, Capillary refill less than 2 seconds  Abd: soft/ non tender, non distended, BS physiological,   Ext: no clubbing, no cyanosis, no edema, brisk 2+ DP pulses  Neuro/Psych: pleasant mood and affect, CN 2-12 grossly intact, sensory grossly within normal limit, strength 3 out of 5 lower extremities 5 out of 5 upper extremities  Skin: warm            Data Review:    Review and/or order of clinical lab test  Review and/or order of tests in the radiology section of CPT  Review and/or order of tests in the medicine section of CPT    I have independently reviewed and interpreted patient's lab and all other diagnostic data    Notes reviewed from all clinical/nonclinical/nursing services involved in patient's clinical care. Care coordination discussions were held with appropriate clinical/nonclinical/ nursing providers based on care coordination needs.     Labs:     Recent Labs     12/28/24  1455 12/29/24  0553   WBC 6.8 7.3   HGB 12.9 12.2   HCT 38.3 37.1    202     Recent Labs     12/28/24  1455 12/29/24  0553    139   K 3.5 3.8    101   CO2 32 31   BUN 22* 23*     Recent Labs     12/28/24  1455 12/29/24  0553   ALT 20 18   GLOB 3.0 2.7     Recent Labs     12/29/24  0553   INR 1.0   APTT 25.4      No results for input(s): \"TIBC\" in the last 72 hours.    Invalid input(s): \"FE\", \"PSAT\", \"FERR\"   No results found for: \"RBCF\"   No results for input(s): \"PH\", \"PCO2\", \"PO2\" in the last 72 hours.  No results for input(s): \"CPK\" in the last 72 hours.    Invalid input(s): \"CPKMB\", \"CKNDX\", \"TROIQ\"  Lab Results   Component Value Date/Time    CHOL 110 12/12/2024 03:00 PM    HDL 45 12/12/2024 03:00 PM    LDL 41.4 12/12/2024

## 2024-12-29 NOTE — PROGRESS NOTES
Report called to REAGAN Fabian.  Pt transferred via wheelchair with monitor.  Patient able to ambulate from wheelchair to bed.  Call bell in reach.

## 2024-12-29 NOTE — PLAN OF CARE
Problem: Occupational Therapy - Adult  Goal: By Discharge: Performs self-care activities at highest level of function for planned discharge setting.  See evaluation for individualized goals.  Description: FUNCTIONAL STATUS PRIOR TO ADMISSION: Pt lives alone in a 9th floor condo (elevator) and was MOD IND with ADLs PTA. Local friend assists with IADLs as needed. Primarily uses a rollator for all mobility. Enjoys watching football and spending time with her cat.    HOME SUPPORT: Patient lived alone with local friend to provide assistance.    Occupational Therapy Goals:  Initiated 12/29/2024  1.  Patient will perform grooming standing with Supervision within 7 day(s).  2.  Patient will perform upper body dressing seated with Supervision within 7 day(s).  3.  Patient will perform lower body bathing seated with Supervision within 7 day(s).  4.  Patient will perform toilet transfers with Supervision  within 7 day(s).  5.  Patient will perform all aspects of toileting with Supervision within 7 day(s).  6.  Patient will participate in upper extremity therapeutic exercise/activities with Modified Fidelity for 5 minutes within 7 day(s).    7.  Patient will utilize energy conservation techniques during functional activities with verbal cues within 7 day(s).    Outcome: Progressing     OCCUPATIONAL THERAPY EVALUATION    Patient: Aparna Nath (84 y.o. female)  Date: 12/29/2024  Primary Diagnosis: General weakness [R53.1]  Acute cystitis without hematuria [N30.00]  Urinary tract infection with hematuria, site unspecified [N39.0, R31.9]         Precautions: Fall Risk                  ASSESSMENT :  The patient is limited by decreased functional mobility, independence in ADLs, high-level IADLs, activity tolerance, endurance, coordination, balance.    Pt received semi-reclined in bed, amendable to session, on RA. VSS throughout session and denied dizziness. Required SBA-CGA for all ADLs/related functional mobility

## 2024-12-29 NOTE — H&P
History and Physical    Date of Service:  12/28/2024  Primary Care Provider: Will Pringle MD  Source of information: The patient and Chart review    Chief Complaint: Fatigue and Back Pain      History of Presenting Illness:   Aparna Nath is a 84 y.o. female with past medical history of CAD, MI, PTCA stents, hypertension, hyperlipidemia, EMILIA, dyspepsia, GERD, fibromyalgia, chronic pain, lumbar laminectomy/fusion presented to the emergency department with chief complaint of diffuse back pain and fatigue.  Patient notes that she has chronic back pain with history of extensive spinal fusion in the past.  However over the past week she has felt gait imbalance with unsteady gait, increased weakness and numbness atrophy of both feet.  She notes she has had at least 2 near falls.  At baseline she normally ambulates around the house unassisted.  However most recently she has been using her roller walker more frequently.  She has diffuse fatigue and concerned that she may fall.  She also complains of ongoing chronic abdominal pain with approximately 40 pound weight loss within the past 12 months which she attributes to known history of gastroparesis.  She is notably followed by gastroenterologist Dr. Padilla, as an outpatient.  She denies saddle anesthesia.  She does not report any recent fall or trauma.  On arrival in the ED, initial ported vital signs temperature 97.2 °F, /87, heart rate 56, respiratory rate 18, O2 saturation 94% on room air.  12-lead EKG showed sinus bradycardia, first-degree AV block, LVH, ST/T wave changes in the anterior leads at 55 bpm.  Chest x-ray 2 view showed increased mid thoracic spine DDD but no acute process.  CT abdomen pelvis with IV contrast showed moderate amount of stool but normal appendix and no acute process.  Abnormal lab included BUN 22 otherwise labs were unremarkable.  Urinalysis showed trace blood, moderate leukocyte esterase, 20-50 WBC, 0-5 RBC, negative  4/24/2024    Arvind Garrett    Chief Complaint   Patient presents with    4 month     4 month    Medication Check     Atorvastatin has been changed to 40 mg.     bowels     Bowels are soft or runny. Accidents if he is not near a bathroom.        HPI  History was obtained from the patient.  Arvind is a 81 y.o. male who presents today with routine recheck.  Patient has history of coronary disease, atrial fibs, hypertension, sleep apnea on CPAP, prostate CA followed by urology (Dr. Falcon), asthma, obesity, and hyperglycemia.  Patient had some labs about 6 or 7 months ago these include a CBC, CMP, lipid panel and an A1c.  Still works 3 days a week 6 to 7 hours/day.  Remains active no flare of asthma reported blood pressure looks good no current chest pain or unusual palpitations.  He has been compliant with specialist.  Weight continues to still be a challenge.  Mood remains positive.    REVIEW OF SYMPTOMS    Review of Systems   Constitutional:  Negative for activity change and fatigue.   HENT:  Negative for congestion, hearing loss, mouth sores and trouble swallowing.    Eyes:  Negative for pain and visual disturbance.   Respiratory:  Positive for apnea. Negative for chest tightness, shortness of breath and wheezing.         Patient with asthma has occasional flare.   Cardiovascular:  Negative for chest pain and palpitations.        Patient with known A-fib and coronary disease along with hypertension.  Followed closely by cardiology   Gastrointestinal:  Negative for abdominal pain, blood in stool, diarrhea, nausea and vomiting.   Endocrine: Negative for cold intolerance.        History of hyperglycemia   Genitourinary:  Negative for dysuria, frequency and urgency.   Musculoskeletal:  Negative for arthralgias, gait problem and neck stiffness.   Skin:  Negative for rash.   Allergic/Immunologic: Negative for environmental allergies.   Neurological:  Negative for dizziness, seizures, speech difficulty and weakness.  concern for decompensation if discharged from the emergency department. Complex decision making was performed, which includes reviewing the patient's available past medical records, laboratory results, and imaging studies.    Principal Problem:     Urinary tract infection without hematuria     Weakness of both lower extremities     Numbness of both lower extremities     Unsteady gait  Resolved Problems:    * No resolved hospital problems. *      Plan:     Urinary tract infection without hematuria  -Admit to telemetry  -Check urine culture  -Continue IV antibiotics: Ceftriaxone 1000 mg IV every 24 hours    2.  Weakness of both lower extremities       Numbness and tingling of both lower extremities       Unsteady gait  -Placed on neuro checks and fall precautions  -Consult PT/OT  -Consider for MRI lumbar spine without IV contrast  -Consult neurologist  -Ambulate with assistance    3.  Intractable back pain       History of fibromyalgia       History of chronic pain  -Status post neuro/pain stimulator device implantation  -Oxycodone 10 mg p.o. every 6 hours as needed for severe pain  -Tylenol 650 mg p.o. every 6 hours as needed for mild to moderate pain  -Continue home dose Lyrica 225 mg p.o. twice daily    4.  Uncontrolled hypertension  -Resume home BP medications:  Metoprolol 100 mg p.o. twice daily  Clonidine 0.2 mg p.o. twice daily  Losartan 160 mg p.o. daily  -Monitor BP and provide additional antihypertensive medications as needed    5.  EMILIA (obstructive sleep apnea)  -Does not use CPAP  -Place on pulse oximetry monitoring    6.  Unintentional weight loss  -Order prealbumin level  -Order daily weights    7.  GERD  -Continue Protonix      DIET: ADULT DIET; Regular; Low Fat/Low Chol/High Fiber/MICHELLE   ISOLATION PRECAUTIONS: No active isolations  CODE STATUS: Full Code   Central Line:   none  DVT PROPHYLAXIS: SCDs  FUNCTIONAL STATUS PRIOR TO HOSPITALIZATION: Ambulatory and capable of self-care but relies on assistive
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
## 2024-12-29 NOTE — PROGRESS NOTES
Report from REAGAN Danielle.  Pt in bed alert and oriented.  Call bell in reach.  Will continue to monitor patient.

## 2024-12-29 NOTE — PLAN OF CARE
Problem: Physical Therapy - Adult  Goal: By Discharge: Performs mobility at highest level of function for planned discharge setting.  See evaluation for individualized goals.  Description: FUNCTIONAL STATUS PRIOR TO ADMISSION: Per OT: Pt lives alone in a 9th floor condo (elevator) and was MOD IND with ADLs PTA. Local friend assists with IADLs as needed. Primarily uses a rollator for mobility outside her apartment and uses canes inside vs \"furniture surfs\". Enjoys watching football and spending time with her cat.    HOME SUPPORT: Patient lived alone with local friend to provide assistance.    Physical Therapy Goals  Initiated 12/29/2024  1.  Patient will move from supine to sit and sit to supine in bed with independence within 7 day(s).    2.  Patient will perform sit to stand with modified independence within 7 day(s).  3.  Patient will transfer from bed to chair and chair to bed with supervision/set-up using the least restrictive device within 7 day(s).  4.  Patient will ambulate with supervision/set-up for 150 feet with the least restrictive device within 7 day(s).       Outcome: Progressing   PHYSICAL THERAPY EVALUATION    Patient: Aparna Nath (84 y.o. female)  Date: 12/29/2024  Primary Diagnosis: General weakness [R53.1]  Acute cystitis without hematuria [N30.00]  Urinary tract infection with hematuria, site unspecified [N39.0, R31.9]       Precautions: Restrictions/Precautions: Fall Risk                      ASSESSMENT : Pt was seen following admission for gait imbalance, unsteadiness, weakness, numbness, and FOF (with 2 near falls reported). She has an extensive spine surgical hx as well as HTN, CAD, MI, stents, EMILIA, GERD, fibromyalgia, chronic pain, lum sanchez/fusion, and implantable pain stimulator.    DEFICITS/IMPAIRMENTS:   The patient is resting and difficult to wake upon arrival. When she does, she remains somewhat groggy and lethargic- pt attributes this to sleeping 30 minutes last night. She is

## 2024-12-29 NOTE — PLAN OF CARE
Problem: Discharge Planning  Goal: Discharge to home or other facility with appropriate resources  Outcome: Progressing  Flowsheets  Taken 12/29/2024 0913 by Talia Kramer RN  Discharge to home or other facility with appropriate resources:   Arrange for needed discharge resources and transportation as appropriate   Identify barriers to discharge with patient and caregiver  Taken 12/29/2024 0549 by Jackson Gregory RN  Discharge to home or other facility with appropriate resources:   Identify barriers to discharge with patient and caregiver   Arrange for needed discharge resources and transportation as appropriate   Identify discharge learning needs (meds, wound care, etc)     Problem: ABCDS Injury Assessment  Goal: Absence of physical injury  12/29/2024 1053 by Talia Kramer RN  Outcome: Progressing  Flowsheets (Taken 12/29/2024 1050)  Absence of Physical Injury: Implement safety measures based on patient assessment  12/29/2024 0549 by Jackson Gregory RN  Outcome: Progressing     Problem: Safety - Adult  Goal: Free from fall injury  12/29/2024 1053 by Talia Kramer RN  Outcome: Progressing  Flowsheets (Taken 12/29/2024 1050)  Free From Fall Injury: Instruct family/caregiver on patient safety  12/29/2024 0549 by Jackson Gregory RN  Outcome: Progressing

## 2024-12-29 NOTE — ED NOTES
Bedside and Verbal shift change report given to REAGAN Waters (oncoming nurse) by REAGAN Danielle (offgoing nurse). Report included the following information Nurse Handoff Report, ED Encounter Summary, ED SBAR, Adult Overview, MAR, Recent Results, and Neuro Assessment.

## 2024-12-30 LAB
ANION GAP SERPL CALC-SCNC: 4 MMOL/L (ref 2–12)
BASOPHILS # BLD: 0 K/UL (ref 0–0.1)
BASOPHILS NFR BLD: 0 % (ref 0–1)
BUN SERPL-MCNC: 19 MG/DL (ref 6–20)
BUN/CREAT SERPL: 27 (ref 12–20)
CALCIUM SERPL-MCNC: 8.8 MG/DL (ref 8.5–10.1)
CHLORIDE SERPL-SCNC: 102 MMOL/L (ref 97–108)
CO2 SERPL-SCNC: 33 MMOL/L (ref 21–32)
CREAT SERPL-MCNC: 0.71 MG/DL (ref 0.55–1.02)
DIFFERENTIAL METHOD BLD: NORMAL
EOSINOPHIL # BLD: 0.2 K/UL (ref 0–0.4)
EOSINOPHIL NFR BLD: 3 % (ref 0–7)
ERYTHROCYTE [DISTWIDTH] IN BLOOD BY AUTOMATED COUNT: 13.2 % (ref 11.5–14.5)
GLUCOSE SERPL-MCNC: 111 MG/DL (ref 65–100)
HCT VFR BLD AUTO: 36.9 % (ref 35–47)
HGB BLD-MCNC: 12 G/DL (ref 11.5–16)
IMM GRANULOCYTES # BLD AUTO: 0 K/UL (ref 0–0.04)
IMM GRANULOCYTES NFR BLD AUTO: 0 % (ref 0–0.5)
LYMPHOCYTES # BLD: 2.1 K/UL (ref 0.8–3.5)
LYMPHOCYTES NFR BLD: 33 % (ref 12–49)
MCH RBC QN AUTO: 30.1 PG (ref 26–34)
MCHC RBC AUTO-ENTMCNC: 32.5 G/DL (ref 30–36.5)
MCV RBC AUTO: 92.5 FL (ref 80–99)
MONOCYTES # BLD: 0.5 K/UL (ref 0–1)
MONOCYTES NFR BLD: 7 % (ref 5–13)
NEUTS SEG # BLD: 3.6 K/UL (ref 1.8–8)
NEUTS SEG NFR BLD: 57 % (ref 32–75)
NRBC # BLD: 0 K/UL (ref 0–0.01)
NRBC BLD-RTO: 0 PER 100 WBC
PLATELET # BLD AUTO: 192 K/UL (ref 150–400)
PMV BLD AUTO: 10.1 FL (ref 8.9–12.9)
POTASSIUM SERPL-SCNC: 3.5 MMOL/L (ref 3.5–5.1)
RBC # BLD AUTO: 3.99 M/UL (ref 3.8–5.2)
SODIUM SERPL-SCNC: 139 MMOL/L (ref 136–145)
WBC # BLD AUTO: 6.4 K/UL (ref 3.6–11)

## 2024-12-30 PROCEDURE — 96376 TX/PRO/DX INJ SAME DRUG ADON: CPT

## 2024-12-30 PROCEDURE — 2500000003 HC RX 250 WO HCPCS: Performed by: FAMILY MEDICINE

## 2024-12-30 PROCEDURE — 6370000000 HC RX 637 (ALT 250 FOR IP): Performed by: FAMILY MEDICINE

## 2024-12-30 PROCEDURE — 97535 SELF CARE MNGMENT TRAINING: CPT

## 2024-12-30 PROCEDURE — 97530 THERAPEUTIC ACTIVITIES: CPT

## 2024-12-30 PROCEDURE — 80048 BASIC METABOLIC PNL TOTAL CA: CPT

## 2024-12-30 PROCEDURE — 6360000002 HC RX W HCPCS: Performed by: FAMILY MEDICINE

## 2024-12-30 PROCEDURE — 6370000000 HC RX 637 (ALT 250 FOR IP): Performed by: STUDENT IN AN ORGANIZED HEALTH CARE EDUCATION/TRAINING PROGRAM

## 2024-12-30 PROCEDURE — G0378 HOSPITAL OBSERVATION PER HR: HCPCS

## 2024-12-30 PROCEDURE — 85025 COMPLETE CBC W/AUTO DIFF WBC: CPT

## 2024-12-30 RX ORDER — TETRAHYDROZOLINE HCL 0.05 %
1 DROPS OPHTHALMIC (EYE) 2 TIMES DAILY
Status: DISCONTINUED | OUTPATIENT
Start: 2024-12-30 | End: 2025-01-04 | Stop reason: HOSPADM

## 2024-12-30 RX ORDER — CEPHALEXIN 500 MG/1
500 CAPSULE ORAL 4 TIMES DAILY
Qty: 16 CAPSULE | Refills: 0 | Status: SHIPPED
Start: 2024-12-30 | End: 2025-01-03

## 2024-12-30 RX ADMIN — PREGABALIN 225 MG: 100 CAPSULE ORAL at 20:45

## 2024-12-30 RX ADMIN — CLONIDINE HYDROCHLORIDE 0.2 MG: 0.2 TABLET ORAL at 20:45

## 2024-12-30 RX ADMIN — ASPIRIN 81 MG CHEWABLE TABLET 81 MG: 81 TABLET CHEWABLE at 09:33

## 2024-12-30 RX ADMIN — LEVOTHYROXINE SODIUM 75 MCG: 0.07 TABLET ORAL at 09:33

## 2024-12-30 RX ADMIN — PREGABALIN 225 MG: 100 CAPSULE ORAL at 09:32

## 2024-12-30 RX ADMIN — DULOXETINE HYDROCHLORIDE 60 MG: 60 CAPSULE, DELAYED RELEASE ORAL at 09:33

## 2024-12-30 RX ADMIN — OXYCODONE HYDROCHLORIDE 10 MG: 5 TABLET ORAL at 19:10

## 2024-12-30 RX ADMIN — CLONIDINE HYDROCHLORIDE 0.2 MG: 0.2 TABLET ORAL at 09:34

## 2024-12-30 RX ADMIN — DOCUSATE SODIUM 100 MG: 100 CAPSULE, LIQUID FILLED ORAL at 09:32

## 2024-12-30 RX ADMIN — TETRAHYDROZOLINE HCL 1 DROP: 0.05 SOLUTION/ DROPS OPHTHALMIC at 20:48

## 2024-12-30 RX ADMIN — CETIRIZINE HYDROCHLORIDE 5 MG: 10 TABLET, FILM COATED ORAL at 09:33

## 2024-12-30 RX ADMIN — WATER 1000 MG: 1 INJECTION INTRAMUSCULAR; INTRAVENOUS; SUBCUTANEOUS at 19:10

## 2024-12-30 RX ADMIN — OXYCODONE HYDROCHLORIDE 10 MG: 5 TABLET ORAL at 04:57

## 2024-12-30 RX ADMIN — VALSARTAN 160 MG: 160 TABLET, FILM COATED ORAL at 09:33

## 2024-12-30 RX ADMIN — METOPROLOL TARTRATE 100 MG: 50 TABLET, FILM COATED ORAL at 09:32

## 2024-12-30 RX ADMIN — CYCLOBENZAPRINE 10 MG: 10 TABLET, FILM COATED ORAL at 04:57

## 2024-12-30 RX ADMIN — POLYETHYLENE GLYCOL 3350 17 G: 17 POWDER, FOR SOLUTION ORAL at 09:32

## 2024-12-30 RX ADMIN — Medication 10 ML: at 20:49

## 2024-12-30 RX ADMIN — METOPROLOL TARTRATE 100 MG: 50 TABLET, FILM COATED ORAL at 20:45

## 2024-12-30 RX ADMIN — PANTOPRAZOLE SODIUM 40 MG: 40 TABLET, DELAYED RELEASE ORAL at 06:42

## 2024-12-30 RX ADMIN — TETRAHYDROZOLINE HCL 1 DROP: 0.05 SOLUTION/ DROPS OPHTHALMIC at 13:25

## 2024-12-30 RX ADMIN — Medication 10 ML: at 09:34

## 2024-12-30 RX ADMIN — CYCLOBENZAPRINE 10 MG: 10 TABLET, FILM COATED ORAL at 19:10

## 2024-12-30 ASSESSMENT — PAIN DESCRIPTION - LOCATION
LOCATION: BACK
LOCATION: ABDOMEN;BACK

## 2024-12-30 ASSESSMENT — PAIN SCALES - WONG BAKER: WONGBAKER_NUMERICALRESPONSE: HURTS A LITTLE BIT

## 2024-12-30 ASSESSMENT — PAIN SCALES - GENERAL
PAINLEVEL_OUTOF10: 4
PAINLEVEL_OUTOF10: 9
PAINLEVEL_OUTOF10: 8

## 2024-12-30 ASSESSMENT — PAIN DESCRIPTION - DESCRIPTORS
DESCRIPTORS: ACHING
DESCRIPTORS: ACHING

## 2024-12-30 NOTE — PROGRESS NOTES
Hospitalist Progress Note  Airam Galvez MD  Answering service: [phone] OR [phone] from in house phone        Date of Service:  2024  NAME:  Aparna Nath  :  1940  MRN:  [id number]      Admission Summary:   HPI: \"Aparna Nath is a 84 y.o. female with past medical history of CAD, MI, PTCA stents, hypertension, hyperlipidemia, EMILIA, dyspepsia, GERD, fibromyalgia, chronic pain, lumbar laminectomy/fusion presented to the emergency department with chief complaint of diffuse back pain and fatigue.  Patient notes that she has chronic back pain with history of extensive spinal fusion in the past.  However over the past week she has felt gait imbalance with unsteady gait, increased weakness and numbness atrophy of both feet.  She notes she has had at least 2 near falls.  At baseline she normally ambulates around the house unassisted.  However most recently she has been using her roller walker more frequently.  She has diffuse fatigue and concerned that she may fall.  She also complains of ongoing chronic abdominal pain with approximately 40 pound weight loss within the past 12 months which she attributes to known history of gastroparesis.  She is notably followed by gastroenterologist Dr. Padilla, as an outpatient.  She denies saddle anesthesia.  She does not report any recent fall or trauma.  On arrival in the ED, initial ported vital signs temperature 97.2 °F, /87, heart rate 56, respiratory rate 18, O2 saturation 94% on room air.  12-lead EKG showed sinus bradycardia, first-degree AV block, LVH, ST/T wave changes in the anterior leads at 55 bpm.  Chest x-ray 2 view showed increased mid thoracic spine DDD but no acute process.  CT abdomen pelvis with IV contrast showed moderate amount of stool but normal appendix and no acute process.  Abnormal lab included BUN 22 otherwise labs were unremarkable.   tablet 10 mg  10 mg Oral Q6H PRN    pantoprazole (PROTONIX) tablet 40 mg  40 mg Oral QAM AC    cefTRIAXone (ROCEPHIN) 1,000 mg in sterile water 10 mL IV syringe  1,000 mg IntraVENous Q24H     ______________________________________________________________________  EXPECTED LENGTH OF STAY: Unable to retrieve estimated LOS  ACTUAL LENGTH OF STAY:          0                 Airam Galvez MD

## 2024-12-30 NOTE — PLAN OF CARE
Problem: Discharge Planning  Goal: Discharge to home or other facility with appropriate resources  Outcome: Progressing  Flowsheets (Taken 12/30/2024 1059)  Discharge to home or other facility with appropriate resources: (pt wants rehab, informed CM)   Arrange for needed discharge resources and transportation as appropriate   Identify barriers to discharge with patient and caregiver     Problem: ABCDS Injury Assessment  Goal: Absence of physical injury  12/30/2024 1132 by Talia Kramer RN  Outcome: Progressing  Flowsheets (Taken 12/30/2024 1059)  Absence of Physical Injury: Implement safety measures based on patient assessment  12/29/2024 2255 by Jackson Gregory RN  Outcome: Progressing     Problem: Safety - Adult  Goal: Free from fall injury  12/30/2024 1132 by Talia Kramer RN  Outcome: Progressing  Flowsheets (Taken 12/30/2024 1059)  Free From Fall Injury: Instruct family/caregiver on patient safety  12/29/2024 2255 by Jackson Gregory RN  Outcome: Progressing     Problem: Pain  Goal: Verbalizes/displays adequate comfort level or baseline comfort level  12/30/2024 1132 by Talia Kramer RN  Outcome: Progressing  12/29/2024 2255 by Jackson Gregory RN  Outcome: Progressing     Problem: Physical Therapy - Adult  Goal: By Discharge: Performs mobility at highest level of function for planned discharge setting.  See evaluation for individualized goals.  Description: FUNCTIONAL STATUS PRIOR TO ADMISSION: Per OT: Pt lives alone in a 9th floor condo (elevator) and was MOD IND with ADLs PTA. Local friend assists with IADLs as needed. Primarily uses a rollator for mobility outside her apartment and uses canes inside vs \"furniture surfs\". Enjoys watching football and spending time with her cat.    HOME SUPPORT: Patient lived alone with local friend to provide assistance.    Physical Therapy Goals  Initiated 12/29/2024  1.  Patient will move from supine to sit and sit to supine in bed with independence

## 2024-12-30 NOTE — PROGRESS NOTES
Spiritual Health History and Assessment/Progress Note  [facility]    Rituals, Rites and Sacraments,  ,  ,      Name: Aparna Nath MRN: [id number]    Age: 84 y.o.     Sex: female   Language: English   Jain: Jainism   Urinary tract infection with hematuria, site unspecified     Date: 2024            Total Time Calculated: 5 min              Spiritual Assessment began in [facility]        Referral/Consult From: Clergy/   Encounter Overview/Reason: Rituals, Rites and Sacraments  Service Provided For: Patient    Flor, Belief, Meaning:   Patient is connected with a flor tradition or spiritual practice  Family/Friends No family/friends present      Importance and Influence:  Patient has spiritual/personal beliefs that influence decisions regarding their health  Family/Friends No family/friends present    Community:  Patient is connected with a spiritual community  Family/Friends No family/friends present    Assessment and Plan of Care:     Patient Interventions include: Provided sacramental/Jain ritual  Family/Friends Interventions include: No family/friends present    Patient Plan of Care: Spiritual Care available upon further referral  Family/Friends Plan of Care: Spiritual Care available upon further referral    Electronically signed by Chaplain YVONNE on 2024 at 2:39 PM     [facility]  Mrs. Nath was sitting up on the edge of her bed.  She is Jainism and was delighted to be able to receive prayer and communion. She shared she will be going to [facility] in [place] where she has been before.  She shared she has been  three times to three wonderful men who have .  She has a new grandchild named Emma who now weights 7 lbs.  Assured of continued prayer for her well-being.     Sr. ELSA Blakely, RN, ACSW, LCSW   Page:  [phone]

## 2024-12-30 NOTE — PLAN OF CARE
Problem: Physical Therapy - Adult  Goal: By Discharge: Performs mobility at highest level of function for planned discharge setting.  See evaluation for individualized goals.  Description: FUNCTIONAL STATUS PRIOR TO ADMISSION: Per OT: Pt lives alone in a 9th floor condo (elevator) and was MOD IND with ADLs PTA. Local friend assists with IADLs as needed. Primarily uses a rollator for mobility outside her apartment and uses canes inside vs \"furniture surfs\". Enjoys watching football and spending time with her cat.    HOME SUPPORT: Patient lived alone with local friend to provide assistance.    Physical Therapy Goals  Initiated 12/29/2024  1.  Patient will move from supine to sit and sit to supine in bed with independence within 7 day(s).    2.  Patient will perform sit to stand with modified independence within 7 day(s).  3.  Patient will transfer from bed to chair and chair to bed with supervision/set-up using the least restrictive device within 7 day(s).  4.  Patient will ambulate with supervision/set-up for 150 feet with the least restrictive device within 7 day(s).       Outcome: Progressing     PHYSICAL THERAPY TREATMENT    Patient: Aparna Ntah (84 y.o. female)  Date: 12/30/2024  Diagnosis: General weakness [R53.1]  Acute cystitis without hematuria [N30.00]  Urinary tract infection with hematuria, site unspecified [N39.0, R31.9] Urinary tract infection with hematuria, site unspecified      Precautions: Fall Risk                      ASSESSMENT:  Patient continues to benefit from skilled PT services and is slowly progressing towards goals. Pt continues to present with impaired balance, FOF, and generalized pain, affecting functional mobility tolerance. Pt requires overall CGA-Louise for safety during session with transfers and amb, and demos poor safety awareness with use of rollator. Pt lives alone and reports she won't have assistance from family upon discharge. May benefit from continued therapies upon

## 2024-12-30 NOTE — CARE COORDINATION
Care Management Initial Assessment       RUR: N/A - Observation  Readmission? No  1st IM letter given? No  1st  letter given: No    CM met with patient at bedside. Patient was admitted for UTI and confusion. States she has elevated blood pressure this morning and is concerned about discharge. CM reviewed chart and noted therapy recommendations. Patient would like to speak with attending MD and have another IP therapy session this morning to determine if she feels safe returning home. She is agreeable to home health with [facility] ([facility] if available) and rehab if also needed. Patient with extensive history related to her spinal fusion. Ambulates with rollator at baseline. Uses a cane in her condo. She has family local but one daughter is having surgery today and another just lost her mother-in-law, so they are unable to assist at home. Her caregiver only comes 1-2x month for housekeeping and chores. Patient drives herself at baseline. We discussed options for discharge and at a minimum recommend home health with [facility]. Will reach out to attending MD to meet with patient this AM and therapy to inquire if she can be seen today.    1030 - Updated by therapy that patient is appropriate for skilled rehab. Spoke with patient who is agreeable to referral being sent to [facility] for skilled services. Reviewed process for medical acceptance and [employer] approval. Patient remains agreeable.    1045 - Sent message to attending MD requesting callback for update.    1200 - Update received from attending MD. OK for SNF referral. Spoke with patient who is in agreement. Freedom of Choice offered and patient would like referral sent to [facility]. CM sent referral via [employer].    1210 - [facility] accepted for skilled services.     1220 - CM faxed supporting clinicals to [facility] for [employer] authorization (f: [phone]). Advised [employer] that [facility] is accepting facility.    ZAIRA

## 2024-12-30 NOTE — PLAN OF CARE
Problem: Occupational Therapy - Adult  Goal: By Discharge: Performs self-care activities at highest level of function for planned discharge setting.  See evaluation for individualized goals.  Description: FUNCTIONAL STATUS PRIOR TO ADMISSION: Pt lives alone in a 9th floor condo (elevator) and was MOD IND with ADLs PTA. Local friend assists with IADLs as needed. Primarily uses a rollator for all mobility. Enjoys watching football and spending time with her cat.    HOME SUPPORT: Patient lived alone with local friend to provide assistance.    Occupational Therapy Goals:  Initiated 12/29/2024  1.  Patient will perform grooming standing with Supervision within 7 day(s).  2.  Patient will perform upper body dressing seated with Supervision within 7 day(s).  3.  Patient will perform lower body bathing seated with Supervision within 7 day(s).  4.  Patient will perform toilet transfers with Supervision  within 7 day(s).  5.  Patient will perform all aspects of toileting with Supervision within 7 day(s).  6.  Patient will participate in upper extremity therapeutic exercise/activities with Modified [place] for 5 minutes within 7 day(s).    7.  Patient will utilize energy conservation techniques during functional activities with verbal cues within 7 day(s).    Outcome: Progressing   OCCUPATIONAL THERAPY TREATMENT  Patient: Aparna Nath (84 y.o. female)  Date: 12/30/2024  Primary Diagnosis: General weakness [R53.1]  Acute cystitis without hematuria [N30.00]  Urinary tract infection with hematuria, site unspecified [N39.0, R31.9]       Precautions: Fall Risk                Chart, occupational therapy assessment, plan of care, and goals were reviewed.    ASSESSMENT  Patient continues to benefit from skilled OT services and is progressing towards goals. Patient presents with generalized weakness, impaired balance, BUE tremors, and endorses increased fears of falling on this date. Patient engaged in

## 2024-12-30 NOTE — PROGRESS NOTES
Patient states her eyes feel like a cloud is over them, pt eyes track and respond to light. Pt states she could barely use her phone today because of her hands. Pt has strong  equally, able to raise both arms and legs.

## 2024-12-31 LAB
ANION GAP SERPL CALC-SCNC: 6 MMOL/L (ref 2–12)
BASOPHILS # BLD: 0 K/UL (ref 0–0.1)
BASOPHILS NFR BLD: 0 % (ref 0–1)
BUN SERPL-MCNC: 19 MG/DL (ref 6–20)
BUN/CREAT SERPL: 23 (ref 12–20)
CALCIUM SERPL-MCNC: 9 MG/DL (ref 8.5–10.1)
CHLORIDE SERPL-SCNC: 103 MMOL/L (ref 97–108)
CO2 SERPL-SCNC: 31 MMOL/L (ref 21–32)
CREAT SERPL-MCNC: 0.83 MG/DL (ref 0.55–1.02)
DIFFERENTIAL METHOD BLD: NORMAL
EOSINOPHIL # BLD: 0.2 K/UL (ref 0–0.4)
EOSINOPHIL NFR BLD: 3 % (ref 0–7)
ERYTHROCYTE [DISTWIDTH] IN BLOOD BY AUTOMATED COUNT: 13.2 % (ref 11.5–14.5)
GLUCOSE SERPL-MCNC: 99 MG/DL (ref 65–100)
HCT VFR BLD AUTO: 35.7 % (ref 35–47)
HGB BLD-MCNC: 11.9 G/DL (ref 11.5–16)
IMM GRANULOCYTES # BLD AUTO: 0 K/UL (ref 0–0.04)
IMM GRANULOCYTES NFR BLD AUTO: 0 % (ref 0–0.5)
LYMPHOCYTES # BLD: 2.4 K/UL (ref 0.8–3.5)
LYMPHOCYTES NFR BLD: 35 % (ref 12–49)
MCH RBC QN AUTO: 30.4 PG (ref 26–34)
MCHC RBC AUTO-ENTMCNC: 33.3 G/DL (ref 30–36.5)
MCV RBC AUTO: 91.3 FL (ref 80–99)
MONOCYTES # BLD: 0.4 K/UL (ref 0–1)
MONOCYTES NFR BLD: 7 % (ref 5–13)
NEUTS SEG # BLD: 3.7 K/UL (ref 1.8–8)
NEUTS SEG NFR BLD: 55 % (ref 32–75)
NRBC # BLD: 0 K/UL (ref 0–0.01)
NRBC BLD-RTO: 0 PER 100 WBC
PLATELET # BLD AUTO: 193 K/UL (ref 150–400)
PMV BLD AUTO: 10.7 FL (ref 8.9–12.9)
POTASSIUM SERPL-SCNC: 3.5 MMOL/L (ref 3.5–5.1)
RBC # BLD AUTO: 3.91 M/UL (ref 3.8–5.2)
SODIUM SERPL-SCNC: 140 MMOL/L (ref 136–145)
WBC # BLD AUTO: 6.7 K/UL (ref 3.6–11)

## 2024-12-31 PROCEDURE — G0378 HOSPITAL OBSERVATION PER HR: HCPCS

## 2024-12-31 PROCEDURE — 6370000000 HC RX 637 (ALT 250 FOR IP): Performed by: FAMILY MEDICINE

## 2024-12-31 PROCEDURE — 97530 THERAPEUTIC ACTIVITIES: CPT

## 2024-12-31 PROCEDURE — 2500000003 HC RX 250 WO HCPCS: Performed by: FAMILY MEDICINE

## 2024-12-31 PROCEDURE — 6360000002 HC RX W HCPCS: Performed by: FAMILY MEDICINE

## 2024-12-31 PROCEDURE — 6370000000 HC RX 637 (ALT 250 FOR IP): Performed by: STUDENT IN AN ORGANIZED HEALTH CARE EDUCATION/TRAINING PROGRAM

## 2024-12-31 PROCEDURE — 85025 COMPLETE CBC W/AUTO DIFF WBC: CPT

## 2024-12-31 PROCEDURE — 96376 TX/PRO/DX INJ SAME DRUG ADON: CPT

## 2024-12-31 PROCEDURE — 80048 BASIC METABOLIC PNL TOTAL CA: CPT

## 2024-12-31 RX ADMIN — ASPIRIN 81 MG CHEWABLE TABLET 81 MG: 81 TABLET CHEWABLE at 09:10

## 2024-12-31 RX ADMIN — DULOXETINE HYDROCHLORIDE 60 MG: 60 CAPSULE, DELAYED RELEASE ORAL at 09:10

## 2024-12-31 RX ADMIN — OXYCODONE HYDROCHLORIDE 10 MG: 5 TABLET ORAL at 18:38

## 2024-12-31 RX ADMIN — METOPROLOL TARTRATE 100 MG: 50 TABLET, FILM COATED ORAL at 09:11

## 2024-12-31 RX ADMIN — OXYCODONE HYDROCHLORIDE 10 MG: 5 TABLET ORAL at 09:09

## 2024-12-31 RX ADMIN — POLYETHYLENE GLYCOL 3350 17 G: 17 POWDER, FOR SOLUTION ORAL at 09:15

## 2024-12-31 RX ADMIN — CYCLOBENZAPRINE 10 MG: 10 TABLET, FILM COATED ORAL at 18:39

## 2024-12-31 RX ADMIN — PREGABALIN 225 MG: 100 CAPSULE ORAL at 09:10

## 2024-12-31 RX ADMIN — Medication 10 ML: at 21:49

## 2024-12-31 RX ADMIN — CLONIDINE HYDROCHLORIDE 0.2 MG: 0.2 TABLET ORAL at 21:47

## 2024-12-31 RX ADMIN — CETIRIZINE HYDROCHLORIDE 5 MG: 10 TABLET, FILM COATED ORAL at 09:10

## 2024-12-31 RX ADMIN — LEVOTHYROXINE SODIUM 75 MCG: 0.07 TABLET ORAL at 09:11

## 2024-12-31 RX ADMIN — VALSARTAN 160 MG: 160 TABLET, FILM COATED ORAL at 09:11

## 2024-12-31 RX ADMIN — TETRAHYDROZOLINE HCL 1 DROP: 0.05 SOLUTION/ DROPS OPHTHALMIC at 09:11

## 2024-12-31 RX ADMIN — ACETAMINOPHEN 650 MG: 325 TABLET ORAL at 21:55

## 2024-12-31 RX ADMIN — PANTOPRAZOLE SODIUM 40 MG: 40 TABLET, DELAYED RELEASE ORAL at 06:29

## 2024-12-31 RX ADMIN — Medication 10 ML: at 09:15

## 2024-12-31 RX ADMIN — PREGABALIN 225 MG: 100 CAPSULE ORAL at 21:48

## 2024-12-31 RX ADMIN — TETRAHYDROZOLINE HCL 1 DROP: 0.05 SOLUTION/ DROPS OPHTHALMIC at 21:50

## 2024-12-31 RX ADMIN — CLONIDINE HYDROCHLORIDE 0.2 MG: 0.2 TABLET ORAL at 09:09

## 2024-12-31 RX ADMIN — CYCLOBENZAPRINE 10 MG: 10 TABLET, FILM COATED ORAL at 09:11

## 2024-12-31 RX ADMIN — WATER 1000 MG: 1 INJECTION INTRAMUSCULAR; INTRAVENOUS; SUBCUTANEOUS at 18:39

## 2024-12-31 ASSESSMENT — PAIN DESCRIPTION - ORIENTATION
ORIENTATION: LOWER
ORIENTATION: MID

## 2024-12-31 ASSESSMENT — PAIN SCALES - GENERAL
PAINLEVEL_OUTOF10: 2
PAINLEVEL_OUTOF10: 7
PAINLEVEL_OUTOF10: 9
PAINLEVEL_OUTOF10: 2
PAINLEVEL_OUTOF10: 5
PAINLEVEL_OUTOF10: 8

## 2024-12-31 ASSESSMENT — PAIN DESCRIPTION - LOCATION
LOCATION: BACK
LOCATION: GENERALIZED
LOCATION: OTHER (COMMENT)
LOCATION: BACK

## 2024-12-31 ASSESSMENT — PAIN DESCRIPTION - DESCRIPTORS
DESCRIPTORS: ACHING;DISCOMFORT;JABBING
DESCRIPTORS: SPASM

## 2024-12-31 NOTE — PLAN OF CARE
Problem: Occupational Therapy - Adult  Goal: By Discharge: Performs self-care activities at highest level of function for planned discharge setting.  See evaluation for individualized goals.  Description: FUNCTIONAL STATUS PRIOR TO ADMISSION: Pt lives alone in a 9th floor condo (elevator) and was MOD IND with ADLs PTA. Local friend assists with IADLs as needed. Primarily uses a rollator for all mobility. Enjoys watching football and spending time with her cat.    HOME SUPPORT: Patient lived alone with local friend to provide assistance.    Occupational Therapy Goals:  Initiated 12/29/2024  1.  Patient will perform grooming standing with Supervision within 7 day(s).  2.  Patient will perform upper body dressing seated with Supervision within 7 day(s).  3.  Patient will perform lower body bathing seated with Supervision within 7 day(s).  4.  Patient will perform toilet transfers with Supervision  within 7 day(s).  5.  Patient will perform all aspects of toileting with Supervision within 7 day(s).  6.  Patient will participate in upper extremity therapeutic exercise/activities with Modified [place] for 5 minutes within 7 day(s).    7.  Patient will utilize energy conservation techniques during functional activities with verbal cues within 7 day(s).    Outcome: Progressing   OCCUPATIONAL THERAPY TREATMENT  Patient: Aparna Nath (84 y.o. female)  Date: 12/31/2024  Primary Diagnosis: General weakness [R53.1]  Acute cystitis without hematuria [N30.00]  Urinary tract infection with hematuria, site unspecified [N39.0, R31.9]       Precautions: Fall Risk                Chart, occupational therapy assessment, plan of care, and goals were reviewed.    ASSESSMENT  Patient continues to benefit from skilled OT services and is progressing towards goals. Patient tolerated increased hallway ambulation via rollator with CGA - min A when perform transfer to platform seat. Patient educated throughout session on rollator

## 2024-12-31 NOTE — PLAN OF CARE
Problem: Physical Therapy - Adult  Goal: By Discharge: Performs mobility at highest level of function for planned discharge setting.  See evaluation for individualized goals.  Description: FUNCTIONAL STATUS PRIOR TO ADMISSION: Per OT: Pt lives alone in a 9th floor condo (elevator) and was MOD IND with ADLs PTA. Local friend assists with IADLs as needed. Primarily uses a rollator for mobility outside her apartment and uses canes inside vs \"furniture surfs\". Enjoys watching football and spending time with her cat.    HOME SUPPORT: Patient lived alone with local friend to provide assistance.    Physical Therapy Goals  Initiated 12/29/2024  1.  Patient will move from supine to sit and sit to supine in bed with independence within 7 day(s).    2.  Patient will perform sit to stand with modified independence within 7 day(s).  3.  Patient will transfer from bed to chair and chair to bed with supervision/set-up using the least restrictive device within 7 day(s).  4.  Patient will ambulate with supervision/set-up for 150 feet with the least restrictive device within 7 day(s).       Outcome: Progressing     PHYSICAL THERAPY TREATMENT    Patient: Aparna Nath (84 y.o. female)  Date: 12/31/2024  Diagnosis: General weakness [R53.1]  Acute cystitis without hematuria [N30.00]  Urinary tract infection with hematuria, site unspecified [N39.0, R31.9] Urinary tract infection with hematuria, site unspecified      Precautions: Fall Risk                      ASSESSMENT:  Patient continues to benefit from skilled PT services and is progressing towards goals. Pt overall tolerating session well, progressing amb distance with use of RW. Continues to present with decreased endurance, requiring seated RB's during amb. Continued education on safety with use of rollator. Will continue to follow to progress mobility as able.         PLAN:  Patient continues to benefit from skilled intervention to address the above impairments.  Continue  treatment per established plan of care.    Recommendations for staff mobility and toileting assistance:  Recommend that staff completes patient mobility with assist x1 using rolling walker.      Recommendation for discharge: (in order for the patient to meet his/her long term goals):   Moderate intensity short-term skilled physical therapy up to 5x/week    Other factors to consider for discharge: concern for safely navigating or managing the home environment    IF patient discharges home will need the following DME: continuing to assess with progress       SUBJECTIVE:   Patient stated, \"I just can't function without my pain meds.\"    OBJECTIVE DATA SUMMARY:   Critical Behavior:      AAO x 4    Functional Mobility Training:  Bed Mobility:  Bed Mobility Training  Bed Mobility Training: Yes  Overall Level of Assistance: Supervision  Supine to Sit: Supervision  Scooting: Supervision  Transfers:  Transfer Training  Transfer Training: Yes  Balance:  Balance  Sitting: Intact  Standing: Impaired  Standing - Static: Good  Standing - Dynamic: Fair   Ambulation/Gait Training:     Gait  Gait Training: Yes  Overall Level of Assistance: Contact-guard assistance;Stand-by assistance  Distance (ft): 50 Feet (x1, 75' x 1)  Assistive Device: Walker, rollator  Interventions: Safety awareness training;Verbal cues  Speed/Marnie: Slow;Shuffled  Step Length: Right shortened;Left shortened  Gait Abnormalities: Decreased step clearance   - Pt taking seated RB between bouts of amb d/t fatigue                Pain Rating:  - Pt reports pain at start of session  Pain Intervention(s):   patient medicated for pain prior to session    Activity Tolerance:   Fair     After treatment:   Patient left in no apparent distress sitting up in chair, Call bell within reach, and Bed/ chair alarm activated      COMMUNICATION/EDUCATION:   The patient's plan of care was discussed with: occupational therapist and registered nurse    Patient Education  Education

## 2024-12-31 NOTE — CONSULTS
Comprehensive Nutrition Assessment    Type and Reason for Visit: Initial, Consult    Nutrition Recommendations/Plan:   Continue Regular diet -- removed cardiac restrictions  Ensure Plus once/d -- strawberry  Monitor weight -- obtain standing scale if able  Please record %PO intake in I/Os     Malnutrition Assessment:  Malnutrition Status:  No malnutrition (12/31/24 1148)    Context:  Acute Illness        Nutrition Assessment:    83 yo female admitted for UTI with hematuria, presented with back pain and fatigue. PMH of CAD, MI, PTCA, HTN, HLD, EMILIA, dyspepsia, GERD, fibromyalgia, chronic pain, lumbar laminectomy/fusion. Pt also reports 40# wt loss x 12 mo which she attributes to gastroparesis.     12/31: RD consult for unintentional wt loss. Spoke with pt at bedside, she reports having a lower appetite than normal, but is still able to eat 50-75% of her meals, consistent with flowsheet documentation. RD observed 75% of breakfast consumed. Will remove cardiac restrictions to promote PO intake. PTA she eats oatmeal with banana, a protein shake/crackers, and chicken salad/soup/frozen meal. She would like to have an Ensure once daily while IP. She reports wt loss of 40# back in September 2023 over 6 weeks. She has weighed 160-170# since October 2023. No recent wt changes per chart review. #, suspect this is increase d/t bed scale, admission wt 173# which is likely more accurate. Some mild muscle wasting/fat loss, could be age related. RD to continue to monitor.     Nutritionally Significant Medications:  Rocephin, levothyroxine, protonix, glycolax    Estimated Daily Nutrient Needs:  Energy Requirements Based On: Formula  Weight Used for Energy Requirements: Admission  Energy (kcal/day): 5536-4859 (MSJ x 1.4-1.5)  Weight Used for Protein Requirements: Admission  Protein (g/day): 78-94 (1.0-1.2 g/kg)  Method Used for Fluid Requirements: 1 ml/kcal  Fluid (ml/day): 5422-6044 ml    Nutrition Related Findings:   Edema:  None                    Recent Labs     12/28/24  1455 12/29/24  0553 12/30/24  0434 12/31/24  0438   GLUCOSE 89 104* 111* 99   BUN 22* 23* 19 19   CREATININE 0.88 0.73 0.71 0.83    139 139 140   K 3.5 3.8 3.5 3.5    101 102 103   CO2 32 31 33* 31   CALCIUM 9.1 9.0 8.8 9.0     Lab Results   Component Value Date/Time    LABA1C 5.2 12/12/2024 03:00 PM    LABA1C 5.4 10/04/2022 04:10 PM     12/12/2024 03:00 PM     Triglycerides   Date Value Ref Range Status   12/12/2024 118 <150 MG/DL Final     Comment:     Based on NCEP-ATP III:  Triglycerides <150 mg/dL  is considered normal, 150-199 mg/dL  borderline high,  200-499 mg/dL high and  greater than or equal to 500 mg/dL very high.   07/12/2023 187 (H) <150 MG/DL Final     Comment:     Based on NCEP-ATP III:  Triglycerides <150 mg/dL  is considered normal, 150-199 mg/dL  borderline high,  200-499 mg/dL high and  greater than or equal to 500 mg/dL very high.   07/07/2022 374 (H) <150 MG/DL Final     Comment:     Based on NCEP-ATP III:  Triglycerides <150 mg/dL  is considered normal, 150-199 mg/dL  borderline high,  200-499 mg/dL high and  greater than or equal to 500 mg/dL very high.     Last BM:      Wounds:   Wound Type: None    Current Nutrition Therapies:  Diet: regular, cardiac   Supplements: none  Meal Intake:   Patient Vitals for the past 168 hrs:   PO Meals Eaten (%)   12/30/24 1059 51 - 75%   12/29/24 1000 51 - 75%     Supplement Intake:  No data found.  Nutrition Support: none    Anthropometric Measures:  Height: 160 cm (5' 3\")  Ideal Body Weight (IBW): 115 lbs (52 kg)    Admission Body Weight: 78.5 kg (173 lb)  Current Body Weight: 82.1 kg (181 lb), 157.4 % IBW. Weight Source: Bed scale  Current BMI (kg/m2): 32.1  Usual Body Weight: 77.1 kg (170 lb)  % Weight Change (Calculated): 6.5  Weight Adjustment For: No Adjustment                 BMI Categories: Obese Class 1 (BMI 30.0-34.9)    Wt Readings from Last 10 Encounters:   12/31/24 82.2 kg (181

## 2024-12-31 NOTE — PROGRESS NOTES
Hospitalist Progress Note  Airam Galvez MD  Answering service: [phone] OR [phone] from in house phone        Date of Service:  2024  NAME:  Aparna Nath  :  1940  MRN:  [id number]      Admission Summary:   HPI: \"Aparna Nath is a 84 y.o. female with past medical history of CAD, MI, PTCA stents, hypertension, hyperlipidemia, EMILIA, dyspepsia, GERD, fibromyalgia, chronic pain, lumbar laminectomy/fusion presented to the emergency department with chief complaint of diffuse back pain and fatigue.  Patient notes that she has chronic back pain with history of extensive spinal fusion in the past.  However over the past week she has felt gait imbalance with unsteady gait, increased weakness and numbness atrophy of both feet.  She notes she has had at least 2 near falls.  At baseline she normally ambulates around the house unassisted.  However most recently she has been using her roller walker more frequently.  She has diffuse fatigue and concerned that she may fall.  She also complains of ongoing chronic abdominal pain with approximately 40 pound weight loss within the past 12 months which she attributes to known history of gastroparesis.  She is notably followed by gastroenterologist Dr. Padilla, as an outpatient.  She denies saddle anesthesia.  She does not report any recent fall or trauma.  On arrival in the ED, initial ported vital signs temperature 97.2 °F, /87, heart rate 56, respiratory rate 18, O2 saturation 94% on room air.  12-lead EKG showed sinus bradycardia, first-degree AV block, LVH, ST/T wave changes in the anterior leads at 55 bpm.  Chest x-ray 2 view showed increased mid thoracic spine DDD but no acute process.  CT abdomen pelvis with IV contrast showed moderate amount of stool but normal appendix and no acute process.  Abnormal lab included BUN 22 otherwise labs were unremarkable.   72 hours.  No results for input(s): \"CPK\" in the last 72 hours.    Invalid input(s): \"CPKMB\", \"CKNDX\", \"TROIQ\"  Lab Results   Component Value Date/Time    CHOL 110 12/12/2024 03:00 PM    HDL 45 12/12/2024 03:00 PM    LDL 41.4 12/12/2024 03:00 PM    LDL 27.6 07/12/2023 04:28 PM     No results found for: \"GLUCPOC\"        Medications Reviewed:     Current Facility-Administered Medications   Medication Dose Route Frequency    tetrahydrozoline 0.05 % ophthalmic solution 1 drop  1 drop Both Eyes BID    polyethylene glycol (GLYCOLAX) packet 17 g  17 g Oral Daily    sodium chloride flush 0.9 % injection 5-40 mL  5-40 mL IntraVENous 2 times per day    sodium chloride flush 0.9 % injection 5-40 mL  5-40 mL IntraVENous PRN    0.9 % sodium chloride infusion   IntraVENous PRN    ondansetron (ZOFRAN-ODT) disintegrating tablet 4 mg  4 mg Oral Q8H PRN    Or    ondansetron (ZOFRAN) injection 4 mg  4 mg IntraVENous Q6H PRN    acetaminophen (TYLENOL) tablet 650 mg  650 mg Oral Q6H PRN    Or    acetaminophen (TYLENOL) suppository 650 mg  650 mg Rectal Q6H PRN    albuterol sulfate HFA (PROVENTIL;VENTOLIN;PROAIR) 108 (90 Base) MCG/ACT inhaler 2 puff  2 puff Inhalation Q4H PRN    cetirizine (ZYRTEC) tablet 5 mg  5 mg Oral Daily    cyclobenzaprine (FLEXERIL) tablet 10 mg  10 mg Oral BID PRN    docusate sodium (COLACE) capsule 100 mg  100 mg Oral Daily PRN    melatonin tablet 9 mg  9 mg Oral QHS PRN    pregabalin (LYRICA) capsule 225 mg  225 mg Oral BID    DULoxetine (CYMBALTA) extended release capsule 60 mg  60 mg Oral Daily    cloNIDine (CATAPRES) tablet 0.2 mg  0.2 mg Oral BID    metoprolol tartrate (LOPRESSOR) tablet 100 mg  100 mg Oral BID    valsartan (DIOVAN) tablet 160 mg  160 mg Oral Daily    aspirin chewable tablet 81 mg  81 mg Oral Daily    levothyroxine (SYNTHROID) tablet 75 mcg  75 mcg Oral Daily    oxyCODONE (ROXICODONE) immediate release tablet 10 mg  10 mg Oral Q6H PRN    pantoprazole (PROTONIX) tablet 40 mg  40 mg Oral QAM

## 2025-01-01 LAB
ANION GAP SERPL CALC-SCNC: 5 MMOL/L (ref 2–12)
BASOPHILS # BLD: 0 K/UL (ref 0–0.1)
BASOPHILS NFR BLD: 0 % (ref 0–1)
BUN SERPL-MCNC: 19 MG/DL (ref 6–20)
BUN/CREAT SERPL: 26 (ref 12–20)
CALCIUM SERPL-MCNC: 9.1 MG/DL (ref 8.5–10.1)
CHLORIDE SERPL-SCNC: 105 MMOL/L (ref 97–108)
CO2 SERPL-SCNC: 29 MMOL/L (ref 21–32)
CREAT SERPL-MCNC: 0.73 MG/DL (ref 0.55–1.02)
DIFFERENTIAL METHOD BLD: NORMAL
EOSINOPHIL # BLD: 0.2 K/UL (ref 0–0.4)
EOSINOPHIL NFR BLD: 3 % (ref 0–7)
ERYTHROCYTE [DISTWIDTH] IN BLOOD BY AUTOMATED COUNT: 13.2 % (ref 11.5–14.5)
GLUCOSE SERPL-MCNC: 108 MG/DL (ref 65–100)
HCT VFR BLD AUTO: 35.1 % (ref 35–47)
HGB BLD-MCNC: 11.6 G/DL (ref 11.5–16)
IMM GRANULOCYTES # BLD AUTO: 0 K/UL (ref 0–0.04)
IMM GRANULOCYTES NFR BLD AUTO: 0 % (ref 0–0.5)
LYMPHOCYTES # BLD: 2.4 K/UL (ref 0.8–3.5)
LYMPHOCYTES NFR BLD: 34 % (ref 12–49)
MCH RBC QN AUTO: 30.1 PG (ref 26–34)
MCHC RBC AUTO-ENTMCNC: 33 G/DL (ref 30–36.5)
MCV RBC AUTO: 90.9 FL (ref 80–99)
MONOCYTES # BLD: 0.5 K/UL (ref 0–1)
MONOCYTES NFR BLD: 6 % (ref 5–13)
NEUTS SEG # BLD: 3.9 K/UL (ref 1.8–8)
NEUTS SEG NFR BLD: 57 % (ref 32–75)
NRBC # BLD: 0 K/UL (ref 0–0.01)
NRBC BLD-RTO: 0 PER 100 WBC
PLATELET # BLD AUTO: 180 K/UL (ref 150–400)
PMV BLD AUTO: 10.9 FL (ref 8.9–12.9)
POTASSIUM SERPL-SCNC: 4.2 MMOL/L (ref 3.5–5.1)
RBC # BLD AUTO: 3.86 M/UL (ref 3.8–5.2)
SODIUM SERPL-SCNC: 139 MMOL/L (ref 136–145)
WBC # BLD AUTO: 7 K/UL (ref 3.6–11)

## 2025-01-01 PROCEDURE — 36415 COLL VENOUS BLD VENIPUNCTURE: CPT

## 2025-01-01 PROCEDURE — 80048 BASIC METABOLIC PNL TOTAL CA: CPT

## 2025-01-01 PROCEDURE — 96376 TX/PRO/DX INJ SAME DRUG ADON: CPT

## 2025-01-01 PROCEDURE — 2500000003 HC RX 250 WO HCPCS: Performed by: STUDENT IN AN ORGANIZED HEALTH CARE EDUCATION/TRAINING PROGRAM

## 2025-01-01 PROCEDURE — 6360000002 HC RX W HCPCS: Performed by: STUDENT IN AN ORGANIZED HEALTH CARE EDUCATION/TRAINING PROGRAM

## 2025-01-01 PROCEDURE — G0378 HOSPITAL OBSERVATION PER HR: HCPCS

## 2025-01-01 PROCEDURE — 2500000003 HC RX 250 WO HCPCS: Performed by: FAMILY MEDICINE

## 2025-01-01 PROCEDURE — 6370000000 HC RX 637 (ALT 250 FOR IP): Performed by: FAMILY MEDICINE

## 2025-01-01 PROCEDURE — 85025 COMPLETE CBC W/AUTO DIFF WBC: CPT

## 2025-01-01 PROCEDURE — 6370000000 HC RX 637 (ALT 250 FOR IP): Performed by: STUDENT IN AN ORGANIZED HEALTH CARE EDUCATION/TRAINING PROGRAM

## 2025-01-01 RX ORDER — METOPROLOL TARTRATE 25 MG/1
50 TABLET, FILM COATED ORAL 2 TIMES DAILY
Status: DISCONTINUED | OUTPATIENT
Start: 2025-01-01 | End: 2025-01-04 | Stop reason: HOSPADM

## 2025-01-01 RX ADMIN — CLONIDINE HYDROCHLORIDE 0.2 MG: 0.2 TABLET ORAL at 08:42

## 2025-01-01 RX ADMIN — CLONIDINE HYDROCHLORIDE 0.2 MG: 0.2 TABLET ORAL at 20:49

## 2025-01-01 RX ADMIN — VALSARTAN 160 MG: 160 TABLET, FILM COATED ORAL at 08:42

## 2025-01-01 RX ADMIN — POLYETHYLENE GLYCOL 3350 17 G: 17 POWDER, FOR SOLUTION ORAL at 08:42

## 2025-01-01 RX ADMIN — ASPIRIN 81 MG CHEWABLE TABLET 81 MG: 81 TABLET CHEWABLE at 08:42

## 2025-01-01 RX ADMIN — CETIRIZINE HYDROCHLORIDE 5 MG: 10 TABLET, FILM COATED ORAL at 08:42

## 2025-01-01 RX ADMIN — Medication 10 ML: at 20:49

## 2025-01-01 RX ADMIN — PREGABALIN 225 MG: 100 CAPSULE ORAL at 20:48

## 2025-01-01 RX ADMIN — Medication 10 ML: at 08:42

## 2025-01-01 RX ADMIN — CYCLOBENZAPRINE 10 MG: 10 TABLET, FILM COATED ORAL at 18:31

## 2025-01-01 RX ADMIN — METOPROLOL TARTRATE 50 MG: 25 TABLET, FILM COATED ORAL at 20:48

## 2025-01-01 RX ADMIN — TETRAHYDROZOLINE HCL 1 DROP: 0.05 SOLUTION/ DROPS OPHTHALMIC at 20:56

## 2025-01-01 RX ADMIN — LEVOTHYROXINE SODIUM 75 MCG: 0.07 TABLET ORAL at 08:42

## 2025-01-01 RX ADMIN — TETRAHYDROZOLINE HCL 1 DROP: 0.05 SOLUTION/ DROPS OPHTHALMIC at 08:44

## 2025-01-01 RX ADMIN — OXYCODONE HYDROCHLORIDE 10 MG: 5 TABLET ORAL at 08:47

## 2025-01-01 RX ADMIN — CYCLOBENZAPRINE 10 MG: 10 TABLET, FILM COATED ORAL at 01:55

## 2025-01-01 RX ADMIN — OXYCODONE HYDROCHLORIDE 10 MG: 5 TABLET ORAL at 01:51

## 2025-01-01 RX ADMIN — OXYCODONE HYDROCHLORIDE 10 MG: 5 TABLET ORAL at 20:48

## 2025-01-01 RX ADMIN — OXYCODONE HYDROCHLORIDE 10 MG: 5 TABLET ORAL at 14:46

## 2025-01-01 RX ADMIN — PREGABALIN 225 MG: 100 CAPSULE ORAL at 08:42

## 2025-01-01 RX ADMIN — PANTOPRAZOLE SODIUM 40 MG: 40 TABLET, DELAYED RELEASE ORAL at 06:27

## 2025-01-01 RX ADMIN — WATER 1000 MG: 1 INJECTION INTRAMUSCULAR; INTRAVENOUS; SUBCUTANEOUS at 18:31

## 2025-01-01 RX ADMIN — DULOXETINE HYDROCHLORIDE 60 MG: 60 CAPSULE, DELAYED RELEASE ORAL at 08:42

## 2025-01-01 ASSESSMENT — PAIN SCALES - GENERAL
PAINLEVEL_OUTOF10: 8
PAINLEVEL_OUTOF10: 6
PAINLEVEL_OUTOF10: 9
PAINLEVEL_OUTOF10: 7
PAINLEVEL_OUTOF10: 9
PAINLEVEL_OUTOF10: 10
PAINLEVEL_OUTOF10: 8
PAINLEVEL_OUTOF10: 10

## 2025-01-01 ASSESSMENT — PAIN DESCRIPTION - LOCATION
LOCATION: OTHER (COMMENT)
LOCATION: NECK
LOCATION: OTHER (COMMENT)
LOCATION: BACK

## 2025-01-01 ASSESSMENT — PAIN DESCRIPTION - DESCRIPTORS
DESCRIPTORS: ACHING
DESCRIPTORS: SPASM

## 2025-01-01 ASSESSMENT — PAIN DESCRIPTION - ORIENTATION: ORIENTATION: RIGHT

## 2025-01-01 NOTE — PROGRESS NOTES
Spiritual Health History and Assessment/Progress Note  [facility]    Rituals, Rites and Sacraments,  ,  ,      Name: Aparna Nath MRN: [id number]    Age: 84 y.o.     Sex: female   Language: English   Baptism: Congregation   Urinary tract infection with hematuria, site unspecified     Date: 1/1/2025            Total Time Calculated: 5 min              Spiritual Assessment continued in [address] ONCOLOGY        Referral/Consult From: Clergy/   Encounter Overview/Reason: Rituals, Rites and Sacraments  Service Provided For: Patient    Flor, Belief, Meaning:   Patient is connected with a flor tradition or spiritual practice  Family/Friends No family/friends present      Importance and Influence:  Patient unable to assess at this time  Family/Friends No family/friends present    Community:  Patient Other: unknown  Family/Friends No family/friends present    Assessment and Plan of Care:     Patient Interventions include: Provided sacramental/Jainism ritual  Family/Friends Interventions include: No family/friends present    Patient Plan of Care: Spiritual Care available upon further referral  Family/Friends Plan of Care: Spiritual Care available upon further referral    Electronically signed by Chaplain YVONNE on 1/1/2025 at 2:26 PM     [place] visit attempted.  Mrs. Nath is Congregation. She was asleep and no family was present at the time of the visit. Prayer for spiritual communion offered for Mrs. Nath's well-being.     Sr. ELSA Blakely, RN, ACSW, LCSW   Page:  [phone]

## 2025-01-01 NOTE — PLAN OF CARE
Problem: Discharge Planning  Goal: Discharge to home or other facility with appropriate resources  1/1/2025 0952 by Larissa Kim RN  Outcome: Progressing  1/1/2025 0345 by Minerva Umanzor RN  Outcome: Progressing     Problem: ABCDS Injury Assessment  Goal: Absence of physical injury  1/1/2025 0952 by Larissa Kim RN  Outcome: Progressing  1/1/2025 0345 by Minerva Umanzor RN  Outcome: Progressing     Problem: Safety - Adult  Goal: Free from fall injury  1/1/2025 0952 by Larissa Kim RN  Outcome: Progressing  1/1/2025 0345 by Minerva Umanzor RN  Outcome: Progressing     Problem: Pain  Goal: Verbalizes/displays adequate comfort level or baseline comfort level  1/1/2025 0952 by Larissa Kim RN  Outcome: Progressing  1/1/2025 0345 by Minerva Umanzor RN  Outcome: Progressing     Problem: Nutrition Deficit:  Goal: Optimize nutritional status  1/1/2025 0952 by Larissa Kim RN  Outcome: Progressing  1/1/2025 0345 by Minerva Umanzor RN  Outcome: Progressing

## 2025-01-01 NOTE — PROGRESS NOTES
Hospitalist Progress Note  Airam Galvez MD  Answering service: [phone] OR [phone] from in house phone        Date of Service:  2025  NAME:  Aparna Nath  :  1940  MRN:  [id number]      Admission Summary:   HPI: \"Aparna Nath is a 84 y.o. female with past medical history of CAD, MI, PTCA stents, hypertension, hyperlipidemia, EMILIA, dyspepsia, GERD, fibromyalgia, chronic pain, lumbar laminectomy/fusion presented to the emergency department with chief complaint of diffuse back pain and fatigue.  Patient notes that she has chronic back pain with history of extensive spinal fusion in the past.  However over the past week she has felt gait imbalance with unsteady gait, increased weakness and numbness atrophy of both feet.  She notes she has had at least 2 near falls.  At baseline she normally ambulates around the house unassisted.  However most recently she has been using her roller walker more frequently.  She has diffuse fatigue and concerned that she may fall.  She also complains of ongoing chronic abdominal pain with approximately 40 pound weight loss within the past 12 months which she attributes to known history of gastroparesis.  She is notably followed by gastroenterologist Dr. Padilla, as an outpatient.  She denies saddle anesthesia.  She does not report any recent fall or trauma.  On arrival in the ED, initial ported vital signs temperature 97.2 °F, /87, heart rate 56, respiratory rate 18, O2 saturation 94% on room air.  12-lead EKG showed sinus bradycardia, first-degree AV block, LVH, ST/T wave changes in the anterior leads at 55 bpm.  Chest x-ray 2 view showed increased mid thoracic spine DDD but no acute process.  CT abdomen pelvis with IV contrast showed moderate amount of stool but normal appendix and no acute process.  Abnormal lab included BUN 22 otherwise labs were unremarkable.   note. Most recent are:  BP (!) 153/68   Pulse 56   Temp 97.9 °F (36.6 °C) (Oral)   Resp 16   Ht 1.6 m (5' 3\")   Wt 82.2 kg (181 lb 3.5 oz)   SpO2 93%   BMI 32.10 kg/m²      No intake or output data in the 24 hours ending 01/01/25 1451       Physical Examination:     I had a face to face encounter with this patient and independently examined them on 1/1/2025 as outlined below:          General : alert x 3, awake, no acute distress,   HEENT: PEERL, EOMI, moist mucus membrane  Neck: supple, no JVD, no meningeal signs  Chest: Clear to auscultation bilaterally   CVS: S1 S2 heard, Capillary refill less than 2 seconds  Abd: soft/ non tender, non distended, BS physiological,   Ext: no clubbing, no cyanosis, no edema, brisk 2+ DP pulses  Neuro/Psych: pleasant mood and affect, CN 2-12 grossly intact, sensory grossly within normal limit, strength 3 out of 5 lower extremities 5 out of 5 upper extremities  Skin: warm            Data Review:    Review and/or order of clinical lab test  Review and/or order of tests in the radiology section of CPT  Review and/or order of tests in the medicine section of CPT    I have independently reviewed and interpreted patient's lab and all other diagnostic data    Notes reviewed from all clinical/nonclinical/nursing services involved in patient's clinical care. Care coordination discussions were held with appropriate clinical/nonclinical/ nursing providers based on care coordination needs.     Labs:     Recent Labs     12/31/24  0438 01/01/25  0431   WBC 6.7 7.0   HGB 11.9 11.6   HCT 35.7 35.1    180     Recent Labs     12/30/24  0434 12/31/24  0438 01/01/25  0431    140 139   K 3.5 3.5 4.2    103 105   CO2 33* 31 29   BUN 19 19 19     No results for input(s): \"ALT\", \"TP\", \"GLOB\", \"GGT\" in the last 72 hours.    Invalid input(s): \"SGOT\", \"GPT\", \"AP\", \"TBIL\", \"TBILI\", \"ALB\", \"AML\", \"AMYP\", \"LPSE\", \"HLPSE\"    No results for input(s): \"INR\", \"APTT\" in the last 72

## 2025-01-02 PROBLEM — N39.0 UTI (URINARY TRACT INFECTION): Status: ACTIVE | Noted: 2025-01-02

## 2025-01-02 LAB
ANION GAP SERPL CALC-SCNC: 2 MMOL/L (ref 2–12)
BASOPHILS # BLD: 0 K/UL (ref 0–0.1)
BASOPHILS NFR BLD: 0 % (ref 0–1)
BUN SERPL-MCNC: 22 MG/DL (ref 6–20)
BUN/CREAT SERPL: 31 (ref 12–20)
CALCIUM SERPL-MCNC: 8.7 MG/DL (ref 8.5–10.1)
CHLORIDE SERPL-SCNC: 107 MMOL/L (ref 97–108)
CO2 SERPL-SCNC: 29 MMOL/L (ref 21–32)
CREAT SERPL-MCNC: 0.72 MG/DL (ref 0.55–1.02)
DIFFERENTIAL METHOD BLD: ABNORMAL
EOSINOPHIL # BLD: 0.2 K/UL (ref 0–0.4)
EOSINOPHIL NFR BLD: 3 % (ref 0–7)
ERYTHROCYTE [DISTWIDTH] IN BLOOD BY AUTOMATED COUNT: 13 % (ref 11.5–14.5)
GLUCOSE SERPL-MCNC: 116 MG/DL (ref 65–100)
HCT VFR BLD AUTO: 33.5 % (ref 35–47)
HGB BLD-MCNC: 11 G/DL (ref 11.5–16)
IMM GRANULOCYTES # BLD AUTO: 0 K/UL (ref 0–0.04)
IMM GRANULOCYTES NFR BLD AUTO: 0 % (ref 0–0.5)
LYMPHOCYTES # BLD: 2.5 K/UL (ref 0.8–3.5)
LYMPHOCYTES NFR BLD: 32 % (ref 12–49)
MCH RBC QN AUTO: 29.9 PG (ref 26–34)
MCHC RBC AUTO-ENTMCNC: 32.8 G/DL (ref 30–36.5)
MCV RBC AUTO: 91 FL (ref 80–99)
MONOCYTES # BLD: 0.5 K/UL (ref 0–1)
MONOCYTES NFR BLD: 6 % (ref 5–13)
NEUTS SEG # BLD: 4.6 K/UL (ref 1.8–8)
NEUTS SEG NFR BLD: 59 % (ref 32–75)
NRBC # BLD: 0 K/UL (ref 0–0.01)
NRBC BLD-RTO: 0 PER 100 WBC
PLATELET # BLD AUTO: 195 K/UL (ref 150–400)
PMV BLD AUTO: 10.7 FL (ref 8.9–12.9)
POTASSIUM SERPL-SCNC: 4.1 MMOL/L (ref 3.5–5.1)
RBC # BLD AUTO: 3.68 M/UL (ref 3.8–5.2)
SODIUM SERPL-SCNC: 138 MMOL/L (ref 136–145)
WBC # BLD AUTO: 8 K/UL (ref 3.6–11)

## 2025-01-02 PROCEDURE — 6370000000 HC RX 637 (ALT 250 FOR IP)

## 2025-01-02 PROCEDURE — 2500000003 HC RX 250 WO HCPCS: Performed by: FAMILY MEDICINE

## 2025-01-02 PROCEDURE — 97116 GAIT TRAINING THERAPY: CPT

## 2025-01-02 PROCEDURE — 85025 COMPLETE CBC W/AUTO DIFF WBC: CPT

## 2025-01-02 PROCEDURE — G0378 HOSPITAL OBSERVATION PER HR: HCPCS

## 2025-01-02 PROCEDURE — 6370000000 HC RX 637 (ALT 250 FOR IP): Performed by: STUDENT IN AN ORGANIZED HEALTH CARE EDUCATION/TRAINING PROGRAM

## 2025-01-02 PROCEDURE — 97530 THERAPEUTIC ACTIVITIES: CPT

## 2025-01-02 PROCEDURE — 1100000000 HC RM PRIVATE

## 2025-01-02 PROCEDURE — 80048 BASIC METABOLIC PNL TOTAL CA: CPT

## 2025-01-02 PROCEDURE — 6370000000 HC RX 637 (ALT 250 FOR IP): Performed by: FAMILY MEDICINE

## 2025-01-02 RX ORDER — SENNOSIDES A AND B 8.6 MG/1
1 TABLET, FILM COATED ORAL DAILY PRN
Status: DISCONTINUED | OUTPATIENT
Start: 2025-01-02 | End: 2025-01-04 | Stop reason: HOSPADM

## 2025-01-02 RX ADMIN — OXYCODONE HYDROCHLORIDE 10 MG: 5 TABLET ORAL at 21:56

## 2025-01-02 RX ADMIN — DOCUSATE SODIUM 100 MG: 100 CAPSULE, LIQUID FILLED ORAL at 06:40

## 2025-01-02 RX ADMIN — PANTOPRAZOLE SODIUM 40 MG: 40 TABLET, DELAYED RELEASE ORAL at 06:33

## 2025-01-02 RX ADMIN — SENNOSIDES 8.6 MG: 8.6 TABLET, FILM COATED ORAL at 06:40

## 2025-01-02 RX ADMIN — METOPROLOL TARTRATE 50 MG: 25 TABLET, FILM COATED ORAL at 21:54

## 2025-01-02 RX ADMIN — ACETAMINOPHEN 650 MG: 325 TABLET ORAL at 00:03

## 2025-01-02 RX ADMIN — PREGABALIN 225 MG: 100 CAPSULE ORAL at 09:22

## 2025-01-02 RX ADMIN — VALSARTAN 160 MG: 160 TABLET, FILM COATED ORAL at 09:22

## 2025-01-02 RX ADMIN — CETIRIZINE HYDROCHLORIDE 5 MG: 10 TABLET, FILM COATED ORAL at 09:23

## 2025-01-02 RX ADMIN — LEVOTHYROXINE SODIUM 75 MCG: 0.07 TABLET ORAL at 09:23

## 2025-01-02 RX ADMIN — OXYCODONE HYDROCHLORIDE 10 MG: 5 TABLET ORAL at 15:51

## 2025-01-02 RX ADMIN — Medication 5 ML: at 09:27

## 2025-01-02 RX ADMIN — OXYCODONE HYDROCHLORIDE 10 MG: 5 TABLET ORAL at 09:21

## 2025-01-02 RX ADMIN — TETRAHYDROZOLINE HCL 1 DROP: 0.05 SOLUTION/ DROPS OPHTHALMIC at 21:58

## 2025-01-02 RX ADMIN — Medication 10 ML: at 21:58

## 2025-01-02 RX ADMIN — TETRAHYDROZOLINE HCL 1 DROP: 0.05 SOLUTION/ DROPS OPHTHALMIC at 10:31

## 2025-01-02 RX ADMIN — PREGABALIN 225 MG: 100 CAPSULE ORAL at 21:55

## 2025-01-02 RX ADMIN — DULOXETINE HYDROCHLORIDE 60 MG: 60 CAPSULE, DELAYED RELEASE ORAL at 09:23

## 2025-01-02 RX ADMIN — CLONIDINE HYDROCHLORIDE 0.2 MG: 0.2 TABLET ORAL at 21:55

## 2025-01-02 RX ADMIN — METOPROLOL TARTRATE 50 MG: 25 TABLET, FILM COATED ORAL at 09:23

## 2025-01-02 RX ADMIN — ASPIRIN 81 MG CHEWABLE TABLET 81 MG: 81 TABLET CHEWABLE at 09:24

## 2025-01-02 RX ADMIN — CLONIDINE HYDROCHLORIDE 0.2 MG: 0.2 TABLET ORAL at 09:24

## 2025-01-02 RX ADMIN — OXYCODONE HYDROCHLORIDE 10 MG: 5 TABLET ORAL at 03:46

## 2025-01-02 ASSESSMENT — PAIN DESCRIPTION - ORIENTATION
ORIENTATION: LEFT;RIGHT
ORIENTATION: LEFT
ORIENTATION: RIGHT;LEFT;ANTERIOR;POSTERIOR

## 2025-01-02 ASSESSMENT — PAIN SCALES - GENERAL
PAINLEVEL_OUTOF10: 8
PAINLEVEL_OUTOF10: 8
PAINLEVEL_OUTOF10: 0
PAINLEVEL_OUTOF10: 8
PAINLEVEL_OUTOF10: 6
PAINLEVEL_OUTOF10: 9

## 2025-01-02 ASSESSMENT — PAIN DESCRIPTION - LOCATION
LOCATION: HIP;BUTTOCKS
LOCATION: ABDOMEN;BACK;GENERALIZED
LOCATION: GENERALIZED;ABDOMEN;BACK
LOCATION: BACK
LOCATION: ABDOMEN

## 2025-01-02 ASSESSMENT — PAIN DESCRIPTION - DESCRIPTORS
DESCRIPTORS: SPASM
DESCRIPTORS: DISCOMFORT
DESCRIPTORS: ACHING

## 2025-01-02 ASSESSMENT — PAIN - FUNCTIONAL ASSESSMENT: PAIN_FUNCTIONAL_ASSESSMENT: ACTIVITIES ARE NOT PREVENTED

## 2025-01-02 NOTE — CARE COORDINATION
CARLEY:    Patient [employer] policy , and now she has [facility] Medicare. Mita with [employer] submitted to [facility] for new authorization. Patient aware of this. Cm placed w/c transport with [facility] on will call. Patient requested cm to send referral to [facility] to see if they can accept.      [facility]  Care Management Department  Ph:[phone]

## 2025-01-02 NOTE — PROGRESS NOTES
Hospitalist Progress Note  Gabriel Nair MD  Answering service: [phone]        Date of Service:  2025  NAME:  Aparna Nath  :  1940  MRN:  [id number]      Admission Summary:     Patient admitted with bilateral lower extremity weakness and UTI.    Interval history / Subjective:     Overall patient is doing well no acute complaint at this time.     Assessment & Plan:     Urinary tract infection  -Initial urinalysis shows trace blood, moderate leukocyte esterase, pyuria and bacteriuria  -Urine culture grew mixed valentino  -Rocephin completed    Bilateral lower extremity weakness  -CT head negative for acute intracranial process  -Neurology evaluated the patient and negative for concerning neuroexam  -Neurology's impression was weakness probably related to UTI  -PT following, insurance authorization for SNF pending    Hypertension  -Continue clonidine, Toprol and Diovan  -Monitor blood pressure    GERD  -Continue PPI    Hypothyroidism  -Continue Synthroid    Mood disorder  -Continue Cymbalta    Obstructive sleep apnea  -Noncompliant with CPAP     Code status: Full  Prophylaxis: SCDs  Care Plan discussed with: Patient  Anticipated Disposition: Medically stable since , insurance authorization for SNF pending.  Central Line:   None             Review of Systems:   Pertinent items are noted in HPI.         Vital Signs:    Last 24hrs VS reviewed since prior progress note. Most recent are:  Vitals:    25 0921   BP:    Pulse:    Resp: 18   Temp:    SpO2:        No intake or output data in the 24 hours ending 25 0939     Physical Examination:     I had a face to face encounter with this patient and independently examined them on 2025 as outlined below:          General : alert x 3, awake, no acute distress,   HEENT: PEERL, EOMI, moist mucus membrane, TM clear  Neck: supple, no JVD, no meningeal  signs  Chest: Clear to auscultation bilaterally   CVS: S1 S2 heard, Capillary refill less than 2 seconds  Abd: soft/ non tender, non distended, BS physiological,   Ext: no clubbing, no cyanosis, no edema, brisk 2+ DP pulses  Neuro/Psych: pleasant mood and affect, CN 2-12 grossly intact, sensory grossly within normal limit, Strength 5/5 in all extremities, DTR 1+ x 4  Skin: warm            Data Review:    Review and/or order of clinical lab test    I have independently reviewed and interpreted patient's lab and all other diagnostic data    Notes reviewed from all clinical/nonclinical/nursing services involved in patient's clinical care. Care coordination discussions were held with appropriate clinical/nonclinical/ nursing providers based on care coordination needs.     Labs:     Recent Labs     01/01/25  0431 01/02/25  0200   WBC 7.0 8.0   HGB 11.6 11.0*   HCT 35.1 33.5*    195     Recent Labs     12/31/24  0438 01/01/25  0431 01/02/25  0200    139 138   K 3.5 4.2 4.1    105 107   CO2 31 29 29   BUN 19 19 22*     No results for input(s): \"ALT\", \"TP\", \"GLOB\", \"GGT\" in the last 72 hours.    Invalid input(s): \"SGOT\", \"GPT\", \"AP\", \"TBIL\", \"TBILI\", \"ALB\", \"AML\", \"AMYP\", \"LPSE\", \"HLPSE\"  No results for input(s): \"INR\", \"APTT\" in the last 72 hours.    Invalid input(s): \"PTP\"   No results for input(s): \"TIBC\" in the last 72 hours.    Invalid input(s): \"FE\", \"PSAT\", \"FERR\"   No results found for: \"RBCF\"   No results for input(s): \"PH\", \"PCO2\", \"PO2\" in the last 72 hours.  No results for input(s): \"CPK\" in the last 72 hours.    Invalid input(s): \"CPKMB\", \"CKNDX\", \"TROIQ\"  Lab Results   Component Value Date/Time    CHOL 110 12/12/2024 03:00 PM    HDL 45 12/12/2024 03:00 PM    LDL 41.4 12/12/2024 03:00 PM    LDL 27.6 07/12/2023 04:28 PM     No results found for: \"GLUCPOC\"  [unfilled]      Medications Reviewed:     Current Facility-Administered Medications   Medication Dose Route Frequency    senna (SENOKOT)

## 2025-01-02 NOTE — PLAN OF CARE
Problem: Physical Therapy - Adult  Goal: By Discharge: Performs mobility at highest level of function for planned discharge setting.  See evaluation for individualized goals.  Description: FUNCTIONAL STATUS PRIOR TO ADMISSION: Per OT: Pt lives alone in a 9th floor condo (elevator) and was MOD IND with ADLs PTA. Local friend assists with IADLs as needed. Primarily uses a rollator for mobility outside her apartment and uses canes inside vs \"furniture surfs\". Enjoys watching football and spending time with her cat.    HOME SUPPORT: Patient lived alone with local friend to provide assistance.    Physical Therapy Goals  Initiated 12/29/2024  1.  Patient will move from supine to sit and sit to supine in bed with independence within 7 day(s).    2.  Patient will perform sit to stand with modified independence within 7 day(s).  3.  Patient will transfer from bed to chair and chair to bed with supervision/set-up using the least restrictive device within 7 day(s).  4.  Patient will ambulate with supervision/set-up for 150 feet with the least restrictive device within 7 day(s).       Outcome: Progressing   PHYSICAL THERAPY TREATMENT    Patient: Aparna Nath (84 y.o. female)  Date: 1/2/2025  Diagnosis: General weakness [R53.1]  Acute cystitis without hematuria [N30.00]  Urinary tract infection with hematuria, site unspecified [N39.0, R31.9] Urinary tract infection with hematuria, site unspecified      Precautions: Fall Risk                      ASSESSMENT:  Patient continues to benefit from skilled PT services and is progressing towards goals. Patient received sitting EOB and most agreeable to therapy.  Is complaining of left lateral hip pain but otherwise doing well.  Demonstrating good management of rollator with increased endurance today.  She states though that primarily uses SPC in apartment due to not enough room for rollator.  Discussed working on eliminating some furniture down the road for safety in  apartment.

## 2025-01-03 LAB
ANION GAP SERPL CALC-SCNC: 3 MMOL/L (ref 2–12)
BASOPHILS # BLD: 0.1 K/UL (ref 0–0.1)
BASOPHILS NFR BLD: 1 % (ref 0–1)
BUN SERPL-MCNC: 20 MG/DL (ref 6–20)
BUN/CREAT SERPL: 32 (ref 12–20)
CALCIUM SERPL-MCNC: 8.8 MG/DL (ref 8.5–10.1)
CHLORIDE SERPL-SCNC: 106 MMOL/L (ref 97–108)
CO2 SERPL-SCNC: 30 MMOL/L (ref 21–32)
CREAT SERPL-MCNC: 0.63 MG/DL (ref 0.55–1.02)
DIFFERENTIAL METHOD BLD: NORMAL
EOSINOPHIL # BLD: 0.3 K/UL (ref 0–0.4)
EOSINOPHIL NFR BLD: 4 % (ref 0–7)
ERYTHROCYTE [DISTWIDTH] IN BLOOD BY AUTOMATED COUNT: 13 % (ref 11.5–14.5)
GLUCOSE SERPL-MCNC: 93 MG/DL (ref 65–100)
HCT VFR BLD AUTO: 35.9 % (ref 35–47)
HGB BLD-MCNC: 11.7 G/DL (ref 11.5–16)
IMM GRANULOCYTES # BLD AUTO: 0 K/UL (ref 0–0.04)
IMM GRANULOCYTES NFR BLD AUTO: 0 % (ref 0–0.5)
LYMPHOCYTES # BLD: 2.7 K/UL (ref 0.8–3.5)
LYMPHOCYTES NFR BLD: 34 % (ref 12–49)
MCH RBC QN AUTO: 29.9 PG (ref 26–34)
MCHC RBC AUTO-ENTMCNC: 32.6 G/DL (ref 30–36.5)
MCV RBC AUTO: 91.8 FL (ref 80–99)
MONOCYTES # BLD: 0.5 K/UL (ref 0–1)
MONOCYTES NFR BLD: 6 % (ref 5–13)
NEUTS SEG # BLD: 4.2 K/UL (ref 1.8–8)
NEUTS SEG NFR BLD: 55 % (ref 32–75)
NRBC # BLD: 0 K/UL (ref 0–0.01)
NRBC BLD-RTO: 0 PER 100 WBC
PLATELET # BLD AUTO: 173 K/UL (ref 150–400)
PMV BLD AUTO: 11.2 FL (ref 8.9–12.9)
POTASSIUM SERPL-SCNC: 4.9 MMOL/L (ref 3.5–5.1)
RBC # BLD AUTO: 3.91 M/UL (ref 3.8–5.2)
SODIUM SERPL-SCNC: 139 MMOL/L (ref 136–145)
WBC # BLD AUTO: 7.8 K/UL (ref 3.6–11)

## 2025-01-03 PROCEDURE — 2500000003 HC RX 250 WO HCPCS: Performed by: FAMILY MEDICINE

## 2025-01-03 PROCEDURE — 6370000000 HC RX 637 (ALT 250 FOR IP): Performed by: FAMILY MEDICINE

## 2025-01-03 PROCEDURE — 1100000000 HC RM PRIVATE

## 2025-01-03 PROCEDURE — 85025 COMPLETE CBC W/AUTO DIFF WBC: CPT

## 2025-01-03 PROCEDURE — 6370000000 HC RX 637 (ALT 250 FOR IP): Performed by: STUDENT IN AN ORGANIZED HEALTH CARE EDUCATION/TRAINING PROGRAM

## 2025-01-03 PROCEDURE — 80048 BASIC METABOLIC PNL TOTAL CA: CPT

## 2025-01-03 RX ADMIN — OXYCODONE HYDROCHLORIDE 10 MG: 5 TABLET ORAL at 04:42

## 2025-01-03 RX ADMIN — TETRAHYDROZOLINE HCL 1 DROP: 0.05 SOLUTION/ DROPS OPHTHALMIC at 20:47

## 2025-01-03 RX ADMIN — LEVOTHYROXINE SODIUM 75 MCG: 0.07 TABLET ORAL at 10:58

## 2025-01-03 RX ADMIN — OXYCODONE HYDROCHLORIDE 10 MG: 5 TABLET ORAL at 18:24

## 2025-01-03 RX ADMIN — METOPROLOL TARTRATE 50 MG: 25 TABLET, FILM COATED ORAL at 20:47

## 2025-01-03 RX ADMIN — PANTOPRAZOLE SODIUM 40 MG: 40 TABLET, DELAYED RELEASE ORAL at 04:43

## 2025-01-03 RX ADMIN — CLONIDINE HYDROCHLORIDE 0.2 MG: 0.2 TABLET ORAL at 10:58

## 2025-01-03 RX ADMIN — METOPROLOL TARTRATE 50 MG: 25 TABLET, FILM COATED ORAL at 10:58

## 2025-01-03 RX ADMIN — Medication 10 ML: at 20:47

## 2025-01-03 RX ADMIN — Medication 10 ML: at 11:03

## 2025-01-03 RX ADMIN — VALSARTAN 160 MG: 160 TABLET, FILM COATED ORAL at 06:12

## 2025-01-03 RX ADMIN — PREGABALIN 225 MG: 100 CAPSULE ORAL at 10:57

## 2025-01-03 RX ADMIN — ONDANSETRON 4 MG: 4 TABLET, ORALLY DISINTEGRATING ORAL at 18:41

## 2025-01-03 RX ADMIN — POLYETHYLENE GLYCOL 3350 17 G: 17 POWDER, FOR SOLUTION ORAL at 10:58

## 2025-01-03 RX ADMIN — CETIRIZINE HYDROCHLORIDE 5 MG: 10 TABLET, FILM COATED ORAL at 10:58

## 2025-01-03 RX ADMIN — DULOXETINE HYDROCHLORIDE 60 MG: 60 CAPSULE, DELAYED RELEASE ORAL at 10:56

## 2025-01-03 RX ADMIN — PREGABALIN 225 MG: 100 CAPSULE ORAL at 20:47

## 2025-01-03 RX ADMIN — CLONIDINE HYDROCHLORIDE 0.2 MG: 0.2 TABLET ORAL at 20:47

## 2025-01-03 RX ADMIN — ASPIRIN 81 MG CHEWABLE TABLET 81 MG: 81 TABLET CHEWABLE at 10:58

## 2025-01-03 RX ADMIN — TETRAHYDROZOLINE HCL 1 DROP: 0.05 SOLUTION/ DROPS OPHTHALMIC at 10:59

## 2025-01-03 ASSESSMENT — PAIN DESCRIPTION - LOCATION
LOCATION: BACK
LOCATION: ABDOMEN;BACK

## 2025-01-03 ASSESSMENT — PAIN SCALES - GENERAL
PAINLEVEL_OUTOF10: 9
PAINLEVEL_OUTOF10: 9
PAINLEVEL_OUTOF10: 0
PAINLEVEL_OUTOF10: 10
PAINLEVEL_OUTOF10: 0

## 2025-01-03 ASSESSMENT — PAIN DESCRIPTION - DESCRIPTORS: DESCRIPTORS: SPASM;TIGHTNESS

## 2025-01-03 ASSESSMENT — PAIN - FUNCTIONAL ASSESSMENT: PAIN_FUNCTIONAL_ASSESSMENT: ACTIVITIES ARE NOT PREVENTED

## 2025-01-03 ASSESSMENT — PAIN DESCRIPTION - ORIENTATION
ORIENTATION: ANTERIOR;POSTERIOR
ORIENTATION: RIGHT;LEFT

## 2025-01-03 ASSESSMENT — PAIN SCALES - WONG BAKER: WONGBAKER_NUMERICALRESPONSE: NO HURT

## 2025-01-03 NOTE — CARE COORDINATION
CARLEY:      Insurance auth approved for patient to admit to [facility] on Saturday. [facility] transport scheduled for 12p.m. Patient in agremeent with this plan.    [facility]  Care Management Department  Ph:[phone]

## 2025-01-03 NOTE — PROGRESS NOTES
Hospitalist Progress Note  Gabriel Nair MD  Answering service: [phone]        Date of Service:  1/3/2025  NAME:  Aparna Nath  :  1940  MRN:  [id number]      Admission Summary:     Patient admitted with bilateral lower extremity weakness and UTI.    Interval history / Subjective:     Overall patient is doing well no acute complaint at this time.     Assessment & Plan:     Urinary tract infection  -Initial urinalysis shows trace blood, moderate leukocyte esterase, pyuria and bacteriuria  -Urine culture grew mixed valentino  -Rocephin completed    Bilateral lower extremity weakness  -CT head negative for acute intracranial process  -Neurology evaluated the patient and negative for concerning neuroexam  -Neurology's impression was weakness probably related to UTI  -PT following, insurance authorization for SNF pending    Hypertension  -Continue clonidine, Toprol and Diovan  -Monitor blood pressure    GERD  -Continue PPI    Hypothyroidism  -Continue Synthroid    Mood disorder  -Continue Cymbalta    Obstructive sleep apnea  -Noncompliant with CPAP     Code status: Full  Prophylaxis: SCDs  Care Plan discussed with: Patient  Anticipated Disposition: Medically stable since , insurance authorization for SNF pending.  Central Line:   None             Review of Systems:   Pertinent items are noted in HPI.         Vital Signs:    Last 24hrs VS reviewed since prior progress note. Most recent are:  Vitals:    25 0702   BP: (!) 148/72   Pulse: 52   Resp:    Temp:    SpO2:          Intake/Output Summary (Last 24 hours) at 1/3/2025 1334  Last data filed at 2025 2158  Gross per 24 hour   Intake 10 ml   Output --   Net 10 ml        Physical Examination:     I had a face to face encounter with this patient and independently examined them on 1/3/2025 as outlined below:          General : alert x 3, awake, no acute      Current Facility-Administered Medications   Medication Dose Route Frequency    senna (SENOKOT) tablet 8.6 mg  1 tablet Oral Daily PRN    metoprolol tartrate (LOPRESSOR) tablet 50 mg  50 mg Oral BID    tetrahydrozoline 0.05 % ophthalmic solution 1 drop  1 drop Both Eyes BID    polyethylene glycol (GLYCOLAX) packet 17 g  17 g Oral Daily    sodium chloride flush 0.9 % injection 5-40 mL  5-40 mL IntraVENous 2 times per day    sodium chloride flush 0.9 % injection 5-40 mL  5-40 mL IntraVENous PRN    0.9 % sodium chloride infusion   IntraVENous PRN    ondansetron (ZOFRAN-ODT) disintegrating tablet 4 mg  4 mg Oral Q8H PRN    Or    ondansetron (ZOFRAN) injection 4 mg  4 mg IntraVENous Q6H PRN    acetaminophen (TYLENOL) tablet 650 mg  650 mg Oral Q6H PRN    Or    acetaminophen (TYLENOL) suppository 650 mg  650 mg Rectal Q6H PRN    albuterol sulfate HFA (PROVENTIL;VENTOLIN;PROAIR) 108 (90 Base) MCG/ACT inhaler 2 puff  2 puff Inhalation Q4H PRN    cetirizine (ZYRTEC) tablet 5 mg  5 mg Oral Daily    cyclobenzaprine (FLEXERIL) tablet 10 mg  10 mg Oral BID PRN    docusate sodium (COLACE) capsule 100 mg  100 mg Oral Daily PRN    melatonin tablet 9 mg  9 mg Oral QHS PRN    pregabalin (LYRICA) capsule 225 mg  225 mg Oral BID    DULoxetine (CYMBALTA) extended release capsule 60 mg  60 mg Oral Daily    cloNIDine (CATAPRES) tablet 0.2 mg  0.2 mg Oral BID    valsartan (DIOVAN) tablet 160 mg  160 mg Oral Daily    aspirin chewable tablet 81 mg  81 mg Oral Daily    levothyroxine (SYNTHROID) tablet 75 mcg  75 mcg Oral Daily    oxyCODONE (ROXICODONE) immediate release tablet 10 mg  10 mg Oral Q6H PRN    pantoprazole (PROTONIX) tablet 40 mg  40 mg Oral QAM AC     ______________________________________________________________________  EXPECTED LENGTH OF STAY: 6  ACTUAL LENGTH OF STAY:          1                 Gabriel Nair MD

## 2025-01-04 VITALS
HEART RATE: 52 BPM | RESPIRATION RATE: 16 BRPM | OXYGEN SATURATION: 94 % | HEIGHT: 63 IN | SYSTOLIC BLOOD PRESSURE: 136 MMHG | DIASTOLIC BLOOD PRESSURE: 77 MMHG | TEMPERATURE: 97.3 F | BODY MASS INDEX: 32.11 KG/M2 | WEIGHT: 181.22 LBS

## 2025-01-04 LAB
ANION GAP SERPL CALC-SCNC: 1 MMOL/L (ref 2–12)
BASOPHILS # BLD: 0 K/UL (ref 0–0.1)
BASOPHILS NFR BLD: 1 % (ref 0–1)
BUN SERPL-MCNC: 16 MG/DL (ref 6–20)
BUN/CREAT SERPL: 21 (ref 12–20)
CALCIUM SERPL-MCNC: 8.9 MG/DL (ref 8.5–10.1)
CHLORIDE SERPL-SCNC: 107 MMOL/L (ref 97–108)
CO2 SERPL-SCNC: 31 MMOL/L (ref 21–32)
CREAT SERPL-MCNC: 0.75 MG/DL (ref 0.55–1.02)
DIFFERENTIAL METHOD BLD: NORMAL
EOSINOPHIL # BLD: 0.3 K/UL (ref 0–0.4)
EOSINOPHIL NFR BLD: 4 % (ref 0–7)
ERYTHROCYTE [DISTWIDTH] IN BLOOD BY AUTOMATED COUNT: 13.2 % (ref 11.5–14.5)
GLUCOSE SERPL-MCNC: 99 MG/DL (ref 65–100)
HCT VFR BLD AUTO: 36.9 % (ref 35–47)
HGB BLD-MCNC: 12.1 G/DL (ref 11.5–16)
IMM GRANULOCYTES # BLD AUTO: 0 K/UL (ref 0–0.04)
IMM GRANULOCYTES NFR BLD AUTO: 0 % (ref 0–0.5)
LYMPHOCYTES # BLD: 2.8 K/UL (ref 0.8–3.5)
LYMPHOCYTES NFR BLD: 39 % (ref 12–49)
MCH RBC QN AUTO: 29.9 PG (ref 26–34)
MCHC RBC AUTO-ENTMCNC: 32.8 G/DL (ref 30–36.5)
MCV RBC AUTO: 91.1 FL (ref 80–99)
MONOCYTES # BLD: 0.5 K/UL (ref 0–1)
MONOCYTES NFR BLD: 6 % (ref 5–13)
NEUTS SEG # BLD: 3.6 K/UL (ref 1.8–8)
NEUTS SEG NFR BLD: 50 % (ref 32–75)
NRBC # BLD: 0 K/UL (ref 0–0.01)
NRBC BLD-RTO: 0 PER 100 WBC
PLATELET # BLD AUTO: 201 K/UL (ref 150–400)
PMV BLD AUTO: 10.4 FL (ref 8.9–12.9)
POTASSIUM SERPL-SCNC: 4.7 MMOL/L (ref 3.5–5.1)
RBC # BLD AUTO: 4.05 M/UL (ref 3.8–5.2)
SODIUM SERPL-SCNC: 139 MMOL/L (ref 136–145)
WBC # BLD AUTO: 7.3 K/UL (ref 3.6–11)

## 2025-01-04 PROCEDURE — 85025 COMPLETE CBC W/AUTO DIFF WBC: CPT

## 2025-01-04 PROCEDURE — 6370000000 HC RX 637 (ALT 250 FOR IP): Performed by: STUDENT IN AN ORGANIZED HEALTH CARE EDUCATION/TRAINING PROGRAM

## 2025-01-04 PROCEDURE — 2500000003 HC RX 250 WO HCPCS: Performed by: FAMILY MEDICINE

## 2025-01-04 PROCEDURE — 6370000000 HC RX 637 (ALT 250 FOR IP): Performed by: FAMILY MEDICINE

## 2025-01-04 PROCEDURE — 80048 BASIC METABOLIC PNL TOTAL CA: CPT

## 2025-01-04 RX ADMIN — METOPROLOL TARTRATE 50 MG: 25 TABLET, FILM COATED ORAL at 10:37

## 2025-01-04 RX ADMIN — CLONIDINE HYDROCHLORIDE 0.2 MG: 0.2 TABLET ORAL at 10:38

## 2025-01-04 RX ADMIN — PREGABALIN 225 MG: 100 CAPSULE ORAL at 10:37

## 2025-01-04 RX ADMIN — OXYCODONE HYDROCHLORIDE 10 MG: 5 TABLET ORAL at 12:15

## 2025-01-04 RX ADMIN — PANTOPRAZOLE SODIUM 40 MG: 40 TABLET, DELAYED RELEASE ORAL at 05:58

## 2025-01-04 RX ADMIN — VALSARTAN 160 MG: 160 TABLET, FILM COATED ORAL at 07:07

## 2025-01-04 RX ADMIN — ASPIRIN 81 MG CHEWABLE TABLET 81 MG: 81 TABLET CHEWABLE at 10:37

## 2025-01-04 RX ADMIN — TETRAHYDROZOLINE HCL 1 DROP: 0.05 SOLUTION/ DROPS OPHTHALMIC at 10:45

## 2025-01-04 RX ADMIN — CETIRIZINE HYDROCHLORIDE 5 MG: 10 TABLET, FILM COATED ORAL at 10:37

## 2025-01-04 RX ADMIN — POLYETHYLENE GLYCOL 3350 17 G: 17 POWDER, FOR SOLUTION ORAL at 10:37

## 2025-01-04 RX ADMIN — OXYCODONE HYDROCHLORIDE 10 MG: 5 TABLET ORAL at 05:58

## 2025-01-04 RX ADMIN — LEVOTHYROXINE SODIUM 75 MCG: 0.07 TABLET ORAL at 10:37

## 2025-01-04 RX ADMIN — ONDANSETRON 4 MG: 4 TABLET, ORALLY DISINTEGRATING ORAL at 12:15

## 2025-01-04 RX ADMIN — Medication 10 ML: at 10:45

## 2025-01-04 RX ADMIN — DULOXETINE HYDROCHLORIDE 60 MG: 60 CAPSULE, DELAYED RELEASE ORAL at 10:37

## 2025-01-04 ASSESSMENT — PAIN DESCRIPTION - LOCATION
LOCATION: BACK
LOCATION: BACK

## 2025-01-04 ASSESSMENT — PAIN DESCRIPTION - DESCRIPTORS
DESCRIPTORS: ACHING;DISCOMFORT
DESCRIPTORS: SPASM;TIGHTNESS

## 2025-01-04 ASSESSMENT — PAIN - FUNCTIONAL ASSESSMENT: PAIN_FUNCTIONAL_ASSESSMENT: ACTIVITIES ARE NOT PREVENTED

## 2025-01-04 ASSESSMENT — PAIN DESCRIPTION - ORIENTATION: ORIENTATION: LEFT;RIGHT

## 2025-01-04 ASSESSMENT — PAIN SCALES - GENERAL: PAINLEVEL_OUTOF10: 10

## 2025-01-04 NOTE — PROGRESS NOTES
Per MD order, PT received AVS documentation for discharge (DC). Provided PT education on medications, follow-up appointments & s/sx of infection. Allowed PT time for questions and PT verbalized understanding. All lines were removed (intact) prior to DC. PT was transported to [facility] via transpiration service, Hospital [facility].     Room #314B  [phone]     No cyanosis, no pallor, no jaundice, no rash

## 2025-01-04 NOTE — PLAN OF CARE
Problem: Discharge Planning  Goal: Discharge to home or other facility with appropriate resources  1/4/2025 1153 by Tati Owens RN  Outcome: Adequate for Discharge  Flowsheets (Taken 1/4/2025 1036)  Discharge to home or other facility with appropriate resources: Refer to discharge planning if patient needs post-hospital services based on physician order or complex needs related to functional status, cognitive ability or social support system  1/3/2025 2159 by Shima Tavares RN  Outcome: Progressing  Flowsheets (Taken 1/3/2025 1056 by Tati Owens RN)  Discharge to home or other facility with appropriate resources: Identify barriers to discharge with patient and caregiver     Problem: ABCDS Injury Assessment  Goal: Absence of physical injury  1/4/2025 1153 by Tati Ownes RN  Outcome: Adequate for Discharge  1/3/2025 2159 by Shima Tavares RN  Outcome: Progressing     Problem: Safety - Adult  Goal: Free from fall injury  1/4/2025 1153 by Tati Owens RN  Outcome: Adequate for Discharge  1/3/2025 2159 by Shima Tavares RN  Outcome: Progressing     Problem: Pain  Goal: Verbalizes/displays adequate comfort level or baseline comfort level  1/4/2025 1153 by Tati Owens RN  Outcome: Adequate for Discharge  1/3/2025 2159 by Shima Tavares RN  Outcome: Progressing     Problem: Nutrition Deficit:  Goal: Optimize nutritional status  1/4/2025 1153 by Tati Owens RN  Outcome: Adequate for Discharge  1/3/2025 2159 by Shima Tavares RN  Outcome: Progressing

## 2025-01-04 NOTE — DISCHARGE SUMMARY
Discharge Summary       PATIENT ID: Aparna Nath  MRN: [id number]   YOB: 1940    DATE OF ADMISSION: 12/28/2024  3:00 PM    DATE OF DISCHARGE: 1/4/2025   PRIMARY CARE PROVIDER: Will Pringle MD     ATTENDING PHYSICIAN: Gabriel Nair  DISCHARGING PROVIDER: Gabriel Nair MD      CONSULTATIONS: IP CONSULT TO NEUROLOGY  IP CONSULT TO DIETITIAN    PROCEDURES/SURGERIES: * No surgery found *    ADMISSION SUMMARY AND HOSPITAL COURSE:     Aparna Nath is a 84 y.o. female with past medical history of CAD, MI, PTCA stents, hypertension, hyperlipidemia, EMILIA, dyspepsia, GERD, fibromyalgia, chronic pain, lumbar laminectomy/fusion presented to the emergency department with chief complaint of diffuse back pain and fatigue.  Patient notes that she has chronic back pain with history of extensive spinal fusion in the past.  However over the past week she has felt gait imbalance with unsteady gait, increased weakness and numbness atrophy of both feet.  She notes she has had at least 2 near falls.  At baseline she normally ambulates around the house unassisted.  However most recently she has been using her roller walker more frequently.  She has diffuse fatigue and concerned that she may fall.  She also complains of ongoing chronic abdominal pain with approximately 40 pound weight loss within the past 12 months which she attributes to known history of gastroparesis.  She is notably followed by gastroenterologist Dr. Padilla, as an outpatient.  She denies saddle anesthesia.  She does not report any recent fall or trauma.  On arrival in the ED, initial ported vital signs temperature 97.2 °F, /87, heart rate 56, respiratory rate 18, O2 saturation 94% on room air.  12-lead EKG showed sinus bradycardia, first-degree AV block, LVH, ST/T wave changes in the anterior leads at 55 bpm.  Chest x-ray 2 view showed increased mid thoracic spine DDD but no acute process.  CT abdomen pelvis with IV contrast showed moderate

## 2025-01-05 ENCOUNTER — OFFICE VISIT (OUTPATIENT)
Facility: CLINIC | Age: 85
End: 2025-01-05

## 2025-01-05 VITALS
BODY MASS INDEX: 31.04 KG/M2 | DIASTOLIC BLOOD PRESSURE: 60 MMHG | OXYGEN SATURATION: 95 % | WEIGHT: 175.2 LBS | TEMPERATURE: 98 F | RESPIRATION RATE: 13 BRPM | HEART RATE: 49 BPM | SYSTOLIC BLOOD PRESSURE: 132 MMHG

## 2025-01-05 DIAGNOSIS — D50.8 IRON DEFICIENCY ANEMIA SECONDARY TO INADEQUATE DIETARY IRON INTAKE: Chronic | ICD-10-CM

## 2025-01-05 DIAGNOSIS — G47.33 MILD OBSTRUCTIVE SLEEP APNEA: Chronic | ICD-10-CM

## 2025-01-05 DIAGNOSIS — K21.9 GASTROESOPHAGEAL REFLUX DISEASE WITHOUT ESOPHAGITIS: Chronic | ICD-10-CM

## 2025-01-05 DIAGNOSIS — R65.21 SEPTIC SHOCK DUE TO URINARY TRACT INFECTION (HCC): Primary | ICD-10-CM

## 2025-01-05 DIAGNOSIS — M48.062 SPINAL STENOSIS, LUMBAR REGION, WITH NEUROGENIC CLAUDICATION: Chronic | ICD-10-CM

## 2025-01-05 DIAGNOSIS — I10 ESSENTIAL HYPERTENSION: Chronic | ICD-10-CM

## 2025-01-05 DIAGNOSIS — M51.362 DEGENERATION OF INTERVERTEBRAL DISC OF LUMBAR REGION WITH DISCOGENIC BACK PAIN AND LOWER EXTREMITY PAIN: Chronic | ICD-10-CM

## 2025-01-05 DIAGNOSIS — G89.29 CHRONIC LEFT-SIDED LOW BACK PAIN WITH LEFT-SIDED SCIATICA: Chronic | ICD-10-CM

## 2025-01-05 DIAGNOSIS — M54.42 CHRONIC LEFT-SIDED LOW BACK PAIN WITH LEFT-SIDED SCIATICA: Chronic | ICD-10-CM

## 2025-01-05 DIAGNOSIS — E78.00 HYPERCHOLESTEREMIA: Chronic | ICD-10-CM

## 2025-01-05 DIAGNOSIS — N39.0 SEPTIC SHOCK DUE TO URINARY TRACT INFECTION (HCC): Primary | ICD-10-CM

## 2025-01-05 DIAGNOSIS — A41.9 SEPTIC SHOCK DUE TO URINARY TRACT INFECTION (HCC): Primary | ICD-10-CM

## 2025-01-05 DIAGNOSIS — I25.10 CORONARY ARTERY DISEASE INVOLVING NATIVE CORONARY ARTERY OF NATIVE HEART WITHOUT ANGINA PECTORIS: Chronic | ICD-10-CM

## 2025-01-05 PROBLEM — R31.9 URINARY TRACT INFECTION WITH HEMATURIA, SITE UNSPECIFIED: Status: RESOLVED | Noted: 2024-12-28 | Resolved: 2025-01-05

## 2025-01-06 ENCOUNTER — TELEPHONE (OUTPATIENT)
Facility: CLINIC | Age: 85
End: 2025-01-06

## 2025-01-06 RX ORDER — LEVOTHYROXINE SODIUM 75 UG/1
75 TABLET ORAL DAILY
Qty: 90 TABLET | Refills: 1 | Status: SHIPPED | OUTPATIENT
Start: 2025-01-06

## 2025-01-06 NOTE — TELEPHONE ENCOUNTER
Patient called stating she was in [facility] x 7 days and now in rehab [facility]  SAMMY

## 2025-01-07 ENCOUNTER — OFFICE VISIT (OUTPATIENT)
Facility: CLINIC | Age: 85
End: 2025-01-07

## 2025-01-07 DIAGNOSIS — F32.A ANXIETY AND DEPRESSION: Primary | Chronic | ICD-10-CM

## 2025-01-07 DIAGNOSIS — I10 ESSENTIAL HYPERTENSION: ICD-10-CM

## 2025-01-07 DIAGNOSIS — K21.9 GASTROESOPHAGEAL REFLUX DISEASE WITHOUT ESOPHAGITIS: Chronic | ICD-10-CM

## 2025-01-07 DIAGNOSIS — F41.9 ANXIETY AND DEPRESSION: Primary | Chronic | ICD-10-CM

## 2025-01-07 DIAGNOSIS — N39.0 URINARY TRACT INFECTION WITHOUT HEMATURIA, SITE UNSPECIFIED: ICD-10-CM

## 2025-01-07 DIAGNOSIS — I50.9 CONGESTIVE HEART FAILURE, UNSPECIFIED HF CHRONICITY, UNSPECIFIED HEART FAILURE TYPE (HCC): ICD-10-CM

## 2025-01-09 ENCOUNTER — OFFICE VISIT (OUTPATIENT)
Facility: CLINIC | Age: 85
End: 2025-01-09

## 2025-01-09 DIAGNOSIS — F32.A ANXIETY AND DEPRESSION: Primary | Chronic | ICD-10-CM

## 2025-01-09 DIAGNOSIS — F41.9 ANXIETY AND DEPRESSION: Primary | Chronic | ICD-10-CM

## 2025-01-09 DIAGNOSIS — N39.0 URINARY TRACT INFECTION WITHOUT HEMATURIA, SITE UNSPECIFIED: ICD-10-CM

## 2025-01-09 DIAGNOSIS — I10 ESSENTIAL HYPERTENSION: ICD-10-CM

## 2025-01-09 PROBLEM — I50.9 CONGESTIVE HEART FAILURE (HCC): Status: ACTIVE | Noted: 2025-01-09

## 2025-01-09 NOTE — ASSESSMENT & PLAN NOTE
Patient continues on Cymbalta 60 mg daily for history of depression in-house psych NP to evaluate and treat as needed.

## 2025-01-09 NOTE — PROGRESS NOTES
PLACE OF SERVICE:  [facility] [address] [place] [address]    SKILLED VISIT        Chief Complaint: Generalized weakness secondary to UTI without hematuria    HPI : Aparna Nath is a 84 y.o. female patient seen today for follow-up.  Patient admitted to facility from hospital secondary as a result generalized weakness secondary to UTI without hematuria. Patient denies symptoms respiratory distress, lungs clear upon assessment. She continues on Albuterol Sulfate 2 puffs every 4 hours as needed and Cetirizine 10 mg daily for allergies. For history of hypertension patient remains stable on metoprolol 100 mg, clonidine 0.2 mg and Losartan Potassium 100 MG,  current blood pressure 117/68 heart rate of 78. She continues on Bumex 2 mg daily for CHFand aspirin 81 mg for heart health  no signs symptom of edema noted.  Patient continues on oxycodone 10 mg every 6 hours as needed, Lyrica to 225 mg twice a day and Tylenol 650 mg every 6 hours as needed for pain. She also remains stable on Flexeril 10 mg daily.  History of hypothyroidism she remains stable on current Synthroid 75 mcg daily. Patient continues on Cymbalta 60 mg daily for history of depression in-house psych NP to evaluate and treat as needed. Patient reports insomnia new order placed for melatonin 15 mg at bedtime.  New concerns reported from patient or nursing staff.  Patient continued to participate in therapy session, notes reviewed.  Will continue to monitor patient closely.     PMH:  Hypertension, CAD, MI with PTIA  stents, Hyperlipidemia,  Fibromyalgia/chronic back pain, Obstructive sleep apnea not on CPAP  Unintentional weight loss, GERD    Social History / Family History:      Medications: Reviewed in EMR and assessment and plan    ROS:   The following system review was negative:  Constitutional; Respiratory; Cardiovascular; Genitourinary; Gastrointestinal; Psychiatric; Ear-Nose-Throat; Musculoskeletal;

## 2025-01-09 NOTE — ASSESSMENT & PLAN NOTE
remains stable on metoprolol 100 mg, clonidine 0.2 mg and Losartan Potassium 100 MG,  current blood pressure 117/68 heart rate of 78.

## 2025-01-10 NOTE — ASSESSMENT & PLAN NOTE
Patient remained stable on current dose of  metoprolol 100 mg, clonidine 0.2 mg and Losartan Potassium 100 MG,

## 2025-01-10 NOTE — PROGRESS NOTES
PLACE OF SERVICE:  [facility] [address] [place] [address]    SKILLED VISIT        Chief Complaint: Generalized weakness secondary to UTI without hematuria    HPI : Aparna Nath is a 84 y.o. female patient seen today for follow-up.  Patient admitted to facility from hospital secondary as a result generalized weakness secondary to UTI without hematuria. Patient denies symptoms respiratory distress, lungs clear upon assessment. She continues on Albuterol Sulfate 2 puffs every 4 hours as needed and Cetirizine 10 mg daily for allergies. For history of hypertension patient remains stable on metoprolol 100 mg, clonidine 0.2 mg and Losartan Potassium 100 MG,  current blood pressure 117/68 heart rate of 78. She continues on Bumex 2 mg daily for CHFand aspirin 81 mg for heart health  no signs symptom of edema noted.  Patient continues on oxycodone 10 mg every 6 hours as needed, Lyrica to 225 mg twice a day and Tylenol 650 mg every 6 hours as needed for pain. She also remains stable on Flexeril 10 mg daily.  History of hypothyroidism she remains stable on current Synthroid 75 mcg daily. Patient continues on Cymbalta 60 mg daily for history of depression in-house psych NP to evaluate and treat as needed. Patient reports insomnia new order placed for melatonin 15 mg at bedtime.  New concerns reported from patient or nursing staff.  Patient continued to participate in therapy session, notes reviewed.  Will continue to monitor patient closely.       1/9/2025  Patient sitting out of bed to wheelchair she is alert and oriented x 4 able to make her needs known.  No signs of respiratory distress noted upon assessment patient states that she is feeling somewhat forgetful believe that her UTI might have returned.  Patient was scheduled to continue Keflex 500 mg every 6 hours x 4 days as per discharge summary. New order placed for Keflex to be restarted x 4 days.  Patient remains motivated

## 2025-01-16 ENCOUNTER — OFFICE VISIT (OUTPATIENT)
Facility: CLINIC | Age: 85
End: 2025-01-16

## 2025-01-16 DIAGNOSIS — F41.9 ANXIETY AND DEPRESSION: Primary | Chronic | ICD-10-CM

## 2025-01-16 DIAGNOSIS — F32.A ANXIETY AND DEPRESSION: Primary | Chronic | ICD-10-CM

## 2025-01-16 DIAGNOSIS — N39.0 URINARY TRACT INFECTION WITHOUT HEMATURIA, SITE UNSPECIFIED: ICD-10-CM

## 2025-01-16 DIAGNOSIS — I50.9 ACUTE ON CHRONIC CONGESTIVE HEART FAILURE, UNSPECIFIED HEART FAILURE TYPE (HCC): ICD-10-CM

## 2025-01-16 DIAGNOSIS — I10 ESSENTIAL HYPERTENSION: ICD-10-CM

## 2025-01-17 NOTE — PROGRESS NOTES
PLACE OF SERVICE:  [facility] [address] [place] [address]    SKILLED VISIT        Chief Complaint: Generalized weakness secondary to UTI without hematuria    HPI : Aparna Nath is a 84 y.o. female patient seen today for follow-up.  Patient admitted to facility from hospital secondary as a result generalized weakness secondary to UTI without hematuria. Patient denies symptoms respiratory distress, lungs clear upon assessment. She continues on Albuterol Sulfate 2 puffs every 4 hours as needed and Cetirizine 10 mg daily for allergies. For history of hypertension patient remains stable on metoprolol 100 mg, clonidine 0.2 mg and Losartan Potassium 100 MG,  current blood pressure 117/68 heart rate of 78. She continues on Bumex 2 mg daily for CHFand aspirin 81 mg for heart health  no signs symptom of edema noted.  Patient continues on oxycodone 10 mg every 6 hours as needed, Lyrica to 225 mg twice a day and Tylenol 650 mg every 6 hours as needed for pain. She also remains stable on Flexeril 10 mg daily.  History of hypothyroidism she remains stable on current Synthroid 75 mcg daily. Patient continues on Cymbalta 60 mg daily for history of depression in-house psych NP to evaluate and treat as needed. Patient reports insomnia new order placed for melatonin 15 mg at bedtime.  New concerns reported from patient or nursing staff.  Patient continued to participate in therapy session, notes reviewed.  Will continue to monitor patient closely.       1/9/2025  Patient sitting out of bed to wheelchair she is alert and oriented x 4 able to make her needs known.  No signs of respiratory distress noted upon assessment patient states that she is feeling somewhat forgetful believe that her UTI might have returned.  Patient was scheduled to continue Keflex 500 mg every 6 hours x 4 days as per discharge summary. New order placed for Keflex to be restarted x 4 days.  Patient remains motivated

## 2025-01-21 ENCOUNTER — OFFICE VISIT (OUTPATIENT)
Facility: CLINIC | Age: 85
End: 2025-01-21

## 2025-01-21 DIAGNOSIS — I10 ESSENTIAL HYPERTENSION: ICD-10-CM

## 2025-01-21 DIAGNOSIS — F41.9 ANXIETY AND DEPRESSION: Primary | Chronic | ICD-10-CM

## 2025-01-21 DIAGNOSIS — F32.A ANXIETY AND DEPRESSION: Primary | Chronic | ICD-10-CM

## 2025-01-21 DIAGNOSIS — R19.5 LOOSE BOWEL MOVEMENTS: ICD-10-CM

## 2025-01-22 NOTE — PROGRESS NOTES
EMR and assessment and plan    ROS:   The following system review was negative:  Constitutional; Respiratory; Cardiovascular; Genitourinary; Gastrointestinal; Psychiatric; Ear-Nose-Throat; Musculoskeletal; Neurologic; Endocrine; Hematologic; Skin; Eyes; denies any    Vitals:   Blood pressure 100/53 temperature 97 heart rate 59 respiration 20 O2 sat 96% on room air    Exam:  Constitutional: No acute distress;   Eyes: Sclera clear, PERRLA;   Ears/nose/mouth/throat:mmm, OP clear, trachea midline;  Cardiovascular: RRR,nml S1 and S2, no rubs murmurs or gallops, no edema, no cyanosis;   Respiratory: Clear to auscultation, symmetric, no respiratory distress;  Gastrointestinal: Abdomen soft, NT, ND, no masses, normal bowel sounds;  Neurologic: Cranial nerves II through VII grossly intact, no speech or motor deficits A&O in time place and person  Skin: No rash, warm and dry;  Musculoskeletal: No erythema swelling or joint tenderness, extremities without cyanosis, neck supple, ROM intact spine and extremities;  Psychiatric: Not agitated.  Appropriate affect, mood, judgment and insight.  Genitourinary: No suprapubic tenderness or flank tenderness  Heme, lymph, immuno: No pallor;     Labs:    As of 1/8/2025 BUN 19 creatinine 0.80 potassium 4.0 sodium 142 hemoglobin 12.6 and hematocrit 39.1    Assessment/Plans:   1. Anxiety and depression  Assessment & Plan:  Patient continues on Cymbalta 60 mg daily for history of depression in-house psych NP to evaluate and treat as needed.   2. Essential hypertension  Assessment & Plan:  Patient remained stable on current dose of metoprolol 100 mg, clonidine 0.2 mg and Losartan Potassium 100 MG,   3. Loose bowel movements  Assessment & Plan:  New order placed for probiotics 1 tab daily for gut health and Imodium 2 mg after each bowel movement up to 16 mg/day       Ana Rose, APRN - CNP

## 2025-01-22 NOTE — ASSESSMENT & PLAN NOTE
New order placed for probiotics 1 tab daily for gut health and Imodium 2 mg after each bowel movement up to 16 mg/day

## 2025-01-23 ENCOUNTER — OFFICE VISIT (OUTPATIENT)
Facility: CLINIC | Age: 85
End: 2025-01-23
Payer: MEDICARE

## 2025-01-23 DIAGNOSIS — K21.9 GASTROESOPHAGEAL REFLUX DISEASE WITHOUT ESOPHAGITIS: Chronic | ICD-10-CM

## 2025-01-23 DIAGNOSIS — F32.A ANXIETY AND DEPRESSION: Primary | Chronic | ICD-10-CM

## 2025-01-23 DIAGNOSIS — I50.9 ACUTE ON CHRONIC CONGESTIVE HEART FAILURE, UNSPECIFIED HEART FAILURE TYPE (HCC): ICD-10-CM

## 2025-01-23 DIAGNOSIS — F41.9 ANXIETY AND DEPRESSION: Primary | Chronic | ICD-10-CM

## 2025-01-23 DIAGNOSIS — I10 ESSENTIAL HYPERTENSION: ICD-10-CM

## 2025-01-23 PROCEDURE — 99316 NF DSCHRG MGMT 30 MIN+: CPT | Performed by: NURSE PRACTITIONER

## 2025-01-24 DIAGNOSIS — G89.29 OTHER CHRONIC PAIN: Primary | ICD-10-CM

## 2025-01-24 DIAGNOSIS — M62.838 MUSCLE SPASM: ICD-10-CM

## 2025-01-24 RX ORDER — CLONIDINE HYDROCHLORIDE 0.2 MG/1
0.2 TABLET ORAL 2 TIMES DAILY
Qty: 60 TABLET | Refills: 0 | Status: SHIPPED | OUTPATIENT
Start: 2025-01-24 | End: 2025-02-23

## 2025-01-24 RX ORDER — CYCLOBENZAPRINE HCL 10 MG
10 TABLET ORAL 2 TIMES DAILY
Qty: 60 TABLET | Refills: 0 | Status: SHIPPED | OUTPATIENT
Start: 2025-01-24 | End: 2025-02-23

## 2025-01-24 RX ORDER — LEVOTHYROXINE SODIUM 75 UG/1
75 TABLET ORAL DAILY
Qty: 30 TABLET | Refills: 0 | Status: SHIPPED | OUTPATIENT
Start: 2025-01-24 | End: 2025-02-23

## 2025-01-24 RX ORDER — ACETAMINOPHEN 325 MG/1
650 TABLET ORAL EVERY 6 HOURS PRN
Status: SHIPPED | OUTPATIENT
Start: 2025-01-24

## 2025-01-24 RX ORDER — ALBUTEROL SULFATE 90 UG/1
2 INHALANT RESPIRATORY (INHALATION) EVERY 4 HOURS PRN
Qty: 18 G | Refills: 0 | Status: SHIPPED | OUTPATIENT
Start: 2025-01-24 | End: 2025-02-23

## 2025-01-24 RX ORDER — BUMETANIDE 2 MG/1
2 TABLET ORAL DAILY
Qty: 30 TABLET | Refills: 0 | Status: SHIPPED | OUTPATIENT
Start: 2025-01-24 | End: 2025-02-23

## 2025-01-24 RX ORDER — TRIAMCINOLONE ACETONIDE 1 MG/G
OINTMENT TOPICAL 2 TIMES DAILY
Qty: 80 G | Refills: 0 | Status: SHIPPED | OUTPATIENT
Start: 2025-01-24 | End: 2025-02-23

## 2025-01-24 RX ORDER — OXYCODONE HYDROCHLORIDE 10 MG/1
10 TABLET ORAL EVERY 6 HOURS PRN
Qty: 20 TABLET | Refills: 0 | Status: SHIPPED | OUTPATIENT
Start: 2025-01-24 | End: 2025-01-29

## 2025-01-24 RX ORDER — PREGABALIN 225 MG/1
225 CAPSULE ORAL 2 TIMES DAILY
Qty: 60 CAPSULE | Refills: 0 | Status: SHIPPED | OUTPATIENT
Start: 2025-01-24 | End: 2025-02-23

## 2025-01-24 RX ORDER — CETIRIZINE HYDROCHLORIDE 10 MG/1
10 TABLET ORAL DAILY
Qty: 30 TABLET | Refills: 0 | Status: SHIPPED | OUTPATIENT
Start: 2025-01-24 | End: 2025-02-23

## 2025-01-24 RX ORDER — DULOXETIN HYDROCHLORIDE 60 MG/1
60 CAPSULE, DELAYED RELEASE ORAL DAILY
Qty: 30 CAPSULE | Refills: 0 | Status: SHIPPED | OUTPATIENT
Start: 2025-01-24 | End: 2025-02-23

## 2025-01-24 RX ORDER — ASPIRIN 81 MG/1
81 TABLET, CHEWABLE ORAL DAILY
Qty: 30 TABLET | Refills: 0 | Status: SHIPPED | OUTPATIENT
Start: 2025-01-24 | End: 2025-02-23

## 2025-01-24 RX ORDER — METOPROLOL TARTRATE 100 MG/1
100 TABLET ORAL 2 TIMES DAILY
Qty: 60 TABLET | Refills: 0 | Status: SHIPPED | OUTPATIENT
Start: 2025-01-24 | End: 2025-02-23

## 2025-01-24 RX ORDER — PRAZOSIN HYDROCHLORIDE 2 MG/1
2 CAPSULE ORAL NIGHTLY
Qty: 30 CAPSULE | Refills: 0 | Status: SHIPPED | OUTPATIENT
Start: 2025-01-24 | End: 2025-02-23

## 2025-01-24 RX ORDER — LOSARTAN POTASSIUM 100 MG/1
100 TABLET ORAL DAILY
Qty: 30 TABLET | Refills: 0 | Status: SHIPPED | OUTPATIENT
Start: 2025-01-24 | End: 2025-02-23

## 2025-01-24 NOTE — PROGRESS NOTES
Place of Service:  [facility] [address] [place] [address]                                                                        Discharge Summary       Admit Dx: Generalized weakness and UTI    Disposition: Patient scheduled for discharge home on 1/24/2025.  Patient will be transported home by a friend.  Patient will receive home health services through [facility] by [facility] for OT and PT. reported and bedside commode was ordered to [facility].  Medication sent electronically to pharmacy of patient's choice     Presentation: Aparna Nath is a 84 y.o. female who presents for admission to [facility] following a recent admission to the hospital for several medical issues including Urosepsis. She was quite ill initially, but is slowly improving. She denies any current  sx.      She has a hx of CAD, HTN and DDD. She denies any current CP sx.      The patient has a number of chronic medical problems as listed above.     The patient requires Skilled Nursing Facility care for rehabilitation due to multiple medical issues as noted.      The history is obtained from the patient, family and previous records and is felt to be satisfactory to establish an acceptable plan of care. Health status indicators were reviewed and there are no changes except as noted above. The past medical history, family history, social history and ethnic considerations have been updated as needed. The patient denies any constitutional, ENT, cardiopulmonary, gastrointestinal, or genitourinary issues otherwise as review in the ROS.      Discharge time : Patient scheduled to the facility by 1 PM    Brief SNF Course:  This is an 84 y.o. female patient admitted to facility from hospital secondary as a result generalized weakness secondary to UTI without hematuria, continues on Keflex 500 mg of 6 hours x 4 days without any adverse reaction.  Patient denies symptoms respiratory distress,

## 2025-02-01 PROBLEM — N39.0 UTI (URINARY TRACT INFECTION): Status: RESOLVED | Noted: 2025-01-02 | Resolved: 2025-02-01

## 2025-03-06 ENCOUNTER — APPOINTMENT (OUTPATIENT)
Facility: HOSPITAL | Age: 85
End: 2025-03-06
Payer: MEDICARE

## 2025-03-06 ENCOUNTER — HOSPITAL ENCOUNTER (INPATIENT)
Facility: HOSPITAL | Age: 85
LOS: 2 days | Discharge: HOME HEALTH | End: 2025-03-08
Attending: EMERGENCY MEDICINE | Admitting: FAMILY MEDICINE
Payer: MEDICARE

## 2025-03-06 ENCOUNTER — OFFICE VISIT (OUTPATIENT)
Facility: CLINIC | Age: 85
End: 2025-03-06

## 2025-03-06 VITALS
WEIGHT: 169 LBS | BODY MASS INDEX: 29.95 KG/M2 | SYSTOLIC BLOOD PRESSURE: 154 MMHG | OXYGEN SATURATION: 93 % | DIASTOLIC BLOOD PRESSURE: 86 MMHG | HEART RATE: 102 BPM | RESPIRATION RATE: 16 BRPM | HEIGHT: 63 IN | TEMPERATURE: 98.7 F

## 2025-03-06 DIAGNOSIS — N39.0 URINARY TRACT INFECTION WITHOUT HEMATURIA, SITE UNSPECIFIED: ICD-10-CM

## 2025-03-06 DIAGNOSIS — I10 ESSENTIAL HYPERTENSION: ICD-10-CM

## 2025-03-06 DIAGNOSIS — G89.29 OTHER CHRONIC PAIN: ICD-10-CM

## 2025-03-06 DIAGNOSIS — I10 HYPERTENSION, UNSPECIFIED TYPE: Primary | ICD-10-CM

## 2025-03-06 DIAGNOSIS — Z13.6 SCREENING FOR CARDIOVASCULAR CONDITION: ICD-10-CM

## 2025-03-06 DIAGNOSIS — R73.9 HYPERGLYCEMIA, UNSPECIFIED: ICD-10-CM

## 2025-03-06 DIAGNOSIS — R41.0 CONFUSION: ICD-10-CM

## 2025-03-06 DIAGNOSIS — D50.9 IRON DEFICIENCY ANEMIA, UNSPECIFIED IRON DEFICIENCY ANEMIA TYPE: ICD-10-CM

## 2025-03-06 DIAGNOSIS — Z00.00 MEDICARE ANNUAL WELLNESS VISIT, SUBSEQUENT: Primary | ICD-10-CM

## 2025-03-06 PROBLEM — R41.82 AMS (ALTERED MENTAL STATUS): Status: ACTIVE | Noted: 2025-03-06

## 2025-03-06 LAB
ALBUMIN SERPL-MCNC: 3.8 G/DL (ref 3.5–5)
ALBUMIN SERPL-MCNC: 3.9 G/DL (ref 3.5–5)
ALBUMIN/GLOB SERPL: 1.1 (ref 1.1–2.2)
ALBUMIN/GLOB SERPL: 1.2 (ref 1.1–2.2)
ALP SERPL-CCNC: 66 U/L (ref 45–117)
ALP SERPL-CCNC: 75 U/L (ref 45–117)
ALT SERPL-CCNC: 18 U/L (ref 12–78)
ALT SERPL-CCNC: 18 U/L (ref 12–78)
ANION GAP SERPL CALC-SCNC: 5 MMOL/L (ref 2–12)
ANION GAP SERPL CALC-SCNC: 7 MMOL/L (ref 2–12)
APPEARANCE UR: ABNORMAL
AST SERPL-CCNC: 22 U/L (ref 15–37)
AST SERPL-CCNC: 24 U/L (ref 15–37)
BACTERIA URNS QL MICRO: ABNORMAL /HPF
BASOPHILS # BLD: 0.03 K/UL (ref 0–0.1)
BASOPHILS NFR BLD: 0.3 % (ref 0–1)
BILIRUB SERPL-MCNC: 0.7 MG/DL (ref 0.2–1)
BILIRUB SERPL-MCNC: 0.7 MG/DL (ref 0.2–1)
BILIRUB UR QL: NEGATIVE
BUN SERPL-MCNC: 23 MG/DL (ref 6–20)
BUN SERPL-MCNC: 26 MG/DL (ref 6–20)
BUN/CREAT SERPL: 30 (ref 12–20)
BUN/CREAT SERPL: 32 (ref 12–20)
CALCIUM SERPL-MCNC: 9.3 MG/DL (ref 8.5–10.1)
CALCIUM SERPL-MCNC: 9.6 MG/DL (ref 8.5–10.1)
CHLORIDE SERPL-SCNC: 108 MMOL/L (ref 97–108)
CHLORIDE SERPL-SCNC: 108 MMOL/L (ref 97–108)
CO2 SERPL-SCNC: 25 MMOL/L (ref 21–32)
CO2 SERPL-SCNC: 26 MMOL/L (ref 21–32)
COLOR UR: ABNORMAL
CREAT SERPL-MCNC: 0.72 MG/DL (ref 0.55–1.02)
CREAT SERPL-MCNC: 0.86 MG/DL (ref 0.55–1.02)
DIFFERENTIAL METHOD BLD: NORMAL
EKG ATRIAL RATE: 97 BPM
EKG DIAGNOSIS: NORMAL
EKG P AXIS: 60 DEGREES
EKG P-R INTERVAL: 248 MS
EKG Q-T INTERVAL: 352 MS
EKG QRS DURATION: 90 MS
EKG QTC CALCULATION (BAZETT): 447 MS
EKG R AXIS: -38 DEGREES
EKG T AXIS: 95 DEGREES
EKG VENTRICULAR RATE: 97 BPM
EOSINOPHIL # BLD: 0.07 K/UL (ref 0–0.4)
EOSINOPHIL NFR BLD: 0.7 % (ref 0–7)
EPITH CASTS URNS QL MICRO: ABNORMAL /LPF
ERYTHROCYTE [DISTWIDTH] IN BLOOD BY AUTOMATED COUNT: 13.4 % (ref 11.5–14.5)
FLUAV RNA SPEC QL NAA+PROBE: NOT DETECTED
FLUBV RNA SPEC QL NAA+PROBE: NOT DETECTED
GLOBULIN SER CALC-MCNC: 3.1 G/DL (ref 2–4)
GLOBULIN SER CALC-MCNC: 3.5 G/DL (ref 2–4)
GLUCOSE BLD STRIP.AUTO-MCNC: 89 MG/DL (ref 65–117)
GLUCOSE SERPL-MCNC: 110 MG/DL (ref 65–100)
GLUCOSE SERPL-MCNC: 87 MG/DL (ref 65–100)
GLUCOSE UR STRIP.AUTO-MCNC: NEGATIVE MG/DL
HCT VFR BLD AUTO: 39.6 % (ref 35–47)
HGB BLD-MCNC: 13.3 G/DL (ref 11.5–16)
HGB UR QL STRIP: NEGATIVE
IMM GRANULOCYTES # BLD AUTO: 0.03 K/UL (ref 0–0.04)
IMM GRANULOCYTES NFR BLD AUTO: 0.3 % (ref 0–0.5)
KETONES UR QL STRIP.AUTO: NEGATIVE MG/DL
LEUKOCYTE ESTERASE UR QL STRIP.AUTO: ABNORMAL
LYMPHOCYTES # BLD: 1.77 K/UL (ref 0.8–3.5)
LYMPHOCYTES NFR BLD: 18.6 % (ref 12–49)
MAGNESIUM SERPL-MCNC: 2.1 MG/DL (ref 1.6–2.4)
MCH RBC QN AUTO: 29 PG (ref 26–34)
MCHC RBC AUTO-ENTMCNC: 33.6 G/DL (ref 30–36.5)
MCV RBC AUTO: 86.5 FL (ref 80–99)
MONOCYTES # BLD: 0.52 K/UL (ref 0–1)
MONOCYTES NFR BLD: 5.5 % (ref 5–13)
NEUTS SEG # BLD: 7.1 K/UL (ref 1.8–8)
NEUTS SEG NFR BLD: 74.6 % (ref 32–75)
NITRITE UR QL STRIP.AUTO: NEGATIVE
NRBC # BLD: 0 K/UL (ref 0–0.01)
NRBC BLD-RTO: 0 PER 100 WBC
PH UR STRIP: 5.5 (ref 5–8)
PLATELET # BLD AUTO: 198 K/UL (ref 150–400)
PMV BLD AUTO: 10.9 FL (ref 8.9–12.9)
POTASSIUM SERPL-SCNC: 3.4 MMOL/L (ref 3.5–5.1)
POTASSIUM SERPL-SCNC: 3.6 MMOL/L (ref 3.5–5.1)
PROT SERPL-MCNC: 6.9 G/DL (ref 6.4–8.2)
PROT SERPL-MCNC: 7.4 G/DL (ref 6.4–8.2)
PROT UR STRIP-MCNC: 30 MG/DL
RBC # BLD AUTO: 4.58 M/UL (ref 3.8–5.2)
RBC #/AREA URNS HPF: ABNORMAL /HPF (ref 0–5)
SARS-COV-2 RNA RESP QL NAA+PROBE: NOT DETECTED
SERVICE CMNT-IMP: NORMAL
SODIUM SERPL-SCNC: 139 MMOL/L (ref 136–145)
SODIUM SERPL-SCNC: 140 MMOL/L (ref 136–145)
SOURCE: NORMAL
SP GR UR REFRACTOMETRY: 1.02 (ref 1–1.03)
SPECIMEN HOLD: NORMAL
TROPONIN I SERPL HS-MCNC: 19 NG/L (ref 0–51)
TROPONIN I SERPL HS-MCNC: 20 NG/L (ref 0–51)
UROBILINOGEN UR QL STRIP.AUTO: 0.2 EU/DL (ref 0.2–1)
WBC # BLD AUTO: 9.5 K/UL (ref 3.6–11)
WBC URNS QL MICRO: ABNORMAL /HPF (ref 0–4)

## 2025-03-06 PROCEDURE — 85025 COMPLETE CBC W/AUTO DIFF WBC: CPT

## 2025-03-06 PROCEDURE — 83735 ASSAY OF MAGNESIUM: CPT

## 2025-03-06 PROCEDURE — 71046 X-RAY EXAM CHEST 2 VIEWS: CPT

## 2025-03-06 PROCEDURE — 93005 ELECTROCARDIOGRAM TRACING: CPT

## 2025-03-06 PROCEDURE — 87636 SARSCOV2 & INF A&B AMP PRB: CPT

## 2025-03-06 PROCEDURE — 6360000002 HC RX W HCPCS: Performed by: EMERGENCY MEDICINE

## 2025-03-06 PROCEDURE — 93010 ELECTROCARDIOGRAM REPORT: CPT | Performed by: INTERNAL MEDICINE

## 2025-03-06 PROCEDURE — 2060000000 HC ICU INTERMEDIATE R&B

## 2025-03-06 PROCEDURE — 87086 URINE CULTURE/COLONY COUNT: CPT

## 2025-03-06 PROCEDURE — 82962 GLUCOSE BLOOD TEST: CPT

## 2025-03-06 PROCEDURE — 84484 ASSAY OF TROPONIN QUANT: CPT

## 2025-03-06 PROCEDURE — 2500000003 HC RX 250 WO HCPCS: Performed by: EMERGENCY MEDICINE

## 2025-03-06 PROCEDURE — 6370000000 HC RX 637 (ALT 250 FOR IP): Performed by: EMERGENCY MEDICINE

## 2025-03-06 PROCEDURE — 70450 CT HEAD/BRAIN W/O DYE: CPT

## 2025-03-06 PROCEDURE — 81001 URINALYSIS AUTO W/SCOPE: CPT

## 2025-03-06 PROCEDURE — 99285 EMERGENCY DEPT VISIT HI MDM: CPT

## 2025-03-06 PROCEDURE — 6370000000 HC RX 637 (ALT 250 FOR IP): Performed by: FAMILY MEDICINE

## 2025-03-06 PROCEDURE — 80053 COMPREHEN METABOLIC PANEL: CPT

## 2025-03-06 RX ORDER — PRAZOSIN HYDROCHLORIDE 1 MG/1
1 CAPSULE ORAL NIGHTLY
Status: DISCONTINUED | OUTPATIENT
Start: 2025-03-06 | End: 2025-03-08 | Stop reason: HOSPADM

## 2025-03-06 RX ORDER — METOPROLOL TARTRATE 50 MG
100 TABLET ORAL 2 TIMES DAILY
Status: DISCONTINUED | OUTPATIENT
Start: 2025-03-06 | End: 2025-03-08 | Stop reason: HOSPADM

## 2025-03-06 RX ORDER — POLYETHYLENE GLYCOL 3350 17 G/17G
17 POWDER, FOR SOLUTION ORAL DAILY PRN
Status: DISCONTINUED | OUTPATIENT
Start: 2025-03-06 | End: 2025-03-08 | Stop reason: HOSPADM

## 2025-03-06 RX ORDER — ONDANSETRON 4 MG/1
4 TABLET, ORALLY DISINTEGRATING ORAL EVERY 8 HOURS PRN
Status: DISCONTINUED | OUTPATIENT
Start: 2025-03-06 | End: 2025-03-08

## 2025-03-06 RX ORDER — ASPIRIN 81 MG/1
81 TABLET, CHEWABLE ORAL DAILY
Status: DISCONTINUED | OUTPATIENT
Start: 2025-03-07 | End: 2025-03-08 | Stop reason: HOSPADM

## 2025-03-06 RX ORDER — SODIUM CHLORIDE 0.9 % (FLUSH) 0.9 %
5-40 SYRINGE (ML) INJECTION PRN
Status: DISCONTINUED | OUTPATIENT
Start: 2025-03-06 | End: 2025-03-08 | Stop reason: HOSPADM

## 2025-03-06 RX ORDER — ACETAMINOPHEN 650 MG/1
650 SUPPOSITORY RECTAL EVERY 6 HOURS PRN
Status: DISCONTINUED | OUTPATIENT
Start: 2025-03-06 | End: 2025-03-08 | Stop reason: HOSPADM

## 2025-03-06 RX ORDER — LEVOTHYROXINE SODIUM 75 UG/1
75 TABLET ORAL DAILY
Status: DISCONTINUED | OUTPATIENT
Start: 2025-03-07 | End: 2025-03-08 | Stop reason: HOSPADM

## 2025-03-06 RX ORDER — POTASSIUM CHLORIDE 750 MG/1
40 TABLET, EXTENDED RELEASE ORAL PRN
Status: DISCONTINUED | OUTPATIENT
Start: 2025-03-06 | End: 2025-03-07

## 2025-03-06 RX ORDER — SODIUM CHLORIDE 9 MG/ML
INJECTION, SOLUTION INTRAVENOUS PRN
Status: DISCONTINUED | OUTPATIENT
Start: 2025-03-06 | End: 2025-03-08 | Stop reason: HOSPADM

## 2025-03-06 RX ORDER — ONDANSETRON 2 MG/ML
4 INJECTION INTRAMUSCULAR; INTRAVENOUS EVERY 6 HOURS PRN
Status: DISCONTINUED | OUTPATIENT
Start: 2025-03-06 | End: 2025-03-08

## 2025-03-06 RX ORDER — SODIUM CHLORIDE 0.9 % (FLUSH) 0.9 %
5-40 SYRINGE (ML) INJECTION EVERY 12 HOURS SCHEDULED
Status: DISCONTINUED | OUTPATIENT
Start: 2025-03-06 | End: 2025-03-08 | Stop reason: HOSPADM

## 2025-03-06 RX ORDER — BUMETANIDE 0.25 MG/ML
2 INJECTION, SOLUTION INTRAMUSCULAR; INTRAVENOUS DAILY
Status: DISCONTINUED | OUTPATIENT
Start: 2025-03-07 | End: 2025-03-08 | Stop reason: HOSPADM

## 2025-03-06 RX ORDER — CYCLOBENZAPRINE HCL 10 MG
10 TABLET ORAL 3 TIMES DAILY PRN
COMMUNITY

## 2025-03-06 RX ORDER — MAGNESIUM SULFATE IN WATER 40 MG/ML
2000 INJECTION, SOLUTION INTRAVENOUS PRN
Status: DISCONTINUED | OUTPATIENT
Start: 2025-03-06 | End: 2025-03-07

## 2025-03-06 RX ORDER — ACETAMINOPHEN 325 MG/1
650 TABLET ORAL EVERY 6 HOURS PRN
Status: DISCONTINUED | OUTPATIENT
Start: 2025-03-06 | End: 2025-03-08 | Stop reason: HOSPADM

## 2025-03-06 RX ORDER — OXYCODONE HYDROCHLORIDE 5 MG/1
10 TABLET ORAL
Status: COMPLETED | OUTPATIENT
Start: 2025-03-06 | End: 2025-03-06

## 2025-03-06 RX ORDER — LOSARTAN POTASSIUM 50 MG/1
100 TABLET ORAL DAILY
Status: DISCONTINUED | OUTPATIENT
Start: 2025-03-07 | End: 2025-03-08 | Stop reason: HOSPADM

## 2025-03-06 RX ORDER — DULOXETIN HYDROCHLORIDE 30 MG/1
60 CAPSULE, DELAYED RELEASE ORAL DAILY
Status: DISCONTINUED | OUTPATIENT
Start: 2025-03-07 | End: 2025-03-08 | Stop reason: HOSPADM

## 2025-03-06 RX ORDER — PANTOPRAZOLE SODIUM 40 MG/1
40 TABLET, DELAYED RELEASE ORAL
Status: DISCONTINUED | OUTPATIENT
Start: 2025-03-07 | End: 2025-03-08 | Stop reason: HOSPADM

## 2025-03-06 RX ORDER — POTASSIUM CHLORIDE 7.45 MG/ML
10 INJECTION INTRAVENOUS PRN
Status: DISCONTINUED | OUTPATIENT
Start: 2025-03-06 | End: 2025-03-07

## 2025-03-06 RX ORDER — CLONIDINE HYDROCHLORIDE 0.1 MG/1
0.2 TABLET ORAL 2 TIMES DAILY
Status: DISCONTINUED | OUTPATIENT
Start: 2025-03-06 | End: 2025-03-08 | Stop reason: HOSPADM

## 2025-03-06 RX ADMIN — WATER 1000 MG: 1 INJECTION INTRAMUSCULAR; INTRAVENOUS; SUBCUTANEOUS at 20:49

## 2025-03-06 RX ADMIN — PRAZOSIN HYDROCHLORIDE 1 MG: 1 CAPSULE ORAL at 21:57

## 2025-03-06 RX ADMIN — CLONIDINE HYDROCHLORIDE 0.2 MG: 0.1 TABLET ORAL at 21:56

## 2025-03-06 RX ADMIN — OXYCODONE HYDROCHLORIDE 10 MG: 5 TABLET ORAL at 19:36

## 2025-03-06 RX ADMIN — METOPROLOL TARTRATE 100 MG: 50 TABLET, FILM COATED ORAL at 21:56

## 2025-03-06 ASSESSMENT — PATIENT HEALTH QUESTIONNAIRE - PHQ9
6. FEELING BAD ABOUT YOURSELF - OR THAT YOU ARE A FAILURE OR HAVE LET YOURSELF OR YOUR FAMILY DOWN: MORE THAN HALF THE DAYS
SUM OF ALL RESPONSES TO PHQ QUESTIONS 1-9: 12
SUM OF ALL RESPONSES TO PHQ QUESTIONS 1-9: 12
7. TROUBLE CONCENTRATING ON THINGS, SUCH AS READING THE NEWSPAPER OR WATCHING TELEVISION: MORE THAN HALF THE DAYS
5. POOR APPETITE OR OVEREATING: MORE THAN HALF THE DAYS
9. THOUGHTS THAT YOU WOULD BE BETTER OFF DEAD, OR OF HURTING YOURSELF: NOT AT ALL
3. TROUBLE FALLING OR STAYING ASLEEP: MORE THAN HALF THE DAYS
SUM OF ALL RESPONSES TO PHQ QUESTIONS 1-9: 12
1. LITTLE INTEREST OR PLEASURE IN DOING THINGS: SEVERAL DAYS
4. FEELING TIRED OR HAVING LITTLE ENERGY: MORE THAN HALF THE DAYS
8. MOVING OR SPEAKING SO SLOWLY THAT OTHER PEOPLE COULD HAVE NOTICED. OR THE OPPOSITE, BEING SO FIGETY OR RESTLESS THAT YOU HAVE BEEN MOVING AROUND A LOT MORE THAN USUAL: NOT AT ALL
2. FEELING DOWN, DEPRESSED OR HOPELESS: SEVERAL DAYS
10. IF YOU CHECKED OFF ANY PROBLEMS, HOW DIFFICULT HAVE THESE PROBLEMS MADE IT FOR YOU TO DO YOUR WORK, TAKE CARE OF THINGS AT HOME, OR GET ALONG WITH OTHER PEOPLE: SOMEWHAT DIFFICULT
SUM OF ALL RESPONSES TO PHQ QUESTIONS 1-9: 12

## 2025-03-06 ASSESSMENT — PAIN - FUNCTIONAL ASSESSMENT
PAIN_FUNCTIONAL_ASSESSMENT: 0-10
PAIN_FUNCTIONAL_ASSESSMENT: 0-10
PAIN_FUNCTIONAL_ASSESSMENT: ACTIVITIES ARE NOT PREVENTED
PAIN_FUNCTIONAL_ASSESSMENT: ACTIVITIES ARE NOT PREVENTED

## 2025-03-06 ASSESSMENT — PAIN DESCRIPTION - LOCATION
LOCATION: GENERALIZED
LOCATION: BACK

## 2025-03-06 ASSESSMENT — PAIN DESCRIPTION - ORIENTATION: ORIENTATION: RIGHT;LEFT;MID

## 2025-03-06 ASSESSMENT — PAIN SCALES - GENERAL
PAINLEVEL_OUTOF10: 10
PAINLEVEL_OUTOF10: 4
PAINLEVEL_OUTOF10: 9

## 2025-03-06 ASSESSMENT — LIFESTYLE VARIABLES
HOW MANY STANDARD DRINKS CONTAINING ALCOHOL DO YOU HAVE ON A TYPICAL DAY: PATIENT DOES NOT DRINK
HOW MANY STANDARD DRINKS CONTAINING ALCOHOL DO YOU HAVE ON A TYPICAL DAY: PATIENT DOES NOT DRINK
HOW OFTEN DO YOU HAVE A DRINK CONTAINING ALCOHOL: NEVER
HOW OFTEN DO YOU HAVE A DRINK CONTAINING ALCOHOL: NEVER

## 2025-03-06 ASSESSMENT — PAIN DESCRIPTION - DESCRIPTORS: DESCRIPTORS: ACHING

## 2025-03-06 ASSESSMENT — PAIN DESCRIPTION - PAIN TYPE: TYPE: CHRONIC PAIN

## 2025-03-06 ASSESSMENT — PAIN DESCRIPTION - FREQUENCY: FREQUENCY: INTERMITTENT

## 2025-03-06 NOTE — PATIENT INSTRUCTIONS
serving is 1 slice of bread, 1 ounce of dry cereal, or 1/2 cup of cooked rice, pasta, or cooked cereal. Try to choose whole-grain products as much as possible.  Limit lean meat, poultry, and fish to 6 ounces or less each day. One egg counts as 1 ounce.  Eat 4 to 5 servings of nuts, seeds, and legumes (cooked dried beans, lentils, and split peas) each week. A serving is 1/3 cup of nuts, 2 tablespoons of seeds, 2 tablespoons of peanut butter, or 1/2 cup of cooked beans or peas.  Limit fats and oils to 2 to 3 servings each day. A serving is 1 teaspoon of vegetable oil or 2 tablespoons of salad dressing.  Limit sweets and added sugars to 5 servings or less a week. A serving is 1 tablespoon jelly or jam, 1/2 cup sorbet, or 1 cup of lemonade.  Eat less than 2,300 milligrams (mg) of sodium a day. If you limit your sodium to 1,500 mg a day, you can lower your blood pressure even more.  Be aware that all of these are the suggested number of servings for people who eat 1,800 to 2,000 calories a day. Your recommended number of servings may be different if you need more or fewer calories.  Tips for success  Start small. Make small changes, and stick with them. Once those changes become habit, add a few more changes.  Try some of the following:  Make it a goal to eat a fruit or vegetable at every meal and at snacks. This will make it easy to get the recommended amount of fruits and vegetables each day.  Try yogurt topped with fruit and nuts for a snack or healthy dessert.  Add lettuce, tomato, cucumber, and onion to sandwiches.  Have a variety of cut-up vegetables with a low-fat dip as an appetizer instead of chips and dip.  Sprinkle sunflower seeds or chopped almonds over salads. Or try adding chopped walnuts or almonds to cooked vegetables.  Try some vegetarian meals using beans and peas. Add garbanzo or kidney beans to salads. Make burritos and tacos with mashed biswas beans or black beans.  Where can you learn more?  Go to

## 2025-03-06 NOTE — PROGRESS NOTES
Medicare Annual Wellness Visit    Aparna Nath is here for Medicare AWV    Assessment & Plan   Essential hypertension  -     Lipid Panel; Future  -     Comprehensive Metabolic Panel; Future  Hyperglycemia, unspecified  -     Hemoglobin A1C; Future  Iron deficiency anemia, unspecified iron deficiency anemia type  -     CBC with Auto Differential; Future  Medicare annual wellness visit, subsequent  Screening for cardiovascular condition       Return in about 6 months (around 9/6/2025) for HTN.     Subjective       Patient's complete Health Risk Assessment and screening values have been reviewed and are found in Flowsheets. The following problems were reviewed today and where indicated follow up appointments were made and/or referrals ordered.    Positive Risk Factor Screenings with Interventions:    Fall Risk:        Interventions:    Reviewed medications, home hazards, visual acuity, and co-morbidities that can increase risk for falls         Controlled Medication Review:    Today's Pain Level: No data recorded   Opioid Risk: (Low risk score <55) Opioid risk score: 28    Patient is low risk for opioid use disorder or overdose.    Last PDMP Adolfo as Reviewed:  Review User Review Instant Review Result                   Inactivity:    (!) Abnormal     Interventions:  See AVS for additional education material     Abnormal BMI (obese):  There is no height or weight on file to calculate BMI. (!) Abnormal  Interventions:  Patient advised to follow-up in this office for further evaluation and treatment              Tobacco Use:    Tobacco Use      Smoking status: Every Day        Packs/day: 0.10        Years: 0.9 packs/day for 11.1 years (10.1 ttl pk-yrs)        Types: Cigarettes        Start date: 02/2024        Last attempt to quit: 1/1/2006      Smokeless tobacco: Never     Interventions:  See A/P for plan and any pertinent orders                      Objective   There were no vitals filed for this visit.   There is no

## 2025-03-06 NOTE — ED TRIAGE NOTES
Pt ambulatory w/ rollator into triage w/ steady gait w/ complaints of generalized weakness, unable to use her hands and fingers to text normal and shaking.  Pt states she has chronic pain and takes pain meds every 4-6 hours as prescribed.  Pt states she can't remember if she took her other prescribed daily meds but did take her Oxy @ noon.  Pt states she felt fine when she went to bed last night and when she woke up @ 0930 this morning she noticed her sx's.  Pt also states she takes prophylactic abx for chronic UTI's and saw her PCP today and was told to just come to ER for sx's.  BS in triage 89.

## 2025-03-06 NOTE — ED NOTES
3:45 PM    83 yo female presenting from her primary care office with concerns of \"feeling off\" since waking up this morning at 9;30. Reports hands being increasingly shaky, she shakes at baseline. Endorses SOB. No fevers.  No extremity weakness, numbness. No facial droop. Speech is coherent. No HA, dizziness, or blurry vision. Ambulatory with steady gait in triage.       I have evaluated the patient as the Provider in Rapid Medical Evaluation (RME). I have reviewed her vital signs and the triage nurse assessment. I have talked with the patient and any available family and advised that I am the provider in triage and have ordered the appropriate study to initiate their work up based on the clinical presentation during my assessment. I have advised that the patient will be accommodated in the Main ED as soon as possible. I have also requested to contact the triage nurse or myself immediately if the patient experiences any changes in their condition during this brief waiting period.  HUMBERTO Granado Alyson E, PA-C  03/06/25 6300

## 2025-03-07 LAB
BACTERIA SPEC CULT: NORMAL
BASOPHILS # BLD: 0.02 K/UL (ref 0–0.1)
BASOPHILS NFR BLD: 0.2 % (ref 0–1)
DIFFERENTIAL METHOD BLD: NORMAL
EOSINOPHIL # BLD: 0.2 K/UL (ref 0–0.4)
EOSINOPHIL NFR BLD: 2.5 % (ref 0–7)
ERYTHROCYTE [DISTWIDTH] IN BLOOD BY AUTOMATED COUNT: 13.6 % (ref 11.5–14.5)
HCT VFR BLD AUTO: 37.2 % (ref 35–47)
HGB BLD-MCNC: 12.1 G/DL (ref 11.5–16)
IMM GRANULOCYTES # BLD AUTO: 0.02 K/UL (ref 0–0.04)
IMM GRANULOCYTES NFR BLD AUTO: 0.2 % (ref 0–0.5)
LYMPHOCYTES # BLD: 2.51 K/UL (ref 0.8–3.5)
LYMPHOCYTES NFR BLD: 30.9 % (ref 12–49)
MCH RBC QN AUTO: 28.5 PG (ref 26–34)
MCHC RBC AUTO-ENTMCNC: 32.5 G/DL (ref 30–36.5)
MCV RBC AUTO: 87.5 FL (ref 80–99)
MONOCYTES # BLD: 0.63 K/UL (ref 0–1)
MONOCYTES NFR BLD: 7.8 % (ref 5–13)
NEUTS SEG # BLD: 4.74 K/UL (ref 1.8–8)
NEUTS SEG NFR BLD: 58.4 % (ref 32–75)
NRBC # BLD: 0 K/UL (ref 0–0.01)
NRBC BLD-RTO: 0 PER 100 WBC
PLATELET # BLD AUTO: 198 K/UL (ref 150–400)
PMV BLD AUTO: 10.6 FL (ref 8.9–12.9)
RBC # BLD AUTO: 4.25 M/UL (ref 3.8–5.2)
SERVICE CMNT-IMP: NORMAL
WBC # BLD AUTO: 8.1 K/UL (ref 3.6–11)

## 2025-03-07 PROCEDURE — 2500000003 HC RX 250 WO HCPCS: Performed by: FAMILY MEDICINE

## 2025-03-07 PROCEDURE — 6360000002 HC RX W HCPCS: Performed by: FAMILY MEDICINE

## 2025-03-07 PROCEDURE — 97535 SELF CARE MNGMENT TRAINING: CPT | Performed by: OCCUPATIONAL THERAPIST

## 2025-03-07 PROCEDURE — 2060000000 HC ICU INTERMEDIATE R&B

## 2025-03-07 PROCEDURE — 97165 OT EVAL LOW COMPLEX 30 MIN: CPT | Performed by: OCCUPATIONAL THERAPIST

## 2025-03-07 PROCEDURE — 6370000000 HC RX 637 (ALT 250 FOR IP): Performed by: NURSE PRACTITIONER

## 2025-03-07 PROCEDURE — 97530 THERAPEUTIC ACTIVITIES: CPT

## 2025-03-07 PROCEDURE — 85025 COMPLETE CBC W/AUTO DIFF WBC: CPT

## 2025-03-07 PROCEDURE — 97161 PT EVAL LOW COMPLEX 20 MIN: CPT

## 2025-03-07 PROCEDURE — 6370000000 HC RX 637 (ALT 250 FOR IP): Performed by: FAMILY MEDICINE

## 2025-03-07 RX ORDER — OXYCODONE HYDROCHLORIDE 5 MG/1
10 TABLET ORAL EVERY 6 HOURS PRN
Status: DISCONTINUED | OUTPATIENT
Start: 2025-03-07 | End: 2025-03-08 | Stop reason: HOSPADM

## 2025-03-07 RX ADMIN — METOPROLOL TARTRATE 100 MG: 50 TABLET, FILM COATED ORAL at 20:54

## 2025-03-07 RX ADMIN — CLONIDINE HYDROCHLORIDE 0.2 MG: 0.1 TABLET ORAL at 20:54

## 2025-03-07 RX ADMIN — METOPROLOL TARTRATE 100 MG: 50 TABLET, FILM COATED ORAL at 09:56

## 2025-03-07 RX ADMIN — BUMETANIDE 2 MG: 0.25 INJECTION INTRAMUSCULAR; INTRAVENOUS at 09:56

## 2025-03-07 RX ADMIN — WATER 1000 MG: 1 INJECTION INTRAMUSCULAR; INTRAVENOUS; SUBCUTANEOUS at 20:52

## 2025-03-07 RX ADMIN — OXYCODONE 10 MG: 5 TABLET ORAL at 13:43

## 2025-03-07 RX ADMIN — SODIUM CHLORIDE, PRESERVATIVE FREE 10 ML: 5 INJECTION INTRAVENOUS at 20:52

## 2025-03-07 RX ADMIN — OXYCODONE 10 MG: 5 TABLET ORAL at 21:06

## 2025-03-07 RX ADMIN — LOSARTAN POTASSIUM 100 MG: 50 TABLET, FILM COATED ORAL at 09:56

## 2025-03-07 RX ADMIN — CLONIDINE HYDROCHLORIDE 0.2 MG: 0.1 TABLET ORAL at 09:56

## 2025-03-07 RX ADMIN — SODIUM CHLORIDE, PRESERVATIVE FREE 10 ML: 5 INJECTION INTRAVENOUS at 10:00

## 2025-03-07 RX ADMIN — LEVOTHYROXINE SODIUM 75 MCG: 0.07 TABLET ORAL at 06:58

## 2025-03-07 RX ADMIN — ACETAMINOPHEN 650 MG: 325 TABLET ORAL at 07:00

## 2025-03-07 RX ADMIN — ASPIRIN 81 MG CHEWABLE TABLET 81 MG: 81 TABLET CHEWABLE at 09:56

## 2025-03-07 RX ADMIN — PRAZOSIN HYDROCHLORIDE 1 MG: 1 CAPSULE ORAL at 20:54

## 2025-03-07 RX ADMIN — PANTOPRAZOLE SODIUM 40 MG: 40 TABLET, DELAYED RELEASE ORAL at 06:58

## 2025-03-07 RX ADMIN — DULOXETINE HYDROCHLORIDE 60 MG: 30 CAPSULE, DELAYED RELEASE ORAL at 09:56

## 2025-03-07 ASSESSMENT — PAIN SCALES - GENERAL
PAINLEVEL_OUTOF10: 9
PAINLEVEL_OUTOF10: 5
PAINLEVEL_OUTOF10: 5
PAINLEVEL_OUTOF10: 9

## 2025-03-07 ASSESSMENT — PAIN DESCRIPTION - LOCATION
LOCATION: BACK
LOCATION: GENERALIZED
LOCATION: BACK

## 2025-03-07 ASSESSMENT — PAIN DESCRIPTION - ORIENTATION: ORIENTATION: POSTERIOR

## 2025-03-07 ASSESSMENT — PAIN DESCRIPTION - DESCRIPTORS: DESCRIPTORS: ACHING

## 2025-03-07 ASSESSMENT — PAIN - FUNCTIONAL ASSESSMENT: PAIN_FUNCTIONAL_ASSESSMENT: NONE - DENIES PAIN

## 2025-03-07 NOTE — ED NOTES
ED TO INPATIENT SBAR HANDOFF    Patient Name: Aparna Nath   :  1940  84 y.o.   MRN:  [id number]  ED Room #:  ER20/20     Situation  Code Status: Full Code   Allergies: Patient has no known allergies.  Weight: Patient Vitals for the past 96 hrs (Last 3 readings):   Weight   25 1547 77.9 kg (171 lb 11.8 oz)       Arrived from: home    Chief Complaint:   Chief Complaint   Patient presents with    Fatigue       Hospital Problem/Diagnosis:  Principal Problem:    AMS (altered mental status)  Resolved Problems:    * No resolved hospital problems. *      Mobility: new onset weakness: uses a rollator at baseline   ED Fall Risk: Presents to emergency department  because of falls (Syncope, seizure, or loss of consciousness): No, Age > 70: Yes, Altered Mental Status, Intoxication with alcohol or substance confusion (Disorientation, impaired judgment, poor safety awaremess, or inability to follow instructions): No, Impaired Mobility: Ambulates or transfers with assistive devices or assistance; Unable to ambulate or transer.: No, Nursing Judgement: Yes   Fell in ED or prior to admission: no   Restraints: no     Sitter: no   Family/Caregiver Present: no    Neet to know social/safety information: pt has chronic pain     Background  History:   Past Medical History:   Diagnosis Date    Autoimmune disease     FIBROMYALGIA    Beta-blocker intolerance     CAD (coronary artery disease)     MI >> stent x3, cath 2010: [place]    Chronic pain     back pain    Delayed gastric emptying     Depression     Depression with anxiety     Dyspepsia and other specified disorders of function of stomach     GERD (gastroesophageal reflux disease)     Hypercholesterolemia     Hypertension     EMILIA on CPAP     DOES NOT USE CPAP       Assessment    Abnormal Assessment Findings: new onset weakness and fatigue, needs O2 when sleeping   Imaging:   CT HEAD WO CONTRAST   Final Result   No acute intracranial process.   There is no intracranial

## 2025-03-07 NOTE — ED PROVIDER NOTES
[facility] EMERGENCY DEPARTMENT  EMERGENCY DEPARTMENT ENCOUNTER      Pt Name: Aparna Nath  MRN: [id number]  Birthdate 1940  Date of evaluation: 3/6/2025  Provider: Stephany Mcghee MD    CHIEF COMPLAINT       Chief Complaint   Patient presents with    Fatigue         HISTORY OF PRESENT ILLNESS    Aparna Nath is an 83 yo F with h/o GERD, CAD, HTN, Chronic back pain and fibromyalgia who was admitted at the end of December for generalized weakness and acute cystitis.  She states when she woke this morning she felt shaky and an confused and had trouble doing normally simple tasks which she states occurred last night she was admitted for UTI.  She denies focal numbness or weakness.  She went to her PCP today and was referred to the ED for evaluation to r/o stroke vs UTI. PAtient states she cannot remember if she took her blood pressure medications.  Is requesting her afternoon dose of oxcodone.  PDMP reveiwed and patient prescribed 10mg oxcodone.            Additional history from independent historians:     Review of External Medical Records:     Nursing Notes were reviewed.    REVIEW OF SYSTEMS       Review of Systems    Except as noted above the remainder of the review of systems was reviewed and negative.       PAST MEDICAL HISTORY     Past Medical History:   Diagnosis Date    Autoimmune disease     FIBROMYALGIA    Beta-blocker intolerance     CAD (coronary artery disease) 1999    MI >> stent x3, cath 2010: OK    Chronic pain     back pain    Delayed gastric emptying     Depression     Depression with anxiety     Dyspepsia and other specified disorders of function of stomach     GERD (gastroesophageal reflux disease)     Hypercholesterolemia     Hypertension     EMILIA on CPAP     DOES NOT USE CPAP         SURGICAL HISTORY       Past Surgical History:   Procedure Laterality Date    CATARACT REMOVAL      CORONARY ANGIOPLASTY WITH STENT PLACEMENT      DILATION AND CURETTAGE OF UTERUS      HEENT      ORAL  Sensation is intact.      Motor: Tremor present. No weakness or pronator drift.         DIAGNOSTIC RESULTS     EKG: All EKG's are interpreted by the Emergency Department Physician listed in the interpretation in the absence of a cardiologist and may also be found below under Reassessment/ED Course.           RADIOLOGY:   Non-plain film images such as CT, Ultrasound and MRI are read by the radiologist. Plain radiographic images are visualized and preliminarily interpreted by the emergency physician with the below findings:    Interpretation per the Radiologist below, if available at the time of this note:    CT HEAD WO CONTRAST   Final Result   No acute intracranial process.   There is no intracranial mass or hemorrhage.      Electronically signed by [employer] BILLY      XR CHEST (2 VW)   Final Result      Normal PA and lateral chest views.         Electronically signed by ZELALEM GARCIA           LABS:  Labs Reviewed   COMPREHENSIVE METABOLIC PANEL - Abnormal; Notable for the following components:       Result Value    BUN 26 (*)     BUN/Creatinine Ratio 30 (*)     All other components within normal limits   URINALYSIS WITH MICROSCOPIC - Abnormal; Notable for the following components:    Appearance CLOUDY (*)     Protein, UA 30 (*)     Leukocyte Esterase, Urine MODERATE (*)     BACTERIA, URINE 1+ (*)     All other components within normal limits   COVID-19 & INFLUENZA COMBO   URINE CULTURE HOLD SAMPLE   CBC WITH AUTO DIFFERENTIAL   TROPONIN   EXTRA TUBES HOLD   TROPONIN   POCT GLUCOSE       All other labs were within normal range or not returned as of this dictation.    EMERGENCY DEPARTMENT COURSE and DIFFERENTIAL DIAGNOSIS/MDM:   Vitals:    Vitals:    03/06/25 1547   BP: (!) 174/101   Pulse: (!) 109   Resp: 20   Temp: 97.8 °F (36.6 °C)   TempSrc: Temporal   SpO2: 100%   Weight: 77.9 kg (171 lb 11.8 oz)   Height: 1.6 m (5' 3\")           Medical Decision Making  Amount and/or Complexity of Data Reviewed  Labs:

## 2025-03-07 NOTE — PLAN OF CARE
Problem: Physical Therapy - Adult  Goal: By Discharge: Performs mobility at highest level of function for planned discharge setting.  See evaluation for individualized goals.  Description: FUNCTIONAL STATUS PRIOR TO ADMISSION: Patient was independent in the home and modified independent using a rollator for functional mobility outside of the home. When asked she reported no falls in the last 12 months and baseline is room air.. Patient reports hospitalization in January of this year and discharging to a SNF and then back to home with HHPT and no PT at the time of this admission.     HOME SUPPORT PRIOR TO ADMISSION: The patient lived alone with a friend and neighbors local support.    Physical Therapy Goals  Initiated 3/7/2025  1.  Patient will move from supine to sit and sit to supine, scoot up and down, and roll side to side in bed with independence within 7 day(s).    2.  Patient will perform sit to stand with modified independence within 7 day(s).  3.  Patient will transfer from bed to chair and chair to bed with modified independence using the least restrictive device within 7 day(s).  4.  Patient will ambulate with modified independence for 200 feet with the least restrictive device within 7 day(s).     Outcome: Progressing   PHYSICAL THERAPY EVALUATION    Patient: Aparna Nath (84 y.o. female)  Date: 3/7/2025  Primary Diagnosis: Confusion [R41.0]  Other chronic pain [G89.29]  Urinary tract infection without hematuria, site unspecified [N39.0]  Hypertension, unspecified type [I10]  AMS (altered mental status) [R41.82]       Precautions: Restrictions/Precautions  Restrictions/Precautions: Fall Risk, Bed Alarm            ASSESSMENT :   DEFICITS/IMPAIRMENTS:   The patient is limited by decreased functional mobility, sensation, balance, increased pain levels admitted with AMS (but fully oriented X 4 during this eval) and a UTI. Pt also lives with chronic pain, see subjective. Given the AMS, pt was given  Mobility Inpatient Short Form (6-Clicks) Version 2  How much HELP from another person do you currently need... (If the patient hasn't done an activity recently, how much help from another person do you think they would need if they tried?) Total A Lot A Little None   1.  Turning from your back to your side while in a flat bed without using bedrails? []  1 []  2 [x]  3  []  4   2.  Moving from lying on your back to sitting on the side of a flat bed without using bedrails? []  1 []  2 [x]  3  []  4   3.  Moving to and from a bed to a chair (including a wheelchair)? []  1 []  2 [x]  3  []  4   4. Standing up from a chair using your arms (e.g. wheelchair or bedside chair)? []  1 []  2 [x]  3  []  4   5.  Walking in hospital room? []  1 []  2 [x]  3  []  4   6.  Climbing 3-5 steps with a railing? []  1 []  2 [x]  3  []  4     Raw Score: 18/24                            Cutoff score <=171,2,3 had higher odds of discharging home with home health or need of SNF/IPR.    1. Betsy Treadwell, Qi King, Destin Eldridge, India Barnhart, Ryder Keyes, Kole Treadwell.  Validity of the AM-PAC “6-Clicks” Inpatient Daily Activity and Basic Mobility Short Forms. Physical Therapy Mar 2014, [phone]) 379-391; DOI: 10.2522/ptj.18855051  2. Tung DEUTSCH, Kala MTZ, Isaiah MTZ, David MTZ. Association of -PAC \"6-Clicks\" Basic Mobility and Daily Activity Scores With Discharge Destination. Phys Ther. 2021 Apr 4;101(4):zbto700. doi: 10.1093/ptj/ajgp492. PMID: 31642092.  3. Edwin MTZ, Abigail ROBERTS, Elizabeth S, Tomer K, Sary S. Activity Measure for Post-Acute Care \"6-Clicks\" Basic Mobility Scores Predict Discharge Destination After Acute Care Hospitalization in Select Patient Groups: A Retrospective, Observational Study. Arch Rehabil Res Clin Transl. 2022 Jul 16;4(3):176899. doi: 10.1016/j.arrct.2022.774220. PMID: 61242887; PMCID: [id number].  4. Eben NGUYEN, Vicky S, Juaquin W, Zena P. AM-PAC Short Forms Manual 4.0. Revised 2/2020.

## 2025-03-07 NOTE — PROGRESS NOTES
[facility]                                                                                          Hospitalist Progress Note  NICOLETTE Dunham NP  Office Phone: [phone]        Date of Service:  3/7/2025  NAME:  Aparna Nath  :  1940  MRN:  [id number]       Admission Summary:   Aparna Nath is a 84 y.o. female with past medical history of CAD, MI, PTCA stents, hypertension, hyperlipidemia, EMILIA, dyspepsia, GERD, fibromyalgia, chronic pain, lumbar laminectomy/fusion presented to the emergency department with chief complaint of confusion, generalized weakness, difficulty performing tasks, shaking.  Patient was last known well last night.  When she woke this morning she noticed onset of symptoms.  She reportedly was unable to use her hands fingers in order to text, shaking uncontrollably, with weakness.  Per ER reports she had taken her pain medication (oxycodone) but was unable to recall if she had taken her other prescribed medications.  She has a history of chronic pain which she is on pain medications every 4 hours.  She has a history of chronic UTIs and is chronically on antibiotics for the same.  Today she reportedly seen by her PCP and was told to go to the ER.  Per chart review, she was last hospitalized on 2024 to 2025 with diagnosis of bilateral lower extremity weakness possibly secondary to UTI.      Today, on arrival in the ED, initial reported vital signs were temperature 97.8 °F,  101, heart rate 109, respiratory rate 20, O2 saturation 100 send on room air.  12-lead EKG shows sinus rhythm, first-degree AV block, nonspecific ST/T wave changes 97 bpm.  Chest x-ray 2 view showed no acute cardiopulmonary process.  CT head without IV contrast showed no acute intracranial findings.  Abnormal lab included BUN 26.  Urinalysis showed 30 protein, negative blood, moderate leukocyte esterase, negative nitrite, 10-20 WBC, 0-5 RBC, 1+  Facility-Administered Medications   Medication Dose Route Frequency    sodium chloride flush 0.9 % injection 5-40 mL  5-40 mL IntraVENous 2 times per day    sodium chloride flush 0.9 % injection 5-40 mL  5-40 mL IntraVENous PRN    0.9 % sodium chloride infusion   IntraVENous PRN    potassium chloride (KLOR-CON) extended release tablet 40 mEq  40 mEq Oral PRN    Or    potassium bicarb-citric acid (EFFER-K) effervescent tablet 40 mEq  40 mEq Oral PRN    Or    potassium chloride 10 mEq/100 mL IVPB (Peripheral Line)  10 mEq IntraVENous PRN    magnesium sulfate 2000 mg in 50 mL IVPB premix  2,000 mg IntraVENous PRN    ondansetron (ZOFRAN-ODT) disintegrating tablet 4 mg  4 mg Oral Q8H PRN    Or    ondansetron (ZOFRAN) injection 4 mg  4 mg IntraVENous Q6H PRN    polyethylene glycol (GLYCOLAX) packet 17 g  17 g Oral Daily PRN    acetaminophen (TYLENOL) tablet 650 mg  650 mg Oral Q6H PRN    Or    acetaminophen (TYLENOL) suppository 650 mg  650 mg Rectal Q6H PRN    cefTRIAXone (ROCEPHIN) 1,000 mg in sterile water 10 mL IV syringe  1,000 mg IntraVENous Q24H    aspirin chewable tablet 81 mg  81 mg Oral Daily    levothyroxine (SYNTHROID) tablet 75 mcg  75 mcg Oral Daily    pantoprazole (PROTONIX) tablet 40 mg  40 mg Oral QAM AC    metoprolol tartrate (LOPRESSOR) tablet 100 mg  100 mg Oral BID    cloNIDine (CATAPRES) tablet 0.2 mg  0.2 mg Oral BID    bumetanide (BUMEX) injection 2 mg  2 mg IntraVENous Daily    losartan (COZAAR) tablet 100 mg  100 mg Oral Daily    DULoxetine (CYMBALTA) extended release capsule 60 mg  60 mg Oral Daily    prazosin (MINIPRESS) capsule 1 mg  1 mg Oral Nightly     ______________________________________________________________________  EXPECTED LENGTH OF STAY: 3  ACTUAL LENGTH OF STAY:          1                 NICOLETTE Dunham NP

## 2025-03-07 NOTE — ED NOTES
Bedside and Verbal shift change report given to REAGAN Roldan (oncoming nurse) by REAGAN Daley (offgoing nurse). Report included the following information Nurse Handoff Report and Index.

## 2025-03-07 NOTE — PROGRESS NOTES
OCCUPATIONAL THERAPY EVALUATION/DISCHARGE  Patient: Aparna Nath (84 y.o. female)  Date: 3/7/2025  Primary Diagnosis: Confusion [R41.0]  Other chronic pain [G89.29]  Urinary tract infection without hematuria, site unspecified [N39.0]  Hypertension, unspecified type [I10]  AMS (altered mental status) [R41.82]         Precautions: Fall Risk, Bed Alarm                  ASSESSMENT :  Based on the objective data below, the patient presents at an overall CGA level with LE ADLs, toileting and functional mobility amb with her rollator.  Pt limited by impaired balance and pain.  Pt with fair safety awareness and no LOB.  No further skilled OT required at this time.  PT will continue to follow for mobility and balance issues.    Functional Outcome Measure:  The patient scored 21/24 on the ADL AM-PAC outcome measure.      Further skilled acute occupational therapy is not indicated at this time.     PLAN :  Recommend with staff: up with rollator and assist for safety    Recommendation for discharge: (in order for the patient to meet his/her long term goals):   No skilled occupational therapy    Other factors to consider for discharge: lives alone and concern for safely navigating or managing the home environment    IF patient discharges home will need the following DME: patient owns DME required for discharge     SUBJECTIVE:   Patient stated, “Now that I have eaten something I feel better.”    OBJECTIVE DATA SUMMARY:     Past Medical History:   Diagnosis Date    Autoimmune disease     FIBROMYALGIA    Beta-blocker intolerance     CAD (coronary artery disease) 1999    MI >> stent x3, cath 2010: [place]    Chronic pain     back pain    Delayed gastric emptying     Depression     Depression with anxiety     Dyspepsia and other specified disorders of function of stomach     GERD (gastroesophageal reflux disease)     Hypercholesterolemia     Hypertension     EMILIA on CPAP     DOES NOT USE CPAP     Past Surgical History:   Procedure

## 2025-03-07 NOTE — H&P
History and Physical    Date of Service:  3/6/2025  Primary Care Provider: Will Pringle MD  Source of information: The patient and Chart review    Chief Complaint: Fatigue      History of Presenting Illness:   Aparna Nath is a 84 y.o. female with past medical history of CAD, MI, PTCA stents, hypertension, hyperlipidemia, EMILIA, dyspepsia, GERD, fibromyalgia, chronic pain, lumbar laminectomy/fusion presented to the emergency department with chief complaint of confusion, generalized weakness, difficulty performing tasks, shaking.  Patient was last known well last night.  When she woke this morning she noticed onset of symptoms.  She reportedly was unable to use her hands fingers in order to text, shaking uncontrollably, with weakness.  Per ER reports she had taken her pain medication (oxycodone) but was unable to recall if she had taken her other prescribed medications.  She has a history of chronic pain which she is on pain medications every 4 hours.  She has a history of chronic UTIs and is chronically on antibiotics for the same.  Today she reportedly seen by her PCP and was told to go to the ER.  Per chart review, she was last hospitalized on 12/8/2024 to 1/4/2025 with diagnosis of bilateral lower extremity weakness possibly secondary to UTI.  Today, on arrival in the ED, initial reported vital signs were temperature 97.8 °F,  101, heart rate 109, respiratory rate 20, O2 saturation 100 send on room air.  12-lead EKG shows sinus rhythm, first-degree AV block, nonspecific ST/T wave changes 97 bpm.  Chest x-ray 2 view showed no acute cardiopulmonary process.  CT head without IV contrast showed no acute intracranial findings.  Abnormal lab included BUN 26.  Urinalysis showed 30 protein, negative blood, moderate leukocyte esterase, negative nitrite, 10-20 WBC, 0-5 RBC, 1+ bacteria.  ED ordered ceftriaxone 1000 mL IV and oxycodone 10 mg p.o. x 1 dose.  Patient is now seen for admission to the

## 2025-03-08 VITALS
RESPIRATION RATE: 18 BRPM | TEMPERATURE: 98.2 F | OXYGEN SATURATION: 98 % | HEART RATE: 53 BPM | WEIGHT: 167.7 LBS | SYSTOLIC BLOOD PRESSURE: 116 MMHG | HEIGHT: 63 IN | BODY MASS INDEX: 29.71 KG/M2 | DIASTOLIC BLOOD PRESSURE: 65 MMHG

## 2025-03-08 LAB
ANION GAP SERPL CALC-SCNC: 6 MMOL/L (ref 2–12)
BUN SERPL-MCNC: 24 MG/DL (ref 6–20)
BUN/CREAT SERPL: 34 (ref 12–20)
CALCIUM SERPL-MCNC: 9.2 MG/DL (ref 8.5–10.1)
CHLORIDE SERPL-SCNC: 105 MMOL/L (ref 97–108)
CO2 SERPL-SCNC: 30 MMOL/L (ref 21–32)
CREAT SERPL-MCNC: 0.7 MG/DL (ref 0.55–1.02)
GLUCOSE SERPL-MCNC: 108 MG/DL (ref 65–100)
POTASSIUM SERPL-SCNC: 3.8 MMOL/L (ref 3.5–5.1)
SODIUM SERPL-SCNC: 141 MMOL/L (ref 136–145)

## 2025-03-08 PROCEDURE — 6370000000 HC RX 637 (ALT 250 FOR IP): Performed by: NURSE PRACTITIONER

## 2025-03-08 PROCEDURE — 6360000002 HC RX W HCPCS: Performed by: FAMILY MEDICINE

## 2025-03-08 PROCEDURE — 6370000000 HC RX 637 (ALT 250 FOR IP): Performed by: FAMILY MEDICINE

## 2025-03-08 PROCEDURE — 80048 BASIC METABOLIC PNL TOTAL CA: CPT

## 2025-03-08 PROCEDURE — 2500000003 HC RX 250 WO HCPCS: Performed by: FAMILY MEDICINE

## 2025-03-08 RX ORDER — PROCHLORPERAZINE MALEATE 10 MG
10 TABLET ORAL 2 TIMES DAILY
COMMUNITY

## 2025-03-08 RX ORDER — PROCHLORPERAZINE MALEATE 5 MG/1
5 TABLET ORAL EVERY 6 HOURS PRN
Status: DISCONTINUED | OUTPATIENT
Start: 2025-03-08 | End: 2025-03-08 | Stop reason: HOSPADM

## 2025-03-08 RX ADMIN — SODIUM CHLORIDE, PRESERVATIVE FREE 10 ML: 5 INJECTION INTRAVENOUS at 08:57

## 2025-03-08 RX ADMIN — LEVOTHYROXINE SODIUM 75 MCG: 0.07 TABLET ORAL at 07:09

## 2025-03-08 RX ADMIN — METOPROLOL TARTRATE 100 MG: 50 TABLET, FILM COATED ORAL at 08:56

## 2025-03-08 RX ADMIN — DULOXETINE HYDROCHLORIDE 60 MG: 30 CAPSULE, DELAYED RELEASE ORAL at 08:56

## 2025-03-08 RX ADMIN — PROCHLORPERAZINE MALEATE 5 MG: 5 TABLET ORAL at 09:30

## 2025-03-08 RX ADMIN — LOSARTAN POTASSIUM 100 MG: 50 TABLET, FILM COATED ORAL at 08:56

## 2025-03-08 RX ADMIN — ONDANSETRON 4 MG: 2 INJECTION INTRAMUSCULAR; INTRAVENOUS at 03:54

## 2025-03-08 RX ADMIN — OXYCODONE 10 MG: 5 TABLET ORAL at 04:07

## 2025-03-08 RX ADMIN — ASPIRIN 81 MG CHEWABLE TABLET 81 MG: 81 TABLET CHEWABLE at 08:56

## 2025-03-08 RX ADMIN — CLONIDINE HYDROCHLORIDE 0.2 MG: 0.1 TABLET ORAL at 08:56

## 2025-03-08 RX ADMIN — OXYCODONE 10 MG: 5 TABLET ORAL at 09:56

## 2025-03-08 RX ADMIN — BUMETANIDE 2 MG: 0.25 INJECTION INTRAMUSCULAR; INTRAVENOUS at 08:56

## 2025-03-08 RX ADMIN — PANTOPRAZOLE SODIUM 40 MG: 40 TABLET, DELAYED RELEASE ORAL at 07:09

## 2025-03-08 ASSESSMENT — PAIN SCALES - GENERAL
PAINLEVEL_OUTOF10: 8
PAINLEVEL_OUTOF10: 8

## 2025-03-08 ASSESSMENT — PAIN SCALES - WONG BAKER: WONGBAKER_NUMERICALRESPONSE: NO HURT

## 2025-03-08 ASSESSMENT — PAIN DESCRIPTION - LOCATION: LOCATION: GENERALIZED

## 2025-03-08 NOTE — DISCHARGE SUMMARY
Discharge Summary       PATIENT ID: Aparna Nath  MRN: [id number]   YOB: 1940    DATE OF ADMISSION: 3/6/2025  6:53 PM    DATE OF DISCHARGE: 3/8/2025  PRIMARY CARE PROVIDER: Will Pringle MD     ATTENDING PHYSICIAN: Dr. Higgins  DISCHARGING PROVIDER: NICOLETTE Dunham NP    To contact this individual call [phone] and ask the  to page.  If unavailable ask to be transferred the Adult Hospitalist Department.    CONSULTATIONS: IP CONSULT TO HOSPITALIST  IP CONSULT TO CASE MANAGEMENT  IP CONSULT TO CASE MANAGEMENT    PROCEDURES/SURGERIES: * No surgery found *    ADMITTING DIAGNOSES & HOSPITAL COURSE:   Aparna Nath is a 84 y.o. female with past medical history of CAD, MI, PTCA stents, hypertension, hyperlipidemia, EMILIA, dyspepsia, GERD, fibromyalgia, chronic pain, lumbar laminectomy/fusion presented to the emergency department with chief complaint of confusion, generalized weakness, difficulty performing tasks, shaking.  Patient was last known well last night.  When she woke this morning she noticed onset of symptoms.  She reportedly was unable to use her hands fingers in order to text, shaking uncontrollably, with weakness.  Per ER reports she had taken her pain medication (oxycodone) but was unable to recall if she had taken her other prescribed medications.  She has a history of chronic pain which she is on pain medications every 4 hours.  She has a history of chronic UTIs and is chronically on antibiotics for the same.  Today she reportedly seen by her PCP and was told to go to the ER.  Per chart review, she was last hospitalized on 12/8/2024 to 1/4/2025 with diagnosis of bilateral lower extremity weakness possibly secondary to UTI.       Today, on arrival in the ED, initial reported vital signs were temperature 97.8 °F,  101, heart rate 109, respiratory rate 20, O2 saturation 100 send on room air.  12-lead EKG shows sinus rhythm, first-degree AV block, nonspecific ST/T  warm, old healed lumbar surgical scar    CHRONIC MEDICAL DIAGNOSES:      Greater than 31 minutes were spent with the patient on counseling and coordination of care    Signed:   NICOLETTE Dunham NP  3/8/2025  10:58 AM

## 2025-03-08 NOTE — CARE COORDINATION
Care Management Initial Assessment       RUR: 16%  Readmission? No  1st IM letter given? Yes - 3/6  1st  letter given: No    CM verified insurance and demographics. Pt lives alone in a single level home. Pt is independent with ADLs and ambulation. DME - RW and rollator -- baseline. The following hx of rehab:   HH - [facility]  SNF - [facility]  IPR - no hx    Pt requesting to restart  services and would like PT/SN to [facility].    CM sent orders via [facility]. They have accepted.    AVS updated.    Daughter will transport pt home.    CM will provide support as needed.    Miguelina MAYFIELD CM    [employer]ia [employer]     03/08/25 4157   Service Assessment   Patient Orientation Alert and Oriented;Person;Place;Situation   Cognition Alert   History Provided By Patient   Primary Caregiver Self   Support Systems Family Members   Patient's Healthcare Decision Maker is: Named in Scanned ACP Document   PCP Verified by CM Yes   Last Visit to PCP Within last 3 months   Prior Functional Level Independent in ADLs/IADLs   Current Functional Level Independent in ADLs/IADLs   Can patient return to prior living arrangement Yes   Ability to make needs known: Good   Family able to assist with home care needs: Yes   Financial Resources Medicare   Social/Functional History   Lives With Alone   Type of Home House   Home Equipment Walker - Rolling;Rollator   Receives Help From Home health   Prior Level of Assist for ADLs Independent   Prior Level of Assist for Homemaking Independent   Ambulation Assistance Independent   Prior Level of Assist for Transfers Independent   Active  Yes   Mode of Transportation Car   Occupation Retired   Discharge Planning   Type of Residence House   Living Arrangements Alone   Type of Home Care Services OT;PT;Skilled Therapy   Patient expects to be discharged to: House   Services At/After Discharge   Transition of Care Consult (CM Consult) Home Health;Transportation Assistance;Discharge Planning   Mode of  Transport at Discharge Self   Confirm Follow Up Transport Friends   Condition of Participation: Discharge Planning   The Plan for Transition of Care is related to the following treatment goals: home with HH   The Patient and/or Patient Representative was provided with a Choice of Provider? Patient   The Patient and/Or Patient Representative agree with the Discharge Plan? Yes   Freedom of Choice list was provided with basic dialogue that supports the patient's individualized plan of care/goals, treatment preferences, and shares the quality data associated with the providers?  Yes

## 2025-03-08 NOTE — DISCHARGE INSTRUCTIONS
Discharge Instructions       PATIENT ID: Aparna Nath  MRN: [id number]   YOB: 1940    DATE OF ADMISSION: 3/6/2025   DATE OF DISCHARGE: 3/8/2025    PRIMARY CARE PROVIDER: Will Pringle     ATTENDING PHYSICIAN: David Machado MD   DISCHARGING PROVIDER: NICOLETTE Dunham NP    To contact this individual call [phone] and ask the  to page.   If unavailable ask to be transferred the Adult Hospitalist Department.    DISCHARGE DIAGNOSES: Confusion, generalized weakness    CONSULTATIONS: none    PROCEDURES/SURGERIES: * No surgery found *    PENDING TEST RESULTS:   At the time of discharge the following test results are still pending: none    FOLLOW UP APPOINTMENTS:    PCP, Urology    ADDITIONAL CARE RECOMMENDATIONS:   UTI was ruled out  Imaging of your head was negative for an acute stroke  Please follow-up with your PCP pain management specialist  Please exercise caution when taking oxycodone, do not mix with Flexeril, Compazine or Lyrica at the same time as this can make you lethargic and confused. Space these medications out.  Please share this information with your daughter so she is aware you are on multiple medications which the combination may alter your sensorium.  Do NOT drive while taking oxycodone    DIET: Regular    ACTIVITY: HH PT/OT    WOUND CARE: none    EQUIPMENT needed: none      DISCHARGE MEDICATIONS:   See Medication Reconciliation Form    It is important that you take the medication exactly as they are prescribed.   Keep your medication in the bottles provided by the pharmacist and keep a list of the medication names, dosages, and times to be taken in your wallet.   Do not take other medications without consulting your doctor.       NOTIFY YOUR PHYSICIAN FOR ANY OF THE FOLLOWING:   Fever over 101 degrees for 24 hours.   Chest pain, shortness of breath, fever, chills, nausea, vomiting, diarrhea, change in mentation, falling, weakness, bleeding. Severe pain or pain  not relieved by medications.  Or, any other signs or symptoms that you may have questions about.      DISPOSITION:   x Home With:   OT  PT  HH  RN       SNF/Inpatient Rehab/LTAC    Independent/assisted living    Hospice    Other:     CDMP Checked:   Yes x     PROBLEM LIST Updated:  Yes x       Signed:   NICOLETTE Dunham NP  3/8/2025  7:11 AM

## 2025-03-08 NOTE — CARE COORDINATION
Transition of Care Plan:    RUR: 15%  Prior Level of Functioning: independent  Disposition: home with HH  MARY: today   Follow up appointments: MD recommendations  DME needed: N  Transportation at discharge: family   IM/IMM Medicare/ letter given: 3/8  Caregiver Contact: Carli / [phone]  Discharge Caregiver contacted prior to discharge? N  Care Conference needed? N  Barriers to discharge: N    Pt requesting HH services. Pt is requesting HH services with [facility].     CM sent orders for HH services [facility]. Pending decision of [facility] if they're able to accept.    CM called intake:  P:  [phone]       AVS updated.    CM will continue to provide support as needed.    Miguelina Mezt RN BSN CM    [facility] [employer]

## 2025-03-10 ENCOUNTER — TELEPHONE (OUTPATIENT)
Facility: CLINIC | Age: 85
End: 2025-03-10

## 2025-03-10 NOTE — TELEPHONE ENCOUNTER
Care Transitions Initial Follow Up Call    Outreach made within 2 business days of discharge: Yes    Patient: Aparna Nath Patient : 1940   MRN: [id number]  Reason for Admission: AMS (altered mental status)  Discharge Date: 3/8/25       Spoke with: Patient    Discharge department/facility: [facility]    TCM Interactive Patient Contact:  Was patient able to fill all prescriptions: Yes  Was patient instructed to bring all medications to the follow-up visit: Yes  Is patient taking all medications as directed in the discharge summary? Yes  Does patient understand their discharge instructions: Yes  Does patient have questions or concerns that need addressed prior to 7-14 day follow up office visit: no    Additional needs identified to be addressed with provider  No needs identified               Follow Up  No future appointments.    Yessenia Ramos

## 2025-03-12 ENCOUNTER — TELEPHONE (OUTPATIENT)
Facility: CLINIC | Age: 85
End: 2025-03-12

## 2025-03-12 NOTE — TELEPHONE ENCOUNTER
Care Transitions Initial Follow Up Call    Outreach made within 2 business days of discharge: Yes    Patient: Aparna Nath Patient : 1940   MRN: [id number]  Reason for Admission: AMS (altered mental status)   Discharge Date: 3/8/25       Spoke with: Patient    Discharge department/facility:  [facility]     TCM Interactive Patient Contact:  Was patient able to fill all prescriptions: Yes  Was patient instructed to bring all medications to the follow-up visit: Yes  Is patient taking all medications as directed in the discharge summary? Yes  Does patient understand their discharge instructions: Yes  Does patient have questions or concerns that need addressed prior to 7-14 day follow up office visit: no    Additional needs identified to be addressed with provider  No needs identified             Offered patient appointment for 3/13/25, pt declined. Will continue to find an appointment that works for the patient.     Offered patient appointment for 3/18/25, pt accepted appointment.     Follow Up  Future Appointments   Date Time Provider Department Center   3/18/2025  2:15 PM Will Pringle MD [facility] MAIN [facility] DEP       Yessenia Ramos

## 2025-03-20 ENCOUNTER — TELEMEDICINE (OUTPATIENT)
Facility: CLINIC | Age: 85
End: 2025-03-20

## 2025-03-20 ENCOUNTER — TELEPHONE (OUTPATIENT)
Facility: CLINIC | Age: 85
End: 2025-03-20

## 2025-03-20 DIAGNOSIS — R41.82 ALTERED MENTAL STATUS, UNSPECIFIED ALTERED MENTAL STATUS TYPE: Primary | ICD-10-CM

## 2025-03-20 DIAGNOSIS — N39.0 CHRONIC UTI: ICD-10-CM

## 2025-03-20 DIAGNOSIS — G25.2 COARSE TREMOR: ICD-10-CM

## 2025-03-20 DIAGNOSIS — I10 ESSENTIAL HYPERTENSION: ICD-10-CM
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
## 2025-03-28 ENCOUNTER — TELEPHONE (OUTPATIENT)
Facility: CLINIC | Age: 85
End: 2025-03-28

## 2025-03-28 NOTE — TELEPHONE ENCOUNTER
Patient call requesting an order from you for a home health nurse to draw her blood. She states she is unable to drive to come in office

## 2025-05-01 NOTE — PROGRESS NOTES
Physician Progress Note      PATIENT:               SUHA BAILEY  CSN #:                  [id number]  :                       1940  ADMIT DATE:       3/6/2025 6:53 PM  DISCH DATE:        3/8/2025 1:59 PM  RESPONDING  PROVIDER #:        David Machado MD          QUERY TEXT:    A mental status change is documented in the medical record H&P by Prem Holder on .  Please specify the underlying cause:    The clinical indicators include:  Patient presented with confusion and initially admitted with UTI which was   later ruled out. Patient has chronic pain syndrome/ fibromyalgia and takes   Oxycodone and Lyrica  Clinical indicators include  Per ER reports she had taken her pain medication (oxycodone) but was unable to   recall if she had taken her other prescribed medications.   DS \"Confusion likely from chronic narcotics, this has been discussed   with patient at length.\" \"She is requesting Flexeril and Compazine in addition   to her chronic Oxycodone and Lyrica.  Caution has been advised not to take   all of these at the same time as it can make her lethargic and confused,   verbally clear\"  'AMS (altered mental status) -  Resolved\"  CT head without IV contrast 3/6/2025:  no acute intracranial findings  Options provided:  -- Drug-induced encephalopathy related to Oxycodone  -- Drug-induced encephalopathy related to Lyrica, Please specify substance.  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    Query created by: Dulce Francois on 2025 4:40 PM      Electronically signed by:  David Machado MD 2025 9:01 PM

## 2025-05-29 ENCOUNTER — HOSPITAL ENCOUNTER (EMERGENCY)
Facility: HOSPITAL | Age: 85
Discharge: HOME | End: 2025-05-29
Attending: EMERGENCY MEDICINE
Payer: MEDICARE

## 2025-05-29 ENCOUNTER — APPOINTMENT (OUTPATIENT)
Facility: HOSPITAL | Age: 85
End: 2025-05-29
Payer: MEDICARE

## 2025-05-29 VITALS
RESPIRATION RATE: 18 BRPM | HEART RATE: 80 BPM | SYSTOLIC BLOOD PRESSURE: 148 MMHG | HEIGHT: 63 IN | OXYGEN SATURATION: 100 % | DIASTOLIC BLOOD PRESSURE: 73 MMHG | BODY MASS INDEX: 32.77 KG/M2 | WEIGHT: 184.97 LBS | TEMPERATURE: 97.5 F

## 2025-05-29 DIAGNOSIS — I10 ESSENTIAL HYPERTENSION: ICD-10-CM

## 2025-05-29 DIAGNOSIS — R11.0 NAUSEA: ICD-10-CM

## 2025-05-29 DIAGNOSIS — M79.672 PAIN IN BOTH FEET: Primary | ICD-10-CM

## 2025-05-29 DIAGNOSIS — M79.671 PAIN IN BOTH FEET: Primary | ICD-10-CM

## 2025-05-29 PROCEDURE — 99283 EMERGENCY DEPT VISIT LOW MDM: CPT

## 2025-05-29 PROCEDURE — 6370000000 HC RX 637 (ALT 250 FOR IP): Performed by: EMERGENCY MEDICINE

## 2025-05-29 PROCEDURE — 73630 X-RAY EXAM OF FOOT: CPT

## 2025-05-29 RX ORDER — ONDANSETRON 4 MG/1
4 TABLET, ORALLY DISINTEGRATING ORAL 3 TIMES DAILY PRN
Qty: 21 TABLET | Refills: 0 | Status: SHIPPED | OUTPATIENT
Start: 2025-05-29

## 2025-05-29 RX ORDER — ONDANSETRON 4 MG/1
4 TABLET, ORALLY DISINTEGRATING ORAL
Status: COMPLETED | OUTPATIENT
Start: 2025-05-29 | End: 2025-05-29

## 2025-05-29 RX ORDER — OXYCODONE AND ACETAMINOPHEN 10; 325 MG/1; MG/1
1 TABLET ORAL
Refills: 0 | Status: COMPLETED | OUTPATIENT
Start: 2025-05-29 | End: 2025-05-29

## 2025-05-29 RX ADMIN — OXYCODONE AND ACETAMINOPHEN 1 TABLET: 10; 325 TABLET ORAL at 14:22

## 2025-05-29 RX ADMIN — ONDANSETRON 4 MG: 4 TABLET, ORALLY DISINTEGRATING ORAL at 14:22

## 2025-05-29 ASSESSMENT — PAIN DESCRIPTION - FREQUENCY
FREQUENCY: CONTINUOUS
FREQUENCY: CONTINUOUS

## 2025-05-29 ASSESSMENT — PAIN - FUNCTIONAL ASSESSMENT
PAIN_FUNCTIONAL_ASSESSMENT: PREVENTS OR INTERFERES SOME ACTIVE ACTIVITIES AND ADLS
PAIN_FUNCTIONAL_ASSESSMENT: 0-10
PAIN_FUNCTIONAL_ASSESSMENT: PREVENTS OR INTERFERES WITH MANY ACTIVE NOT PASSIVE ACTIVITIES
PAIN_FUNCTIONAL_ASSESSMENT: ACTIVITIES ARE NOT PREVENTED

## 2025-05-29 ASSESSMENT — PAIN SCALES - GENERAL
PAINLEVEL_OUTOF10: 10
PAINLEVEL_OUTOF10: 7
PAINLEVEL_OUTOF10: 10

## 2025-05-29 ASSESSMENT — PAIN DESCRIPTION - LOCATION
LOCATION: BACK

## 2025-05-29 ASSESSMENT — PAIN DESCRIPTION - PAIN TYPE
TYPE: ACUTE PAIN
TYPE: CHRONIC PAIN

## 2025-05-29 ASSESSMENT — PAIN DESCRIPTION - ORIENTATION
ORIENTATION: LOWER
ORIENTATION: LOWER
ORIENTATION: MID;LOWER

## 2025-05-29 ASSESSMENT — PAIN DESCRIPTION - DESCRIPTORS
DESCRIPTORS: ACHING;DISCOMFORT

## 2025-05-29 ASSESSMENT — LIFESTYLE VARIABLES
HOW OFTEN DO YOU HAVE A DRINK CONTAINING ALCOHOL: NEVER
HOW MANY STANDARD DRINKS CONTAINING ALCOHOL DO YOU HAVE ON A TYPICAL DAY: PATIENT DOES NOT DRINK

## 2025-05-29 ASSESSMENT — PAIN DESCRIPTION - ONSET
ONSET: ON-GOING
ONSET: ON-GOING

## 2025-05-29 NOTE — ED TRIAGE NOTES
Patient arrives to the ED by EMS for complaints of back pain. Patient reports being taken off of a medication that is not FDA approved for her back pain. Patient reports her back pain has become worse since stopping the medication. Patient reports taking oxycodone 4x day.

## 2025-05-29 NOTE — DISCHARGE INSTRUCTIONS
Bladder scan results 859 ml.    You were seen in the emergency department for foot pain, back pain, nausea, and hypertension.  The results of your tests were reassuring.  Please take any medications prescribed at this visit as instructed.  Please follow-up with your PCP or return to the emergency department if you experience a worsening of symptoms or any new symptoms that are concerning to you.

## 2025-05-29 NOTE — ED PROVIDER NOTES
Disp-30 tablet, R-0Normal      omeprazole (PRILOSEC) 20 MG delayed release capsule Take 1 capsule by mouth daily, Disp-30 capsule, R-0Normal      pregabalin (LYRICA) 225 MG capsule Take 1 capsule by mouth 2 times daily for 30 days. Max Daily Amount: 450 mg, Disp-60 capsule, R-0Normal      metoprolol (LOPRESSOR) 100 MG tablet Take 1 tablet by mouth 2 times daily, Disp-60 tablet, R-0Normal      cloNIDine (CATAPRES) 0.2 MG tablet Take 1 tablet by mouth 2 times daily, Disp-60 tablet, R-0Normal      bumetanide (BUMEX) 2 MG tablet Take 1 tablet by mouth daily, Disp-30 tablet, R-0Normal      Melatonin 5 MG CAPS Take 10 mg by mouth nightly, Disp-30 capsule, R-0Normal      losartan (COZAAR) 100 MG tablet Take 1 tablet by mouth daily, Disp-30 tablet, R-0Normal      DULoxetine (CYMBALTA) 60 MG extended release capsule Take 1 capsule by mouth daily, Disp-30 capsule, R-0Normal      prazosin (MINIPRESS) 2 MG capsule Take 1 capsule by mouth nightly, Disp-30 capsule, R-0Normal      valsartan (DIOVAN) 160 MG tablet Take 1 tablet by mouth daily, Disp-30 tablet, R-0Normal      oxyCODONE-acetaminophen (PERCOCET)  MG per tablet Historical Med             ALLERGIES     Patient has no known allergies.    FAMILY HISTORY       Family History   Problem Relation Age of Onset    Stroke Father     Heart Disease Brother     Hypertension Brother     Depression Maternal Aunt     Heart Disease Father     Heart Disease Mother     Hypertension Mother     Depression Maternal Grandmother     Depression Maternal Uncle     Anesth Problems Neg Hx     Depression Maternal Grandfather           SOCIAL HISTORY       Social History     Socioeconomic History    Marital status:    Tobacco Use    Smoking status: Every Day     Current packs/day: 0.10     Average packs/day: 0.9 packs/day for 11.3 years (10.1 ttl pk-yrs)     Types: Cigarettes     Start date: 02/2024     Last attempt to quit: 1/1/2006    Smokeless tobacco: Never   Vaping Use    Vaping  either signs or Co-signs this chart in the absence of a cardiologist.        RADIOLOGY:   Non-plain film images such as CT, Ultrasound and MRI are read by the radiologist. Plain radiographic images are visualized and preliminarily interpreted by the emergency physician with the below findings:        Interpretation per the Radiologist below, if available at the time of this note:    XR FOOT RIGHT (MIN 3 VIEWS)   Final Result   No acute abnormality. Joint degenerative change.      Electronically signed by Kenny Renee           LABS:  Labs Reviewed - No data to display    All other labs were within normal range or not returned as of this dictation.    EMERGENCY DEPARTMENT COURSE and DIFFERENTIAL DIAGNOSIS/MDM:   Vitals:    Vitals:    05/29/25 1200 05/29/25 1422 05/29/25 1445 05/29/25 1545   BP: (!) 183/100  (!) 181/90 (!) 148/73   Pulse: 78  81 80   Resp: 16 18 16 18   Temp: 97.9 °F (36.6 °C)  98 °F (36.7 °C) 97.5 °F (36.4 °C)   TempSrc: Oral  Oral Oral   SpO2: 94%  95% 100%   Weight: 83.9 kg (184 lb 15.5 oz)      Height: 1.6 m (5' 3\")              Medical Decision Making  DDx: Osteomyelitis, cellulitis, well-healing wound, chronic pain    Plan:  - Imaging: X-ray right foot  - Medications: Ondansetron, oxycodone    Reassessment: Patient's x-ray is reassuring, no osteomyelitis.  She reports feeling better with the ondansetron and oxycodone.  Will prescribe her ondansetron and she states that she has plenty of her oxycodone as prescribed by her pain specialist.  The remainder of her complaints need to be addressed by her primary care provider.  Will discharge at this time.  Patient is in agreement with this plan.      Amount and/or Complexity of Data Reviewed  Radiology: ordered.    Risk  Prescription drug management.            REASSESSMENT            CONSULTS:  None    PROCEDURES:  Unless otherwise noted below, none     Procedures      FINAL IMPRESSION      1. Pain in both feet    2. Nausea    3. Essential

## 2025-05-31 ASSESSMENT — ENCOUNTER SYMPTOMS
EYES NEGATIVE: 1
BACK PAIN: 1
RESPIRATORY NEGATIVE: 1
NAUSEA: 1

## 2025-06-10 ENCOUNTER — TELEPHONE (OUTPATIENT)
Facility: CLINIC | Age: 85
End: 2025-06-10

## 2025-06-12 RX ORDER — PROMETHAZINE HYDROCHLORIDE 25 MG/1
25 TABLET ORAL 3 TIMES DAILY PRN
Qty: 12 TABLET | Refills: 0 | Status: SHIPPED | OUTPATIENT
Start: 2025-06-12 | End: 2025-06-19

## 2025-07-09 ENCOUNTER — OFFICE VISIT (OUTPATIENT)
Age: 85
End: 2025-07-09
Payer: MEDICARE

## 2025-07-09 VITALS
HEIGHT: 63 IN | SYSTOLIC BLOOD PRESSURE: 138 MMHG | BODY MASS INDEX: 28.77 KG/M2 | RESPIRATION RATE: 16 BRPM | WEIGHT: 162.4 LBS | DIASTOLIC BLOOD PRESSURE: 60 MMHG | HEART RATE: 84 BPM | TEMPERATURE: 97 F

## 2025-07-09 DIAGNOSIS — R25.1 TREMOR OF BOTH HANDS: Primary | ICD-10-CM

## 2025-07-09 PROBLEM — R41.82 AMS (ALTERED MENTAL STATUS): Status: RESOLVED | Noted: 2025-03-06 | Resolved: 2025-07-09

## 2025-07-09 PROCEDURE — 4004F PT TOBACCO SCREEN RCVD TLK: CPT | Performed by: PSYCHIATRY & NEUROLOGY

## 2025-07-09 PROCEDURE — G8427 DOCREV CUR MEDS BY ELIG CLIN: HCPCS | Performed by: PSYCHIATRY & NEUROLOGY

## 2025-07-09 PROCEDURE — 3078F DIAST BP <80 MM HG: CPT | Performed by: PSYCHIATRY & NEUROLOGY

## 2025-07-09 PROCEDURE — 1160F RVW MEDS BY RX/DR IN RCRD: CPT | Performed by: PSYCHIATRY & NEUROLOGY

## 2025-07-09 PROCEDURE — 99204 OFFICE O/P NEW MOD 45 MIN: CPT | Performed by: PSYCHIATRY & NEUROLOGY

## 2025-07-09 PROCEDURE — G8399 PT W/DXA RESULTS DOCUMENT: HCPCS | Performed by: PSYCHIATRY & NEUROLOGY

## 2025-07-09 PROCEDURE — 1159F MED LIST DOCD IN RCRD: CPT | Performed by: PSYCHIATRY & NEUROLOGY

## 2025-07-09 PROCEDURE — 1123F ACP DISCUSS/DSCN MKR DOCD: CPT | Performed by: PSYCHIATRY & NEUROLOGY

## 2025-07-09 PROCEDURE — 3075F SYST BP GE 130 - 139MM HG: CPT | Performed by: PSYCHIATRY & NEUROLOGY

## 2025-07-09 PROCEDURE — 1125F AMNT PAIN NOTED PAIN PRSNT: CPT | Performed by: PSYCHIATRY & NEUROLOGY

## 2025-07-09 PROCEDURE — 1090F PRES/ABSN URINE INCON ASSESS: CPT | Performed by: PSYCHIATRY & NEUROLOGY

## 2025-07-09 PROCEDURE — G8417 CALC BMI ABV UP PARAM F/U: HCPCS | Performed by: PSYCHIATRY & NEUROLOGY

## 2025-07-09 RX ORDER — PRIMIDONE 50 MG/1
50 TABLET ORAL NIGHTLY
Qty: 90 TABLET | Refills: 3 | Status: SHIPPED | OUTPATIENT
Start: 2025-07-09

## 2025-07-09 RX ORDER — PRAVASTATIN SODIUM 40 MG
40 TABLET ORAL DAILY
COMMUNITY

## 2025-07-09 RX ORDER — HYDROXYZINE HYDROCHLORIDE 10 MG/1
10 TABLET, FILM COATED ORAL EVERY 6 HOURS PRN
COMMUNITY
Start: 2025-06-09

## 2025-07-09 ASSESSMENT — PATIENT HEALTH QUESTIONNAIRE - PHQ9
2. FEELING DOWN, DEPRESSED OR HOPELESS: NEARLY EVERY DAY
SUM OF ALL RESPONSES TO PHQ QUESTIONS 1-9: 21
10. IF YOU CHECKED OFF ANY PROBLEMS, HOW DIFFICULT HAVE THESE PROBLEMS MADE IT FOR YOU TO DO YOUR WORK, TAKE CARE OF THINGS AT HOME, OR GET ALONG WITH OTHER PEOPLE: NOT DIFFICULT AT ALL
6. FEELING BAD ABOUT YOURSELF - OR THAT YOU ARE A FAILURE OR HAVE LET YOURSELF OR YOUR FAMILY DOWN: NEARLY EVERY DAY
5. POOR APPETITE OR OVEREATING: NEARLY EVERY DAY
SUM OF ALL RESPONSES TO PHQ QUESTIONS 1-9: 21
9. THOUGHTS THAT YOU WOULD BE BETTER OFF DEAD, OR OF HURTING YOURSELF: NOT AT ALL
1. LITTLE INTEREST OR PLEASURE IN DOING THINGS: NEARLY EVERY DAY
2. FEELING DOWN, DEPRESSED OR HOPELESS: NEARLY EVERY DAY
8. MOVING OR SPEAKING SO SLOWLY THAT OTHER PEOPLE COULD HAVE NOTICED. OR THE OPPOSITE, BEING SO FIGETY OR RESTLESS THAT YOU HAVE BEEN MOVING AROUND A LOT MORE THAN USUAL: NOT AT ALL
SUM OF ALL RESPONSES TO PHQ QUESTIONS 1-9: 21
7. TROUBLE CONCENTRATING ON THINGS, SUCH AS READING THE NEWSPAPER OR WATCHING TELEVISION: NEARLY EVERY DAY
4. FEELING TIRED OR HAVING LITTLE ENERGY: NEARLY EVERY DAY
1. LITTLE INTEREST OR PLEASURE IN DOING THINGS: NEARLY EVERY DAY
SUM OF ALL RESPONSES TO PHQ QUESTIONS 1-9: 6
SUM OF ALL RESPONSES TO PHQ QUESTIONS 1-9: 21
SUM OF ALL RESPONSES TO PHQ QUESTIONS 1-9: 6
3. TROUBLE FALLING OR STAYING ASLEEP: NEARLY EVERY DAY

## 2025-07-09 NOTE — PATIENT INSTRUCTIONS
As per discussion  We have elected to start you on a medication called primidone the other name for it is Mysoline just take 1 in the evening hours or around that time.  It might make you sleepy which is why we give it at bedtime this should hopefully help to reduce your tremor so it is a little bit easier to get for your day-to-day activities.  Nothing is going to be 100% make it go away but hopefully will have more control over the tremor and less interference with your normal day-to-day activities    We are also going to check an MRI scan of the brain to make sure there is nothing going on that is contributing to this and we will also do a skin biopsy to check for specific protein that will help us determine what this may or may not be.    At this time I do not believe you have Parkinson's disease but doing the MRI scan and the biopsy will help us to get better clarification            As a reminder:   Please come to your appointment 15 minutes before your office appointment.  This way, you can get checked in at the  and checked in by the nursing staff so you have the full allotment of time with your provider for your visit.  Please bring an up-to-date and accurate list of all your medications.  Or bring all your active prescription bottles with you at the time of your office visit and this includes over-the-counter medications so we can make sure that your medication list is up-to-date.  If you are scheduled for a virtual visit, please be aware that the  will need to check you in and usually the day before to verify insurance and collect co-pays as appropriate.  Please be prepared for the second call which will be from the nurse to go over your medications and any other vital information.  This will probably be done 30 minutes prior to your visit.  The reason why we do this early is that you can get the full benefit of your appointment time with your provider.  Finally you will be given the  S/W pt.  Pt stated he had 50% of improvement and pain level is a 4/10.

## 2025-07-09 NOTE — PROGRESS NOTES
BP Site: Right Upper Arm Right Upper Arm   Patient Position: Sitting Sitting   BP Cuff Size: Large Adult Large Adult   Pulse: 84    Resp: 16    Temp: 97 °F (36.1 °C)    TempSrc: Temporal    Weight: 73.7 kg (162 lb 6.4 oz)    Height: 1.6 m (5' 3\")               7/9/2025     9:50 AM   PHQ-9    Little interest or pleasure in doing things 3   Feeling down, depressed, or hopeless 3   Trouble falling or staying asleep, or sleeping too much 3   Feeling tired or having little energy 3   Poor appetite or overeating 3   Feeling bad about yourself - or that you are a failure or have let yourself or your family down 3   Trouble concentrating on things, such as reading the newspaper or watching television 3   Moving or speaking so slowly that other people could have noticed. Or the opposite - being so fidgety or restless that you have been moving around a lot more than usual 0   Thoughts that you would be better off dead, or of hurting yourself in some way 0   PHQ-2 Score 6   PHQ-9 Total Score 21   If you checked off any problems, how difficult have these problems made it for you to do your work, take care of things at home, or get along with other people? 0        General:  Patient is well-developed and well-nourished in no apparent distress  Affect is appropriate   Normocephalic without evidence of trauma  Heart sounds normal no additional sounds heard breath sounds clear to auscultation no cervical masses or bruits appreciated  No tenderness in the head or neck area to palpation  Good peripheral pulses no edema is noted  No rashes unusual bruising or bleeding lacerations are atypical discoloration appreciated on general inspection    Neuro Exam  Mental status  Alert and oriented x3  No word blocking appreciated  No evidence of processing difficulty on general observation  Able to follow commands without difficulty    Cranial nerves  PERRLA   eyes move in all planes  No nystagmus appreciated  Gaze is conjugate  Good facial

## 2025-07-09 NOTE — ASSESSMENT & PLAN NOTE
Suspect essential tremor magnified by some of her medications and anxiety  Her exam is not strongly supportive for extrapyramidal disease but there is some manifestations of extrapyramidal symptoms so reasonable to further evaluate this.  Will check MRI scan for any pathology that could be contributing to her tremor I will also do a Syn-one Skin Bx biopsy to evaluate for alpha-synuclein protein which might suggest extrapyramidal disease       Patient is already on metoprolol so beta-blockers would not be appropriate for management of symptoms  Patient would like to try to manage her symptoms so we have elected to start her on low-dose primidone 50 mg nightly  A prescription was sent to her mail order [facility] pharmacy

## 2025-07-23 ENCOUNTER — TELEPHONE (OUTPATIENT)
Age: 85
End: 2025-07-23

## 2025-07-23 RX ORDER — DULOXETIN HYDROCHLORIDE 60 MG/1
60 CAPSULE, DELAYED RELEASE ORAL DAILY
COMMUNITY

## 2025-07-23 RX ORDER — LEVOTHYROXINE SODIUM 75 UG/1
75 TABLET ORAL DAILY
COMMUNITY

## 2025-07-23 RX ORDER — CYCLOBENZAPRINE HCL 10 MG
10 TABLET ORAL 3 TIMES DAILY PRN
COMMUNITY

## 2025-07-23 RX ORDER — LOSARTAN POTASSIUM 100 MG/1
100 TABLET ORAL DAILY
COMMUNITY

## 2025-07-23 NOTE — TELEPHONE ENCOUNTER
Patient states that during her apptmt she was advised to stop taking some medications.    Pt states that she does not have a list of those medications and would like to have a call back to verify what she would need to stop.    Please call her at [phone]

## 2025-07-23 NOTE — TELEPHONE ENCOUNTER
Returned call to patient and verified with 2 identifiers. She confirmed that the medications that were marked as \"not taking' are medications that she is still taking. Reviewed those with patient and will update list accordingly.

## 2025-07-28 RX ORDER — PROMETHAZINE HYDROCHLORIDE 25 MG/1
25 TABLET ORAL 3 TIMES DAILY PRN
Qty: 12 TABLET | Refills: 0 | Status: SHIPPED | OUTPATIENT
Start: 2025-07-28 | End: 2025-08-04

## 2025-08-14 ENCOUNTER — TELEPHONE (OUTPATIENT)
Age: 85
End: 2025-08-14

## 2025-08-25 RX ORDER — LEVOTHYROXINE SODIUM 75 UG/1
75 TABLET ORAL DAILY
Qty: 90 TABLET | Refills: 0 | Status: SHIPPED | OUTPATIENT
Start: 2025-08-25

## (undated) DEVICE — DRAIN SURG L0.375IN SIL CHN FLAT FULL FLOATED RADPQ CLS WND

## (undated) DEVICE — ABDOMINAL PAD: Brand: DERMACEA

## (undated) DEVICE — 2.5MM DRILL BIT/QC/GOLD/110MM

## (undated) DEVICE — CONVERTORS STOCKINETTE: Brand: CONVERTORS

## (undated) DEVICE — BIT DRL L125MM DIA2.7MM QUIK CPL 3 FLUT

## (undated) DEVICE — STERILE POLYISOPRENE POWDER-FREE SURGICAL GLOVES WITH EMOLLIENT COATING: Brand: PROTEXIS

## (undated) DEVICE — BIPOLAR FORCEPS CORD: Brand: VALLEYLAB

## (undated) DEVICE — SUTURE VCRL SZ 0 L27IN ABSRB UD L26MM CT-2 1/2 CIR J270H

## (undated) DEVICE — HANDLE LT SNAP ON ULT DURABLE LENS FOR TRUMPF ALC DISPOSABLE

## (undated) DEVICE — ZIMMER® STERILE DISPOSABLE TOURNIQUET CUFF WITH PLC, DUAL PORT, SINGLE BLADDER, 18 IN. (46 CM)

## (undated) DEVICE — (D)PREP SKN CHLRAPRP APPL 26ML -- CONVERT TO ITEM 371833

## (undated) DEVICE — DEVON™ KNEE AND BODY STRAP 60" X 3" (1.5 M X 7.6 CM): Brand: DEVON

## (undated) DEVICE — SUTURE VCRL SZ 2-0 L36IN ABSRB UD L36MM CT-1 1/2 CIR J945H

## (undated) DEVICE — IMMOB SHLDR W/WAIST STRP L --

## (undated) DEVICE — DRAPE,U/ SHT,SPLIT,PLAS,STERIL: Brand: MEDLINE

## (undated) DEVICE — DRAPE,REIN 53X77,STERILE: Brand: MEDLINE

## (undated) DEVICE — BIT DRL L125MM DIA2MM QUIK CPL W/O STP REUSE

## (undated) DEVICE — FORCEPS BX L240CM JAW DIA2.8MM L CAP W/ NDL MIC MESH TOOTH

## (undated) DEVICE — SLIM BODY SKIN STAPLER: Brand: APPOSE ULC

## (undated) DEVICE — KENDALL SCD EXPRESS SLEEVES, KNEE LENGTH, MEDIUM: Brand: KENDALL SCD

## (undated) DEVICE — DRAPE XR C ARM 41X74IN LF --

## (undated) DEVICE — GAUZE SPONGES,12 PLY: Brand: CURITY

## (undated) DEVICE — Device

## (undated) DEVICE — PADDING CAST SPEC 6INX4YD COT --

## (undated) DEVICE — BANDAGE COMPR SELF ADH 5 YDX4 IN TAN STRL PREMIERPRO LF

## (undated) DEVICE — HOOK LOCK LATEX FREE ELASTIC BANDAGE 4INX5YD

## (undated) DEVICE — REM POLYHESIVE ADULT PATIENT RETURN ELECTRODE: Brand: VALLEYLAB

## (undated) DEVICE — 2.8MM PERCUTANEOUS DRILL BIT F/LCP® PL QC/200MM/100MM CALIB: Brand: LCP

## (undated) DEVICE — OCCLUSIVE GAUZE STRIP,3% BISMUTH TRIBROMOPHENATE IN PETROLATUM BLEND: Brand: XEROFORM

## (undated) DEVICE — SURGICAL PROCEDURE PACK BASIN MAJ SET CUST NO CAUT

## (undated) DEVICE — BASIC PACK: Brand: CONVERTORS

## (undated) DEVICE — BANDAGE COMPR 9 FTX4 IN SMOOTH COMFORTABLE SYNTH ESMRK LF

## (undated) DEVICE — INFECTION CONTROL KIT SYS

## (undated) DEVICE — 3M™ STERI-DRAPE™ U-DRAPE 1015: Brand: STERI-DRAPE™

## (undated) DEVICE — ROCKER SWITCH PENCIL BLADE ELECTRODE, HOLSTER: Brand: EDGE

## (undated) DEVICE — DRAPE,EXTREMITY,89X128,STERILE: Brand: MEDLINE